# Patient Record
Sex: MALE | Race: WHITE | NOT HISPANIC OR LATINO | Employment: UNEMPLOYED | RURAL
[De-identification: names, ages, dates, MRNs, and addresses within clinical notes are randomized per-mention and may not be internally consistent; named-entity substitution may affect disease eponyms.]

---

## 2017-07-27 ENCOUNTER — TRANSCRIBE ORDERS (OUTPATIENT)
Dept: PHYSICAL THERAPY | Facility: HOSPITAL | Age: 37
End: 2017-07-27

## 2017-07-27 DIAGNOSIS — Z96.641 PRESENCE OF ARTIFICIAL HIP JOINT, RIGHT: Primary | ICD-10-CM

## 2017-08-07 ENCOUNTER — HOSPITAL ENCOUNTER (OUTPATIENT)
Dept: PHYSICAL THERAPY | Facility: HOSPITAL | Age: 37
Setting detail: THERAPIES SERIES
Discharge: HOME | End: 2017-08-07

## 2017-08-07 DIAGNOSIS — Z96.641 PRESENCE OF ARTIFICIAL HIP JOINT, RIGHT: Primary | ICD-10-CM

## 2017-08-07 PROCEDURE — 97161 PT EVAL LOW COMPLEX 20 MIN: CPT | Performed by: PHYSICAL THERAPIST

## 2017-08-07 PROCEDURE — 97110 THERAPEUTIC EXERCISES: CPT | Performed by: PHYSICAL THERAPIST
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
## 2018-01-29 ENCOUNTER — DOCUMENTATION (OUTPATIENT)
Dept: PHYSICAL THERAPY | Facility: HOSPITAL | Age: 38
End: 2018-01-29

## 2018-01-29 DIAGNOSIS — Z96.641 PRESENCE OF ARTIFICIAL HIP JOINT, RIGHT: Primary | ICD-10-CM

## 2018-01-29 NOTE — THERAPY DISCHARGE NOTE
Outpatient Physical Therapy Discharge Summary         Patient Name: Rikki Alejandro  : 1980  MRN: [id number]    Today's Date: 2018    Visit Dx:    ICD-10-CM ICD-9-CM   1. Presence of artificial hip joint, right Z96.641 V43.64             PT OP Goals       18 1300       PT Short Term Goals    STG Date to Achieve 17  -KG     STG 1 Indep in HEP  -KG     STG 1 Progress Not Met  -KG     STG 2 Tolerate 45 minute treatment session without increased pain  -KG     STG 2 Progress Not Met  -KG     STG 3 Ambulate independently with no AD, non antalgic gait  -KG     STG 3 Progress Not Met  -KG     STG 4 Demonstrate R hip flex AROM to 90 deg  -KG     STG 4 Progress Not Met  -KG     Long Term Goals    LTG Date to Achieve 17  -KG     LTG 1 Subjectively report 60% improvement or greater  -KG     LTG 1 Progress Not Met  -KG     LTG 2 LEFS score to 45/80 or greater  -KG     LTG 2 Progress Not Met  -KG     LTG 3 Demonstrate R hip flex/abd MMT to 5/5  -KG     LTG 3 Progress Not Met  -KG     LTG 4 Demonstrate R hip flex AROM to 100 deg  -KG     LTG 4 Progress Not Met  -KG     LTG 5 Demonstrate R hip abd AROM to 30 deg  -KG     LTG 5 Progress Not Met  -KG     LTG 6 Demonstrate ability to lift/carry 50# with no difficulty demonstrating good body mechanics  -KG     LTG 6 Progress Not Met  -KG       User Key  (r) = Recorded By, (t) = Taken By, (c) = Cosigned By    Initials Name Provider Type    KG Ciera Anne, PT Physical Therapist          OP PT Discharge Summary  Date of Discharge: 18  Reason for Discharge: Non-compliant (Pt did not return to therapy after initial eval on 17.)  Outcomes Achieved: Unable to make functional progress toward goals at this time, Refer to plan of care for updates on goals achieved  Discharge Destination: Home with home program      Time Calculation:                    Ciera Anne, PT  2018

## 2018-07-11 ENCOUNTER — OFFICE VISIT (OUTPATIENT)
Dept: NEUROSURGERY | Facility: CLINIC | Age: 38
End: 2018-07-11

## 2018-07-11 VITALS — WEIGHT: 257 LBS | BODY MASS INDEX: 36.79 KG/M2 | HEIGHT: 70 IN

## 2018-07-11 DIAGNOSIS — M54.12 CERVICAL RADICULOPATHY: Primary | ICD-10-CM

## 2018-07-11 DIAGNOSIS — M51.36 DEGENERATION OF LUMBAR INTERVERTEBRAL DISC: ICD-10-CM

## 2018-07-11 DIAGNOSIS — Z87.891 FORMER TOBACCO USE: ICD-10-CM

## 2018-07-11 PROCEDURE — 99204 OFFICE O/P NEW MOD 45 MIN: CPT | Performed by: NEUROLOGICAL SURGERY

## 2018-07-11 RX ORDER — RANITIDINE 150 MG/1
150 TABLET ORAL NIGHTLY
COMMUNITY
End: 2020-11-18

## 2018-07-11 RX ORDER — LOSARTAN POTASSIUM 100 MG/1
TABLET ORAL
COMMUNITY
End: 2019-04-24 | Stop reason: SDUPTHER

## 2018-07-11 RX ORDER — GABAPENTIN 300 MG
CAPSULE ORAL
COMMUNITY
End: 2018-10-29

## 2018-07-11 RX ORDER — BACLOFEN 10 MG/1
10 TABLET ORAL 3 TIMES DAILY
COMMUNITY
Start: 2018-06-30 | End: 2018-10-29
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
## 2018-07-11 NOTE — PATIENT INSTRUCTIONS

## 2018-07-23 ENCOUNTER — APPOINTMENT (OUTPATIENT)
Dept: PHYSICAL THERAPY | Facility: HOSPITAL | Age: 38
End: 2018-07-23

## 2018-07-23 ENCOUNTER — HOSPITAL ENCOUNTER (OUTPATIENT)
Dept: PHYSICAL THERAPY | Facility: HOSPITAL | Age: 38
Setting detail: THERAPIES SERIES
Discharge: HOME | End: 2018-07-23

## 2018-07-23 DIAGNOSIS — M54.12 CERVICAL RADICULOPATHY: Primary | ICD-10-CM

## 2018-07-23 DIAGNOSIS — M51.36 DEGENERATION OF LUMBAR INTERVERTEBRAL DISC: ICD-10-CM

## 2018-07-23 PROCEDURE — 97161 PT EVAL LOW COMPLEX 20 MIN: CPT | Performed by: PHYSICAL THERAPIST

## 2018-07-23 PROCEDURE — 97110 THERAPEUTIC EXERCISES: CPT | Performed by: PHYSICAL THERAPIST
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
## 2018-07-30 ENCOUNTER — HOSPITAL ENCOUNTER (OUTPATIENT)
Dept: PHYSICAL THERAPY | Facility: HOSPITAL | Age: 38
Setting detail: THERAPIES SERIES
Discharge: HOME | End: 2018-07-30

## 2018-07-30 DIAGNOSIS — M51.36 DEGENERATION OF LUMBAR INTERVERTEBRAL DISC: ICD-10-CM

## 2018-07-30 DIAGNOSIS — M54.12 CERVICAL RADICULOPATHY: Primary | ICD-10-CM

## 2018-07-30 PROCEDURE — 97110 THERAPEUTIC EXERCISES: CPT | Performed by: PHYSICAL THERAPY ASSISTANT

## 2018-07-30 PROCEDURE — 97140 MANUAL THERAPY 1/> REGIONS: CPT | Performed by: PHYSICAL THERAPY ASSISTANT

## 2018-07-30 NOTE — THERAPY TREATMENT NOTE
Outpatient Physical Therapy Ortho Treatment Note  [facility]     Patient Name: Rikki Alejandro  : 1980  MRN: [id number]  Today's Date: 2018      Visit Date: 2018  Subjective Improvement:  N/A     Attendance:   Approved:   20 visits        MD follow up:   After PT        RC date:    18   Visit Dx:    ICD-10-CM ICD-9-CM   1. Cervical radiculopathy M54.12 723.4   2. Degeneration of lumbar intervertebral disc M51.36 722.52       Patient Active Problem List   Diagnosis   • Cervical radiculopathy   • Degeneration of lumbar intervertebral disc   • BMI 36.0-36.9,adult   • Former tobacco use        Past Medical History:   Diagnosis Date   • Arthritis    • Hypertension         Past Surgical History:   Procedure Laterality Date   • TOTAL HIP ARTHROPLASTY Bilateral 2017    Performed by Dr. Mejia in [place], [place]             PT Ortho     Row Name 18 1300       Subjective Pain    Pre-Treatment Pain Level 7  -    Post-Treatment Pain Level 6  -       Posture/Observations    Posture/Observations Comments AMB with [facility]  -      User Key  (r) = Recorded By, (t) = Taken By, (c) = Cosigned By    Initials Name Provider Type    GAB Morris PTA Physical Therapy Assistant                            PT Assessment/Plan     Row Name 18 1400          PT Assessment    Assessment Comments Pt tolerated tx poorly, Pt defers any cervical stretching secondary to increased pain, Pt tolerated suboccipital release but not STM or distraction to cervical region. Treatment stopped and put on MHP to try to decrease pain. Pt reports going to MD on wednesday and PTA encouraged Pt to communicate issues with continuing headache.  -        PT Plan    PT Frequency 2x/week;3x/week  -     PT Plan Comments encourage stretching, progress as able  -       User Key  (r) = Recorded By, (t) = Taken By, (c) = Cosigned By    Initials Name Provider Type    GAB Morris PTA Physical Therapy  "Assistant                Modalities     Row Name 07/30/18 1300             Moist Heat    MH Applied Yes  -      Location cervical  -      Rx Minutes 15 mins  -      MH S/P Rx Yes  -        User Key  (r) = Recorded By, (t) = Taken By, (c) = Cosigned By    Initials Name Provider Type    GAB Morris PTA Physical Therapy Assistant                Exercises     Row Name 07/30/18 1300             Subjective Comments    Subjective Comments Pt reports he has fallen 2 times in the last week, and everything has gotten worse. Pt reports he has had a headach for 8 days with no relief  -         Subjective Pain    Able to rate subjective pain? yes  -      Pre-Treatment Pain Level 7  -JH      Post-Treatment Pain Level 6  -JH         Exercise 1    Exercise Name 1 PRO ll UE  -      Time 1 10  -JH      Additional Comments L 1, 5' Fwd/ 5'rev  -         Exercise 2    Exercise Name 2 defers UT/levator stretching  -         Exercise 3    Exercise Name 3 seated HS stretch  -      Reps 3 2  -JH      Time 3 30\"  -        User Key  (r) = Recorded By, (t) = Taken By, (c) = Cosigned By    Initials Name Provider Type    GAB Morris PTA Physical Therapy Assistant                        Manual Rx (last 36 hours)      Manual Treatments     Row Name 07/30/18 1300             Manual Rx 1    Manual Rx 1 Location Cervical spine  -      Manual Rx 1 Type Gentle manual distraction, L UT & sub-occipital STM, PROM  -      Manual Rx 1 Duration 6'  -        User Key  (r) = Recorded By, (t) = Taken By, (c) = Cosigned By    Initials Name Provider Type    GAB Morris PTA Physical Therapy Assistant                PT OP Goals     Row Name 07/30/18 1400          PT Short Term Goals    STG Date to Achieve 08/13/18  -     STG 1 Independent/compliant with HEP  -     STG 1 Progress New  -     STG 2 Tolerate 45 minute treatment session without increased pain  -     STG 2 Progress New  -     STG 3 Demonstrate " independence in isometric TrA activities throughout treatment session  -     STG 3 Progress New  -     STG 4 Demonstrate lumbar flex AROM to knee joint line  -     STG 4 Progress New  -     STG 5 Demonstrate bilateral shoulder abd AROM to 130 deg  -     STG 5 Progress New  [facility]        Long Term Goals    LTG Date to Achieve 08/27/18  -     LTG 1 Subjectively report 60% improvement or greater  -     LTG 1 Progress New  -     LTG 2 Improve NDI score to 45% or less  -     LTG 2 Progress New  -     LTG 3 Demonstrate Bilateral hip flex MMT to 4+/5  -JH     LTG 3 Progress New  -     LTG 4 Demonstrate bilateral hip abd MMT to 4+/5  -JH     LTG 4 Progress New  -     LTG 5 Demonstrate bilateral shoulder flex/abd MMT to 4+/5  -JH     LTG 5 Progress New  -     LTG 6 Demonstrate bilateral cervical rotation AROM to 50 deg or greater  -     LTG 6 Progress New  -     LTG 7 Demonstrate independence in proper posture/body mechanics throughout treatment session  -     LTG 7 Progress New  -        Time Calculation    PT Goal Re-Cert Due Date 08/13/18  -       User Key  (r) = Recorded By, (t) = Taken By, (c) = Cosigned By    Initials Name Provider Type     Eliceo Morris PTA Physical Therapy Assistant                         Time Calculation:   Start Time: 1355  Stop Time: 1438  Time Calculation (min): 43 min  PT Non-Billable Time (min): 15 min  Therapy Suggested Charges     Code   Minutes Charges    None           Therapy Charges for Today     Code Description Service Date Service Provider Modifiers Qty    30335559629 HC PT THER PROC EA 15 MIN 7/30/2018 Eliceo Morris PTA GP 1    77480280527 HC PT MANUAL THERAPY EA 15 MIN 7/30/2018 Eliceo Morris PTA GP 1    65895938615 HC PT THER SUPP EA 15 MIN 7/30/2018 Eliceo Morris PTA GP 1                    Eliceo Morris PTA  7/30/2018

## 2018-08-01 ENCOUNTER — HOSPITAL ENCOUNTER (OUTPATIENT)
Dept: PHYSICAL THERAPY | Facility: HOSPITAL | Age: 38
Setting detail: THERAPIES SERIES
Discharge: HOME | End: 2018-08-01

## 2018-08-01 DIAGNOSIS — M54.12 CERVICAL RADICULOPATHY: Primary | ICD-10-CM

## 2018-08-01 DIAGNOSIS — M51.36 DEGENERATION OF LUMBAR INTERVERTEBRAL DISC: ICD-10-CM

## 2018-08-01 DIAGNOSIS — Z96.641 PRESENCE OF ARTIFICIAL HIP JOINT, RIGHT: ICD-10-CM

## 2018-08-01 PROCEDURE — G0283 ELEC STIM OTHER THAN WOUND: HCPCS

## 2018-08-01 PROCEDURE — 97110 THERAPEUTIC EXERCISES: CPT

## 2018-08-01 NOTE — THERAPY TREATMENT NOTE
Outpatient Physical Therapy Ortho Treatment Note  [facility]  Emperatriz Mims PTA  18  2:40 PM       Patient Name: Rikki Alejandro  : 1980  MRN: [id number]  Today's Date: 2018      Visit Date: 2018    Subjective Improvement: 0%       Attendance: 3/3  Approved: 20 visits          MD follow up: After PT          RC date: 18         Visit Dx:    ICD-10-CM ICD-9-CM   1. Cervical radiculopathy M54.12 723.4   2. Degeneration of lumbar intervertebral disc M51.36 722.52   3. Presence of artificial hip joint, right Z96.641 V43.64       Patient Active Problem List   Diagnosis   • Cervical radiculopathy   • Degeneration of lumbar intervertebral disc   • BMI 36.0-36.9,adult   • Former tobacco use        Past Medical History:   Diagnosis Date   • Arthritis    • Hypertension         Past Surgical History:   Procedure Laterality Date   • TOTAL HIP ARTHROPLASTY Bilateral 2017    Performed by Dr. Mejia in [place], [place]             PT Ortho     Row Name 18 1300       Precautions and Contraindications    Precautions/Limitations no known precautions/limitations  -KM    Precautions Hx of bilateral ant JULIUS  -KM       Posture/Observations    Posture/Observations Comments AMB with SPC  -KM    Row Name 18 1300       Subjective Pain    Pre-Treatment Pain Level 7  -JH    Post-Treatment Pain Level 6  -JH       Posture/Observations    Posture/Observations Comments AMB with SPC  -JH      User Key  (r) = Recorded By, (t) = Taken By, (c) = Cosigned By    Initials Name Provider Type     Eliceo Morris PTA Physical Therapy Assistant     Emperatriz Mims PTA Physical Therapy Assistant                            PT Assessment/Plan     Row Name 18 1300          PT Assessment    Functional Limitations Limitation in home management;Limitations in community activities;Performance in leisure activities;Performance in self-care ADL;Limitations in functional capacity and  "performance  -KM     Impairments Impaired flexibility;Impaired muscle endurance;Joint mobility;Muscle strength;Pain;Posture;Range of motion  -KM     Assessment Comments pt continues to tolerate treatment fair. pt educated on gentle stretching and not over doing it. pt requested no manual done to his neck due to it being painful and not helping. pt did benefit from IFC on both neck and lumbar regions.   -KM     Rehab Potential Good  -KM     Patient/caregiver participated in establishment of treatment plan and goals Yes  -KM     Patient would benefit from skilled therapy intervention Yes  -KM        PT Plan    PT Frequency 2x/week;3x/week  -KM     Predicted Duration of Therapy Intervention (Therapy Eval) 3-4 weeks  -KM     PT Plan Comments Cont to progress as pt can tolerate. Educate HEP  -KM       User Key  (r) = Recorded By, (t) = Taken By, (c) = Cosigned By    Initials Name Provider Type    RIMA Mims PTA Physical Therapy Assistant                Modalities     Row Name 08/01/18 1300             Moist Heat    MH Applied Yes  -KM      Location cervical and low back  -KM      Rx Minutes 15 mins  -KM      MH S/P Rx Yes  -KM         ELECTRICAL STIMULATION    Attended/Unattended Unattended  -KM      Stimulation Type IFC  -KM      Location/Electrode Placement/Other Cervial/lumbar  -KM      35053 - PT Electrical Stimulation (Manual) Minutes 15  -KM        User Key  (r) = Recorded By, (t) = Taken By, (c) = Cosigned By    Initials Name Provider Type    RIMA Mims PTA Physical Therapy Assistant                Exercises     Row Name 08/01/18 1300             Precautions    Existing Precautions/Restrictions no known precautions/restrictions  -KM         Subjective Comments    Subjective Comments pt states that today is a good day compared to others. pt states that he cannot tolerate manual treatments to his neck- states that \"each time we've done it- it doesnt help\"  -KM         Subjective Pain    " Able to rate subjective pain? yes  -KM      Pre-Treatment Pain Level 6   6/10 neck; 5/10 back  -KM      Post-Treatment Pain Level 5   5/10 neck 4/10 back  -KM         Aquatics    Aquatics performed? No  -KM         Exercise 1    Exercise Name 1 Seated HS S  -KM      Reps 1 2  -KM      Time 1 30 sec hold  -KM         Exercise 2    Exercise Name 2 Gentle seated piriformis S  -KM      Reps 2 2  -KM      Time 2 30 sec hold  -KM         Exercise 3    Exercise Name 3 scap squeezes  -KM      Sets 3 2  -KM      Reps 3 10  -KM      Time 3 5 sec hold  -KM         Exercise 4    Exercise Name 4 UT S  -KM      Reps 4 2  -KM      Time 4 30 sec hold  -KM         Exercise 5    Exercise Name 5 Levator S  -KM      Reps 5 2  -KM      Time 5 30 sec hold  -KM         Exercise 6    Exercise Name 6 Corner pec S  -KM      Reps 6 2  -KM      Time 6 30 sec hold  -KM         Exercise 7    Exercise Name 7 LTR  -KM      Sets 7 1  -KM      Reps 7 5  -KM      Time 7 5 sec hold  -KM         Exercise 8    Exercise Name 8 Hip add squeezes  -KM      Sets 8 2  -KM      Reps 8 10  -KM      Time 8 5 sec hold  -KM         Exercise 9    Exercise Name 9 HL hip abd  -KM      Sets 9 2  -KM      Reps 9 10  -KM      Additional Comments yellow  -KM        User Key  (r) = Recorded By, (t) = Taken By, (c) = Cosigned By    Initials Name Provider Type    RIMA Mims PTA Physical Therapy Assistant                        Manual Rx (last 36 hours)      Manual Treatments     Row Name 08/01/18 1300             Manual Rx 1    Manual Rx 1 Location pt deferred  -KM        User Key  (r) = Recorded By, (t) = Taken By, (c) = Cosigned By    Initials Name Provider Type    RIMA Mims PTA Physical Therapy Assistant                PT OP Goals     Row Name 08/01/18 1300          PT Short Term Goals    STG Date to Achieve 08/13/18  -KM     STG 1 Independent/compliant with HEP  -KM     STG 1 Progress New  -KM     STG 2 Tolerate 45 minute treatment session  without increased pain  -KM     STG 2 Progress New  -KM     STG 3 Demonstrate independence in isometric TrA activities throughout treatment session  -KM     STG 3 Progress New  -KM     STG 4 Demonstrate lumbar flex AROM to knee joint line  -KM     STG 4 Progress New  -KM     STG 5 Demonstrate bilateral shoulder abd AROM to 130 deg  -KM     STG 5 Progress New  -KM        Long Term Goals    LTG Date to Achieve 08/27/18  -KM     LTG 1 Subjectively report 60% improvement or greater  -KM     LTG 1 Progress New  -KM     LTG 2 Improve NDI score to 45% or less  -KM     LTG 2 Progress New  -KM     LTG 3 Demonstrate Bilateral hip flex MMT to 4+/5  -KM     LTG 3 Progress New  -KM     LTG 4 Demonstrate bilateral hip abd MMT to 4+/5  -KM     LTG 4 Progress New  -KM     LTG 5 Demonstrate bilateral shoulder flex/abd MMT to 4+/5  -KM     LTG 5 Progress New  -KM     LTG 6 Demonstrate bilateral cervical rotation AROM to 50 deg or greater  -KM     LTG 6 Progress New  -KM     LTG 7 Demonstrate independence in proper posture/body mechanics throughout treatment session  -KM     LTG 7 Progress New  -        Time Calculation    PT Goal Re-Cert Due Date 08/13/18  -KM       User Key  (r) = Recorded By, (t) = Taken By, (c) = Cosigned By    Initials Name Provider Type     Emperatriz Mims, PTA Physical Therapy Assistant          Therapy Education  Given: HEP, Symptoms/condition management, Pain management, Posture/body mechanics  Program: New  How Provided: Verbal, Demonstration, Written  Provided to: Patient  Level of Understanding: Teach back education performed, Verbalized, Demonstrated              Time Calculation:   Start Time: 1347  Stop Time: 1441  Time Calculation (min): 54 min  PT Non-Billable Time (min): 15 min  Total Timed Code Minutes- PT: 39 minute(s)  Therapy Suggested Charges     Code   Minutes Charges    36054 (CPT®) Hc Pt Neuromusc Re Education Ea 15 Min      25755 (CPT®) Hc Pt Ther Proc Ea 15 Min      85866 (CPT®)  Hc Gait Training Ea 15 Min      45323 (CPT®) Hc Pt Therapeutic Act Ea 15 Min      16606 (CPT®) Hc Pt Manual Therapy Ea 15 Min      54763 (CPT®) Hc Pt Ther Massage- Per 15 Min      32353 (CPT®) Hc Pt Iontophoresis Ea 15 Min      77553 (CPT®) Hc Pt Elec Stim Ea-Per 15 Min 15 1    81367 (CPT®) Hc Pt Ultrasound Ea 15 Min      46451 (CPT®) Hc Pt Self Care/Mgmt/Train Ea 15 Min      Total  15 1        Therapy Charges for Today     Code Description Service Date Service Provider Modifiers Qty    11169486664 HC PT THER PROC EA 15 MIN 8/1/2018 Emperatriz Raymond, PTA GP 3    92567684979 HC PT ELECTRICAL STIM UNATTENDED 8/1/2018 Emperatriz Raymond, PTA  1    00717572861 HC PT THER SUPP EA 15 MIN 8/1/2018 Emperatriz Raymond, PTA GP 1                    Emperatriz Raymond, PTA  8/1/2018

## 2018-08-07 ENCOUNTER — HOSPITAL ENCOUNTER (OUTPATIENT)
Dept: PHYSICAL THERAPY | Facility: HOSPITAL | Age: 38
Setting detail: THERAPIES SERIES
Discharge: HOME | End: 2018-08-07

## 2018-08-07 DIAGNOSIS — M54.12 CERVICAL RADICULOPATHY: Primary | ICD-10-CM

## 2018-08-07 DIAGNOSIS — M51.36 DEGENERATION OF LUMBAR INTERVERTEBRAL DISC: ICD-10-CM

## 2018-08-07 DIAGNOSIS — Z96.641 PRESENCE OF ARTIFICIAL HIP JOINT, RIGHT: ICD-10-CM

## 2018-08-07 PROCEDURE — G0283 ELEC STIM OTHER THAN WOUND: HCPCS

## 2018-08-07 PROCEDURE — 97110 THERAPEUTIC EXERCISES: CPT

## 2018-08-07 NOTE — THERAPY TREATMENT NOTE
Outpatient Physical Therapy Ortho Treatment Note  [facility]  Emperatriz Raymond PTA  18  2:51 PM       Patient Name: Rikki Alejandro  : 1980  MRN: [id number]  Today's Date: 2018      Visit Date: 2018    Subjective Improvement: 0%       Attendance:   Approved: 20 visits           MD follow up: After PT           RC date: 18         Visit Dx:    ICD-10-CM ICD-9-CM   1. Cervical radiculopathy M54.12 723.4   2. Degeneration of lumbar intervertebral disc M51.36 722.52   3. Presence of artificial hip joint, right Z96.641 V43.64       Patient Active Problem List   Diagnosis   • Cervical radiculopathy   • Degeneration of lumbar intervertebral disc   • BMI 36.0-36.9,adult   • Former tobacco use        Past Medical History:   Diagnosis Date   • Arthritis    • Hypertension         Past Surgical History:   Procedure Laterality Date   • TOTAL HIP ARTHROPLASTY Bilateral 2017    Performed by Dr. Mejia in [place], [place]             PT Ortho     Row Name 18 1300       Precautions and Contraindications    Precautions/Limitations no known precautions/limitations  -KM    Precautions Hx of bilateral ant JULIUS  -KM       Subjective Pain    Post-Treatment Pain Level 7   7/10 NECK 6/10 BACK  -KM       Posture/Observations    Posture/Observations Comments AMB with SPC  -KM      User Key  (r) = Recorded By, (t) = Taken By, (c) = Cosigned By    Initials Name Provider Type    Emperatriz Valdez PTA Physical Therapy Assistant                            PT Assessment/Plan     Row Name 18 1400          PT Assessment    Functional Limitations Limitation in home management;Limitations in community activities;Performance in leisure activities;Performance in self-care ADL;Limitations in functional capacity and performance  -KM     Impairments Impaired flexibility;Impaired muscle endurance;Joint mobility;Muscle strength;Pain;Posture;Range of motion  -KM     Assessment Comments  pt had increase pain/ numb and increase complaints about L shoulder today. pt encouraged to call MD if symptoms persist. pt had good contraction with TA isometrics. Fair tolerance stretches  -KM     Rehab Potential Good  -KM     Patient/caregiver participated in establishment of treatment plan and goals Yes  -KM     Patient would benefit from skilled therapy intervention Yes  -KM        PT Plan    PT Frequency 2x/week;3x/week  -KM     Predicted Duration of Therapy Intervention (Therapy Eval) 3-4 weeks  -KM     PT Plan Comments Possible tband mid rows/shoulder ext  -KM       User Key  (r) = Recorded By, (t) = Taken By, (c) = Cosigned By    Initials Name Provider Type    Emperatriz Valdez PTA Physical Therapy Assistant                Modalities     Row Name 08/07/18 1300             Moist Heat    MH Applied --  -KM      Location --  -KM      Rx Minutes --  -KM      MH S/P Rx --  -KM         Ice    Ice Applied Yes  -KM      Location cervical and low back  -KM      Rx Minutes 15 mins  -KM      Ice S/P Rx Yes  -KM         ELECTRICAL STIMULATION    Attended/Unattended Unattended  -KM      Stimulation Type IFC  -KM      Location/Electrode Placement/Other Cervial/lumbar  -KM        User Key  (r) = Recorded By, (t) = Taken By, (c) = Cosigned By    Initials Name Provider Type    Emperatriz Valdez PTA Physical Therapy Assistant                Exercises     Row Name 08/07/18 1300             Precautions    Existing Precautions/Restrictions no known precautions/restrictions  -KM         Subjective Comments    Subjective Comments pt states that he started a new medicine for his HA and the next morning after taking it he states he woke up with his L shoulder hurting and numb.   -KM         Subjective Pain    Able to rate subjective pain? yes  -KM      Pre-Treatment Pain Level 8   neck 8/10; back 7/10  -KM      Post-Treatment Pain Level 7   7/10 NECK 6/10 BACK  -KM         Aquatics    Aquatics performed? No  -KM          Exercise 1    Exercise Name 1 Seated HS S  -KM      Reps 1 2  -KM      Time 1 30 sec hold  -KM         Exercise 2    Exercise Name 2 Gentle seated piriformis S  -KM      Reps 2 2  -KM      Time 2 30 sec hold  -KM         Exercise 3    Exercise Name 3 scap squeezes  -KM      Sets 3 2  -KM      Reps 3 10  -KM      Time 3 5 sec hold  -KM         Exercise 4    Exercise Name 4 UT S  -KM      Reps 4 2  -KM      Time 4 30 sec hold  -KM         Exercise 5    Exercise Name 5 Levator S  -KM      Reps 5 2  -KM      Time 5 30 sec hold  -KM         Exercise 6    Exercise Name 6 Corner pec S  -KM      Reps 6 2  -KM      Time 6 30 sec hold  -KM      Additional Comments deferred due to increase shoulder pain  -KM         Exercise 7    Exercise Name 7 No moneys  -KM      Sets 7 2  -KM      Reps 7 10  -KM      Time 7 5 sec hold  -KM         Exercise 8    Exercise Name 8 Hip add squeezes  -KM      Sets 8 2  -KM      Reps 8 10  -KM      Time 8 5 sec hold  -KM         Exercise 9    Exercise Name 9 HL hip abd  -KM      Sets 9 2  -KM      Reps 9 10  -KM      Additional Comments red  -KM         Exercise 10    Exercise Name 10 Isometric TA   -KM      Sets 10 2  -KM      Reps 10 10  -KM      Time 10 5 sec hold  -KM        User Key  (r) = Recorded By, (t) = Taken By, (c) = Cosigned By    Initials Name Provider Type    Emperatriz aVldez PTA Physical Therapy Assistant                        Manual Rx (last 36 hours)      Manual Treatments     Row Name 08/07/18 1300             Manual Rx 1    Manual Rx 1 Location pt deferred  -KM        User Key  (r) = Recorded By, (t) = Taken By, (c) = Cosigned By    Initials Name Provider Type    Emperatriz Valdez PTA Physical Therapy Assistant                PT OP Goals     Row Name 08/07/18 1400 08/07/18 1300       PT Short Term Goals    STG Date to Achieve  -- 08/13/18  -KM    STG 1  -- Independent/compliant with HEP  -KM    STG 1 Progress  -- Ongoing  -KM    STG 2  -- Tolerate 45  minute treatment session without increased pain  -[facility] 2 Progress  -- Ongoing  -KM    STG 3  -- Demonstrate independence in isometric TrA activities throughout treatment session  -[facility] 3 Progress  -- Ongoing  -KM    STG 4  -- Demonstrate lumbar flex AROM to knee joint line  -[facility] 4 Progress  -- Ongoing  -[facility] 5  -- Demonstrate bilateral shoulder abd AROM to 130 deg  -[facility] 5 Progress  -- Ongoing  -KM       Long Term Goals    LTG Date to Achieve  -- 08/27/18  -    LTG 1  -- Subjectively report 60% improvement or greater  -    LTG 1 Progress  -- Ongoing  -    LTG 2  -- Improve NDI score to 45% or less  -    LT 2 Progress  -- Ongoing  -    LTG 3  -- Demonstrate Bilateral hip flex MMT to 4+/5  -KM    LTG 3 Progress  -- Ongoing  -    LTG 4  -- Demonstrate bilateral hip abd MMT to 4+/5  -KM    LTG 4 Progress  -- Ongoing  -    LTG 5  -- Demonstrate bilateral shoulder flex/abd MMT to 4+/5  -KM    LTG 5 Progress  -- Ongoing  -    LTG 6  -- Demonstrate bilateral cervical rotation AROM to 50 deg or greater  -    LT 6 Progress  -- Ongoing  -    LTG 7  -- Demonstrate independence in proper posture/body mechanics throughout treatment session  -    LT 7 Progress  -- Ongoing  -       Time Calculation    PT Goal Re-Cert Due Date 08/13/18  -  --      User Key  (r) = Recorded By, (t) = Taken By, (c) = Cosigned By    Initials Name Provider Type     Emperatriz Mims, PTA Physical Therapy Assistant          Therapy Education  Given: HEP, Symptoms/condition management, Pain management, Posture/body mechanics  Program: New  How Provided: Verbal, Demonstration, Written  Provided to: Patient  Level of Understanding: Teach back education performed, Verbalized, Demonstrated              Time Calculation:   Start Time: 1350  Stop Time: 1445  Time Calculation (min): 55 min  PT Non-Billable Time (min): 15 min  Total Timed Code Minutes- PT: 40 minute(s)  Therapy Suggested Charges      Code   Minutes Charges    None           Therapy Charges for Today     Code Description Service Date Service Provider Modifiers Qty    27885715522 HC PT THER PROC EA 15 MIN 8/7/2018 Emperatriz Raymond, PTA GP 3    90718839876 HC PT ELECTRICAL STIM UNATTENDED 8/7/2018 Emperatriz Raymond, PTA  1    68736364581 HC PT THER SUPP EA 15 MIN 8/7/2018 Emperatriz Raymond, PTA GP 1                    Emperatriz Raymond, ANGE  8/7/2018

## 2018-08-09 ENCOUNTER — HOSPITAL ENCOUNTER (OUTPATIENT)
Dept: PHYSICAL THERAPY | Facility: HOSPITAL | Age: 38
Setting detail: THERAPIES SERIES
Discharge: HOME | End: 2018-08-09

## 2018-08-09 DIAGNOSIS — Z96.641 PRESENCE OF ARTIFICIAL HIP JOINT, RIGHT: ICD-10-CM

## 2018-08-09 DIAGNOSIS — M54.12 CERVICAL RADICULOPATHY: Primary | ICD-10-CM

## 2018-08-09 DIAGNOSIS — M51.36 DEGENERATION OF LUMBAR INTERVERTEBRAL DISC: ICD-10-CM

## 2018-08-09 PROCEDURE — 97113 AQUATIC THERAPY/EXERCISES: CPT

## 2018-08-09 NOTE — THERAPY TREATMENT NOTE
Outpatient Physical Therapy Ortho Treatment Note  [facility]  Emperatriz Raymond PTA  18  2:40 PM       Patient Name: Rikki Alejandro  : 1980  MRN: [id number]  Today's Date: 2018      Visit Date: 2018    Subjective Improvement: 0%      Attendance:   Approved: 20 visits           MD follow up: after PT          RC date: 18         Visit Dx:    ICD-10-CM ICD-9-CM   1. Cervical radiculopathy M54.12 723.4   2. Degeneration of lumbar intervertebral disc M51.36 722.52   3. Presence of artificial hip joint, right Z96.641 V43.64       Patient Active Problem List   Diagnosis   • Cervical radiculopathy   • Degeneration of lumbar intervertebral disc   • BMI 36.0-36.9,adult   • Former tobacco use        Past Medical History:   Diagnosis Date   • Arthritis    • Hypertension         Past Surgical History:   Procedure Laterality Date   • TOTAL HIP ARTHROPLASTY Bilateral 2017    Performed by Dr. Mejia in [place], [place]             PT Ortho     Row Name 18 1300       Precautions and Contraindications    Precautions/Limitations no known precautions/limitations  -KM    Precautions Hx of bilateral ant JULIUS  -KM       Subjective Pain    Able to rate subjective pain? yes  -KM    Pre-Treatment Pain Level 7  -KM    Post-Treatment Pain Level 6  -KM       Posture/Observations    Posture/Observations Comments AMB with SPC  -KM    Row Name 18 1300       Precautions and Contraindications    Precautions/Limitations no known precautions/limitations  -KM    Precautions Hx of bilateral ant JULIUS  -KM       Subjective Pain    Post-Treatment Pain Level 7   7/10 NECK 6/10 BACK  -KM       Posture/Observations    Posture/Observations Comments AMB with SPC  -KM      User Key  (r) = Recorded By, (t) = Taken By, (c) = Cosigned By    Initials Name Provider Type    Emperatriz Valdez PTA Physical Therapy Assistant                            PT Assessment/Plan     Row Name 18 1400           PT Assessment    Functional Limitations Limitation in home management;Limitations in community activities;Performance in leisure activities;Performance in self-care ADL;Limitations in functional capacity and performance  -KM     Impairments Impaired flexibility;Impaired muscle endurance;Joint mobility;Muscle strength;Pain;Posture;Range of motion  -KM     Assessment Comments pt had no complaints of increase pain with any aquatic therex. pt able to complete all therex with some cues for posture and TA contraction. Decrease in pain post aquatic treatment  -KM     Rehab Potential Good  -KM     Patient/caregiver participated in establishment of treatment plan and goals Yes  -KM     Patient would benefit from skilled therapy intervention Yes  -KM        PT Plan    PT Frequency 2x/week;3x/week  -KM     Predicted Duration of Therapy Intervention (Therapy Eval) 3-4 weeks  -KM     PT Plan Comments Float steps with YRF next aquatics  -KM       User Key  (r) = Recorded By, (t) = Taken By, (c) = Cosigned By    Initials Name Provider Type    Emperatriz Valdez, PTA Physical Therapy Assistant                    Exercises     Row Name 08/09/18 1300             Precautions    Existing Precautions/Restrictions no known precautions/restrictions  -KM         Subjective Comments    Subjective Comments pt states that he cannot get his neck to stop hurting  -KM         Subjective Pain    Able to rate subjective pain? yes  -KM      Pre-Treatment Pain Level 7  -KM      Post-Treatment Pain Level 6  -KM         Aquatics    Aquatics performed? Yes  -KM         Exercise 1    Exercise Name 1 AQ fwd/bkw walk  -KM      Time 1 4 min  -KM         Exercise 2    Exercise Name 2 AQ lat side stepping  -KM      Time 2 4 min  -KM         Exercise 3    Exercise Name 3 AQ truck rot w/ noodle  -KM      Sets 3 2  -KM      Reps 3 10  -KM         Exercise 4    Exercise Name 4 AQ TA + push/pull  -KM      Sets 4 2  -KM      Reps 4 10  -KM       Additional Comments M cookie  -KM         Exercise 5    Exercise Name 5 AQ TA + push down  -KM      Sets 5 2  -KM      Reps 5 10  -KM      Additional Comments M cookie  -KM         Exercise 6    Exercise Name 6 AQ shoulder flex/ext  -KM      Sets 6 2  -KM      Reps 6 10  -KM         Exercise 7    Exercise Name 7 AQ shoulder abd/add  -KM      Sets 7 2  -KM      Reps 7 10  -KM         Exercise 8    Exercise Name 8 AQ shoulder horizontal abd/  -KM      Sets 8 2  -KM      Reps 8 10  -KM         Exercise 9    Exercise Name 9 AQ mini squats  -KM      Sets 9 2  -KM      Reps 9 10  -KM         Exercise 10    Exercise Name 10 AQ SLR 3 way  -KM      Sets 10 2  -KM      Reps 10 10  -KM         Exercise 11    Exercise Name 11 Deep end:  bicycle  -KM      Time 11 3 min  -KM         Exercise 12    Exercise Name 12 Deep end scissor  -KM      Time 12 3 min  -KM         Exercise 13    Exercise Name 13 Deep end hang  -KM      Time 13 5 min  -KM        User Key  (r) = Recorded By, (t) = Taken By, (c) = Cosigned By    Initials Name Provider Type    Emperatriz Valdez, PTA Physical Therapy Assistant                               PT OP Goals     Row Name 08/09/18 1400          PT Short Term Goals    STG Date to Achieve 08/13/18  -KM     STG 1 Independent/compliant with HEP  -KM     STG 1 Progress Ongoing  -KM     STG 2 Tolerate 45 minute treatment session without increased pain  -KM     STG 2 Progress Ongoing  -KM     STG 3 Demonstrate independence in isometric TrA activities throughout treatment session  -KM     STG 3 Progress Ongoing  -KM     STG 4 Demonstrate lumbar flex AROM to knee joint line  -KM     STG 4 Progress Ongoing  -KM     STG 5 Demonstrate bilateral shoulder abd AROM to 130 deg  -KM     STG 5 Progress Ongoing  -KM        Long Term Goals    LTG Date to Achieve 08/27/18  -KM     LTG 1 Subjectively report 60% improvement or greater  -KM     LTG 1 Progress Ongoing  -KM     LTG 2 Improve NDI score to 45% or less  -KM      LTG 2 Progress Ongoing  -KM     LTG 3 Demonstrate Bilateral hip flex MMT to 4+/5  -KM     LTG 3 Progress Ongoing  -KM     LTG 4 Demonstrate bilateral hip abd MMT to 4+/5  -KM     LTG 4 Progress Ongoing  -KM     LTG 5 Demonstrate bilateral shoulder flex/abd MMT to 4+/5  -KM     LTG 5 Progress Ongoing  -KM     LTG 6 Demonstrate bilateral cervical rotation AROM to 50 deg or greater  -KM     LTG 6 Progress Ongoing  -KM     LTG 7 Demonstrate independence in proper posture/body mechanics throughout treatment session  -     LTG 7 Progress Ongoing  -KM        Time Calculation    PT Goal Re-Cert Due Date 08/13/18  -KM       User Key  (r) = Recorded By, (t) = Taken By, (c) = Cosigned By    Initials Name Provider Type     Emperatriz Raymond PTA Physical Therapy Assistant          Therapy Education  Given: HEP, Symptoms/condition management, Pain management, Posture/body mechanics  Program: New  How Provided: Verbal, Demonstration, Written  Provided to: Patient  Level of Understanding: Teach back education performed, Verbalized, Demonstrated              Time Calculation:   Start Time: 1348  Stop Time: 1430  Time Calculation (min): 42 min  Total Timed Code Minutes- PT: 42 minute(s)  Therapy Suggested Charges     Code   Minutes Charges    None           Therapy Charges for Today     Code Description Service Date Service Provider Modifiers Qty    84068635045 HC PT AQUATIC THERAPY EA 15 MIN 8/9/2018 Emperatriz Raymond PTA GP 3                    Emperatriz Raymond PTA  8/9/2018

## 2018-08-13 ENCOUNTER — HOSPITAL ENCOUNTER (OUTPATIENT)
Dept: PHYSICAL THERAPY | Facility: HOSPITAL | Age: 38
Setting detail: THERAPIES SERIES
Discharge: HOME | End: 2018-08-13

## 2018-08-13 DIAGNOSIS — M54.12 CERVICAL RADICULOPATHY: Primary | ICD-10-CM

## 2018-08-13 DIAGNOSIS — M51.36 DEGENERATION OF LUMBAR INTERVERTEBRAL DISC: ICD-10-CM

## 2018-08-13 DIAGNOSIS — Z96.641 PRESENCE OF ARTIFICIAL HIP JOINT, RIGHT: ICD-10-CM

## 2018-08-13 PROCEDURE — 97113 AQUATIC THERAPY/EXERCISES: CPT

## 2018-08-13 NOTE — THERAPY TREATMENT NOTE
Outpatient Physical Therapy Ortho Treatment Note  [facility]  Emperatriz Raymond PTA  18  3:27 PM       Patient Name: Rikki Alejandro  : 1980  MRN: [id number]  Today's Date: 2018      Visit Date: 2018    Subjective Improvement: 0%      Attendance:    Approved: 20 visits           MD follow up: after PT          RC date: 18        Visit Dx:    ICD-10-CM ICD-9-CM   1. Cervical radiculopathy M54.12 723.4   2. Degeneration of lumbar intervertebral disc M51.36 722.52   3. Presence of artificial hip joint, right Z96.641 V43.64       Patient Active Problem List   Diagnosis   • Cervical radiculopathy   • Degeneration of lumbar intervertebral disc   • BMI 36.0-36.9,adult   • Former tobacco use        Past Medical History:   Diagnosis Date   • Arthritis    • Hypertension         Past Surgical History:   Procedure Laterality Date   • TOTAL HIP ARTHROPLASTY Bilateral 2017    Performed by Dr. Mejia in [place], [place]             PT Ortho     Row Name 18 1400       Subjective Comments    Subjective Comments pt states that he really overdid it this weekend. States that he has done something to his lower leg that is hurting him but doesn't know how he did it.  -KM       Precautions and Contraindications    Precautions/Limitations no known precautions/limitations  -KM    Precautions Hx of bilateral ant JULIUS  -KM       Subjective Pain    Able to rate subjective pain? yes  -KM    Pre-Treatment Pain Level 9   neck 9/10 Back 8/10  -KM       Posture/Observations    Posture/Observations Comments AMB with SPC  -KM      User Key  (r) = Recorded By, (t) = Taken By, (c) = Cosigned By    Initials Name Provider Type    Emperatriz Valdez PTA Physical Therapy Assistant                            PT Assessment/Plan     Row Name 18 1400          PT Assessment    Functional Limitations Limitation in home management;Limitations in community activities;Performance in leisure  activities;Performance in self-care ADL;Limitations in functional capacity and performance  -KM     Impairments Impaired flexibility;Impaired muscle endurance;Joint mobility;Muscle strength;Pain;Posture;Range of motion  -KM     Assessment Comments pt complained of R leg pain today. pt not able to complete all 20 reps of several exercises. pt expresses his want to continue pool treatments due to helping him move better  -KM     Rehab Potential Good  -KM     Patient/caregiver participated in establishment of treatment plan and goals Yes  -KM     Patient would benefit from skilled therapy intervention Yes  -KM        PT Plan    PT Frequency 2x/week;3x/week  -KM     Predicted Duration of Therapy Intervention (Therapy Eval) 3-4 weeks  -KM     PT Plan Comments Possible YRF SLR 3 way  -KM       User Key  (r) = Recorded By, (t) = Taken By, (c) = Cosigned By    Initials Name Provider Type    Emperatriz Valdez, PTA Physical Therapy Assistant                    Exercises     Row Name 08/13/18 1400             Precautions    Existing Precautions/Restrictions no known precautions/restrictions  -KM         Subjective Comments    Subjective Comments pt states that he really overdid it this weekend. States that he has done something to his lower leg that is hurting him but doesn't know how he did it.  -KM         Subjective Pain    Able to rate subjective pain? yes  -KM      Pre-Treatment Pain Level 9   neck 9/10 Back 8/10  -KM         Aquatics    Aquatics performed? Yes  -KM         Exercise 1    Exercise Name 1 AQ fwd/bkw walk  -KM      Time 1 4 min  -KM         Exercise 2    Exercise Name 2 AQ lat side stepping  -KM      Time 2 4 min  -KM         Exercise 3    Exercise Name 3 AQ truck rot w/ noodle  -KM      Sets 3 2  -KM      Reps 3 10  -KM         Exercise 4    Exercise Name 4 AQ TA + push/pull  -KM      Sets 4 2  -KM      Reps 4 10  -KM      Additional Comments large cookie  -KM         Exercise 5    Exercise Name 5  AQ TA + push down  -KM      Sets 5 2  -KM      Reps 5 10  -KM      Additional Comments large cookie  -KM         Exercise 6    Exercise Name 6 AQ shoulder flex/ext  -KM      Sets 6 2  -KM      Reps 6 10  -KM         Exercise 7    Exercise Name 7 AQ shoulder abd/add  -KM      Sets 7 2  -KM      Reps 7 10  -KM         Exercise 8    Exercise Name 8 AQ shoulder horizontal abd/  -KM      Sets 8 2  -KM      Reps 8 10  -KM         Exercise 9    Exercise Name 9 AQ mini squats  -KM      Sets 9 2  -KM      Reps 9 10  -KM         Exercise 10    Exercise Name 10 AQ SLR 3 way  -KM      Sets 10 2  -KM      Reps 10 10  -KM         Exercise 11    Exercise Name 11 Deep end:  bicycle  -KM      Time 11 3 min  -KM         Exercise 12    Exercise Name 12 Deep end scissor  -KM      Time 12 3 min  -KM         Exercise 13    Exercise Name 13 Deep end hang  -KM      Time 13 5 min  -KM        User Key  (r) = Recorded By, (t) = Taken By, (c) = Cosigned By    Initials Name Provider Type    Emperatriz Valdez, PTA Physical Therapy Assistant                               PT OP Goals     Row Name 08/13/18 1400          PT Short Term Goals    STG Date to Achieve 08/13/18  -KM     STG 1 Independent/compliant with HEP  -KM     STG 1 Progress Ongoing  -KM     STG 2 Tolerate 45 minute treatment session without increased pain  -KM     STG 2 Progress Ongoing  -KM     STG 3 Demonstrate independence in isometric TrA activities throughout treatment session  -KM     STG 3 Progress Ongoing  -KM     STG 4 Demonstrate lumbar flex AROM to knee joint line  -KM     STG 4 Progress Ongoing  -KM     STG 5 Demonstrate bilateral shoulder abd AROM to 130 deg  -KM     STG 5 Progress Ongoing  -KM        Long Term Goals    LTG Date to Achieve 08/27/18  -KM     LTG 1 Subjectively report 60% improvement or greater  -KM     LTG 1 Progress Ongoing  -KM     LTG 2 Improve NDI score to 45% or less  -KM     LTG 2 Progress Ongoing  -KM     LTG 3 Demonstrate Bilateral hip  flex MMT to 4+/5  -KM     LTG 3 Progress Ongoing  -KM     LTG 4 Demonstrate bilateral hip abd MMT to 4+/5  -KM     LTG 4 Progress Ongoing  -KM     LTG 5 Demonstrate bilateral shoulder flex/abd MMT to 4+/5  -KM     LTG 5 Progress Ongoing  -KM     LTG 6 Demonstrate bilateral cervical rotation AROM to 50 deg or greater  -KM     LTG 6 Progress Ongoing  -KM     LTG 7 Demonstrate independence in proper posture/body mechanics throughout treatment session  -KM     LTG 7 Progress Ongoing  -KM        Time Calculation    PT Goal Re-Cert Due Date 08/13/18  -KM       User Key  (r) = Recorded By, (t) = Taken By, (c) = Cosigned By    Initials Name Provider Type     Emperatriz Raymond PTA Physical Therapy Assistant          Therapy Education  Given: HEP, Symptoms/condition management, Pain management, Posture/body mechanics  Program: New  How Provided: Verbal, Demonstration, Written  Provided to: Patient  Level of Understanding: Teach back education performed, Verbalized, Demonstrated              Time Calculation:   Start Time: 1430  Stop Time: 1515  Time Calculation (min): 45 min  Total Timed Code Minutes- PT: 45 minute(s)  Therapy Suggested Charges     Code   Minutes Charges    None           Therapy Charges for Today     Code Description Service Date Service Provider Modifiers Qty    26421499699 HC PT AQUATIC THERAPY EA 15 MIN 8/13/2018 Emperatriz Raymond PTA GP 3                    Emperatriz Raymond PTA  8/13/2018

## 2018-08-16 ENCOUNTER — HOSPITAL ENCOUNTER (OUTPATIENT)
Dept: PHYSICAL THERAPY | Facility: HOSPITAL | Age: 38
Setting detail: THERAPIES SERIES
Discharge: HOME | End: 2018-08-16

## 2018-08-16 DIAGNOSIS — M51.36 DEGENERATION OF LUMBAR INTERVERTEBRAL DISC: ICD-10-CM

## 2018-08-16 DIAGNOSIS — M54.12 CERVICAL RADICULOPATHY: Primary | ICD-10-CM

## 2018-08-16 PROCEDURE — 97110 THERAPEUTIC EXERCISES: CPT | Performed by: PHYSICAL THERAPIST

## 2018-08-16 PROCEDURE — G0283 ELEC STIM OTHER THAN WOUND: HCPCS | Performed by: PHYSICAL THERAPIST

## 2018-08-17 NOTE — THERAPY PROGRESS REPORT/RE-CERT
"    Outpatient Physical Therapy Ortho Progress Note  [facility]     Patient Name: Rikki Alejandro  : 1980  MRN: [id number]  Today's Date: 2018      Visit Date: 2018     Subjective Improvement: Back 40%; Neck \"very little\"      Attendance:   Approved:   20 visits        MD follow up:    PRN/after PT  RC date:     18      Patient Active Problem List   Diagnosis   • Cervical radiculopathy   • Degeneration of lumbar intervertebral disc   • BMI 36.0-36.9,adult   • Former tobacco use        Past Medical History:   Diagnosis Date   • Arthritis    • Hypertension         Past Surgical History:   Procedure Laterality Date   • TOTAL HIP ARTHROPLASTY Bilateral 2017    Performed by Dr. Mejia in [place], [place]       Visit Dx:     ICD-10-CM ICD-9-CM   1. Cervical radiculopathy M54.12 723.4   2. Degeneration of lumbar intervertebral disc M51.36 722.52                 PT Ortho     Row Name 18 1500       Precautions and Contraindications    Precautions/Limitations no known precautions/limitations  -KG    Precautions Hx of bilateral ant JULIUS  -KG       Subjective Pain    Pre-Treatment Pain Level 8  -KG    Subjective Pain Comment 8/10 neck; 6.5/10  back  -KG       Posture/Observations    Posture/Observations Comments No acute distress. Ambulates with SPC in L hand. Antalgic gait with decreased stance time LLE. Increased guarding/pain with transfers.  -KG       Sensory Screen for Light Touch- Upper Quarter Clearing    C4 (posterior shoulder) Bilateral:;Intact  -KG    C5 (lateral upper arm) Left:;Diminished  -KG    C6 (tip of thumb) Bilateral:;Intact  -KG    C7 (tip of 3rd finger) Bilateral:;Intact  -KG    C8 (tip of 5th finger) Bilateral:;Intact  -KG    T1 (medial lower arm) Bilateral:;Intact  -KG       Cervical/Shoulder ROM Screen    Cervical flexion Impaired  -KG    Cervical extension Impaired  -KG    Cervical lateral flexion Impaired  -KG    Cervical rotation Impaired  -KG    Cervical " "quadrant (Spurling's) Impaired  -KG       Neural Tension Signs- Lower Quarter Clearing    SLR Left:;Positive  -KG       Lumbar ROM Screen- Lower Quarter Clearing    Lumbar Flexion Impaired   Fingertips to superior patella  -KG    Lumbar Extension Impaired  -KG    Lumbar Lateral Flexion Impaired   2\" above knee jt line  -KG       Head/Neck/Trunk    Neck Extension AROM 15  -KG    Neck Flexion AROM 25  -KG    Neck Lt Lateral Flexion AROM 15; pain LUE  -KG    Neck Rt Lateral Flexion AROM 18  -KG    Neck Lt Rotation AROM 30  -KG    Neck Rt Rotation AROM 30  -KG       Right Upper Ext    Rt Shoulder Abduction AROM 106  -KG    Rt Shoulder Flexion AROM 120  -KG       Left Upper Ext    Lt Shoulder Abduction AROM 90  -KG    Lt Shoulder Flexion AROM 100  -KG       Left Shoulder (Manual Muscle Testing)    Comment, MMT: Left Shoulder Flex & abd 4-/5 with pain L neck  -KG       Right Shoulder (Manual Muscle Testing)    Comment, MMT: Right Shoulder Flex & abd 4/5 with pain R side of neck  -KG       Left Hip (Manual Muscle Testing)    MMT, Left Hip: Manual Muscle Testing (MMT) Flex 4/5, abd 4-/5, add 4/5  -KG       Right Hip (Manual Muscle Testing)    MMT, Right Hip: Manual Muscle Testing (MMT) Flex 4/5, abd 4-/5, add 4/5  -KG       Left Knee (Manual Muscle Testing)    Comment, Left Knee: Manual Muscle Testing (MMT) Grossly 4+/5 flex & ext  -KG       Right Knee (Manual Muscle Testing)    Comment, Right Knee: Manual Muscle Testing (MMT) Grossly 4/5 flex & ext  -KG        Strength Right    # Reps 2  -KG    Right Rung 2  -KG    Right  Test 1 80  -KG    Right  Test 2 85  -KG     Strength Average Right 82.5  -KG        Strength Left    # Reps 2  -KG    Left Rung 2  -KG    Left  Test 1 45  -KG    Left  Test 2 45  -KG     Strength Average Left 45  -KG      User Key  (r) = Recorded By, (t) = Taken By, (c) = Cosigned By    Initials Name Provider Type    Ciera Blanca, PT Physical Therapist    "                               PT OP Goals     Row Name 08/16/18 1500          PT Short Term Goals    STG Date to Achieve 08/13/18  -KG     STG 1 Independent/compliant with HEP  -KG     STG 1 Progress Ongoing  -KG     STG 2 Tolerate 45 minute treatment session without increased pain  -KG     STG 2 Progress Ongoing  -KG     STG 3 Demonstrate independence in isometric TrA activities throughout treatment session  -KG     STG 3 Progress Ongoing  -KG     STG 4 Demonstrate lumbar flex AROM to knee joint line  -KG     STG 4 Progress Partially Met  -KG     STG 5 Demonstrate bilateral shoulder abd AROM to 130 deg  -KG     STG 5 Progress Ongoing  -KG        Long Term Goals    LTG Date to Achieve 08/27/18  -KG     LTG 1 Subjectively report 60% improvement or greater  -KG     LTG 1 Progress Ongoing  -KG     LTG 2 Improve NDI score to 45% or less  -KG     LTG 2 Progress Ongoing  -KG     LTG 3 Demonstrate Bilateral hip flex MMT to 4+/5  -KG     LTG 3 Progress Ongoing  -KG     LTG 4 Demonstrate bilateral hip abd MMT to 4+/5  -KG     LTG 4 Progress Ongoing  -KG     LTG 5 Demonstrate bilateral shoulder flex/abd MMT to 4+/5  -KG     LTG 5 Progress Ongoing  -KG     LTG 6 Demonstrate bilateral cervical rotation AROM to 50 deg or greater  -KG     LTG 6 Progress Ongoing  -KG     LTG 7 Demonstrate independence in proper posture/body mechanics throughout treatment session  -KG     LTG 7 Progress Ongoing  -KG        Time Calculation    PT Goal Re-Cert Due Date 09/06/18  -KG       User Key  (r) = Recorded By, (t) = Taken By, (c) = Cosigned By    Initials Name Provider Type    KG Ciera Anne, PT Physical Therapist                PT Assessment/Plan     Row Name 08/16/18 1500          PT Assessment    Functional Limitations Limitation in home management;Limitations in community activities;Performance in leisure activities;Performance in self-care ADL;Limitations in functional capacity and performance  -KG     Impairments Impaired  flexibility;Impaired muscle endurance;Joint mobility;Muscle strength;Pain;Posture;Range of motion  -KG     Assessment Comments Pt progressing slowly with PT at this time. Pt has poor tolerance to land activities which includes manual therapy to cervical spine and associated soft tissue structures. Overall pt reports 40% improvement in his back pain which corresponds with his increased tolerance to core activities vs. cervical activities. Pt however is tolerated aquatic therapy well. Pt able to participate in more activity and reports improvement in pain post aquatics. Slight improvements noted in lumbar/trunk mobility and hip strength but pt still quite limited functionally. Cervical/BUE symptoms not improving and cervical ROM was less and more painful this visit vs. initial evaluation. Educated pt to call MD about lack of progress with neck symptoms. Will continue to see pt for aquatics only at this time until activity tolerance improves and will attempt to transition to more land therex.  -KG     Rehab Potential Good  -KG     Patient/caregiver participated in establishment of treatment plan and goals Yes  -KG     Patient would benefit from skilled therapy intervention Yes  -KG        PT Plan    PT Frequency 2x/week  -KG     Predicted Duration of Therapy Intervention (Therapy Eval) 2-3 more weeks  -KG     PT Plan Comments Will continue to see pt for aquatics 2x/week until activity tolerance improves. Pt has 20 visits for year and has used 7. Monitor progress and refer back to MD if needed.  -KG       User Key  (r) = Recorded By, (t) = Taken By, (c) = Cosigned By    Initials Name Provider Type    KG Ciera Anne, PT Physical Therapist                Modalities     Row Name 08/16/18 1500             Precautions    Existing Precautions/Restrictions no known precautions/restrictions  -KG         Subjective Comments    Subjective Comments Pt states overall his back is about 40% improved and has seen little  "improvement in his neck. States he still has pain/burning in LUE and the pain his neck is not getting better. Enjoys the pool and can actually do exercises without increased pain. States that it's helped make him feel a little better about his situation.  -KG        User Key  (r) = Recorded By, (t) = Taken By, (c) = Cosigned By    Initials Name Provider Type    KG Ciera Anne, PT Physical Therapist              Exercises     Row Name 08/16/18 1500             Precautions    Existing Precautions/Restrictions no known precautions/restrictions  -KG         Subjective Comments    Subjective Comments Pt states overall his back is about 40% improved and has seen little improvement in his neck. States he still has pain/burning in LUE and the pain his neck is not getting better. Enjoys the pool and can actually do exercises without increased pain. States that it's helped make him feel a little better about his situation.  -KG         Subjective Pain    Able to rate subjective pain? yes  -KG      Pre-Treatment Pain Level 8  -KG      Subjective Pain Comment 8/10 neck; 6.5/10  back  -KG         Aquatics    Aquatics performed? No  -KG         Exercise 1    Exercise Name 1 Seated hamstring stretch  -KG      Cueing 1 Verbal  -KG      Reps 1 2  -KG      Time 1 30\" hold  -KG         Exercise 2    Exercise Name 2 Gentle seated piriformis S  -KG      Reps 2 2  -KG      Time 2 30 sec hold  -KG         Exercise 3    Exercise Name 3 Scap retraction  -KG      Additional Comments Increased pain; did not tolerate  -KG         Exercise 4    Exercise Name 4 Isometric TA supine  -KG      Cueing 4 Verbal  -KG      Sets 4 1  -KG      Reps 4 10  -KG      Time 4 5\" hold  -KG         Exercise 5    Exercise Name 5 HL hip add squeeze + TA  -KG      Cueing 5 Verbal  -KG      Sets 5 1  -KG      Reps 5 10  -KG         Exercise 6    Exercise Name 6 Seated Hip abd Tband  -KG      Cueing 6 Verbal  -KG      Sets 6 1  -KG      Reps 6 10  -KG      " Additional Comments Yellow tband  -KG        User Key  (r) = Recorded By, (t) = Taken By, (c) = Cosigned By    Initials Name Provider Type    Ciera Blanca, PT Physical Therapist                        Outcome Measure Options: Modifed [place], Neck Disability Index (NDI)  Modified Oswestry  Modified Oswestry Score/Comments: 54%  Neck Disability Index  Section 1 - Pain Intensity: The pain is fairly severe at the moment.  Section 2 - Personal Care: I need some help but manage most of my personal care.  Section 3 - Lifting: Pain prevents me from lifting heavy weights, but I can manage light weights if they are conveniently positioned.  Section 4 - Work: I can't do any work at all.  Section 5 - Headaches: I have moderate headaches that come infrequently.  Section 6 - Concentration: I have a lot of difficulty concentrating.  Section 7 - Sleeping: My sleep is greatly disturbed for up to 3-5 hours.  Section 8 - Driving: I can't drive as long as I want because of moderate neck pain.  Section 9 - Reading: I can't read as much as I want because of moderate neck pain.  Section 10 - Recreation: I can hardly do recreational activities due to neck pain.  Neck Disability Index Score: 33  Neck Disability Index Comments: 66%      Time Calculation:     Therapy Suggested Charges     Code   Minutes Charges    None             Start Time: 1520  Stop Time: 1617  Time Calculation (min): 57 min  Total Timed Code Minutes- PT: 42 minute(s)     Therapy Charges for Today     Code Description Service Date Service Provider Modifiers Qty    55594184411 HC PT THER PROC EA 15 MIN 8/16/2018 Ciera Anne, PT GP 3    42865396632 HC PT ELECTRICAL STIM UNATTENDED 8/16/2018 Ciera Anne, PT  1    67942834929 HC PT THER SUPP EA 15 MIN 8/16/2018 Ciera Anne, PT GP 1          PT G-Codes  Outcome Measure Options: Judy J[place], Neck Disability Index (NDI)         Ciera Anne PT  8/17/2018

## 2018-08-20 ENCOUNTER — HOSPITAL ENCOUNTER (OUTPATIENT)
Dept: PHYSICAL THERAPY | Facility: HOSPITAL | Age: 38
Setting detail: THERAPIES SERIES
Discharge: HOME | End: 2018-08-20

## 2018-08-20 DIAGNOSIS — Z96.641 PRESENCE OF ARTIFICIAL HIP JOINT, RIGHT: ICD-10-CM

## 2018-08-20 DIAGNOSIS — M51.36 DEGENERATION OF LUMBAR INTERVERTEBRAL DISC: ICD-10-CM

## 2018-08-20 DIAGNOSIS — M54.12 CERVICAL RADICULOPATHY: Primary | ICD-10-CM

## 2018-08-20 PROCEDURE — 97113 AQUATIC THERAPY/EXERCISES: CPT

## 2018-08-20 NOTE — THERAPY TREATMENT NOTE
Outpatient Physical Therapy Ortho Treatment Note  [facility]  Emperatriz Raymond PTA  18  4:59 PM       Patient Name: Rikki Alejandro  : 1980  MRN: [id number]  Today's Date: 2018      Visit Date: 2018    Subjective Improvement: back 40%; neck very little      Attendance:   Approved: Chaim ayers MD follow up: after PT          RC date: 18        Visit Dx:    ICD-10-CM ICD-9-CM   1. Cervical radiculopathy M54.12 723.4   2. Degeneration of lumbar intervertebral disc M51.36 722.52   3. Presence of artificial hip joint, right Z96.641 V43.64       Patient Active Problem List   Diagnosis   • Cervical radiculopathy   • Degeneration of lumbar intervertebral disc   • BMI 36.0-36.9,adult   • Former tobacco use        Past Medical History:   Diagnosis Date   • Arthritis    • Hypertension         Past Surgical History:   Procedure Laterality Date   • TOTAL HIP ARTHROPLASTY Bilateral 2017    Performed by Dr. Mejia in [place], [place]             PT Ortho     Row Name 18 1600       Precautions and Contraindications    Precautions/Limitations no known precautions/limitations  -KM    Precautions Hx of bilateral ant JULIUS  -KM       Subjective Pain    Able to rate subjective pain? yes  -KM    Pre-Treatment Pain Level 8   neck and back  -KM    Post-Treatment Pain Level 7   7/10 back 8/10 neck  -KM       Posture/Observations    Posture/Observations Comments No acute distress. Ambulates with SPC in L hand. Antalgic gait with decreased stance time LLE. Increased guarding/pain with transfers.  -KM      User Key  (r) = Recorded By, (t) = Taken By, (c) = Cosigned By    Initials Name Provider Type    Emperatriz Valdez PTA Physical Therapy Assistant                            PT Assessment/Plan     Row Name 18 1600          PT Assessment    Functional Limitations Limitation in home management;Limitations in community activities;Performance in leisure  activities;Performance in self-care ADL;Limitations in functional capacity and performance  -KM     Impairments Impaired flexibility;Impaired muscle endurance;Joint mobility;Muscle strength;Pain;Posture;Range of motion  -KM     Assessment Comments pt becoming more independent in aquatic treatment with only minor cues for form  -KM     Rehab Potential Good  -KM     Patient/caregiver participated in establishment of treatment plan and goals Yes  -KM     Patient would benefit from skilled therapy intervention Yes  -KM        PT Plan    PT Frequency 2x/week  -KM     Predicted Duration of Therapy Intervention (Therapy Eval) 2-3 more weeks  -KM     PT Plan Comments Try step ups in pool next aquatic treatment  -KM       User Key  (r) = Recorded By, (t) = Taken By, (c) = Cosigned By    Initials Name Provider Type    Emperatriz Valdez PTA Physical Therapy Assistant                    Exercises     Row Name 08/20/18 1600             Precautions    Existing Precautions/Restrictions no known precautions/restrictions  -KM         Subjective Comments    Subjective Comments pt states neck is not getting any better but seeing some improvement in the back  -KM         Subjective Pain    Able to rate subjective pain? yes  -KM      Pre-Treatment Pain Level 8   neck and back  -KM      Post-Treatment Pain Level 7   7/10 back 8/10 neck  -KM         Aquatics    Aquatics performed? Yes  -KM         Exercise 1    Exercise Name 1 AQ fwd/bkw walk  -KM      Time 1 4 min  -KM         Exercise 2    Exercise Name 2 AQ lat side stepping  -KM      Time 2 4 min  -KM         Exercise 3    Exercise Name 3 AQ truck rot w/ noodle  -KM      Sets 3 2  -KM      Reps 3 10  -KM         Exercise 4    Exercise Name 4 AQ TA + push/pull  -KM      Sets 4 2  -KM      Reps 4 10  -KM         Exercise 5    Exercise Name 5 AQ TA + push down  -KM      Sets 5 2  -KM      Reps 5 10  -KM         Exercise 6    Exercise Name 6 AQ shoulder flex/ext  -KM      Sets 6  2  -KM      Reps 6 10  -KM         Exercise 7    Exercise Name 7 AQ shoulder abd/add  -KM      Sets 7 2  -KM      Reps 7 10  -KM         Exercise 8    Exercise Name 8 AQ shoulder horizontal abd/  -KM      Sets 8 2  -KM      Reps 8 10  -KM         Exercise 9    Exercise Name 9 AQ mini squats  -KM      Sets 9 2  -KM      Reps 9 10  -KM         Exercise 10    Exercise Name 10 AQ SLR 3 way  -KM      Sets 10 2  -KM      Reps 10 10  -KM         Exercise 11    Exercise Name 11 Deep end:  bicycle  -KM      Time 11 3 min  -KM         Exercise 12    Exercise Name 12 Deep end scissor  -KM      Time 12 3 min  -KM         Exercise 13    Exercise Name 13 Deep end hang  -KM      Time 13 5 min  -KM        User Key  (r) = Recorded By, (t) = Taken By, (c) = Cosigned By    Initials Name Provider Type    Emperatriz Valdez, PTA Physical Therapy Assistant                               PT OP Goals     Row Name 08/20/18 1600          PT Short Term Goals    STG Date to Achieve 08/13/18  -KM     STG 1 Independent/compliant with HEP  -KM     STG 1 Progress Ongoing  -KM     STG 2 Tolerate 45 minute treatment session without increased pain  -KM     STG 2 Progress Ongoing  -KM     STG 3 Demonstrate independence in isometric TrA activities throughout treatment session  -KM     STG 3 Progress Ongoing  -KM     STG 4 Demonstrate lumbar flex AROM to knee joint line  -KM     STG 4 Progress Partially Met  -KM     STG 5 Demonstrate bilateral shoulder abd AROM to 130 deg  -KM     STG 5 Progress Ongoing  -KM        Long Term Goals    LTG Date to Achieve 08/27/18  -KM     LTG 1 Subjectively report 60% improvement or greater  -KM     LTG 1 Progress Ongoing  -KM     LTG 2 Improve NDI score to 45% or less  -KM     LTG 2 Progress Ongoing  -KM     LTG 3 Demonstrate Bilateral hip flex MMT to 4+/5  -KM     LTG 3 Progress Ongoing  -KM     LTG 4 Demonstrate bilateral hip abd MMT to 4+/5  -KM     LTG 4 Progress Ongoing  -KM     LTG 5 Demonstrate bilateral  shoulder flex/abd MMT to 4+/5  -KM     LTG 5 Progress Ongoing  -KM     LTG 6 Demonstrate bilateral cervical rotation AROM to 50 deg or greater  -KM     LTG 6 Progress Ongoing  -KM     LTG 7 Demonstrate independence in proper posture/body mechanics throughout treatment session  -     LTG 7 Progress Ongoing  -KM        Time Calculation    PT Goal Re-Cert Due Date 09/06/18  -KM       User Key  (r) = Recorded By, (t) = Taken By, (c) = Cosigned By    Initials Name Provider Type     Emperatriz Raymond PTA Physical Therapy Assistant          Therapy Education  Given: HEP, Symptoms/condition management, Pain management, Posture/body mechanics  Program: New  How Provided: Verbal, Demonstration, Written  Provided to: Patient  Level of Understanding: Teach back education performed, Verbalized, Demonstrated              Time Calculation:   Start Time: 1602  Stop Time: 1645  Time Calculation (min): 43 min  Total Timed Code Minutes- PT: 43 minute(s)  Therapy Suggested Charges     Code   Minutes Charges    None           Therapy Charges for Today     Code Description Service Date Service Provider Modifiers Qty    46618351876 HC PT AQUATIC THERAPY EA 15 MIN 8/20/2018 Emperatriz Raymond PTA GP 3                    Emperatriz Raymond PTA  8/20/2018

## 2018-08-27 ENCOUNTER — HOSPITAL ENCOUNTER (OUTPATIENT)
Dept: PHYSICAL THERAPY | Facility: HOSPITAL | Age: 38
Setting detail: THERAPIES SERIES
Discharge: HOME | End: 2018-08-27

## 2018-08-27 DIAGNOSIS — Z96.641 PRESENCE OF ARTIFICIAL HIP JOINT, RIGHT: ICD-10-CM

## 2018-08-27 DIAGNOSIS — M51.36 DEGENERATION OF LUMBAR INTERVERTEBRAL DISC: ICD-10-CM

## 2018-08-27 DIAGNOSIS — M54.12 CERVICAL RADICULOPATHY: Primary | ICD-10-CM

## 2018-08-27 PROCEDURE — 97113 AQUATIC THERAPY/EXERCISES: CPT

## 2018-08-27 NOTE — THERAPY TREATMENT NOTE
Outpatient Physical Therapy Ortho Treatment Note  [facility]  Emperatriz Raymond PTA  18  2:00 PM       Patient Name: Rikki Alejandro  : 1980  MRN: [id number]  Today's Date: 2018      Visit Date: 2018    Subjective Improvement: back 40%; neck very little      Attendance:    Approved: 20 visits           MD follow up: after PT          RC date: 18        Visit Dx:    ICD-10-CM ICD-9-CM   1. Cervical radiculopathy M54.12 723.4   2. Degeneration of lumbar intervertebral disc M51.36 722.52   3. Presence of artificial hip joint, right Z96.641 V43.64       Patient Active Problem List   Diagnosis   • Cervical radiculopathy   • Degeneration of lumbar intervertebral disc   • BMI 36.0-36.9,adult   • Former tobacco use        Past Medical History:   Diagnosis Date   • Arthritis    • Hypertension         Past Surgical History:   Procedure Laterality Date   • TOTAL HIP ARTHROPLASTY Bilateral 2017    Performed by Dr. Mejia in [place], [place]             PT Ortho     Row Name 18 1300       Subjective Comments    Subjective Comments pt states that he went to see MD and said that he has increase his Gabapentin dosage.  -KM       Precautions and Contraindications    Precautions/Limitations no known precautions/limitations  -KM    Precautions Hx of bilateral ant JULIUS  -KM       Subjective Pain    Able to rate subjective pain? yes  -KM    Pre-Treatment Pain Level 7   7/10 neck 6/10 bakc  -KM    Post-Treatment Pain Level 7   7/10 neck; 6/10 back  -KM       Posture/Observations    Posture/Observations Comments No acute distress. Ambulates with SPC in L hand. Antalgic gait with decreased stance time LLE. Increased guarding/pain with transfers.  -KM      User Key  (r) = Recorded By, (t) = Taken By, (c) = Cosigned By    Initials Name Provider Type     Emperatriz Raymond PTA Physical Therapy Assistant                            PT Assessment/Plan     Row Name 18 1300           PT Assessment    Functional Limitations Limitation in home management;Limitations in community activities;Performance in leisure activities;Performance in self-care ADL;Limitations in functional capacity and performance  -KM     Impairments Impaired flexibility;Impaired muscle endurance;Joint mobility;Muscle strength;Pain;Posture;Range of motion  -KM     Assessment Comments pt did very well with aquatics however pts pain level did not change. Good effort and seems to enjoy pool.   -KM     Rehab Potential Good  -KM     Patient/caregiver participated in establishment of treatment plan and goals Yes  -KM     Patient would benefit from skilled therapy intervention Yes  -KM        PT Plan    PT Frequency 2x/week  -KM     Predicted Duration of Therapy Intervention (Therapy Eval) 2-3 more weeks  -KM     PT Plan Comments Step ups in pool next visit  -KM       User Key  (r) = Recorded By, (t) = Taken By, (c) = Cosigned By    Initials Name Provider Type    Emperatriz Valdez PTA Physical Therapy Assistant                    Exercises     Row Name 08/27/18 1300             Precautions    Existing Precautions/Restrictions no known precautions/restrictions  -KM         Subjective Comments    Subjective Comments pt states that he went to see MD and said that he has increase his Gabapentin dosage.  -KM         Subjective Pain    Able to rate subjective pain? yes  -KM      Pre-Treatment Pain Level 7   7/10 neck 6/10 bakc  -KM      Post-Treatment Pain Level 7   7/10 neck; 6/10 back  -KM         Aquatics    Aquatics performed? Yes  -KM         Exercise 1    Exercise Name 1 AQ fwd/bkw walk  -KM      Time 1 4 min  -KM         Exercise 2    Exercise Name 2 AQ lat side stepping  -KM      Time 2 4 min  -KM         Exercise 3    Exercise Name 3 AQ truck rot w/ noodle  -KM      Sets 3 2  -KM      Reps 3 10  -KM         Exercise 4    Exercise Name 4 AQ TA + push/pull  -KM      Sets 4 2  -KM      Reps 4 10  -KM      Additional  Comments L cookie  -KM         Exercise 5    Exercise Name 5 AQ TA + push down  -KM      Sets 5 2  -KM      Reps 5 10  -KM      Additional Comments L cookie  -KM         Exercise 6    Exercise Name 6 AQ shoulder flex/ext  -KM      Sets 6 2  -KM      Reps 6 10  -KM      Additional Comments yellow paddle  -KM         Exercise 7    Exercise Name 7 AQ shoulder abd/add  -KM      Sets 7 2  -KM      Reps 7 10  -KM      Additional Comments yellow paddle  -KM         Exercise 8    Exercise Name 8 AQ shoulder horizontal abd/  -KM      Sets 8 2  -KM      Reps 8 10  -KM      Additional Comments yellow paddle  -KM         Exercise 9    Exercise Name 9 AQ mini squats  -KM      Sets 9 2  -KM      Reps 9 10  -KM         Exercise 10    Exercise Name 10 AQ SLR 3 way  -KM      Sets 10 2  -KM      Reps 10 10  -KM      Additional Comments Blue round float  -KM         Exercise 11    Exercise Name 11 Deep end:  bicycle  -KM      Time 11 3 min  -KM         Exercise 12    Exercise Name 12 Deep end scissor  -KM      Time 12 3 min  -KM         Exercise 13    Exercise Name 13 Deep end hang  -KM      Time 13 5 min  -KM         Exercise 14    Exercise Name 14 AQ march  -KM      Sets 14 2  -KM      Reps 14 10  -KM      Additional Comments BRF  -KM        User Key  (r) = Recorded By, (t) = Taken By, (c) = Cosigned By    Initials Name Provider Type    Emperatriz Valdez, PTA Physical Therapy Assistant                               PT OP Goals     Row Name 08/27/18 1300          PT Short Term Goals    STG Date to Achieve 08/13/18  -KM     STG 1 Independent/compliant with HEP  -KM     STG 1 Progress Ongoing  -KM     STG 2 Tolerate 45 minute treatment session without increased pain  -KM     STG 2 Progress Ongoing  -KM     STG 3 Demonstrate independence in isometric TrA activities throughout treatment session  -KM     STG 3 Progress Ongoing  -KM     STG 4 Demonstrate lumbar flex AROM to knee joint line  -KM     STG 4 Progress Partially Met  -KM      STG 5 Demonstrate bilateral shoulder abd AROM to 130 deg  -KM     STG 5 Progress Ongoing  -KM        Long Term Goals    LTG Date to Achieve 08/27/18  -KM     LTG 1 Subjectively report 60% improvement or greater  -KM     LTG 1 Progress Ongoing  -KM     LTG 2 Improve NDI score to 45% or less  -KM     LTG 2 Progress Ongoing  -KM     LTG 3 Demonstrate Bilateral hip flex MMT to 4+/5  -KM     LTG 3 Progress Ongoing  -KM     LTG 4 Demonstrate bilateral hip abd MMT to 4+/5  -KM     LTG 4 Progress Ongoing  -KM     LTG 5 Demonstrate bilateral shoulder flex/abd MMT to 4+/5  -KM     LTG 5 Progress Ongoing  -KM     LTG 6 Demonstrate bilateral cervical rotation AROM to 50 deg or greater  -KM     LTG 6 Progress Ongoing  -KM     LTG 7 Demonstrate independence in proper posture/body mechanics throughout treatment session  -KM     LTG 7 Progress Ongoing  -KM        Time Calculation    PT Goal Re-Cert Due Date 09/06/18  -KM       User Key  (r) = Recorded By, (t) = Taken By, (c) = Cosigned By    Initials Name Provider Type     Emperatriz Raymond PTA Physical Therapy Assistant          Therapy Education  Given: HEP, Symptoms/condition management, Pain management, Posture/body mechanics  Program: New  How Provided: Verbal, Demonstration, Written  Provided to: Patient  Level of Understanding: Teach back education performed, Verbalized, Demonstrated              Time Calculation:   Start Time: 1300  Stop Time: 1355  Time Calculation (min): 55 min  Total Timed Code Minutes- PT: 55 minute(s)  Therapy Suggested Charges     Code   Minutes Charges    None           Therapy Charges for Today     Code Description Service Date Service Provider Modifiers Qty    39098382601 HC PT AQUATIC THERAPY EA 15 MIN 8/27/2018 Emperatriz Raymond PTA GP 3                    Emperatriz Raymond PTA  8/27/2018

## 2018-08-29 ENCOUNTER — HOSPITAL ENCOUNTER (OUTPATIENT)
Dept: PHYSICAL THERAPY | Facility: HOSPITAL | Age: 38
Setting detail: THERAPIES SERIES
Discharge: HOME | End: 2018-08-29

## 2018-08-29 DIAGNOSIS — M54.12 CERVICAL RADICULOPATHY: Primary | ICD-10-CM

## 2018-08-29 DIAGNOSIS — Z96.641 PRESENCE OF ARTIFICIAL HIP JOINT, RIGHT: ICD-10-CM

## 2018-08-29 DIAGNOSIS — M51.36 DEGENERATION OF LUMBAR INTERVERTEBRAL DISC: ICD-10-CM

## 2018-08-29 PROCEDURE — 97113 AQUATIC THERAPY/EXERCISES: CPT

## 2018-08-29 NOTE — THERAPY TREATMENT NOTE
Outpatient Physical Therapy Ortho Treatment Note  [facility]  Emperatriz Raymond, PTA  18  1:48 PM       Patient Name: Rikki Alejandro  : 1980  MRN: [id number]  Today's Date: 2018      Visit Date: 2018    Subjective Improvement: 40% back; neck very little      Attendance: 10/10   Approved: 20 visits           MD follow up: afterPT            date: 18        Visit Dx:    ICD-10-CM ICD-9-CM   1. Cervical radiculopathy M54.12 723.4   2. Degeneration of lumbar intervertebral disc M51.36 722.52   3. Presence of artificial hip joint, right Z96.641 V43.64       Patient Active Problem List   Diagnosis   • Cervical radiculopathy   • Degeneration of lumbar intervertebral disc   • BMI 36.0-36.9,adult   • Former tobacco use        Past Medical History:   Diagnosis Date   • Arthritis    • Hypertension         Past Surgical History:   Procedure Laterality Date   • TOTAL HIP ARTHROPLASTY Bilateral 2017    Performed by Dr. Mejia in [place], [place]             PT Ortho     Row Name 18 1300       Precautions and Contraindications    Precautions/Limitations no known precautions/limitations  -KM    Precautions Hx of bilateral ant JULIUS  -KM       Subjective Pain    Able to rate subjective pain? yes  -KM    Pre-Treatment Pain Level 7   7/10 neck; 6/10 back  -KM    Post-Treatment Pain Level 7   7/10 neck; 6/10 back  -KM       Posture/Observations    Posture/Observations Comments No acute distress. Ambulates with SPC in L hand. Antalgic gait with decreased stance time LLE. Increased guarding/pain with transfers.  -KM    Row Name 18 1300       Subjective Comments    Subjective Comments pt states that he went to see MD and said that he has increase his Gabapentin dosage.  -KM       Precautions and Contraindications    Precautions/Limitations no known precautions/limitations  -KM    Precautions Hx of bilateral ant JULIUS  -KM       Subjective Pain    Able to rate subjective pain?  yes  -KM    Pre-Treatment Pain Level 7   7/10 neck 6/10 bakc  -KM    Post-Treatment Pain Level 7   7/10 neck; 6/10 back  -KM       Posture/Observations    Posture/Observations Comments No acute distress. Ambulates with SPC in L hand. Antalgic gait with decreased stance time LLE. Increased guarding/pain with transfers.  -KM      User Key  (r) = Recorded By, (t) = Taken By, (c) = Cosigned By    Initials Name Provider Type    Emperatriz Valdez PTA Physical Therapy Assistant                            PT Assessment/Plan     Row Name 08/29/18 1300          PT Assessment    Functional Limitations Limitation in home management;Limitations in community activities;Performance in leisure activities;Performance in self-care ADL;Limitations in functional capacity and performance  -KM     Impairments Impaired flexibility;Impaired muscle endurance;Joint mobility;Muscle strength;Pain;Posture;Range of motion  -KM     Assessment Comments pt did well overall with aquatics. pt able to increase therex intensity with no issues in pool. Good form and knowledge of exercises  -KM     Rehab Potential Good  -KM     Patient/caregiver participated in establishment of treatment plan and goals Yes  -KM     Patient would benefit from skilled therapy intervention Yes  -KM        PT Plan    PT Frequency 2x/week  -KM     Predicted Duration of Therapy Intervention (Therapy Eval) 2-3 more weeks  -KM     PT Plan Comments Recheck next week  -KM       User Key  (r) = Recorded By, (t) = Taken By, (c) = Cosigned By    Initials Name Provider Type    Emperatriz Valdez PTA Physical Therapy Assistant                    Exercises     Row Name 08/29/18 1300             Precautions    Existing Precautions/Restrictions no known precautions/restrictions  -KM         Subjective Comments    Subjective Comments pt states that there has been minimal change in pain  -KM         Subjective Pain    Able to rate subjective pain? yes  -KM       Pre-Treatment Pain Level 7   7/10 neck; 6/10 back  -KM      Post-Treatment Pain Level 7   7/10 neck; 6/10 back  -KM         Aquatics    Aquatics performed? Yes  -KM         Exercise 1    Exercise Name 1 AQ fwd/bkw walk  -KM      Time 1 4 min  -KM      Additional Comments w/ L cookie  -KM         Exercise 2    Exercise Name 2 AQ lat side stepping  -KM      Time 2 4 min  -KM      Additional Comments w/ L cookie  -KM         Exercise 3    Exercise Name 3 AQ truck rot w/ noodle  -KM      Sets 3 2  -KM      Reps 3 10  -KM         Exercise 4    Exercise Name 4 AQ TA + push/pull  -KM      Sets 4 2  -KM      Reps 4 10  -KM      Additional Comments Kickboard  -KM         Exercise 5    Exercise Name 5 AQ TA + push down  -KM      Sets 5 2  -KM      Reps 5 10  -KM      Additional Comments Kickboard  -KM         Exercise 6    Exercise Name 6 AQ shoulder flex/ext  -KM      Sets 6 2  -KM      Reps 6 10  -KM      Additional Comments yellow paddle  -KM         Exercise 7    Exercise Name 7 AQ shoulder abd/add  -KM      Sets 7 2  -KM      Reps 7 10  -KM      Additional Comments yellow paddle  -KM         Exercise 8    Exercise Name 8 AQ shoulder horizontal abd/  -KM      Sets 8 2  -KM      Reps 8 10  -KM      Additional Comments yellow paddle  -KM         Exercise 9    Exercise Name 9 AQ mini squats  -KM      Sets 9 2  -KM      Reps 9 10  -KM         Exercise 10    Exercise Name 10 AQ SLR 3 way  -KM      Sets 10 2  -KM      Reps 10 10  -KM      Additional Comments BRF  -KM         Exercise 11    Exercise Name 11 Deep end:  bicycle  -KM      Time 11 3 min  -KM      Additional Comments 2 noodles  -KM         Exercise 12    Exercise Name 12 Deep end scissor  -KM      Time 12 3 min  -KM      Additional Comments 2 noodles  -KM         Exercise 13    Exercise Name 13 Deep end hang  -KM      Time 13 5 min  -KM      Additional Comments 2 noodles  -KM         Exercise 14    Exercise Name 14 AQ march  -KM      Sets 14 2  -KM      Reps 14 10   -KM      Additional Comments BRF  -KM        User Key  (r) = Recorded By, (t) = Taken By, (c) = Cosigned By    Initials Name Provider Type    Emperatriz Valdez PTA Physical Therapy Assistant                               PT OP Goals     Row Name 08/29/18 1300          PT Short Term Goals    STG Date to Achieve 08/13/18  -KM     STG 1 Independent/compliant with HEP  -KM     STG 1 Progress Ongoing  -KM     STG 2 Tolerate 45 minute treatment session without increased pain  -KM     STG 2 Progress Ongoing  -KM     STG 3 Demonstrate independence in isometric TrA activities throughout treatment session  -KM     STG 3 Progress Ongoing  -KM     STG 4 Demonstrate lumbar flex AROM to knee joint line  -KM     STG 4 Progress Partially Met  -KM     STG 5 Demonstrate bilateral shoulder abd AROM to 130 deg  -KM     STG 5 Progress Ongoing  -KM        Long Term Goals    LTG Date to Achieve 08/27/18  -KM     LTG 1 Subjectively report 60% improvement or greater  -KM     LTG 1 Progress Ongoing  -KM     LTG 2 Improve NDI score to 45% or less  -KM     LTG 2 Progress Ongoing  -KM     LTG 3 Demonstrate Bilateral hip flex MMT to 4+/5  -KM     LTG 3 Progress Ongoing  -KM     LTG 4 Demonstrate bilateral hip abd MMT to 4+/5  -KM     LTG 4 Progress Ongoing  -KM     LTG 5 Demonstrate bilateral shoulder flex/abd MMT to 4+/5  -KM     LTG 5 Progress Ongoing  -KM     LTG 6 Demonstrate bilateral cervical rotation AROM to 50 deg or greater  -KM     LTG 6 Progress Ongoing  -KM     LTG 7 Demonstrate independence in proper posture/body mechanics throughout treatment session  -KM     LTG 7 Progress Ongoing  -KM        Time Calculation    PT Goal Re-Cert Due Date 09/06/18  -KM       User Key  (r) = Recorded By, (t) = Taken By, (c) = Cosigned By    Initials Name Provider Type    Emperatriz Valdez PTA Physical Therapy Assistant          Therapy Education  Given: HEP, Symptoms/condition management, Pain management, Posture/body  mechanics  Program: New  How Provided: Verbal, Demonstration, Written  Provided to: Patient  Level of Understanding: Teach back education performed, Verbalized, Demonstrated              Time Calculation:   Start Time: 1304  Stop Time: 1350  Time Calculation (min): 46 min  Total Timed Code Minutes- PT: 46 minute(s)  Therapy Suggested Charges     Code   Minutes Charges    None           Therapy Charges for Today     Code Description Service Date Service Provider Modifiers Qty    61980016419 HC PT AQUATIC THERAPY EA 15 MIN 8/29/2018 Emperatriz Raymond, PTA GP 3                    Emperatriz Raymond, PTA  8/29/2018

## 2018-09-19 ENCOUNTER — OFFICE VISIT (OUTPATIENT)
Dept: NEUROSURGERY | Facility: CLINIC | Age: 38
End: 2018-09-19

## 2018-09-19 DIAGNOSIS — M51.36 DEGENERATION OF LUMBAR INTERVERTEBRAL DISC: ICD-10-CM

## 2018-09-19 DIAGNOSIS — Z87.891 FORMER TOBACCO USE: ICD-10-CM

## 2018-09-19 DIAGNOSIS — M54.12 CERVICAL RADICULOPATHY: Primary | ICD-10-CM

## 2018-09-19 PROCEDURE — 99213 OFFICE O/P EST LOW 20 MIN: CPT | Performed by: NEUROLOGICAL SURGERY

## 2018-09-19 RX ORDER — TIZANIDINE 4 MG/1
4 TABLET ORAL 2 TIMES DAILY
COMMUNITY
End: 2018-12-29 | Stop reason: SDUPTHER

## 2018-09-19 RX ORDER — GABAPENTIN 600 MG/1
600 TABLET ORAL 3 TIMES DAILY
COMMUNITY
End: 2018-11-19 | Stop reason: SDUPTHER

## 2018-09-19 NOTE — PROGRESS NOTES
Chief complaint:   Chief Complaint   Patient presents with   • Follow-up     Patient complains of numbness in the L arm, starts at shoulder and stops at elbow. Burning is associated with this. Patient complains of weakness, and dropping objects a lot. He states that physical therapy has caused him to have vertigo.        Subjective     HPI:   From previous note:   Rikki Alejandro is a 37 y.o. male with no significant comorbidity. He presents with a new problem of interscapular pain and low back pain in the paraspinal distribution. Physical exam findings of neurologically intact although subjective complaints of bilateral hand and bilateral anterior thigh numbness.  His imaging shows minor degenerative changes of the cervical spine.     Differential Diagnosis: Facet arthropathy, Myofascial pain, axial back pain, much less likely radicular pain from degenerative disc disease  1. Cervical radiculopathy    2. Degeneration of lumbar intervertebral disc    3. BMI 36.0-36.9,adult    4. Former tobacco use          Recommendations:  Rikki's complaints of grinding and popping in his neck as well as paraspinal pain is most consistent with facet arthropathy.  I do not see any evidence of neurological compromise.  His bilateral hand numbness is unexplained however he complains of symptoms worsen the right than the left.  In this is not consistent with his cervical disc herniation which is on the left.  It is unlikely to be peripheral neuropathy as it is not a stocking and glove distribution worsen in the distal extremities.  Nor does it fit a peripheral nerve entrapment syndrome.  The patient has no radiographic pathology to explain her pain syndrome.    No surgical intervention indicated.  PT/OT, prescription provided to patient.  She was in agreement to pursue this.  Massage and Chiropractic as tolerated  Nonsteroidal anti-inflammatories  Referral to pain management, for epidural/facet injection if persistent neck pain after  physical therapy.      Interval History: Rikki is still having persistent neck pain that radiates into his left shoulder to his elbow.  This is not progress past his elbow.  It is also associated with numbness.  He has difficulty with passive range of motion.  He states that his pain is a burning sensation in his arm.  It is not sharp and shooting.  It is a 5 out of 10 in severity.  Currently Dr. Henao as running for his pain medication.  He has not had a shoulder evaluation or x-rays of his shoulder done.    Review of Systems   Constitutional: Positive for activity change.   HENT: Negative.    Eyes: Negative.    Respiratory: Negative.    Cardiovascular: Negative.    Gastrointestinal: Negative.    Endocrine: Negative.    Genitourinary: Negative.    Musculoskeletal: Positive for back pain and gait problem.   Skin: Negative.    Allergic/Immunologic: Negative.    Hematological: Negative.    Psychiatric/Behavioral: Negative.        PFSH:  Past Medical History:   Diagnosis Date   • Arthritis    • Hypertension        Past Surgical History:   Procedure Laterality Date   • TOTAL HIP ARTHROPLASTY Bilateral 2017    Performed by Dr. Mejia in [place], [place]       Objective      Current Outpatient Prescriptions   Medication Sig Dispense Refill   • gabapentin (NEURONTIN) 300 MG capsule Neurontin 300 mg capsule   Take 1 capsule every day by oral route at bedtime.     • gabapentin (NEURONTIN) 600 MG tablet Take 600 mg by mouth 3 (Three) Times a Day.     • losartan (COZAAR) 100 MG tablet losartan 100 mg tablet   Take 1 tablet every day by oral route.     • raNITIdine (ZANTAC) 150 MG tablet Take 150 mg by mouth Every Night.     • tiZANidine (ZANAFLEX) 4 MG tablet Take 4 mg by mouth 2 (Two) Times a Day.     • baclofen (LIORESAL) 10 MG tablet Take 10 mg by mouth 3 (Three) Times a Day.       No current facility-administered medications for this visit.        Vital Signs  There were no vitals taken for this visit.  Physical Exam    Constitutional: He is oriented to person, place, and time. He appears well-developed and well-nourished.   HENT:   Head: Normocephalic and atraumatic.   Eyes: Pupils are equal, round, and reactive to light. Conjunctivae and EOM are normal.   Fundoscopic exam:       The right eye shows no exudate, no hemorrhage and no papilledema.        The left eye shows no exudate, no hemorrhage and no papilledema.   Neck: Normal range of motion. Neck supple.   Cardiovascular:   Pulses:       Dorsalis pedis pulses are 2+ on the right side, and 2+ on the left side.        Posterior tibial pulses are 2+ on the right side, and 2+ on the left side.   Pulmonary/Chest: Effort normal. No respiratory distress.     Vascular Status -  His right foot exhibits no edema. His left foot exhibits no edema.  Neurological: He is oriented to person, place, and time. He has normal strength. Gait normal.   Skin: Skin is warm. No rash noted. No erythema.   Psychiatric: His speech is normal.     Neurologic Exam     Mental Status   Oriented to person, place, and time.   Speech: speech is normal     Cranial Nerves     CN II   Visual fields full to confrontation.     CN III, IV, VI   Pupils are equal, round, and reactive to light.  Extraocular motions are normal.     CN V   Right facial sensation deficit: none  Left facial sensation deficit: none    CN VII   Facial expression full, symmetric.     CN VIII   Hearing: intact    CN IX, X   Palate: symmetric    CN XI   Right sternocleidomastoid strength: normal  Left sternocleidomastoid strength: normal    CN XII   Tongue deviation: none    Motor Exam     Strength   Strength 5/5 throughout. Giveaway weakness of left upper extremity.     Sensory Exam   Right arm light touch: normal  Left arm light touch: normal  Nondermatomal distribution sensory loss in the left lateral arm without extension past the elbow.     Gait, Coordination, and Reflexes     Gait  Gait: normal    Reflexes   Reflexes 2+ except as noted.      (12 bullet pts)    Results Review:   Noncontrast MRI of the cervical spine reveals a very small asymmetric to the left C6/7 disc bulge without effacement of the nerve root of the spinal cord.  Additionally there is a noncontrast MRI with a very small central disc bulge at L5/S1 that again shows no effacement of her nerve root.      Assessment/Plan: Rikki Alejandro is a 37 y.o. male with no significant comorbidity. He presents with a new problem of interscapular pain and low back pain in the paraspinal distribution. Physical exam findings of neurologically intact although subjective complaints of bilateral hand and bilateral anterior thigh numbness.  His imaging shows small left disc herniation without effacement of spinal cord of nerve root.        1. Cervical radiculopathy    2. Degeneration of lumbar intervertebral disc    3. BMI 36.0-36.9,adult    4. Former tobacco use        Recommendations:  Rikki presents with predominantly left shoulder pain and numbness in his left arm.  This does not coordinate with either his C6 or C7 dermatome.  Differential diagnosis includes left shoulder pathology versus myofascial pain versus much less likely brachial plexitis or other neuropathy.  Since the patient's pain includes pain with passive range of motion, I would recommend a shoulder evaluation with xrays and possibly referral to othropedist.  In reviewing his cervical MRI I find no radiographic explanation for the patient's pain syndrome.  Therefore I recommend an EMG and nerve conduction study to rule out brachial plexitis.  I will review the studies and call the patient if there is any abnormal findings.  The patient has also requested a referral to pain management.  He states he is not never been to pain management before and would appreciate a referral closer to [place].    Patient has a BMI 35.0-39.9        Classification: class II obesity.  Therefore above normal parameters. Recommendations include: educational  material, exercise counseling and no follow-up required.       Rikki was seen today for follow-up.    Diagnoses and all orders for this visit:    Cervical radiculopathy  -     EMG & Nerve Conduction Test; Future  -     Ambulatory Referral to Pain Management    Degeneration of lumbar intervertebral disc  -     EMG & Nerve Conduction Test; Future  -     Ambulatory Referral to Pain Management    BMI 36.0-36.9,adult    Former tobacco use        Return if symptoms worsen or fail to improve.    Level of Risk: Moderate due to: two stable chronic illnesses  MDM: Moderate Complexity  (Mod = 26990, High = 06905)    Thank you, Thomas Henao MD for allowing me to continue to participate in the care of this patient.    Sincerely,  Onel Barnett MD

## 2018-10-02 ENCOUNTER — HOSPITAL ENCOUNTER (OUTPATIENT)
Dept: NEUROLOGY | Facility: HOSPITAL | Age: 38
Discharge: HOME | End: 2018-10-02
Attending: NEUROLOGICAL SURGERY | Admitting: NEUROLOGICAL SURGERY

## 2018-10-02 DIAGNOSIS — M51.36 DEGENERATION OF LUMBAR INTERVERTEBRAL DISC: ICD-10-CM

## 2018-10-02 DIAGNOSIS — M54.12 CERVICAL RADICULOPATHY: ICD-10-CM

## 2018-10-02 PROCEDURE — 95909 NRV CNDJ TST 5-6 STUDIES: CPT

## 2018-10-02 PROCEDURE — 95886 MUSC TEST DONE W/N TEST COMP: CPT

## 2018-10-12 NOTE — PROGRESS NOTES
Please let him know that his EMG and his nerve conduction study of his left upper extremity were both normal.  This does not mean that he has not an pain.  It means that his pain is not caused from neurological compression.  I would encourage him and his primary care physician to look at bones, ligaments, and muscles.

## 2018-10-23 ENCOUNTER — TELEPHONE (OUTPATIENT)
Dept: NEUROSURGERY | Facility: CLINIC | Age: 38
End: 2018-10-23

## 2018-10-23 NOTE — TELEPHONE ENCOUNTER
Spoke with patient regarding test results, voiced understanding that he would follow up with PCP regarding such.

## 2018-10-23 NOTE — TELEPHONE ENCOUNTER
----- Message from Onel Barnett MD sent at 10/12/2018 12:55 PM CDT -----  Please let him know that his EMG and his nerve conduction study of his left upper extremity were both normal.  This does not mean that he has not an pain.  It means that his pain is not caused from neurological compression.  I would encourage him and   his primary care physician to look at bones, ligaments, and muscles.

## 2018-10-25 VITALS
SYSTOLIC BLOOD PRESSURE: 128 MMHG | HEART RATE: 80 BPM | HEIGHT: 70 IN | OXYGEN SATURATION: 97 % | BODY MASS INDEX: 36.56 KG/M2 | DIASTOLIC BLOOD PRESSURE: 72 MMHG | WEIGHT: 255.4 LBS

## 2018-10-25 RX ORDER — ALBUTEROL SULFATE 90 UG/1
2 AEROSOL, METERED RESPIRATORY (INHALATION) EVERY 4 HOURS PRN
COMMUNITY
End: 2019-04-23

## 2018-10-29 ENCOUNTER — OFFICE VISIT (OUTPATIENT)
Dept: FAMILY MEDICINE CLINIC | Facility: CLINIC | Age: 38
End: 2018-10-29

## 2018-10-29 VITALS
WEIGHT: 254.2 LBS | HEART RATE: 74 BPM | HEIGHT: 70 IN | BODY MASS INDEX: 36.39 KG/M2 | SYSTOLIC BLOOD PRESSURE: 130 MMHG | DIASTOLIC BLOOD PRESSURE: 70 MMHG | TEMPERATURE: 97.4 F | OXYGEN SATURATION: 98 %

## 2018-10-29 DIAGNOSIS — M54.2 NECK PAIN, CHRONIC: Primary | ICD-10-CM

## 2018-10-29 DIAGNOSIS — I10 ESSENTIAL HYPERTENSION: Chronic | ICD-10-CM

## 2018-10-29 DIAGNOSIS — Z87.891 FORMER TOBACCO USE: ICD-10-CM

## 2018-10-29 DIAGNOSIS — M25.512 LEFT SHOULDER PAIN, UNSPECIFIED CHRONICITY: ICD-10-CM

## 2018-10-29 DIAGNOSIS — M54.12 CERVICAL RADICULOPATHY: ICD-10-CM

## 2018-10-29 DIAGNOSIS — G89.29 NECK PAIN, CHRONIC: Primary | ICD-10-CM

## 2018-10-29 DIAGNOSIS — M51.36 DEGENERATION OF LUMBAR INTERVERTEBRAL DISC: ICD-10-CM

## 2018-10-29 PROCEDURE — 99214 OFFICE O/P EST MOD 30 MIN: CPT | Performed by: FAMILY MEDICINE

## 2018-10-29 RX ORDER — METHYLPREDNISOLONE 4 MG/1
TABLET ORAL
Qty: 1 EACH | Refills: 0 | Status: SHIPPED | OUTPATIENT
Start: 2018-10-29 | End: 2018-12-20

## 2018-10-29 RX ORDER — CITALOPRAM 20 MG/1
20 TABLET ORAL
COMMUNITY
End: 2018-12-20

## 2018-11-01 NOTE — PROGRESS NOTES
Subjective   Rikki Alejandro is a 38 y.o. male here today for Neck Pain (NCV f/u).     Neck Pain    This is a chronic problem. The current episode started more than 1 year ago. The problem occurs intermittently. The problem has been waxing and waning. The pain is associated with nothing. The pain is present in the left side, right side and midline. The quality of the pain is described as aching and stabbing. The pain is at a severity of 8/10. The symptoms are aggravated by bending, position and twisting. The pain is same all the time. Stiffness is present all day. Associated symptoms include headaches, numbness and weakness. Pertinent negatives include no chest pain, fever, syncope or trouble swallowing. He has tried acetaminophen, home exercises and NSAIDs for the symptoms. The treatment provided moderate relief.        The following portions of the patient's history were reviewed and updated as appropriate: history reviewed: Social history , Past Medical History, Allergies, Current Medications, Active Problem List and Health Maintenance.    Review of Systems   Constitutional: Negative for activity change and fever.   HENT: Negative for trouble swallowing.    Respiratory: Negative for cough and shortness of breath.    Cardiovascular: Negative for chest pain and syncope.   Musculoskeletal: Positive for arthralgias, neck pain and neck stiffness.   Neurological: Positive for weakness, numbness and headache. Negative for seizures and syncope.   Psychiatric/Behavioral: Positive for stress. Negative for agitation and suicidal ideas.       Objective   Physical Exam   Constitutional: He is oriented to person, place, and time. He appears well-developed and well-nourished.   HENT:   Head: Normocephalic and atraumatic.   Mouth/Throat: Oropharynx is clear and moist.   Eyes: Conjunctivae and EOM are normal.   Neck: Neck supple. Muscular tenderness present. Decreased range of motion present.       Cardiovascular: Normal rate,  regular rhythm, normal heart sounds and intact distal pulses.    Pulmonary/Chest: Effort normal and breath sounds normal. No respiratory distress. He has no wheezes.   Abdominal: Soft. Bowel sounds are normal.   Neurological: He is alert and oriented to person, place, and time. No sensory deficit. He exhibits normal muscle tone.   Skin: Skin is warm and dry.   Psychiatric: He has a normal mood and affect.   Nursing note and vitals reviewed.      Procedures    Assessment/Plan   Problem List Items Addressed This Visit     Cervical radiculopathy    Degeneration of lumbar intervertebral disc    BMI 36.0-36.9,adult    Former tobacco use    Hypertension (Chronic)    Left shoulder pain    Relevant Orders    XR Shoulder 2+ View Left    Neck pain, chronic - Primary        Continue current meds and treatments/therapy.

## 2018-11-05 PROBLEM — M25.512 LEFT SHOULDER PAIN: Status: ACTIVE | Noted: 2018-11-05

## 2018-11-05 PROBLEM — G89.29 NECK PAIN, CHRONIC: Status: ACTIVE | Noted: 2018-11-05

## 2018-11-05 PROBLEM — M54.2 NECK PAIN, CHRONIC: Status: ACTIVE | Noted: 2018-11-05

## 2018-11-15 ENCOUNTER — TELEPHONE (OUTPATIENT)
Dept: FAMILY MEDICINE CLINIC | Facility: CLINIC | Age: 38
End: 2018-11-15

## 2018-11-21 RX ORDER — MECLIZINE HYDROCHLORIDE 25 MG/1
TABLET ORAL
Qty: 30 TABLET | Refills: 1 | Status: SHIPPED | OUTPATIENT
Start: 2018-11-21 | End: 2020-02-19

## 2018-11-21 RX ORDER — GABAPENTIN 600 MG/1
TABLET ORAL
Qty: 90 TABLET | Refills: 0 | Status: SHIPPED | OUTPATIENT
Start: 2018-11-21 | End: 2019-02-15 | Stop reason: SDUPTHER

## 2018-12-20 ENCOUNTER — OFFICE VISIT (OUTPATIENT)
Dept: FAMILY MEDICINE CLINIC | Facility: CLINIC | Age: 38
End: 2018-12-20

## 2018-12-20 VITALS
OXYGEN SATURATION: 96 % | BODY MASS INDEX: 36.39 KG/M2 | HEIGHT: 70 IN | SYSTOLIC BLOOD PRESSURE: 130 MMHG | DIASTOLIC BLOOD PRESSURE: 90 MMHG | WEIGHT: 254.2 LBS | HEART RATE: 104 BPM | RESPIRATION RATE: 16 BRPM

## 2018-12-20 DIAGNOSIS — J45.20 MILD INTERMITTENT ASTHMATIC BRONCHITIS WITHOUT COMPLICATION: Primary | ICD-10-CM

## 2018-12-20 DIAGNOSIS — H61.23 BILATERAL HEARING LOSS DUE TO CERUMEN IMPACTION: ICD-10-CM

## 2018-12-20 DIAGNOSIS — F33.41 RECURRENT MAJOR DEPRESSIVE DISORDER, IN PARTIAL REMISSION (HCC): ICD-10-CM

## 2018-12-20 DIAGNOSIS — J06.9 UPPER RESPIRATORY TRACT INFECTION, UNSPECIFIED TYPE: ICD-10-CM

## 2018-12-20 PROCEDURE — 99213 OFFICE O/P EST LOW 20 MIN: CPT | Performed by: NURSE PRACTITIONER

## 2018-12-20 PROCEDURE — 69209 REMOVE IMPACTED EAR WAX UNI: CPT | Performed by: NURSE PRACTITIONER

## 2018-12-20 RX ORDER — CEFUROXIME AXETIL 500 MG/1
500 TABLET ORAL 2 TIMES DAILY
Qty: 14 TABLET | Refills: 0 | Status: SHIPPED | OUTPATIENT
Start: 2018-12-20 | End: 2018-12-27

## 2018-12-20 RX ORDER — BENZONATATE 100 MG/1
100 CAPSULE ORAL 3 TIMES DAILY PRN
Qty: 30 CAPSULE | Refills: 0 | Status: SHIPPED | OUTPATIENT
Start: 2018-12-20 | End: 2019-02-13

## 2018-12-20 RX ORDER — METHYLPREDNISOLONE 4 MG/1
TABLET ORAL
Qty: 1 EACH | Refills: 0 | Status: SHIPPED | OUTPATIENT
Start: 2018-12-20 | End: 2019-02-13

## 2018-12-20 RX ORDER — CITALOPRAM 40 MG/1
40 TABLET ORAL DAILY
Qty: 30 TABLET | Refills: 5 | Status: SHIPPED | OUTPATIENT
Start: 2018-12-20 | End: 2019-03-01 | Stop reason: SDUPTHER

## 2018-12-20 RX ORDER — VARENICLINE TARTRATE 1 MG/1
TABLET, FILM COATED ORAL
COMMUNITY
Start: 2018-10-31 | End: 2019-03-01

## 2018-12-20 NOTE — PROGRESS NOTES
Procedure   Ear lavage, bilaterally, for cerumen impaction    Please refer to office note for description of external auditory canals and complaints.      The right ear was draped with a towel and lavage of warm water and hydrogen peroxide was lavaged until a return on a large amount of hard cerumen.  The canal was clear of cerumen upon completion.    The procedure was repeated on the left with good return of large amount of hard cerumen as well.  The canal was clear of cerumen upon completion.    The patient tolerated the procedure well with relief of his symptoms.

## 2018-12-20 NOTE — PROGRESS NOTES
"Chief Complaint   Patient presents with   • Ears Stopped Up     Having trouble hearing   • URI     Coughing x3.5 weeks        Subjective   Rikki Alejandro is a 38 y.o.  male who presents today for URI symptoms that have waxed and waned over the past 3 weeks.  Cough is very productive (yellow-green sputum)  Ears have pressure and feel stopped up.  He also asks if his Celexa is at maximum dosage.    HPI:  The symptoms started as a virus or cold and seemed to get better about 1 week into the illness and then slowly came back, especially the cough.  Ears as above.  No sinus problems at present, just the cough.  The smoking cessation is going well.  He often goes a few days with no cigarette.  Some days he has 1 cigarette (only when anxious/upset), the most is 2 cigarettes per day.  We discussed increasing the Celexa.  He states it works well for about 8-10 hours then it is like \"my tank is empty\" and \"I fly off the handle.\"    Rikki Alejandro  has a past medical history of Anxiety, Arthritis, Depression, and Hypertension.    Allergies   Allergen Reactions   • Aspirin Anaphylaxis and Hives   • Lisinopril Cough       Current Outpatient Medications:   •  albuterol (PROVENTIL HFA;VENTOLIN HFA) 108 (90 Base) MCG/ACT inhaler, Inhale 2 puffs Every 4 (Four) Hours As Needed for Wheezing., Disp: , Rfl:   •  CHANTIX CONTINUING MONTH JOSE G 1 MG tablet, , Disp: , Rfl:   •  citalopram (CeleXA) 20 MG tablet, Take 20 mg by mouth., Disp: , Rfl:   •  gabapentin (NEURONTIN) 600 MG tablet, TAKE 1 TABLET BY MOUTH THREE TIMES DAILY, Disp: 90 tablet, Rfl: 0  •  losartan (COZAAR) 100 MG tablet, losartan 100 mg tablet  Take 1 tablet every day by oral route., Disp: , Rfl:   •  meclizine (ANTIVERT) 25 MG tablet, TAKE 1 TABLET BY MOUTH THREE TIMES DAILY AS NEEDED FOR  VERTIGO, Disp: 30 tablet, Rfl: 1  •  raNITIdine (ZANTAC) 150 MG tablet, Take 150 mg by mouth Every Night., Disp: , Rfl:   •  tiZANidine (ZANAFLEX) 4 MG tablet, Take 4 mg by mouth 2 " "(Two) Times a Day., Disp: , Rfl:   Past Medical History:   Diagnosis Date   • Anxiety    • Arthritis    • Depression    • Hypertension      Past Surgical History:   Procedure Laterality Date   • REPLACEMENT TOTAL HIP LATERAL POSITION Bilateral    • TOTAL HIP ARTHROPLASTY Bilateral 2017    Performed by Dr. Mejia in [place], [place]     Social History     Socioeconomic History   • Marital status:      Spouse name: Not on file   • Number of children: Not on file   • Years of education: Not on file   • Highest education level: Not on file   Tobacco Use   • Smoking status: Current Every Day Smoker   • Smokeless tobacco: Never Used   • Tobacco comment: 2-3 packs down to 1-2 cigarettes a day   Substance and Sexual Activity   • Alcohol use: Yes   • Drug use: No   • Sexual activity: Defer     Family History   Problem Relation Age of Onset   • COPD Mother    • Hyperlipidemia Mother    • Heart attack Father        Family history, surgical history, past medical history, Allergies and med's reviewed with patient today and updated in [facility] EMR.     ROS:  Review of Systems   Constitutional: Negative.    HENT: Positive for congestion, ear pain and hearing loss.    Eyes: Negative.    Respiratory: Positive for cough and wheezing.    Cardiovascular: Negative.    Gastrointestinal: Negative.    Endocrine: Negative.    Genitourinary: Negative.    Musculoskeletal: Negative.    Skin: Negative.    Allergic/Immunologic: Negative.    Neurological: Negative.    Hematological: Negative.    Psychiatric/Behavioral: Negative.        OBJECTIVE:  Vitals:    12/20/18 1538   BP: 130/90   Pulse: 104   Resp: 16   SpO2: 96%   Weight: 115 kg (254 lb 3.2 oz)   Height: 177.8 cm (70\")     Physical Exam   Constitutional: He is oriented to person, place, and time. He appears well-developed and well-nourished.   HENT:   Head: Normocephalic and atraumatic.   Mouth/Throat: Oropharynx is clear and moist.   Cerumen impaction bilaterally   Eyes: Conjunctivae " and EOM are normal. Pupils are equal, round, and reactive to light.   Neck: Normal range of motion. Neck supple.   Cardiovascular: Normal rate, regular rhythm and normal heart sounds.   Pulmonary/Chest: Effort normal. He has wheezes.   Expiratory wheezes bilaterally  Harsh cough noted   Abdominal: Soft.   Musculoskeletal: Normal range of motion.   Neurological: He is alert and oriented to person, place, and time.   Skin: Skin is warm and dry.   Psychiatric: He has a normal mood and affect.   Nursing note and vitals reviewed.      ASSESSMENT/ PLAN:    There are no diagnoses linked to this encounter.    Orders Placed Today:     No orders of the defined types were placed in this encounter.       Management Plan:     An After Visit Summary was printed and given to the patient at discharge.    Follow-up: No Follow-up on file.    Zhane Trevino, APRN 12/20/2018 3:57 PM  This note was electronically signed.

## 2018-12-31 RX ORDER — TIZANIDINE 4 MG/1
TABLET ORAL
Qty: 60 TABLET | Refills: 2 | Status: SHIPPED | OUTPATIENT
Start: 2018-12-31 | End: 2019-01-04 | Stop reason: SDUPTHER

## 2019-01-04 RX ORDER — TIZANIDINE 4 MG/1
4 TABLET ORAL 3 TIMES DAILY PRN
Qty: 60 TABLET | Refills: 2 | Status: SHIPPED | OUTPATIENT
Start: 2019-01-04 | End: 2019-03-25 | Stop reason: SDUPTHER

## 2019-01-04 NOTE — TELEPHONE ENCOUNTER
Pharmacy requesting refill on zanaflex 4mg taken one tablet three times daily as needed.      Last seen on 12/20/2018 by Zhane Trevino

## 2019-02-13 ENCOUNTER — OFFICE VISIT (OUTPATIENT)
Dept: FAMILY MEDICINE CLINIC | Facility: CLINIC | Age: 39
End: 2019-02-13

## 2019-02-13 VITALS
OXYGEN SATURATION: 98 % | HEART RATE: 95 BPM | SYSTOLIC BLOOD PRESSURE: 124 MMHG | DIASTOLIC BLOOD PRESSURE: 80 MMHG | TEMPERATURE: 98.7 F | BODY MASS INDEX: 35.58 KG/M2 | WEIGHT: 248 LBS

## 2019-02-13 DIAGNOSIS — M70.62 TROCHANTERIC BURSITIS OF LEFT HIP: ICD-10-CM

## 2019-02-13 DIAGNOSIS — I10 ESSENTIAL HYPERTENSION: ICD-10-CM

## 2019-02-13 DIAGNOSIS — R10.32 LLQ ABDOMINAL PAIN: Primary | ICD-10-CM

## 2019-02-13 PROCEDURE — 99213 OFFICE O/P EST LOW 20 MIN: CPT | Performed by: FAMILY MEDICINE

## 2019-02-13 RX ORDER — TRAMADOL HYDROCHLORIDE 50 MG/1
50 TABLET ORAL EVERY 6 HOURS PRN
Qty: 40 TABLET | Refills: 0 | Status: SHIPPED | OUTPATIENT
Start: 2019-02-13 | End: 2019-02-20 | Stop reason: SDUPTHER

## 2019-02-13 NOTE — PROGRESS NOTES
OFFICE VISIT NOTE:    Rikki Alejandro is a 38 y.o. male who presents today for Abdominal Pain and Hip Pain (burning sensation & popping sound heard , Left hip replacement 2018).     No trauma or injury - no excess straining. Swelling and redness on left hip about 2 wks ago, but now that's gone and now pain in left abdomen, LLQ.       Abdominal Pain   This is a chronic problem. The current episode started more than 1 year ago. The onset quality is gradual. The problem occurs daily. The problem has been unchanged. The pain is located in the generalized abdominal region. The pain is moderate. The quality of the pain is aching, cramping and dull. The abdominal pain does not radiate. Associated symptoms include belching. Pertinent negatives include no constipation, diarrhea, fever, flatus, hematochezia, melena or weight loss. Nothing aggravates the pain. The pain is relieved by bowel movements and certain positions. He has tried antacids for the symptoms. The treatment provided mild relief.   Hip Pain    The incident occurred more than 1 week ago. The incident occurred at home. There was no injury mechanism. The pain is present in the left hip. The quality of the pain is described as aching and cramping. The pain is moderate. The pain has been fluctuating since onset. Pertinent negatives include no inability to bear weight, loss of motion, loss of sensation or muscle weakness. He reports no foreign bodies present. The symptoms are aggravated by movement and palpation. He has tried acetaminophen and rest for the symptoms. The treatment provided mild relief.        Past medical/surgical history, Family history, Social history, Allergies and Medications have been reviewed with the patient today and are updated in [facility] EMR. See below.    Past Medical History:   Diagnosis Date   • Anxiety    • Arthritis    • Depression    • Hypertension      Past Surgical History:   Procedure Laterality Date   • REPLACEMENT TOTAL HIP LATERAL  POSITION Bilateral    • TOTAL HIP ARTHROPLASTY Bilateral 2017    Performed by Dr. Mejia in [place], [place]     Family History   Problem Relation Age of Onset   • COPD Mother    • Hyperlipidemia Mother    • Heart attack Father      Social History     Tobacco Use   • Smoking status: Current Every Day Smoker   • Smokeless tobacco: Never Used   • Tobacco comment: 2-3 packs down to 1-2 cigarettes a day   Substance Use Topics   • Alcohol use: Yes     Alcohol/week: 2.4 oz     Types: 4 Cans of beer per week     Frequency: 4 or more times a week     Drinks per session: 3 or 4     Binge frequency: Never   • Drug use: No       Allergies:  Aspirin and Lisinopril      Review of Systems:    Review of Systems   Constitutional: Negative for activity change, appetite change, fatigue, fever, unexpected weight gain and unexpected weight loss.   Respiratory: Negative for shortness of breath.    Cardiovascular: Negative for chest pain.   Gastrointestinal: Positive for abdominal pain. Negative for constipation, diarrhea, flatus, hematochezia and melena.   Genitourinary: Negative for difficulty urinating.   Skin: Negative for rash.   Neurological: Negative for syncope and headache.         Physical Examination:  Vital Signs:  /80 (BP Location: Left arm, Patient Position: Sitting, Cuff Size: Adult)   Pulse 95   Temp 98.7 °F (37.1 °C) (Tympanic)   Wt 112 kg (248 lb)   SpO2 98%   BMI 35.58 kg/m²   Physical Exam   Constitutional: He is oriented to person, place, and time. He appears well-developed and well-nourished. No distress.   HENT:   Head: Normocephalic and atraumatic.   Mouth/Throat: Oropharynx is clear and moist.   Neck: Normal range of motion. Neck supple. No JVD present.   Cardiovascular: Normal rate, regular rhythm, normal heart sounds and intact distal pulses.   Pulmonary/Chest: Effort normal and breath sounds normal. No respiratory distress.   Abdominal: Soft. He exhibits no distension. There is no tenderness.    Musculoskeletal: He exhibits no edema.        Left hip: He exhibits decreased range of motion, tenderness (trochanteric bursal tenderness) and bony tenderness. He exhibits no swelling and no crepitus.   Neurological: He is alert and oriented to person, place, and time. No cranial nerve deficit.   Skin: Skin is warm and dry. Capillary refill takes less than 2 seconds. No rash noted.   Psychiatric: He has a normal mood and affect. His behavior is normal.   Nursing note and vitals reviewed.      Procedures      ASSESSMENT/ PLAN:    Rikki was seen today for abdominal pain and hip pain.    Diagnoses and all orders for this visit:    LLQ abdominal pain  -     Cancel: CT Abdomen Pelvis Without Contrast; Future  -     CT Abdomen Pelvis Without Contrast; Future  -     CT Abdomen Pelvis Without Contrast    Trochanteric bursitis of left hip  -     traMADol (ULTRAM) 50 MG tablet; Take 1 tablet by mouth Every 6 (Six) Hours As Needed for Moderate Pain  or Severe Pain .    Essential hypertension        Specific Patient Instructions:  Regular exercise as tolerated. Aerobic activity 30 minutes per day, 3 times a week recommended for heart health. Risk, benefits, and merits of treatment, alternatives reviewed with the patient and questions answered.      Current Outpatient Medications:   •  albuterol (PROVENTIL HFA;VENTOLIN HFA) 108 (90 Base) MCG/ACT inhaler, Inhale 2 puffs Every 4 (Four) Hours As Needed for Wheezing., Disp: , Rfl:   •  CHANTIX CONTINUING MONTH JOSE G 1 MG tablet, , Disp: , Rfl:   •  citalopram (CeleXA) 40 MG tablet, Take 1 tablet by mouth Daily., Disp: 30 tablet, Rfl: 5  •  losartan (COZAAR) 100 MG tablet, losartan 100 mg tablet  Take 1 tablet every day by oral route., Disp: , Rfl:   •  meclizine (ANTIVERT) 25 MG tablet, TAKE 1 TABLET BY MOUTH THREE TIMES DAILY AS NEEDED FOR  VERTIGO, Disp: 30 tablet, Rfl: 1  •  raNITIdine (ZANTAC) 150 MG tablet, Take 150 mg by mouth Every Night., Disp: , Rfl:   •  tiZANidine  (ZANAFLEX) 4 MG tablet, Take 1 tablet by mouth 3 (Three) Times a Day As Needed for Muscle Spasms., Disp: 60 tablet, Rfl: 2  •  gabapentin (NEURONTIN) 600 MG tablet, TAKE 1 TABLET BY MOUTH THREE TIMES DAILY, Disp: 90 tablet, Rfl: 2  •  traMADol (ULTRAM) 50 MG tablet, Take 1 tablet by mouth Every 6 (Six) Hours As Needed for Moderate Pain  or Severe Pain ., Disp: 40 tablet, Rfl: 0    FOLLOW-UP:    Return in about 2 weeks (around 2/27/2019) for Recheck.    I discussed the patients findings and my recommendations with patient.  An After Visit Summary (AVS) was printed and given to the patient at discharge.    Thomas Henao MD, FAAFP  2/16/2019

## 2019-02-13 NOTE — PATIENT INSTRUCTIONS

## 2019-02-15 RX ORDER — GABAPENTIN 600 MG/1
TABLET ORAL
Qty: 90 TABLET | Refills: 2 | Status: SHIPPED | OUTPATIENT
Start: 2019-02-15 | End: 2019-03-25 | Stop reason: SDUPTHER

## 2019-02-20 DIAGNOSIS — M70.62 TROCHANTERIC BURSITIS OF LEFT HIP: ICD-10-CM

## 2019-02-20 RX ORDER — TRAMADOL HYDROCHLORIDE 50 MG/1
50 TABLET ORAL EVERY 6 HOURS PRN
Qty: 28 TABLET | Refills: 2 | Status: SHIPPED | OUTPATIENT
Start: 2019-02-20 | End: 2019-03-25 | Stop reason: SDUPTHER

## 2019-03-01 ENCOUNTER — OFFICE VISIT (OUTPATIENT)
Dept: FAMILY MEDICINE CLINIC | Facility: CLINIC | Age: 39
End: 2019-03-01

## 2019-03-01 VITALS
BODY MASS INDEX: 35.15 KG/M2 | DIASTOLIC BLOOD PRESSURE: 74 MMHG | TEMPERATURE: 98.7 F | WEIGHT: 245.5 LBS | HEART RATE: 87 BPM | HEIGHT: 70 IN | SYSTOLIC BLOOD PRESSURE: 138 MMHG | OXYGEN SATURATION: 98 %

## 2019-03-01 DIAGNOSIS — H53.2 DIPLOPIA: Primary | ICD-10-CM

## 2019-03-01 DIAGNOSIS — R10.84 GENERALIZED ABDOMINAL PAIN: ICD-10-CM
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
## 2019-03-01 DIAGNOSIS — S06.0X0A CONCUSSION WITHOUT LOSS OF CONSCIOUSNESS, INITIAL ENCOUNTER: ICD-10-CM

## 2019-03-01 DIAGNOSIS — F33.41 RECURRENT MAJOR DEPRESSIVE DISORDER, IN PARTIAL REMISSION (HCC): ICD-10-CM

## 2019-03-01 DIAGNOSIS — S09.90XA TRAUMATIC INJURY OF HEAD, INITIAL ENCOUNTER: Primary | ICD-10-CM

## 2019-03-01 PROBLEM — Z86.79 HISTORY OF HYPERTENSION: Status: ACTIVE | Noted: 2017-04-12

## 2019-03-01 PROBLEM — Z96.649 HISTORY OF TOTAL HIP ARTHROPLASTY: Status: ACTIVE | Noted: 2017-08-23

## 2019-03-01 PROBLEM — G47.00 INSOMNIA: Status: ACTIVE | Noted: 2017-05-01

## 2019-03-01 PROCEDURE — 99214 OFFICE O/P EST MOD 30 MIN: CPT | Performed by: FAMILY MEDICINE

## 2019-03-01 RX ORDER — CITALOPRAM 40 MG/1
40 TABLET ORAL DAILY
Qty: 30 TABLET | Refills: 5 | Status: SHIPPED | OUTPATIENT
Start: 2019-03-01 | End: 2019-03-25 | Stop reason: SDUPTHER

## 2019-03-01 NOTE — PATIENT INSTRUCTIONS
Suspect Essential HTN.Good BP control is encouraged with Goal BP based on JNC 8 guidelines 2014 <140/90 for patients with known cardiac disease and diabetes. (CARMELO. 2014:322 (5):507-520. doi:10.1001/carmelo.2013.23089): general population <60 yr old goal BP <140/90 and for those >60 <150/90.  For patients of all ages with Diabetes, CKD, Known CAD <140/90. Recommended to the patient to obtain electronic home BP machine with upper arm blood pressure cuff and to check regularly as instructed.  Keep BP log and bring to subsequent visits. Stable, at goal.  a. LABS: routine for hypertension recommended and ordered if necessary.  b. Recommend if you do not have a home BP machine to obtain an electronic machine with arm blood pressure cuff.      c. Monitor BP over the next week and keep log to bring back to office. Discussed medication therapy however pt wants to try to control with diet exercise. .  Your provider  has recommended self-monitoring of your blood pressure.  If you do not have a blood pressure cuff you may purchase one from the local pharmacy.  You may ask the pharmacist which brand and model they recommend.  Obtain your blood pressure measurement at least 2x per week.  You should also check your blood pressure if you experience any symptoms of blurred visit, dizziness or headache.  Please record all blood pressure measurements and bring them to next office visit.  If you have any questions about the accuracy of your blood pressure machine please bring it in to the office and our staff will be happy to check accuracy.   d. Encouraged to eat a low sodium heart healthy diet  e. Offered handout on HTN educational topics.  These were provided if patient requested these today.  f. MEDS: as listed in today's visit.  g. Risks/benefits of current and new medications discussed with the patient and or family today.  The patient/family are aware and accept that if there any side effects they should call or return to clinic  as soon as possible.  Appropriate F/U discussed for topics addressed today. All questions were answered to the  satisfactory state of patient/family.  Should symptoms fail to improve or worsen they agree to call or return to clinic or to go to the ER. Education handouts were offered on any new Rx if requested.  Discussed the importance of following up with any needed screening tests/labs/specialist appointments and any requested follow-up recommended by me today.  Importance of maintaining follow-up discussed and patient accepts that missed appointments can delay diagnosis and potentially lead to worsening of conditions.      Concussion, Adult  A concussion is a brain injury from a direct hit (blow) to the head or body. This blow causes the brain to shake quickly back and forth inside the skull. This can damage brain cells and cause chemical changes in the brain. A concussion may also be known as a mild traumatic brain injury (TBI).  Concussions are usually not life-threatening, but the effects of a concussion can be serious. If you have a concussion, you are more likely to experience concussion-like symptoms after a direct blow to the head in the future.  What are the causes?  This condition is caused by:  · A direct blow to the head, such as from running into another player during a game, being hit in a fight, or hitting your head on a hard surface.  · A jolt of the head or neck that causes the brain to move back and forth inside the skull, such as in a car crash.    What are the signs or symptoms?  The signs of a concussion can be hard to notice. Early on, they may be missed by you, family members, and health care providers. You may look fine but act or feel differently.  Symptoms are usually temporary, but they may last for days, weeks, or even longer. Some symptoms may appear right away but other symptoms may not show up for hours or days. Every head injury is different. Symptoms may include:  · Headaches. This  can include a feeling of pressure in the head.  · Memory problems.  · Trouble concentrating, organizing, or making decisions.  · Slowness in thinking, acting or reacting, speaking, or reading.  · Confusion.  · Fatigue.  · Changes in eating or sleeping patterns.  · Problems with coordination or balance.  · Nausea or vomiting.  · Numbness or tingling.  · Sensitivity to light or noise.  · Vision or hearing problems.  · Reduced sense of smell.  · Irritability or mood changes.  · Dizziness.  · Lack of motivation.  · Seeing or hearing things that other people do not see or hear (hallucinations).    How is this diagnosed?  This condition is diagnosed based on:  · Your symptoms.  · A description of your injury.    You may also have tests, including:  · Imaging tests, such as a CT scan or MRI. These are done to look for signs of brain injury.  · Neuropsychological tests. These measure your thinking, understanding, learning, and remembering abilities.    How is this treated?  This condition is treated with physical and mental rest and careful observation, usually at home. If the concussion is severe, you may need to stay home from work for a while. You may be referred to a concussion clinic or to other health care providers for management. It is important that you tell your health care provider if:  · You are taking any medicines, including prescription medicines, over-the-counter medicines, and natural remedies. Some medicines, such as blood thinners (anticoagulants) and aspirin, may increase the chance of complications, such as bleeding.  · You are taking or have taken alcohol or illegal drugs. Alcohol and certain other drugs may slow your recovery and can put you at risk of further injury.    How fast you will recover from a concussion depends on many factors, such as how severe your concussion is, what part of your brain was injured, how old you are, and how healthy you were before the concussion. Recovery can take time.  It is important to wait to return to activity until a health care provider says it is safe to do that and your symptoms are completely gone.  Follow these instructions at home:  Activity  · Limit activities that require a lot of thought or concentration. These may include:  ? Doing homework or job-related work.  ? Watching TV.  ? Working on the computer.  ? Playing memory games and puzzles.  · Rest. Rest helps the brain to heal. Make sure you:  ? Get plenty of sleep at night. Avoid staying up late at night.  ? Keep the same bedtime hours on weekends and weekdays.  ? Rest during the day. Take naps or rest breaks when you feel tired.  · Having another concussion before the first one has healed can be dangerous. Do not do high-risk activities that could cause a second concussion, such as riding a bicycle or playing sports.  · Ask your health care provider when you can return to your normal activities, such as school, work, athletics, driving, riding a bicycle, or using heavy machinery. Your ability to react may be slower after a brain injury. Never do these activities if you are dizzy. Your health care provider will likely give you a plan for gradually returning to activities.  General instructions  · Take over-the-counter and prescription medicines only as told by your health care provider.  · Do not drink alcohol until your health care provider says you can.  · If it is harder than usual to remember things, write them down.  · If you are easily distracted, try to do one thing at a time. For example, do not try to watch TV while fixing dinner.  · Talk with family members or close friends when making important decisions.  · Watch your symptoms and tell others to do the same. Complications sometimes occur after a concussion. Older adults with a brain injury may have a higher risk of serious complications, such as a blood clot in the brain.  · Tell your teachers, school nurse, school counselor, , ,  or  about your injury, symptoms, and restrictions. Tell them about what you can or cannot do. They should watch for:  ? Increased problems with attention or concentration.  ? Increased difficulty remembering or learning new information.  ? Increased time needed to complete tasks or assignments.  ? Increased irritability or decreased ability to cope with stress.  ? Increased symptoms.  · Keep all follow-up visits as told by your health care provider. This is important.  How is this prevented?  It is very important to avoid another brain injury, especially as you recover. In rare cases, another injury can lead to permanent brain damage, brain swelling, or death. The risk of this is greatest during the first 7-10 days after a head injury. Avoid injuries by:  · Wearing a seat belt when riding in a car.  · Wearing a helmet when biking, skiing, skateboarding, skating, or doing similar activities.  · Avoiding activities that could lead to a second concussion, such as contact or recreational sports, until your health care provider says it is okay.  · Taking safety measures in your home, such as:  ? Removing clutter and tripping hazards from floors and stairways.  ? Using grab bars in bathrooms and handrails by stairs.  ? Placing non-slip mats on floors and in bathtubs.  ? Improving lighting in dim areas.    Contact a health care provider if:  · Your symptoms get worse.  · You have new symptoms.  · You continue to have symptoms for more than 2 weeks.  Get help right away if:  · You have severe or worsening headaches.  · You have weakness or numbness in any part of your body.  · Your coordination gets worse.  · You vomit repeatedly.  · You are sleepier.  · The pupil of one eye is larger than the other.  · You have convulsions or a seizure.  · Your speech is slurred.  · Your fatigue, confusion, or irritability gets worse.  · You cannot recognize people or places.  · You have neck pain.  · It is difficult to wake  you up.  · You have unusual behavior changes.  · You lose consciousness.  Summary  · A concussion is a brain injury from a direct hit (blow) to the head or body.  · A concussion may also be called a mild traumatic brain injury (TBI).  · You may have imaging tests and neuropsychological tests to diagnose a concussion.  · This condition is treated with physical and mental rest and careful observation.  · Ask your health care provider when you can return to your normal activities, such as school, work, athletics, driving, riding a bicycle, or using heavy machinery. Follow safety instructions as told by your health care provider.  This information is not intended to replace advice given to you by your health care provider. Make sure you discuss any questions you have with your health care provider.  Document Released: 03/09/2005 Document Revised: 11/28/2017 Document Reviewed: 11/28/2017  [employer] Patient Education © 2018 [employer]
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
## 2019-03-06 ENCOUNTER — HOSPITAL ENCOUNTER (OUTPATIENT)
Dept: CT IMAGING | Facility: HOSPITAL | Age: 39
Discharge: HOME | End: 2019-03-06
Admitting: FAMILY MEDICINE

## 2019-03-06 DIAGNOSIS — H53.2 DIPLOPIA: ICD-10-CM

## 2019-03-06 PROCEDURE — 70450 CT HEAD/BRAIN W/O DYE: CPT

## 2019-03-08 RX ORDER — FEXOFENADINE HCL AND PSEUDOEPHEDRINE HCI 180; 240 MG/1; MG/1
1 TABLET, EXTENDED RELEASE ORAL DAILY PRN
Qty: 30 TABLET | Refills: 3 | Status: CANCELLED | OUTPATIENT
Start: 2019-03-08

## 2019-03-18 RX ORDER — FEXOFENADINE HCL AND PSEUDOEPHEDRINE HCI 180; 240 MG/1; MG/1
1 TABLET, EXTENDED RELEASE ORAL DAILY
Qty: 30 TABLET | Refills: 3 | Status: SHIPPED | OUTPATIENT
Start: 2019-03-18 | End: 2019-03-25 | Stop reason: SDUPTHER

## 2019-03-25 ENCOUNTER — OFFICE VISIT (OUTPATIENT)
Dept: FAMILY MEDICINE CLINIC | Facility: CLINIC | Age: 39
End: 2019-03-25

## 2019-03-25 VITALS
DIASTOLIC BLOOD PRESSURE: 66 MMHG | HEART RATE: 111 BPM | HEIGHT: 70 IN | WEIGHT: 238.6 LBS | OXYGEN SATURATION: 98 % | TEMPERATURE: 98.7 F | SYSTOLIC BLOOD PRESSURE: 122 MMHG | BODY MASS INDEX: 34.16 KG/M2

## 2019-03-25 DIAGNOSIS — M70.62 TROCHANTERIC BURSITIS OF LEFT HIP: Primary | ICD-10-CM

## 2019-03-25 DIAGNOSIS — J01.20 SUBACUTE ETHMOIDAL SINUSITIS: ICD-10-CM

## 2019-03-25 DIAGNOSIS — F33.41 RECURRENT MAJOR DEPRESSIVE DISORDER, IN PARTIAL REMISSION (HCC): ICD-10-CM

## 2019-03-25 DIAGNOSIS — I10 ESSENTIAL HYPERTENSION: ICD-10-CM

## 2019-03-25 DIAGNOSIS — R51.9 CHRONIC DAILY HEADACHE: ICD-10-CM

## 2019-03-25 DIAGNOSIS — M62.838 NECK MUSCLE SPASM: ICD-10-CM

## 2019-03-25 DIAGNOSIS — G89.29 CHRONIC PAIN OF LEFT KNEE: ICD-10-CM

## 2019-03-25 DIAGNOSIS — M25.562 CHRONIC PAIN OF LEFT KNEE: ICD-10-CM

## 2019-03-25 PROCEDURE — 99214 OFFICE O/P EST MOD 30 MIN: CPT | Performed by: FAMILY MEDICINE

## 2019-03-25 RX ORDER — CITALOPRAM 40 MG/1
40 TABLET ORAL DAILY
Qty: 30 TABLET | Refills: 5 | Status: SHIPPED | OUTPATIENT
Start: 2019-03-25 | End: 2020-11-18

## 2019-03-25 RX ORDER — TRAMADOL HYDROCHLORIDE 50 MG/1
50 TABLET ORAL EVERY 6 HOURS PRN
Qty: 60 TABLET | Refills: 1 | Status: SHIPPED | OUTPATIENT
Start: 2019-03-25 | End: 2019-05-21 | Stop reason: SDUPTHER

## 2019-03-25 RX ORDER — CALCIPOTRIENE 50 UG/G
CREAM TOPICAL
Refills: 5 | COMMUNITY
Start: 2019-03-19 | End: 2020-02-19

## 2019-03-25 RX ORDER — GABAPENTIN 600 MG/1
600 TABLET ORAL 3 TIMES DAILY
Qty: 90 TABLET | Refills: 2 | Status: SHIPPED | OUTPATIENT
Start: 2019-03-25 | End: 2019-09-23 | Stop reason: SDUPTHER

## 2019-03-25 RX ORDER — FEXOFENADINE HCL AND PSEUDOEPHEDRINE HCI 180; 240 MG/1; MG/1
1 TABLET, EXTENDED RELEASE ORAL DAILY
Qty: 30 TABLET | Refills: 3 | Status: SHIPPED | OUTPATIENT
Start: 2019-03-25 | End: 2019-04-23

## 2019-03-25 RX ORDER — TIZANIDINE 4 MG/1
4 TABLET ORAL 3 TIMES DAILY PRN
Qty: 60 TABLET | Refills: 2 | Status: SHIPPED | OUTPATIENT
Start: 2019-03-25 | End: 2020-11-18

## 2019-03-25 NOTE — PROGRESS NOTES
OFFICE VISIT NOTE:    Rikki Alejandro is a 38 y.o. male who presents today for Head Injury (follow up); Headache (changing locations, intermittent, dull ache); and LLE pain.     Weight is reducing each visit!  Left knee is painful - especially when standing.   A lot of stressors lately - living with mom - in ill-health.        Head Injury    The incident occurred more than 1 week ago. The injury mechanism was a fall. He lost consciousness for a period of less than one minute (prior concussion evaluated by CT scan head). There was no blood loss. The quality of the pain is described as dull and squeezing. The pain is at a severity of 3/10. The pain has been improving since the injury. Associated symptoms include headaches. Pertinent negatives include no blurred vision, disorientation, memory loss or tinnitus. He has tried acetaminophen and NSAIDs for the symptoms. The treatment provided moderate relief.   Headache    This is a chronic problem. The current episode started more than 1 month ago. The problem occurs daily. The problem has been waxing and waning. The pain is located in the bilateral, parietal and temporal region. The pain does not radiate. The pain quality is similar to prior headaches. The quality of the pain is described as dull and squeezing. The pain is at a severity of 3/10. Associated symptoms include sinus pressure. Pertinent negatives include no abdominal pain, abnormal behavior, blurred vision, dizziness, fever, loss of balance, phonophobia, photophobia, scalp tenderness, tinnitus or weight loss. The symptoms are aggravated by emotional stress. He has tried NSAIDs and acetaminophen for the symptoms. The treatment provided mild relief. His past medical history is significant for migraines in the family.        Past medical/surgical history, Family history, Social history, Allergies and Medications have been reviewed with the patient today and are updated in [facility] EMR. See below.    Past Medical  "History:   Diagnosis Date   • Anxiety    • Arthritis    • Depression      Past Surgical History:   Procedure Laterality Date   • REPLACEMENT TOTAL HIP LATERAL POSITION Bilateral    • TOTAL HIP ARTHROPLASTY Bilateral 2017    Performed by Dr. Mejia in [place], [place]     Family History   Problem Relation Age of Onset   • COPD Mother    • Hyperlipidemia Mother    • Heart attack Father      Social History     Tobacco Use   • Smoking status: Current Every Day Smoker     Packs/day: 1.50     Types: Cigarettes   • Smokeless tobacco: Never Used   Substance Use Topics   • Alcohol use: Yes     Alcohol/week: 2.4 oz     Types: 4 Cans of beer per week     Frequency: 4 or more times a week     Drinks per session: 3 or 4     Binge frequency: Never   • Drug use: No       Allergies:  Aspirin; Lisinopril; and Naproxen sodium      Review of Systems:    Review of Systems   Constitutional: Negative for activity change, appetite change, fatigue, fever, unexpected weight gain and unexpected weight loss.   HENT: Positive for sinus pressure. Negative for tinnitus.    Eyes: Negative for blurred vision and photophobia.   Respiratory: Negative for shortness of breath.    Cardiovascular: Negative for chest pain.   Gastrointestinal: Negative for abdominal pain.   Genitourinary: Negative for difficulty urinating.   Skin: Negative for rash.   Neurological: Positive for headache. Negative for dizziness, syncope and loss of balance.   Psychiatric/Behavioral: Negative for memory loss.         Physical Examination:  Vital Signs:  /66 (BP Location: Left arm, Patient Position: Sitting, Cuff Size: Adult)   Pulse 111   Temp 98.7 °F (37.1 °C) (Tympanic)   Ht 177.8 cm (70\")   Wt 108 kg (238 lb 9.6 oz)   SpO2 98%   BMI 34.24 kg/m²   Physical Exam   Constitutional: He is oriented to person, place, and time. He appears well-developed and well-nourished. No distress.   HENT:   Head: Normocephalic and atraumatic.   Mouth/Throat: Oropharynx is clear " and moist.   Eyes: Conjunctivae and EOM are normal.   Neck: Normal range of motion. Neck supple. No JVD present.   Cardiovascular: Normal rate, regular rhythm, normal heart sounds and intact distal pulses.   Pulmonary/Chest: Effort normal and breath sounds normal. No respiratory distress.   Abdominal: Soft. Bowel sounds are normal. He exhibits no distension. There is no tenderness.   Musculoskeletal: Normal range of motion. He exhibits tenderness (left hip more near the buttocks and lower SI area, and toward the left knee without ligament laxity). He exhibits no edema.   Neurological: He is alert and oriented to person, place, and time. No cranial nerve deficit.   Skin: Skin is warm and dry. Capillary refill takes less than 2 seconds. No rash noted.   Psychiatric: He has a normal mood and affect. His behavior is normal.   Nursing note and vitals reviewed.      Procedures      ASSESSMENT/ PLAN:    Rikki was seen today for head injury, headache and lle pain.    Diagnoses and all orders for this visit:    Trochanteric bursitis of left hip  -     Ambulatory Referral to Orthopedic Surgery  -     traMADol (ULTRAM) 50 MG tablet; Take 1 tablet by mouth Every 6 (Six) Hours As Needed for Moderate Pain  or Severe Pain .    Chronic pain of left knee  -     Ambulatory Referral to Orthopedic Surgery  -     traMADol (ULTRAM) 50 MG tablet; Take 1 tablet by mouth Every 6 (Six) Hours As Needed for Moderate Pain  or Severe Pain .    Essential hypertension    Chronic daily headache  -     gabapentin (NEURONTIN) 600 MG tablet; Take 1 tablet by mouth 3 (Three) Times a Day.  -     fexofenadine-pseudoephedrine (ALLEGRA-D ALLERGY & CONGESTION) 180-240 MG per 24 hr tablet; Take 1 tablet by mouth Daily.    Recurrent major depressive disorder, in partial remission (CMS/HCC)  -     citalopram (CeleXA) 40 MG tablet; Take 1 tablet by mouth Daily.    Neck muscle spasm  -     tiZANidine (ZANAFLEX) 4 MG tablet; Take 1 tablet by mouth 3 (Three) Times  a Day As Needed for Muscle Spasms.    Subacute ethmoidal sinusitis  -     fexofenadine-pseudoephedrine (ALLEGRA-D ALLERGY & CONGESTION) 180-240 MG per 24 hr tablet; Take 1 tablet by mouth Daily.        Specific Patient Instructions:    MEDICATION Instructions: Encouraged patient to continue routine medicines as prescribed and maintain compliance. Patient instructed to report any adverse side effects or reactions to medicines promptly to the office. Patient instructed to make us aware of any OTC or herbal meds or supplement use.  DIET Recommendations: No new recommendations regarding diet/restrictions.  EXERCISE Instructions: No new recommendations.    SMOKING Recommendations: N/A  HEALTH MAINTENANCE:  N/A  MISCELLANEOUS Instructions: N/A      Medications at completion of visit:      Current Outpatient Medications:   •  albuterol (PROVENTIL HFA;VENTOLIN HFA) 108 (90 Base) MCG/ACT inhaler, Inhale 2 puffs Every 4 (Four) Hours As Needed for Wheezing., Disp: , Rfl:   •  citalopram (CeleXA) 40 MG tablet, Take 1 tablet by mouth Daily., Disp: 30 tablet, Rfl: 5  •  fexofenadine-pseudoephedrine (ALLEGRA-D ALLERGY & CONGESTION) 180-240 MG per 24 hr tablet, Take 1 tablet by mouth Daily., Disp: 30 tablet, Rfl: 3  •  gabapentin (NEURONTIN) 600 MG tablet, Take 1 tablet by mouth 3 (Three) Times a Day., Disp: 90 tablet, Rfl: 2  •  losartan (COZAAR) 100 MG tablet, losartan 100 mg tablet  Take 1 tablet every day by oral route., Disp: , Rfl:   •  meclizine (ANTIVERT) 25 MG tablet, TAKE 1 TABLET BY MOUTH THREE TIMES DAILY AS NEEDED FOR  VERTIGO, Disp: 30 tablet, Rfl: 1  •  raNITIdine (ZANTAC) 150 MG tablet, Take 150 mg by mouth Every Night., Disp: , Rfl:   •  tiZANidine (ZANAFLEX) 4 MG tablet, Take 1 tablet by mouth 3 (Three) Times a Day As Needed for Muscle Spasms., Disp: 60 tablet, Rfl: 2  •  traMADol (ULTRAM) 50 MG tablet, Take 1 tablet by mouth Every 6 (Six) Hours As Needed for Moderate Pain  or Severe Pain ., Disp: 60 tablet, Rfl:  1  •  calcipotriene (DOVONEX) 0.005 % cream, , Disp: , Rfl: 5    FOLLOW-UP:    Return in about 3 months (around 6/25/2019) for Recheck.    I discussed the patients findings and my recommendations with patient.  An After Visit Summary (AVS) was printed and given to the patient at discharge.    Thomas Henao MD, FAAFP  3/25/2019

## 2019-03-25 NOTE — PATIENT INSTRUCTIONS
Tension Headache, Adult  A tension headache is a feeling of pain, pressure, or aching in the head that is often felt over the front and sides of the head. The pain can be dull, or it can feel tight (constricting). There are two types of tension headache:  · Episodic tension headache. This is when the headaches happen fewer than 15 days a month.  · Chronic tension headache. This is when the headaches happen more than 15 days a month during a 3-month period.    A tension headache can last from 30 minutes to several days. It is the most common kind of headache. Tension headaches are not normally associated with nausea or vomiting, and they do not get worse with physical activity.  What are the causes?  The exact cause of this condition is not known. Tension headaches are often triggered by stress, anxiety, or depression. Other triggers include:  · Alcohol.  · Too much caffeine or caffeine withdrawal.  · Respiratory infections, such as colds, flu, or sinus infections.  · Dental problems or teeth clenching.  · Tiredness (fatigue).  · Holding your head and neck in the same position for a long period of time, such as while using a computer.  · Smoking.  · Arthritis of the neck.    What are the signs or symptoms?  Symptoms of this condition include:  · A feeling of pressure or tightness around the head.  · Dull, aching head pain.  · Pain over the front and sides of the head.  · Tenderness in the muscles of the head, neck, and shoulders.    How is this diagnosed?  This condition may be diagnosed based on your symptoms, your medical history, and a physical exam. If your symptoms are severe or unusual, you may have imaging tests, such as a CT scan or an MRI of your head. Your vision may also be checked.  How is this treated?  This condition may be treated with lifestyle changes and with medicines that help relieve symptoms.  Follow these instructions at home:  Managing pain  · Take over-the-counter and prescription medicines  only as told by your health care provider.  · When you have a headache, lie down in a dark, quiet room.  · If directed, apply ice to the head and neck:  ? Put ice in a plastic bag.  ? Place a towel between your skin and the bag.  ? Leave the ice on for 20 minutes, 2-3 times a day.  · If directed, apply heat to the back of your neck as often as told by your health care provider. Use the heat source that your health care provider recommends, such as a moist heat pack or a heating pad.  ? Place a towel between your skin and the heat source.  ? Leave the heat on for 20-30 minutes.  ? Remove the heat if your skin turns bright red. This is especially important if you are unable to feel pain, heat, or cold. You may have a greater risk of getting burned.  Eating and drinking  · Eat meals on a regular schedule.  · Limit alcohol intake to no more than 1 drink a day for nonpregnant women and 2 drinks a day for men. One drink equals 12 oz of beer, 5 oz of wine, or 1½ oz of hard liquor.  · Drink enough fluid to keep your urine pale yellow.  · Decrease your caffeine intake, or stop using caffeine.  Lifestyle  · Get 7-9 hours of sleep each night, or get the amount of sleep recommended by your health care provider.  · At bedtime, remove all electronic devices from your room. Electronic devices include computers, phones, and tablets.  · Find ways to manage your stress. Some things that can help relieve stress include:  ? Exercise.  ? Deep breathing exercises.  ? Yoga.  ? Listening to music.  ? Positive mental imagery.  · Try to sit up straight and avoid tensing your muscles.  · Do not use any products that contain nicotine or tobacco, such as cigarettes and e-cigarettes. If you need help quitting, ask your health care provider.  General instructions  · Keep all follow-up visits as told by your health care provider. This is important.  · Avoid any headache triggers. Keep a headache journal to help find out what may trigger your  headaches. For example, write down:  ? What you eat and drink.  ? How much sleep you get.  ? Any change to your diet or medicines.  Contact a health care provider if:  · Your headache does not get better.  · Your headache comes back.  · You are sensitive to sounds, light, or smells because of a headache.  · You have nausea or you vomit.  · Your stomach hurts.  Get help right away if:  · You suddenly develop a very severe headache along with any of the following:  ? A stiff neck.  ? Nausea and vomiting.  ? Confusion.  ? Weakness.  ? Double vision or loss of vision.  ? Shortness of breath.  ? Rash.  ? Unusual sleepiness.  ? Fever.  ? Trouble speaking.  ? Pain in your eyes or ears.  ? Trouble walking or balancing.  ? Feeling faint or passing out.  Summary  · A tension headache is a feeling of pain, pressure, or aching in the head that is often felt over the front and sides of the head.  · A tension headache can last from 30 minutes to several days. It is the most common kind of headache.  · This condition may be diagnosed based on your symptoms, your medical history, and a physical exam.  · This condition may be treated with lifestyle changes and with medicines that help relieve symptoms.  This information is not intended to replace advice given to you by your health care provider. Make sure you discuss any questions you have with your health care provider.  Document Released: 12/18/2006 Document Revised: 03/30/2018 Document Reviewed: 03/30/2018  [employer] Interactive Patient Education © 2019 [employer]  Knee Pain, Adult  Knee pain in adults is common. It can be caused by many things, including:  · Arthritis.  · A fluid-filled sac (cyst) or growth in your knee.  · An infection in your knee.  · An injury that will not heal.  · Damage, swelling, or irritation of the tissues that support your knee.    Knee pain is usually not a sign of a serious problem. The pain may go away on its own with time and rest. If it does  not, a health care provider may order tests to find the cause of the pain. These may include:  · Imaging tests, such as an X-ray, MRI, or ultrasound.  · Joint aspiration. In this test, fluid is removed from the knee.  · Arthroscopy. In this test, a lighted tube is inserted into knee and an image is projected onto a TV screen.  · A biopsy. In this test, a sample of tissue is removed from the body and studied under a microscope.    Follow these instructions at home:  Pay attention to any changes in your symptoms. Take these actions to relieve your pain.  Activity  · Rest your knee.  · Do not do things that cause pain or make pain worse.  · Avoid high-impact activities or exercises, such as running, jumping rope, or doing jumping jacks.  General instructions  · Take over-the-counter and prescription medicines only as told by your health care provider.  · Raise (elevate) your knee above the level of your heart when you are sitting or lying down.  · Sleep with a pillow under your knee.  · If directed, apply ice to the knee:  ? Put ice in a plastic bag.  ? Place a towel between your skin and the bag.  ? Leave the ice on for 20 minutes, 2-3 times a day.  · Ask your health care provider if you should wear an elastic knee support.  · Lose weight if you are overweight. Extra weight can put pressure on your knee.  · Do not use any products that contain nicotine or tobacco, such as cigarettes and e-cigarettes. Smoking may slow the healing of any bone and joint problems that you may have. If you need help quitting, ask your health care provider.  Contact a health care provider if:  · Your knee pain continues, changes, or gets worse.  · You have a fever along with knee pain.  · Your knee maribell or locks up.  · Your knee swells, and the swelling becomes worse.  Get help right away if:  · Your knee feels warm to the touch.  · You cannot move your knee.  · You have severe pain in your knee.  · You have chest pain.  · You have  trouble breathing.  Summary  · Knee pain in adults is common. It can be caused by many things, including, arthritis, infection, cysts, or injury.  · Knee pain is usually not a sign of a serious problem, but if it does not go away, a health care provider may perform tests to know the cause of the pain.  · Pay attention to any changes in your symptoms. Relieve your pain with rest, medicines, light activity, and use of ice.  · Get help if your pain continues or becomes very severe, or if your knee maribell or locks up, or if you have chest pain or trouble breathing.  This information is not intended to replace advice given to you by your health care provider. Make sure you discuss any questions you have with your health care provider.  Document Released: 10/14/2008 Document Revised: 12/08/2017 Document Reviewed: 12/08/2017  [employer] Interactive Patient Education © 2019 [employer]

## 2019-04-08 DIAGNOSIS — M70.62 TROCHANTERIC BURSITIS OF LEFT HIP: Primary | ICD-10-CM

## 2019-04-11 ENCOUNTER — HOSPITAL ENCOUNTER (EMERGENCY)
Age: 39
Discharge: HOME | End: 2019-04-11
Payer: MEDICAID

## 2019-04-11 ENCOUNTER — APPOINTMENT (OUTPATIENT)
Dept: GENERAL RADIOLOGY | Age: 39
End: 2019-04-11
Payer: MEDICAID

## 2019-04-11 VITALS
RESPIRATION RATE: 18 BRPM | HEIGHT: 70 IN | BODY MASS INDEX: 33.64 KG/M2 | TEMPERATURE: 97.8 F | WEIGHT: 235 LBS | HEART RATE: 87 BPM | OXYGEN SATURATION: 96 % | SYSTOLIC BLOOD PRESSURE: 142 MMHG | DIASTOLIC BLOOD PRESSURE: 95 MMHG

## 2019-04-11 DIAGNOSIS — S80.01XA CONTUSION OF RIGHT KNEE, INITIAL ENCOUNTER: Primary | ICD-10-CM

## 2019-04-11 PROCEDURE — 73560 X-RAY EXAM OF KNEE 1 OR 2: CPT

## 2019-04-11 PROCEDURE — 99283 EMERGENCY DEPT VISIT LOW MDM: CPT | Performed by: PHYSICIAN ASSISTANT

## 2019-04-11 PROCEDURE — 99283 EMERGENCY DEPT VISIT LOW MDM: CPT

## 2019-04-11 PROCEDURE — 6360000002 HC RX W HCPCS: Performed by: PHYSICIAN ASSISTANT

## 2019-04-11 PROCEDURE — 96372 THER/PROPH/DIAG INJ SC/IM: CPT

## 2019-04-11 RX ORDER — METHYLPREDNISOLONE 4 MG/1
TABLET ORAL
Qty: 1 KIT | Refills: 0 | Status: SHIPPED | OUTPATIENT
Start: 2019-04-11 | End: 2019-04-17

## 2019-04-11 RX ORDER — KETOROLAC TROMETHAMINE 30 MG/ML
30 INJECTION, SOLUTION INTRAMUSCULAR; INTRAVENOUS ONCE
Status: COMPLETED | OUTPATIENT
Start: 2019-04-11 | End: 2019-04-11

## 2019-04-11 RX ORDER — DEXAMETHASONE SODIUM PHOSPHATE 4 MG/ML
4 INJECTION, SOLUTION INTRA-ARTICULAR; INTRALESIONAL; INTRAMUSCULAR; INTRAVENOUS; SOFT TISSUE ONCE
Status: COMPLETED | OUTPATIENT
Start: 2019-04-11 | End: 2019-04-11

## 2019-04-11 RX ORDER — MELOXICAM 15 MG/1
15 TABLET ORAL DAILY
Qty: 30 TABLET | Refills: 0 | Status: SHIPPED | OUTPATIENT
Start: 2019-04-11 | End: 2022-09-21

## 2019-04-11 RX ADMIN — DEXAMETHASONE SODIUM PHOSPHATE 4 MG: 4 INJECTION, SOLUTION INTRAMUSCULAR; INTRAVENOUS at 15:12

## 2019-04-11 RX ADMIN — KETOROLAC TROMETHAMINE 30 MG: 30 INJECTION, SOLUTION INTRAMUSCULAR at 15:12

## 2019-04-11 ASSESSMENT — ENCOUNTER SYMPTOMS
EYE ITCHING: 0
SHORTNESS OF BREATH: 0
BACK PAIN: 0
COLOR CHANGE: 0
PHOTOPHOBIA: 0
COUGH: 0
EYE DISCHARGE: 0
APNEA: 0

## 2019-04-11 ASSESSMENT — PAIN SCALES - GENERAL
PAINLEVEL_OUTOF10: 9
PAINLEVEL_OUTOF10: 9

## 2019-04-11 ASSESSMENT — PAIN DESCRIPTION - LOCATION: LOCATION: KNEE

## 2019-04-11 ASSESSMENT — PAIN DESCRIPTION - PAIN TYPE: TYPE: ACUTE PAIN

## 2019-04-11 NOTE — ED PROVIDER NOTES
[facility] EMERGENCY DEPT  eMERGENCYdEPARTMENT eNCOUnter      Pt Name: Nathan Umana  MRN: [id number]  [place] 1980  Date of evaluation: 4/11/2019  Provider:JOSE Alas    CHIEF COMPLAINT       Chief Complaint   Patient presents with    Fall     yesterday    Knee Injury     right         HISTORY OF PRESENT ILLNESS  (Location/Symptom, Timing/Onset, Context/Setting, Quality, Duration, Modifying Factors, Severity.)   Nathan Umana is a 34 y.o. male who presents to the emergency department with complaints of right knee pain he twisted last night while sitting in a rolling chair rolled out from under him. He is unsure of if he hyper flexed or just twisted. He has been walking on this since the event. He denies any numbness tingling admits to some swelling. No significant medical treatment prior to coming in. Pain is a 10 out of 10 when weightbearing. Denies hip pain or hitting his head. Denies loss of consciousness denies any other arthralgias. HPI    Nursing Notes were reviewed and I agree. REVIEW OF SYSTEMS    (2-9 systems for level 4, 10 or more for level 5)     Review of Systems   Constitutional: Negative for activity change, appetite change, chills and fever. HENT: Negative for congestion and dental problem. Eyes: Negative for photophobia, discharge and itching. Respiratory: Negative for apnea, cough and shortness of breath. Cardiovascular: Negative for chest pain, palpitations and leg swelling. Musculoskeletal: Positive for arthralgias, gait problem and joint swelling. Negative for back pain, myalgias, neck pain and neck stiffness. Skin: Negative for color change, pallor, rash and wound. Neurological: Negative for dizziness, seizures and syncope. Psychiatric/Behavioral: Negative for agitation. The patient is not nervous/anxious. Except as noted above the remainder of the review of systems was reviewed and negative.        PAST MEDICAL HISTORY     Past Medical History: Diagnosis Date    Chronic pain     Hypertension     Osteoarthritis          SURGICAL HISTORY       Past Surgical History:   Procedure Laterality Date    TOTAL HIP ARTHROPLASTY      bilateral         CURRENT MEDICATIONS       Previous Medications    No medications on file       ALLERGIES     Aleve [naproxen sodium]    FAMILY HISTORY     History reviewed. No pertinent family history. SOCIAL HISTORY       Social History     Socioeconomic History    Marital status:      Spouse name: None    Number of children: None    Years of education: None    Highest education level: None   Occupational History    None   Social Needs    Financial resource strain: None    Food insecurity:     Worry: None     Inability: None    Transportation needs:     Medical: None     Non-medical: None   Tobacco Use    Smoking status: Current Every Day Smoker     Packs/day: 0.50     Types: Cigarettes    Smokeless tobacco: Never Used   Substance and Sexual Activity    Alcohol use: Not Currently    Drug use: Never    Sexual activity: None   Lifestyle    Physical activity:     Days per week: None     Minutes per session: None    Stress: None   Relationships    Social connections:     Talks on phone: None     Gets together: None     Attends Yazidism service: None     Active member of club or organization: None     Attends meetings of clubs or organizations: None     Relationship status: None    Intimate partner violence:     Fear of current or ex partner: None     Emotionally abused: None     Physically abused: None     Forced sexual activity: None   Other Topics Concern    None   Social History Narrative    None       SCREENINGS           PHYSICAL EXAM    (up to 7 forlevel 4, 8 or more for level 5)     ED Triage Vitals [04/11/19 1400]   BP Temp Temp src Pulse Resp SpO2 Height Weight   (!) 142/95 97.8 °F (36.6 °C) -- 87 18 96 % -- --       Physical Exam   Constitutional: He is oriented to person, place, and time. He appears well-developed and well-nourished. No distress. HENT:   Head: Normocephalic and atraumatic. Right Ear: External ear normal.   Left Ear: External ear normal.   Mouth/Throat: Oropharynx is clear and moist.   Eyes: Pupils are equal, round, and reactive to light. EOM are normal.   Neck: Normal range of motion. Neck supple. No tracheal deviation present. Cardiovascular: Normal rate, regular rhythm and normal heart sounds. No murmur heard. Pulmonary/Chest: Effort normal and breath sounds normal. No stridor. He has no wheezes. He exhibits no tenderness. Abdominal: Soft. Bowel sounds are normal. He exhibits no distension. There is no tenderness. Musculoskeletal: Normal range of motion. He exhibits edema and tenderness. He exhibits no deformity. Neurological: He is alert and oriented to person, place, and time. He displays normal reflexes. No cranial nerve deficit or sensory deficit. He exhibits normal muscle tone. Coordination normal.   Skin: Skin is warm and dry. Capillary refill takes less than 2 seconds. He is not diaphoretic. Psychiatric: He has a normal mood and affect. His behavior is normal. Judgment and thought content normal.   Nursing note and vitals reviewed. DIAGNOSTIC RESULTS     RADIOLOGY:   Non-plain film images such as CT, Ultrasound and MRI are read by the radiologist. Plain radiographic images are visualized and preliminarilyinterpreted by No att. providers found with the below findings:      Interpretation per the Radiologist below, if available at the time of this note:    XR KNEE RIGHT (1-2 VIEWS)   Final Result   Impression:   Moderate suprapatellar effusion, without acute displaced fracture or   dislocation. .   Signed by Dr Ruma No on 4/11/2019 3:06 PM          LABS:  Labs Reviewed - No data to display    All other labs were within normal range or notreturned as of this dictation.     RE-ASSESSMENT        EMERGENCY DEPARTMENT COURSE and DIFFERENTIAL

## 2019-04-23 ENCOUNTER — OFFICE VISIT (OUTPATIENT)
Dept: ORTHOPEDIC SURGERY | Facility: CLINIC | Age: 39
End: 2019-04-23

## 2019-04-23 VITALS — WEIGHT: 238.1 LBS | BODY MASS INDEX: 34.09 KG/M2 | HEIGHT: 70 IN

## 2019-04-23 DIAGNOSIS — I10 ESSENTIAL HYPERTENSION: ICD-10-CM

## 2019-04-23 DIAGNOSIS — M70.62 TROCHANTERIC BURSITIS OF LEFT HIP: Primary | ICD-10-CM

## 2019-04-23 DIAGNOSIS — Z96.641 PRESENCE OF RIGHT HIP IMPLANT: ICD-10-CM

## 2019-04-23 DIAGNOSIS — Z96.642 PRESENCE OF LEFT HIP IMPLANT: ICD-10-CM

## 2019-04-23 DIAGNOSIS — M25.552 LEFT HIP PAIN: ICD-10-CM

## 2019-04-23 DIAGNOSIS — F17.210 HEAVY CIGARETTE SMOKER (20-39 PER DAY): ICD-10-CM

## 2019-04-23 PROCEDURE — 99203 OFFICE O/P NEW LOW 30 MIN: CPT | Performed by: ORTHOPAEDIC SURGERY

## 2019-04-23 PROCEDURE — 99406 BEHAV CHNG SMOKING 3-10 MIN: CPT | Performed by: ORTHOPAEDIC SURGERY

## 2019-04-23 NOTE — PROGRESS NOTES
Rikki Alejandro is a 38 y.o. male   Primary provider:  Thomas Henao MD       Chief Complaint   Patient presents with   • Left Hip - Hip Pain       HISTORY OF PRESENT ILLNESS: left hip pain started in 2000, left JULIUS 2017 Dr. Glaser in [place]. Patient states that hip does not feel right. xrays done today.   Pain started about 6 weeks after surgery.  No specific injury  Pain is in mid-thigh.  Pain in left knee.  States he had knee pain prior to surgery.    Has numbness on lateral aspect of hip.    Also had right hip replacement (in July 2017) - no problems  Pain has been getting worse in left hip    Hip Pain    The incident occurred more than 1 week ago. There was no injury mechanism. The pain is present in the left hip. The quality of the pain is described as stabbing and burning. The pain is severe. The pain has been constant since onset. He reports no foreign bodies present. Exacerbated by: standing, driving, walking.  He has tried ice and heat for the symptoms.        CONCURRENT MEDICAL HISTORY:    Past Medical History:   Diagnosis Date   • Anxiety    • Arthritis    • Depression        Allergies   Allergen Reactions   • Aspirin Anaphylaxis and Hives   • Lisinopril Cough   • Naproxen Sodium Other (See Comments)         Current Outpatient Medications:   •  calcipotriene (DOVONEX) 0.005 % cream, , Disp: , Rfl: 5  •  citalopram (CeleXA) 40 MG tablet, Take 1 tablet by mouth Daily., Disp: 30 tablet, Rfl: 5  •  gabapentin (NEURONTIN) 600 MG tablet, Take 1 tablet by mouth 3 (Three) Times a Day., Disp: 90 tablet, Rfl: 2  •  losartan (COZAAR) 100 MG tablet, losartan 100 mg tablet  Take 1 tablet every day by oral route., Disp: , Rfl:   •  meclizine (ANTIVERT) 25 MG tablet, TAKE 1 TABLET BY MOUTH THREE TIMES DAILY AS NEEDED FOR  VERTIGO, Disp: 30 tablet, Rfl: 1  •  raNITIdine (ZANTAC) 150 MG tablet, Take 150 mg by mouth Every Night., Disp: , Rfl:   •  tiZANidine (ZANAFLEX) 4 MG tablet, Take 1 tablet by mouth 3 (Three)  "Times a Day As Needed for Muscle Spasms., Disp: 60 tablet, Rfl: 2  •  traMADol (ULTRAM) 50 MG tablet, Take 1 tablet by mouth Every 6 (Six) Hours As Needed for Moderate Pain  or Severe Pain ., Disp: 60 tablet, Rfl: 1    Past Surgical History:   Procedure Laterality Date   • REPLACEMENT TOTAL HIP LATERAL POSITION Bilateral    • TOTAL HIP ARTHROPLASTY Bilateral 2017    Performed by Dr. Mejia in [place], [place]       Family History   Problem Relation Age of Onset   • COPD Mother    • Hyperlipidemia Mother    • Heart attack Father        Social History     Socioeconomic History   • Marital status:      Spouse name: Not on file   • Number of children: Not on file   • Years of education: Not on file   • Highest education level: Not on file   Tobacco Use   • Smoking status: Current Every Day Smoker     Packs/day: 1.50     Types: Cigarettes   • Smokeless tobacco: Never Used   Substance and Sexual Activity   • Alcohol use: Yes     Alcohol/week: 2.4 oz     Types: 4 Cans of beer per week     Frequency: 4 or more times a week     Drinks per session: 3 or 4     Binge frequency: Never   • Drug use: No   • Sexual activity: Defer        Review of Systems   Constitutional: Positive for unexpected weight change.   Respiratory: Positive for cough and wheezing.    Gastrointestinal: Positive for abdominal pain and diarrhea.   Endocrine: Positive for heat intolerance.   Musculoskeletal: Positive for joint swelling.   Neurological: Positive for headaches.   Psychiatric/Behavioral: Positive for dysphoric mood and sleep disturbance.   All other systems reviewed and are negative.      PHYSICAL EXAMINATION:       Ht 177.8 cm (70\")   Wt 108 kg (238 lb 1.6 oz)   BMI 34.16 kg/m²     Physical Exam   Constitutional: He is oriented to person, place, and time. He appears well-developed and well-nourished.   Neurological: He is alert and oriented to person, place, and time.   Psychiatric: He has a normal mood and affect. His behavior is " normal. Judgment and thought content normal.       GAIT:     []  Normal  [x]  Antalgic    Assistive device: [x]  None  []  Walker     []  Crutches  []  Cane     []  Wheelchair  []  Stretcher    Right Hip Exam     Tenderness   The patient is experiencing no tenderness.     Range of Motion   External rotation: 40   Internal rotation: 20     Muscle Strength   Abduction: 4/5   Adduction: 4/5   Flexion: 4/5     Tests   JONATHAN: negative    Other   Erythema: absent  Sensation: normal  Pulse: present      Left Hip Exam     Tenderness   The patient is experiencing no tenderness.     Range of Motion   External rotation: 40   Internal rotation: 20     Muscle Strength   Abduction: 4/5   Adduction: 4/5   Flexion: 4/5     Tests   JONATHAN: negative  Fadir:  Negative FADIR test    Other   Erythema: absent  Sensation: normal  Pulse: present                  Xr Hip With Or Without Pelvis 2 - 3 View Left    Result Date: 4/23/2019  Narrative: Ordering Provider:  Eleazar Orourke MD Ordering Diagnosis/Indication:  Trochanteric bursitis of left hip Procedure:  XR HIP W OR WO PELVIS 2-3 VIEW LEFT Exam Date:  4/23/19 COMPARISON:  Not applicable, no relevant images available.     Impression:  AP standing of the pelvis with AP and lateral of the left hip show acceptable position and alignment of bilateral total hip arthroplasties.  There are 2 fixation screws in the left acetabulum and the appearance of one in the right acetabulum.  No sign of implant loosening or failure is noted on either side.  No acute bony abnormalities are noted. Eleazar Orourke MD 4/23/19           ASSESSMENT:    Diagnoses and all orders for this visit:    Trochanteric bursitis of left hip    Left hip pain    Presence of left hip implant    Presence of right hip implant    Essential hypertension    Heavy cigarette smoker (20-39 per day)          PLAN    Patient underwent total hip arthroplasty on the left approximately 1-1/2 years ago.  He has had pain in  the left leg.  The x-ray appears to indicate that he has some limb length shortening on the left side.  He seemed to be more comfortable with a 1/8 inch block under his foot.  We discussed use of a heel wedge or a shoe insert to elevate the left leg slightly.  We also discussed physical therapy with range of motion and strengthening exercises of both legs gait training and low back exercises.   The patient has seen spine surgery for herniated disks in the cervical spine and lumbar spine.  He is also an pain management.  He has multiple different issues that are intertwined.  The x-rays do not show an abnormality in the left hip, loosening of the prosthesis, or malalignment of the implants.  No surgical indication noted at this time.    I advised Rikki of the risks of continuing to use tobacco.    During this visit, I spent 5 minutes counseling the patient regarding tobacco cessation.      Return in about 6 weeks (around 6/4/2019) for recheck.    Eleazar Orourke MD

## 2019-04-24 ENCOUNTER — TELEPHONE (OUTPATIENT)
Dept: ORTHOPEDIC SURGERY | Facility: CLINIC | Age: 39
End: 2019-04-24

## 2019-04-24 DIAGNOSIS — Z96.642 PRESENCE OF LEFT HIP IMPLANT: ICD-10-CM

## 2019-04-24 DIAGNOSIS — M25.552 LEFT HIP PAIN: ICD-10-CM

## 2019-04-24 DIAGNOSIS — M70.62 TROCHANTERIC BURSITIS OF LEFT HIP: Primary | ICD-10-CM

## 2019-04-24 RX ORDER — LOSARTAN POTASSIUM 100 MG/1
TABLET ORAL
Qty: 30 TABLET | Refills: 5 | Status: SHIPPED | OUTPATIENT
Start: 2019-04-24 | End: 2020-02-19 | Stop reason: SDUPTHER

## 2019-05-01 ENCOUNTER — HOSPITAL ENCOUNTER (OUTPATIENT)
Dept: PHYSICAL THERAPY | Facility: HOSPITAL | Age: 39
Setting detail: THERAPIES SERIES
Discharge: HOME | End: 2019-05-01

## 2019-05-01 DIAGNOSIS — M70.62 TROCHANTERIC BURSITIS OF LEFT HIP: ICD-10-CM

## 2019-05-01 DIAGNOSIS — M70.62 GREATER TROCHANTERIC BURSITIS OF LEFT HIP: Primary | ICD-10-CM

## 2019-05-01 DIAGNOSIS — M25.552 LEFT HIP PAIN: ICD-10-CM

## 2019-05-01 PROCEDURE — 97110 THERAPEUTIC EXERCISES: CPT | Performed by: PHYSICAL THERAPIST

## 2019-05-01 PROCEDURE — 97162 PT EVAL MOD COMPLEX 30 MIN: CPT | Performed by: PHYSICAL THERAPIST

## 2019-05-01 NOTE — THERAPY EVALUATION
Outpatient Physical Therapy Ortho Initial Evaluation  [facility]     Patient Name: Rikki Alejandro  : 1980  MRN: [id number]  Today's Date: 2019      Visit Date: 2019  Visit   Return to MD: SIDNEY  Re-cert date: 19  Patient Active Problem List   Diagnosis   • Cervical radiculopathy   • Degeneration of lumbar intervertebral disc   • BMI 36.0-36.9,adult   • Former tobacco use   • Essential hypertension   • Left shoulder pain   • Neck pain, chronic   • Trochanteric bursitis of left hip   • History of hypertension   • History of total hip arthroplasty   • Insomnia   • Left hip pain   • Presence of left hip implant        Past Medical History:   Diagnosis Date   • Anxiety    • Arthritis    • Bulging of cervical intervertebral disc    • Bulging of lumbar intervertebral disc    • Depression    • GERD (gastroesophageal reflux disease)    • Hypertension         Past Surgical History:   Procedure Laterality Date   • REPLACEMENT TOTAL HIP LATERAL POSITION Bilateral    • TOTAL HIP ARTHROPLASTY Bilateral 2017    Performed by Dr. Mejia in [place], [place]       Visit Dx:     ICD-10-CM ICD-9-CM   1. Greater trochanteric bursitis of left hip M70.62 726.5     Medications (Admitted on 2019)     calcipotriene (DOVONEX) 0.005 % cream     citalopram (CeleXA) 40 MG tablet     gabapentin (NEURONTIN) 600 MG tablet     losartan (COZAAR) 100 MG tablet     meclizine (ANTIVERT) 25 MG tablet     raNITIdine (ZANTAC) 150 MG tablet     tiZANidine (ZANAFLEX) 4 MG tablet     traMADol (ULTRAM) 50 MG tablet      Allergies: Aspirin, Lisinopril, Naproxen Sodium        PT Ortho     Row Name 19 1345       Precautions and Contraindications    Precautions/Limitations  no known precautions/limitations  -BS       Subjective Pain    Able to rate subjective pain?  yes  -BS    Pre-Treatment Pain Level  7  -BS       Posture/Observations    Posture/Observations Comments  level SIJ-level ASIS; level lateral malleoli  (35 inches leg length bilaterally)  -BS       Special Tests/Palpation    Special Tests/Palpation  Hip  -BS       Leg Length Test    True  Equal Bilat LE's 35 inches-ASIS to lat malleolus  -BS       General ROM    GENERAL ROM COMMENTS  AROM: L hip flex 48° add 17° abd 13° R hip AROM-flex 60° abd 27° add 15°  -BS       MMT (Manual Muscle Testing)    General MMT Comments  MMT: R hip-flex 5/5 hip abd 5/5 knee flex 5/5 knee ext 5/5 ankle DF 5/5; L LE-hip flex 5/5 hip abd 5/5 hip ext 4/5 knee ext 4+/5 knee flex 4/5 ankle DF 5/5  -BS       Sensation    Light Touch  Partial deficits in the LLE  -BS       Gait/Stairs Assessment/Training    Comment (Gait/Stairs)  ambulates without AD with minimally antalgic gait pattern with L LE  -BS      User Key  (r) = Recorded By, (t) = Taken By, (c) = Cosigned By    Initials Name Provider Type    Je Diaz, PT Physical Therapist                      Therapy Education  Education Details: HEP: prone HS curls, B SLR, SKC S w/ towel  Given: HEP  Program: New  How Provided: Verbal, Demonstration, Written  Provided to: Patient  Level of Understanding: Teach back education performed     PT OP Goals     Row Name 05/01/19 1400          PT Short Term Goals    STG Date to Achieve  05/22/19  -BS     STG 1  Pt independent with HEP for LE/low back strengthening and ROM.   -BS     STG 1 Progress  New  -BS     STG 2  Improve L hip flex AROM to 90°  -BS     STG 2 Progress  New  -BS     STG 3  Improve L hip extension/L knee flex MMT to 5/5  -BS     STG 3 Progress  New  -BS     STG 4  Reduce L hip pain by 25-50% with prolonged sitting and standing  -BS     STG 4 Progress  New  -BS     STG 5  Improve L hip abduction AROM to 20°  -BS     STG 5 Progress  New  -BS        Time Calculation    PT Goal Re-Cert Due Date  05/22/19  -BS       User Key  (r) = Recorded By, (t) = Taken By, (c) = Cosigned By    Initials Name Provider Type    Je Diaz, PT Physical Therapist          PT Assessment/Plan      Row Name 05/01/19 1345          PT Assessment    Functional Limitations  Impaired gait;Performance in work activities;Performance in sport activities;Performance in leisure activities  -BS     Impairments  Endurance;Gait;Impaired flexibility;Range of motion;Pain;Muscle strength  -BS     Assessment Comments  Chronic L hip pain w/ trochanteric bursitis and h/o old L THR.  -BS     Please refer to paper survey for additional self-reported information  Yes  -BS     Rehab Potential  Fair  -BS     Patient/caregiver participated in establishment of treatment plan and goals  Yes  -BS     Patient would benefit from skilled therapy intervention  Yes  -BS        PT Plan    PT Frequency  2x/week  -BS     Predicted Duration of Therapy Intervention (Therapy Eval)  3 weeks  -BS     Planned CPT's?  PT EVAL MOD COMPLELITY: 53959;PT RE-EVAL: 34822;PT THER PROC EA 15 MIN: 32115;PT MANUAL THERAPY EA 15 MIN: 57334;PT THER ACT EA 15 MIN: 46847;PT NEUROMUSC RE-EDUCATION EA 15 MIN: 36449;PT GAIT TRAINING EA 15 MIN: 36862;PT AQUATIC THERAPY EA 15 MIN: 59120;PT ELECTRICAL STIM UNATTEND: ;PT HOT/COLD PACK WC NONMCARE: 47120;PT THER SUPP EA 15 MIN  -BS     Physical Therapy Interventions (Optional Details)  aquatics exercise;balance training;gait training;home exercise program;lumbar stabilization;manual therapy techniques;modalities;neuromuscular re-education;patient/family education;ROM (Range of Motion);stair training;strengthening;stretching;transfer training  -BS     PT Plan Comments  Address core stability and L hip strengthening and lumbar paraspinal stretching. Consider aquatic therapy if land based PT proves ineffective with pain management.  -BS       User Key  (r) = Recorded By, (t) = Taken By, (c) = Cosigned By    Initials Name Provider Type    BS Je Bautista, PT Physical Therapist            Exercises     Row Name 05/01/19 1345             Subjective Comments    Subjective Comments  39 yo male with chronic L hip pain, h/o L  "THR in 2017 and the pain never abated since. Has a slight length length discrepancy, R>L leg length. Aggravating factors: prolonged standing/sitting, sleeping, driving, walking short distances; Easing factors: none  -BS         Subjective Pain    Able to rate subjective pain?  yes  -BS      Pre-Treatment Pain Level  7  -BS      Post-Treatment Pain Level  8  -BS         Exercise 1    Exercise Name 1  SKC S w/ towel  -BS      Sets 1  1  -BS      Reps 1  10  -BS      Time 1  3\" hold  -BS         Exercise 2    Exercise Name 2  B SLR  -BS      Sets 2  1  -BS      Reps 2  10  -BS         Exercise 3    Exercise Name 3  prone knee bends  -BS      Sets 3  1  -BS      Reps 3  10  -BS        User Key  (r) = Recorded By, (t) = Taken By, (c) = Cosigned By    Initials Name Provider Type    Je Diaz, PT Physical Therapist                        Outcome Measure Options: Lower Extremity Functional Scale (LEFS)  Lower Extremity Functional Index  Any of your usual work, housework or school activities: Quite a bit of difficulty  Your usual hobbies, recreational or sporting activities: Extreme difficulty or unable to perform activity  Getting into or out of the bath: Quite a bit of difficulty  Walking between rooms: Quite a bit of difficulty  Putting on your shoes or socks: Extreme difficulty or unable to perform activity  Squatting: Quite a bit of difficulty  Lifting an object, like a bag of groceries from the floor: Quite a bit of difficulty  Performing light activities around your home: Moderate difficulty  Performing heavy activities around your home: Quite a bit of difficulty  Getting into or out of a car: Quite a bit of difficulty  Walking 2 blocks: Extreme difficulty or unable to perform activity  Walking a mile: Extreme difficulty or unable to perform activity  Going up or down 10 stairs (about 1 flight of stairs): Quite a bit of difficulty  Standing for 1 hour: Extreme difficulty or unable to perform activity  Sitting " for 1 hour: Quite a bit of difficulty  Running on even ground: Extreme difficulty or unable to perform activity  Running on uneven ground: Extreme difficulty or unable to perform activity  Making sharp turns while running fast: Extreme difficulty or unable to perform activity  Hopping: Extreme difficulty or unable to perform activity  Rolling over in bed: Moderate difficulty  Total: 13      Time Calculation:     Start Time: 1345  Stop Time: 1440  Time Calculation (min): 55 min  Total Timed Code Minutes- PT: 55 minute(s)     Therapy Charges for Today     Code Description Service Date Service Provider Modifiers Qty    20090882733 HC PT EVAL MOD COMPLEXITY 3 5/1/2019 Je Bautista, PT GP 1    25917667980 HC PT THER PROC EA 15 MIN 5/1/2019 Je Bautista, PT GP 1          PT G-Codes  Outcome Measure Options: Lower Extremity Functional Scale (LEFS)  Total: 13         Je Bautista, PT  5/1/2019

## 2019-05-06 ENCOUNTER — HOSPITAL ENCOUNTER (OUTPATIENT)
Dept: PHYSICAL THERAPY | Facility: HOSPITAL | Age: 39
Setting detail: THERAPIES SERIES
Discharge: HOME | End: 2019-05-06

## 2019-05-06 DIAGNOSIS — M25.552 LEFT HIP PAIN: ICD-10-CM

## 2019-05-06 DIAGNOSIS — M70.62 TROCHANTERIC BURSITIS OF LEFT HIP: ICD-10-CM

## 2019-05-06 DIAGNOSIS — M70.62 GREATER TROCHANTERIC BURSITIS OF LEFT HIP: Primary | ICD-10-CM

## 2019-05-06 PROCEDURE — 97110 THERAPEUTIC EXERCISES: CPT

## 2019-05-06 NOTE — THERAPY TREATMENT NOTE
"    Outpatient Physical Therapy Ortho Treatment Note  [facility]     Patient Name: Rikki Alejandro  : 1980  MRN: [id number]  Today's Date: 2019      Visit Date: 2019  Subjective Improvement:   \"none\"    Attendance:   Approved: 20 visits per yr          MD follow up: ?5 wks            date:   2019      Visit Dx:    ICD-10-CM ICD-9-CM   1. Greater trochanteric bursitis of left hip M70.62 726.5   2. Left hip pain M25.552 719.45   3. Trochanteric bursitis of left hip M70.62 726.5       Patient Active Problem List   Diagnosis   • Cervical radiculopathy   • Degeneration of lumbar intervertebral disc   • BMI 36.0-36.9,adult   • Former tobacco use   • Essential hypertension   • Left shoulder pain   • Neck pain, chronic   • Trochanteric bursitis of left hip   • History of hypertension   • History of total hip arthroplasty   • Insomnia   • Left hip pain   • Presence of left hip implant        Past Medical History:   Diagnosis Date   • Anxiety    • Arthritis    • Bulging of cervical intervertebral disc    • Bulging of lumbar intervertebral disc    • Depression    • GERD (gastroesophageal reflux disease)    • Hypertension         Past Surgical History:   Procedure Laterality Date   • REPLACEMENT TOTAL HIP LATERAL POSITION Bilateral    • TOTAL HIP ARTHROPLASTY Bilateral 2017    Performed by Dr. Mejia in [place], [place]       PT Ortho     Row Name 19 1400       Precautions and Contraindications    Precautions/Limitations  no known precautions/limitations  -PM       Subjective Pain    Able to rate subjective pain?  yes  -PM    Pre-Treatment Pain Level  7  -PM    Post-Treatment Pain Level  7  -PM       Posture/Observations    Posture/Observations Comments  enters with SPC L hand, antalgic gait supine L short, min change MET; impr with prone ex  -PM       Special Tests/Palpation    Special Tests/Palpation  Hip left is quite anterior with attempted ant hip flex supp S  -PM      User " "Key  (r) = Recorded By, (t) = Taken By, (c) = Cosigned By    Initials Name Provider Type    PM Kisha Fu PTA Physical Therapy Assistant                      PT Assessment/Plan     Row Name 05/06/19 1400          PT Assessment    Assessment Comments  Pt was counseled and practice gait with cane in other hand; states just can't; toes out significantly B and advised to try to keep straight. Most c/o's anterolateral L hip; LLD improved after prone ex. Weak and poor bridge/iso TA. Has only 20 visits per yr per pt and concerned re: saving visits in event he has to have something done to hip.  -PM        PT Plan    PT Frequency  2x/week  -PM     Predicted Duration of Therapy Intervention (Therapy Eval)  3 wks  -PM     PT Plan Comments  AQ next visit as in POC and prev helped pt; monitor improvement or lack thereof being good stewards of his visits  -PM       User Key  (r) = Recorded By, (t) = Taken By, (c) = Cosigned By    Initials Name Provider Type    PM Kisha Fu PTA Physical Therapy Assistant            Exercises     Row Name 05/06/19 1400             Subjective Comments    Subjective Comments  Pt states SLR really hurt/burn.  -PM         Subjective Pain    Able to rate subjective pain?  yes  -PM      Pre-Treatment Pain Level  7  -PM      Post-Treatment Pain Level  7  -PM         Exercise 1    Exercise Name 1  seated HS S util stool  -PM      Cueing 1  Verbal  -PM      Reps 1  2  -PM      Time 1  30\"  -PM         Exercise 2    Exercise Name 2  bridge w/add and iso TA very low level  -PM      Sets 2  2  -PM      Reps 2  10  -PM      Time 2  3\"  -PM         Exercise 3    Exercise Name 3  HL clam w/TB  -PM      Cueing 3  Verbal  -PM      Sets 3  2  -PM      Reps 3  10  -PM      Additional Comments  red  -PM         Exercise 4    Exercise Name 4  SL clam   -PM      Cueing 4  Verbal  -PM      Sets 4  2  -PM      Reps 4  10  -PM         Exercise 5    Exercise Name 5  attempted supported hip flexor S  " "-PM      Cueing 5  Verbal  -PM      Reps 5  1  -PM      Time 5  30\"  -PM         Exercise 6    Exercise Name 6  ball rolls SKTC as vera B  -PM      Cueing 6  Verbal  -PM      Reps 6  15  -PM         Exercise 7    Exercise Name 7  R piriformis S w/towel  -PM      Cueing 7  Verbal  -PM      Reps 7  2  -PM      Time 7  30\"  -PM         Exercise 8    Exercise Name 8  L piriformis S w/towel  -PM      Cueing 8  Verbal  -PM      Reps 8  1  -PM      Time 8  15\"  -PM      Additional Comments  pn ant hip vs S post rept  -PM         Exercise 9    Exercise Name 9  prone ham curls  -PM      Cueing 9  Verbal  -PM      Sets 9  2  -PM      Reps 9  10  -PM         Exercise 10    Exercise Name 10  prone TKE/GS  -PM      Cueing 10  Verbal  -PM      Sets 10  2  -PM      Reps 10  10  -PM      Time 10  5\"  -PM        User Key  (r) = Recorded By, (t) = Taken By, (c) = Cosigned By    Initials Name Provider Type    PM Kisha Fu PTA Physical Therapy Assistant                      Manual Rx (last 36 hours)      Manual Treatments     Row Name 05/06/19 1500             Manual Rx 1    Manual Rx 1 Location  IS  -PM      Manual Rx 1 Type  MET  -PM      Manual Rx 1 Duration  3x10\"  -PM        User Key  (r) = Recorded By, (t) = Taken By, (c) = Cosigned By    Initials Name Provider Type    PM Kisha Fu PTA Physical Therapy Assistant          PT OP Goals     Row Name 05/06/19 1400          PT Short Term Goals    STG Date to Achieve  05/22/19  -PM     STG 1  Pt independent with HEP for LE/low back strengthening and ROM.   -PM     STG 1 Progress  Ongoing  -PM     STG 2  Improve L hip flex AROM to 90°  -PM     STG 2 Progress  Ongoing  -PM     STG 3  Improve L hip extension/L knee flex MMT to 5/5  -PM     STG 3 Progress  Ongoing  -PM     STG 4  Reduce L hip pain by 25-50% with prolonged sitting and standing  -PM     STG 4 Progress  Ongoing  -PM     STG 5  Improve L hip abduction AROM to 20°  -PM     STG 5 Progress  Ongoing  -PM        " Time Calculation    PT Goal Re-Cert Due Date  05/22/19  -PM       User Key  (r) = Recorded By, (t) = Taken By, (c) = Cosigned By    Initials Name Provider Type    PM Kisha Fu PTA Physical Therapy Assistant          Therapy Education  Education Details: SL clam; prone TKE/GS  Given: HEP  Program: Reinforced, Progressed  How Provided: Verbal, Demonstration, Written  Provided to: Patient  Level of Understanding: Teach back education performed              Time Calculation:   Start Time: 1432  Stop Time: 1518  Time Calculation (min): 46 min  Total Timed Code Minutes- PT: 46 minute(s)  Therapy Charges for Today     Code Description Service Date Service Provider Modifiers Qty    51796639668 HC PT THER PROC EA 15 MIN 5/6/2019 Kisha Fu PTA GP 3                    Kisha Fu PTA  5/6/2019

## 2019-05-08 ENCOUNTER — HOSPITAL ENCOUNTER (OUTPATIENT)
Dept: PHYSICAL THERAPY | Facility: HOSPITAL | Age: 39
Setting detail: THERAPIES SERIES
Discharge: HOME | End: 2019-05-08

## 2019-05-08 DIAGNOSIS — M70.62 GREATER TROCHANTERIC BURSITIS OF LEFT HIP: Primary | ICD-10-CM

## 2019-05-08 DIAGNOSIS — M25.552 LEFT HIP PAIN: ICD-10-CM

## 2019-05-08 DIAGNOSIS — M70.62 TROCHANTERIC BURSITIS OF LEFT HIP: ICD-10-CM

## 2019-05-08 PROCEDURE — 97113 AQUATIC THERAPY/EXERCISES: CPT

## 2019-05-08 NOTE — THERAPY TREATMENT NOTE
"    Outpatient Physical Therapy Ortho Treatment Note  [facility]     Patient Name: Rikki Alejandro  : 1980  MRN: [id number]  Today's Date: 2019      Visit Date: 2019  Subjective Improvement:   \"none\"    Attendance: 3/3  Approved: 20 visits per yr          MD follow up: ?5 wks            date:   2019        Visit Dx:    ICD-10-CM ICD-9-CM   1. Greater trochanteric bursitis of left hip M70.62 726.5   2. Left hip pain M25.552 719.45   3. Trochanteric bursitis of left hip M70.62 726.5       Patient Active Problem List   Diagnosis   • Cervical radiculopathy   • Degeneration of lumbar intervertebral disc   • BMI 36.0-36.9,adult   • Former tobacco use   • Essential hypertension   • Left shoulder pain   • Neck pain, chronic   • Trochanteric bursitis of left hip   • History of hypertension   • History of total hip arthroplasty   • Insomnia   • Left hip pain   • Presence of left hip implant        Past Medical History:   Diagnosis Date   • Anxiety    • Arthritis    • Bulging of cervical intervertebral disc    • Bulging of lumbar intervertebral disc    • Depression    • GERD (gastroesophageal reflux disease)    • Hypertension         Past Surgical History:   Procedure Laterality Date   • REPLACEMENT TOTAL HIP LATERAL POSITION Bilateral    • TOTAL HIP ARTHROPLASTY Bilateral 2017    Performed by Dr. Mejia in [place], [place]       PT Ortho     Row Name 19 1400       Subjective Comments    Subjective Comments  Pt states doing fairly well today.  -PM       Precautions and Contraindications    Precautions/Limitations  no known precautions/limitations  -PM       Subjective Pain    Able to rate subjective pain?  yes  -PM    Pre-Treatment Pain Level  7  -PM    Post-Treatment Pain Level  6  -PM       Posture/Observations    Posture/Observations Comments  moves freely in H20 with HHA to wall frequently  -PM    Row Name 19 1400       Precautions and Contraindications    " "Precautions/Limitations  no known precautions/limitations  -PM       Subjective Pain    Able to rate subjective pain?  yes  -PM    Pre-Treatment Pain Level  7  -PM    Post-Treatment Pain Level  7  -PM       Posture/Observations    Posture/Observations Comments  enters with SPC L hand, antalgic gait supine L short, min change MET; impr with prone ex  -PM       Special Tests/Palpation    Special Tests/Palpation  Hip left is quite anterior with attempted ant hip flex supp S  -PM      User Key  (r) = Recorded By, (t) = Taken By, (c) = Cosigned By    Initials Name Provider Type    PM Kisha Fu PTA Physical Therapy Assistant                      PT Assessment/Plan     Row Name 05/08/19 1400          PT Assessment    Assessment Comments  Pt able to do more in aquatic environment with no pain incr. Will monitor post Rx symptoms.  -PM        PT Plan    PT Frequency  2x/week  -PM     Predicted Duration of Therapy Intervention (Therapy Eval)  3 wks  -PM     PT Plan Comments  AQ ham curls; AQ float steps; monitor post AQ symptoms/status  -PM       User Key  (r) = Recorded By, (t) = Taken By, (c) = Cosigned By    Initials Name Provider Type    PM Kisha Fu PTA Physical Therapy Assistant            Exercises     Row Name 05/08/19 1400             Subjective Comments    Subjective Comments  Pt states doing fairly well today.  -PM         Subjective Pain    Able to rate subjective pain?  yes  -PM      Pre-Treatment Pain Level  7  -PM      Post-Treatment Pain Level  6  -PM         Aquatics    Aquatics performed?  Yes  -PM         Aquatics LE    Water Walk  forward;side;backward F/R 4'; LAT 3'  -PM      Stretch 1  HS S at step 2x20\"  -PM      Stretch 2  hip flexor S at step 2x20\"  -PM      Hip Abd/Add  B 2x10 alt  -PM      Hip Flex/Ext  B 2x10 alt  -PM      March in Place  B 2x10 alt  -PM      Mini Squat  B 2x10  -PM      Toe/Heel Raises  20  -PM      Step Ups  0  -PM      Bicycle  deep w/2N 3'  -PM      " Flutter/Scissor  LAT; A/P deep w/2N 1'x2  -PM        User Key  (r) = Recorded By, (t) = Taken By, (c) = Cosigned By    Initials Name Provider Type    Kisha Madison PTA Physical Therapy Assistant                       PT OP Goals     Row Name 05/08/19 1400          PT Short Term Goals    STG Date to Achieve  05/22/19  -PM     STG 1  Pt independent with HEP for LE/low back strengthening and ROM.   -PM     STG 1 Progress  Ongoing  -PM     STG 2  Improve L hip flex AROM to 90°  -PM     STG 2 Progress  Ongoing  -PM     STG 3  Improve L hip extension/L knee flex MMT to 5/5  -PM     STG 3 Progress  Ongoing  -PM     STG 4  Reduce L hip pain by 25-50% with prolonged sitting and standing  -PM     STG 4 Progress  Ongoing  -PM     STG 5  Improve L hip abduction AROM to 20°  -PM     STG 5 Progress  Ongoing  -PM        Time Calculation    PT Goal Re-Cert Due Date  05/22/19  -PM       User Key  (r) = Recorded By, (t) = Taken By, (c) = Cosigned By    Initials Name Provider Type    PM Kisha Fu PTA Physical Therapy Assistant          Therapy Education  Given: HEP  Program: Reinforced  How Provided: Verbal, Demonstration, Written  Provided to: Patient  Level of Understanding: Teach back education performed              Time Calculation:   Start Time: 1435  Stop Time: 1517  Time Calculation (min): 42 min  Total Timed Code Minutes- PT: 42 minute(s)  Therapy Charges for Today     Code Description Service Date Service Provider Modifiers Qty    66637285198 HC PT AQUATIC THERAPY EA 15 MIN 5/8/2019 Kisha Fu PTA GP 3                    Kisha Fu PTA  5/8/2019

## 2019-05-14 ENCOUNTER — TELEPHONE (OUTPATIENT)
Dept: FAMILY MEDICINE CLINIC | Facility: CLINIC | Age: 39
End: 2019-05-14

## 2019-05-14 ENCOUNTER — HOSPITAL ENCOUNTER (OUTPATIENT)
Dept: PHYSICAL THERAPY | Facility: HOSPITAL | Age: 39
Setting detail: THERAPIES SERIES
Discharge: HOME | End: 2019-05-14

## 2019-05-14 DIAGNOSIS — M70.62 GREATER TROCHANTERIC BURSITIS OF LEFT HIP: Primary | ICD-10-CM

## 2019-05-14 DIAGNOSIS — M70.62 TROCHANTERIC BURSITIS OF LEFT HIP: ICD-10-CM

## 2019-05-14 DIAGNOSIS — M25.552 LEFT HIP PAIN: ICD-10-CM

## 2019-05-14 PROCEDURE — 97110 THERAPEUTIC EXERCISES: CPT

## 2019-05-14 NOTE — TELEPHONE ENCOUNTER
Pt called requesting refill of pain medication. He would like this to be sent to [facility]. Tablet requested is 50 mg.

## 2019-05-14 NOTE — TELEPHONE ENCOUNTER
Tried to contact Pt, mom answered, stated he was in [place] at PT, states she would have him call me back when he got to a phone. Pt needs UDS and Contract before any refills on pain medication.

## 2019-05-14 NOTE — THERAPY TREATMENT NOTE
"    Outpatient Physical Therapy Ortho Treatment Note  [facility]     Patient Name: Rikki Alejandro  : 1980  MRN: [id number]  Today's Date: 2019      Visit Date: 2019  Subjective Improvement:   \"not really\"    Attendance:   Approved: 20 visits per yr          MD follow up: ?5 wks            date:   2019        Visit Dx:    ICD-10-CM ICD-9-CM   1. Greater trochanteric bursitis of left hip M70.62 726.5   2. Left hip pain M25.552 719.45   3. Trochanteric bursitis of left hip M70.62 726.5       Patient Active Problem List   Diagnosis   • Cervical radiculopathy   • Degeneration of lumbar intervertebral disc   • BMI 36.0-36.9,adult   • Former tobacco use   • Essential hypertension   • Left shoulder pain   • Neck pain, chronic   • Trochanteric bursitis of left hip   • History of hypertension   • History of total hip arthroplasty   • Insomnia   • Left hip pain   • Presence of left hip implant        Past Medical History:   Diagnosis Date   • Anxiety    • Arthritis    • Bulging of cervical intervertebral disc    • Bulging of lumbar intervertebral disc    • Depression    • GERD (gastroesophageal reflux disease)    • Hypertension         Past Surgical History:   Procedure Laterality Date   • REPLACEMENT TOTAL HIP LATERAL POSITION Bilateral    • TOTAL HIP ARTHROPLASTY Bilateral 2017    Performed by Dr. Mejia in [place], [place]       PT Ortho     Row Name 19 1300       Subjective Comments    Subjective Comments  Pt states he felt pretty good for an hour after pool, then pn returned sitting in chair fixing lawnmower. Saw a different MD for R knee and he said I needed to strengthen quad and hip flexor to improve my L hip pn.  -PM       Precautions and Contraindications    Precautions/Limitations  no known precautions/limitations  -PM       Subjective Pain    Able to rate subjective pain?  yes  -PM    Post-Treatment Pain Level  8  -PM       Posture/Observations    " Posture/Observations Comments  uses cane ipsilateral; altered gait; antalgic gait; LL=, ASIS and sacrum = today after ther ex  -PM      User Key  (r) = Recorded By, (t) = Taken By, (c) = Cosigned By    Initials Name Provider Type    Kisha Madiosn PTA Physical Therapy Assistant                      PT Assessment/Plan     Row Name 05/14/19 1400          PT Assessment    Assessment Comments  Pt vera ther ex well today although reports cont to have high levels of pain. Pt laughing and talking throughout therapy; was noted to be tender post hip; alignment symmetrical today.  -PM     Rehab Potential  Fair  -PM     Patient/caregiver participated in establishment of treatment plan and goals  Yes  -PM     Patient would benefit from skilled therapy intervention  Yes  -PM        PT Plan    PT Frequency  2x/week  -PM     Predicted Duration of Therapy Intervention (Therapy Eval)  3 wks  -PM     PT Plan Comments  possible Pro II; cont AQ; flor next wk  -PM       User Key  (r) = Recorded By, (t) = Taken By, (c) = Cosigned By    Initials Name Provider Type    Kisha Madison PTA Physical Therapy Assistant          Modalities     Row Name 05/14/19 1300             Ice    Ice Applied  Yes  -PM      Location  L hip anterolateral, SL position  -PM      Rx Minutes  15 mins  -PM      Ice S/P Rx  Yes  -PM        User Key  (r) = Recorded By, (t) = Taken By, (c) = Cosigned By    Initials Name Provider Type    Kisha Madison PTA Physical Therapy Assistant        Exercises     Row Name 05/14/19 1300             Subjective Comments    Subjective Comments  Pt states he felt pretty good for an hour after pool, then pn returned sitting in chair fixing lawnmower. Saw a different MD for R knee and he said I needed to strengthen quad and hip flexor to improve my L hip pn.  -PM         Subjective Pain    Able to rate subjective pain?  yes  -PM      Pre-Treatment Pain Level  8  -PM      Post-Treatment Pain Level  8  -PM       "   Exercise 1    Exercise Name 1  seated HS S util stool  -PM      Cueing 1  Verbal  -PM      Reps 1  2  -PM      Time 1  30\"  -PM         Exercise 2    Exercise Name 2  bridge w/add and iso TA very low level  -PM      Sets 2  2  -PM      Reps 2  10  -PM      Time 2  3\"  -PM         Exercise 3    Exercise Name 3  HL clam w/TB  -PM      Cueing 3  Verbal  -PM      Sets 3  2  -PM      Reps 3  10  -PM      Additional Comments  GTB  -PM         Exercise 4    Exercise Name 4  SL clam   -PM      Cueing 4  Verbal  -PM      Sets 4  2  -PM      Reps 4  10  -PM         Exercise 5    Exercise Name 5  std mod quad/hip flexor S with support and util chair  -PM      Cueing 5  Verbal  -PM      Reps 5  2  -PM      Time 5  30\"  -PM      Additional Comments  Bilateral  -PM         Exercise 6    Exercise Name 6  ball rolls SKTC as vera B  -PM      Cueing 6  Verbal  -PM      Reps 6  20  -PM         Exercise 7    Exercise Name 7  R piriformis S w/strap  -PM      Cueing 7  Verbal  -PM      Reps 7  2  -PM      Time 7  30\"  -PM         Exercise 8    Exercise Name 8  L piriformis S w/strap  -PM      Cueing 8  Verbal  -PM      Reps 8  2  -PM      Time 8  30\"  -PM         Exercise 9    Exercise Name 9  prone ham curls  -PM      Cueing 9  Verbal  -PM      Sets 9  2  -PM      Reps 9  10  -PM         Exercise 10    Exercise Name 10  prone TKE/GS  -PM      Cueing 10  Verbal  -PM      Sets 10  2  -PM      Reps 10  10  -PM      Time 10  5\"  -PM         Exercise 11    Exercise Name 11  prone IR B  -PM      Cueing 11  Verbal  -PM      Sets 11  2  -PM      Reps 11  10  -PM        User Key  (r) = Recorded By, (t) = Taken By, (c) = Cosigned By    Initials Name Provider Type    PM Kisha Fu, ANGE Physical Therapy Assistant                      Manual Rx (last 36 hours)      Manual Treatments     Row Name 05/14/19 1300             Manual Rx 1    Manual Rx 1 Location  L piriformis  -PM      Manual Rx 1 Type  MFR release  -PM      Manual Rx 1 " Duration  2'  -PM        User Key  (r) = Recorded By, (t) = Taken By, (c) = Cosigned By    Initials Name Provider Type    PM Kisha Fu PTA Physical Therapy Assistant          PT OP Goals     Row Name 05/14/19 1400          PT Short Term Goals    STG Date to Achieve  05/22/19  -PM     STG 1  Pt independent with HEP for LE/low back strengthening and ROM.   -PM     STG 1 Progress  Ongoing  -PM     STG 2  Improve L hip flex AROM to 90°  -PM     STG 2 Progress  Ongoing  -PM     STG 3  Improve L hip extension/L knee flex MMT to 5/5  -PM     STG 3 Progress  Ongoing  -PM     STG 4  Reduce L hip pain by 25-50% with prolonged sitting and standing  -PM     STG 4 Progress  Ongoing  -PM     STG 5  Improve L hip abduction AROM to 20°  -PM     STG 5 Progress  Ongoing  -PM        Time Calculation    PT Goal Re-Cert Due Date  05/22/19  -PM       User Key  (r) = Recorded By, (t) = Taken By, (c) = Cosigned By    Initials Name Provider Type    PM Kisha Fu PTA Physical Therapy Assistant          Therapy Education  Given: HEP  Program: Reinforced  How Provided: Verbal, Demonstration, Written  Provided to: Patient  Level of Understanding: Teach back education performed              Time Calculation:   Start Time: 1350  Stop Time: 1450  Time Calculation (min): 60 min  Total Timed Code Minutes- PT: 45 minute(s)  Therapy Charges for Today     Code Description Service Date Service Provider Modifiers Qty    93087586952 HC PT THER PROC EA 15 MIN 5/14/2019 Kisha Fu PTA GP 3    19656512078 HC PT THER SUPP EA 15 MIN 5/14/2019 Kisha Fu PTA GP 1                    Kisha Fu PTA  5/14/2019

## 2019-05-16 ENCOUNTER — HOSPITAL ENCOUNTER (OUTPATIENT)
Dept: PHYSICAL THERAPY | Facility: HOSPITAL | Age: 39
Setting detail: THERAPIES SERIES
Discharge: HOME | End: 2019-05-16

## 2019-05-16 DIAGNOSIS — M25.552 LEFT HIP PAIN: ICD-10-CM

## 2019-05-16 DIAGNOSIS — M70.62 GREATER TROCHANTERIC BURSITIS OF LEFT HIP: Primary | ICD-10-CM

## 2019-05-16 DIAGNOSIS — M70.62 TROCHANTERIC BURSITIS OF LEFT HIP: ICD-10-CM

## 2019-05-16 PROCEDURE — 97110 THERAPEUTIC EXERCISES: CPT | Performed by: PHYSICAL THERAPY ASSISTANT

## 2019-05-16 NOTE — THERAPY TREATMENT NOTE
"    Outpatient Physical Therapy Ortho Treatment Note  [facility]     Patient Name: Rikki Alejandro  : 1980  MRN: [id number]  Today's Date: 2019      Visit Date: 2019  Subjective Improvement:   \"not really\"    Attendance:   Approved: 20 visits per yr          MD follow up: ?5 wks            date:   2019           Visit Dx:    ICD-10-CM ICD-9-CM   1. Greater trochanteric bursitis of left hip M70.62 726.5   2. Left hip pain M25.552 719.45   3. Trochanteric bursitis of left hip M70.62 726.5       Patient Active Problem List   Diagnosis   • Cervical radiculopathy   • Degeneration of lumbar intervertebral disc   • BMI 36.0-36.9,adult   • Former tobacco use   • Essential hypertension   • Left shoulder pain   • Neck pain, chronic   • Trochanteric bursitis of left hip   • History of hypertension   • History of total hip arthroplasty   • Insomnia   • Left hip pain   • Presence of left hip implant        Past Medical History:   Diagnosis Date   • Anxiety    • Arthritis    • Bulging of cervical intervertebral disc    • Bulging of lumbar intervertebral disc    • Depression    • GERD (gastroesophageal reflux disease)    • Hypertension         Past Surgical History:   Procedure Laterality Date   • REPLACEMENT TOTAL HIP LATERAL POSITION Bilateral    • TOTAL HIP ARTHROPLASTY Bilateral 2017    Performed by Dr. Mejia in [place], [place]       PT Ortho     Row Name 19 1500       Precautions and Contraindications    Precautions/Limitations  no known precautions/limitations  -       Posture/Observations    Posture/Observations Comments  no AD this date  -    Row Name 19 1300       Subjective Comments    Subjective Comments  Pt states he felt pretty good for an hour after pool, then pn returned sitting in chair fixing lawnmower. Saw a different MD for R knee and he said I needed to strengthen quad and hip flexor to improve my L hip pn.  -PM       Precautions and Contraindications " "   Precautions/Limitations  no known precautions/limitations  -PM       Subjective Pain    Able to rate subjective pain?  yes  -PM    Post-Treatment Pain Level  8  -PM       Posture/Observations    Posture/Observations Comments  uses cane ipsilateral; altered gait; antalgic gait; LL=, ASIS and sacrum = today after ther ex  -PM      User Key  (r) = Recorded By, (t) = Taken By, (c) = Cosigned By    Initials Name Provider Type    PM Kisha Fu, ANGE Physical Therapy Assistant     Eliceo Morris PTA Physical Therapy Assistant                      PT Assessment/Plan     Row Name 05/16/19 1500          PT Assessment    Assessment Comments  good tolerance with all ther ex this date patient had slight decrease in pain. no new goals met  -        PT Plan    PT Frequency  2x/week  -     Predicted Duration of Therapy Intervention (Therapy Eval)  3 weeks  -     PT Plan Comments  recheck NW  -       User Key  (r) = Recorded By, (t) = Taken By, (c) = Cosigned By    Initials Name Provider Type     Eliceo Morris, ANGE Physical Therapy Assistant            Exercises     Row Name 05/16/19 1500             Subjective Comments    Subjective Comments  Patient reports he is hurtingtoday in the neck and the hip.  -         Subjective Pain    Able to rate subjective pain?  yes  -      Pre-Treatment Pain Level  7  -      Post-Treatment Pain Level  6  -         Aquatics    Aquatics performed?  Yes  -         Aquatics LE    Water Walk  forward;side;backward F/R 4'; LAT 3'  -      Stretch 1  HS S at step 2x20\"  -      Stretch 2  hip flexor S at step 2x20\"  -      Hip Abd/Add  B 2x10 alt  -JH      Hip Flex/Ext  B 2x10 alt  -JH      March in Place  B 2x10 alt  -JH      Mini Squat  B 2x10  -JH      Toe/Heel Raises  20  -JH      Step Ups  0  -JH      Bicycle  deep w/2N 5'  -JH      Flutter/Scissor  LAT; A/P deep w/2N 1'x2  -        User Key  (r) = Recorded By, (t) = Taken By, (c) = Cosigned By    Initials " Name Provider Type     Eliceo Morris PTA Physical Therapy Assistant                       PT OP Goals     Row Name 05/16/19 1500          PT Short Term Goals    STG Date to Achieve  05/22/19  -     STG 1  Pt independent with [facility] for LE/low back strengthening and ROM.   -     STG 1 Progress  Ongoing  -     STG 2  Improve L hip flex AROM to 90°  -     STG 2 Progress  Ongoing  -     STG 3  Improve L hip extension/L knee flex MMT to 5/5  -     STG 3 Progress  Ongoing  -     STG 4  Reduce L hip pain by 25-50% with prolonged sitting and standing  -     STG 4 Progress  Ongoing  -     STG 5  Improve L hip abduction AROM to 20°  -     STG 5 Progress  [facility]        Time Calculation    PT Goal Re-Cert Due Date  05/22/19  -       User Key  (r) = Recorded By, (t) = Taken By, (c) = Cosigned By    Initials Name Provider Type     Eliceo Morris PTA Physical Therapy Assistant                         Time Calculation:   Start Time: 0230  Stop Time: 0315  Time Calculation (min): 45 min  Therapy Charges for Today     Code Description Service Date Service Provider Modifiers Qty    11953101438 HC PT THER PROC EA 15 MIN 5/16/2019 Eliceo Morris PTA GP 3                    Eliceo Morris PTA  5/16/2019

## 2019-05-20 ENCOUNTER — HOSPITAL ENCOUNTER (OUTPATIENT)
Dept: PHYSICAL THERAPY | Facility: HOSPITAL | Age: 39
Setting detail: THERAPIES SERIES
Discharge: HOME | End: 2019-05-20

## 2019-05-20 DIAGNOSIS — M70.62 GREATER TROCHANTERIC BURSITIS OF LEFT HIP: Primary | ICD-10-CM

## 2019-05-20 DIAGNOSIS — M25.552 LEFT HIP PAIN: ICD-10-CM

## 2019-05-20 PROCEDURE — 97110 THERAPEUTIC EXERCISES: CPT | Performed by: PHYSICAL THERAPIST

## 2019-05-20 NOTE — THERAPY PROGRESS REPORT/RE-CERT
"    Outpatient Physical Therapy Ortho Progress Note  [facility]     Patient Name: Rikki Alejandro  : 1980  MRN: [id number]  Today's Date: 2019      Visit Date: 2019     Subjective Improvement:  \"little to none\"     Attendance:   Approved:    20 visits       MD follow up:     19       date:     6/10/19      Patient Active Problem List   Diagnosis   • Cervical radiculopathy   • Degeneration of lumbar intervertebral disc   • BMI 36.0-36.9,adult   • Former tobacco use   • Essential hypertension   • Left shoulder pain   • Neck pain, chronic   • Trochanteric bursitis of left hip   • History of hypertension   • History of total hip arthroplasty   • Insomnia   • Left hip pain   • Presence of left hip implant        Past Medical History:   Diagnosis Date   • Anxiety    • Arthritis    • Bulging of cervical intervertebral disc    • Bulging of lumbar intervertebral disc    • Depression    • GERD (gastroesophageal reflux disease)    • Hypertension         Past Surgical History:   Procedure Laterality Date   • REPLACEMENT TOTAL HIP LATERAL POSITION Bilateral    • TOTAL HIP ARTHROPLASTY Bilateral     Performed by Dr. Mejia in [place], [place]       Visit Dx:     ICD-10-CM ICD-9-CM   1. Greater trochanteric bursitis of left hip M70.62 726.5   2. Left hip pain M25.552 719.45             PT Ortho     Row Name 19 1500       Precautions and Contraindications    Precautions/Limitations  no known precautions/limitations  -KG       Posture/Observations    Posture/Observations Comments  Ambulates with SPC, antalgic gait LLE with cane on ipsilateral side. Gait did improve slightly after given heel lift for L shoe. LLE ~1/4\" short vs RLE.  -KG       General ROM    LT Lower Ext  Lt Hip Flexion;Lt Hip ABduction;Lt Hip External Rotation;Lt Hip Internal Rotation  -KG    GENERAL ROM COMMENTS  95 deg flex R  -KG       Left Lower Ext    Lt Hip ABduction AROM  21 deg  -KG    Lt Hip Flexion AROM  58 " deg supine  -KG       MMT (Manual Muscle Testing)    Lt Lower Ext  Lt Hip Flexion;Lt Hip ABduction;Lt Hip ADduction;Lt Knee Extension;Lt Knee Flexion  -KG       MMT Left Lower Ext    Lt Hip Flexion MMT, Gross Movement  (4+/5) good plus  -KG    Lt Hip ABduction MMT, Gross Movement  (4/5) good  -KG    Lt Hip ADduction MMT, Gross Movement  (4/5) good  -KG    Lt Knee Extension MMT, Gross Movement  (4+/5) good plus  -KG    Lt Knee Flexion MMT, Gross Movement  (4/5) good  -KG       Sensation    Light Touch  Partial deficits in the LLE  -KG      User Key  (r) = Recorded By, (t) = Taken By, (c) = Cosigned By    Initials Name Provider Type    Ciera Blanca, PT Physical Therapist                      Therapy Education  Education Details: Given green tband for HL hip abd, added seated posture/PN/TA to HEP  Given: HEP, Symptoms/condition management, Pain management, Posture/body mechanics, Mobility training  Program: Reinforced, Progressed  How Provided: Verbal, Demonstration, Written  Provided to: Patient  Level of Understanding: Teach back education performed, Verbalized, Demonstrated     PT OP Goals     Row Name 05/20/19 1500          PT Short Term Goals    STG Date to Achieve  06/10/19  -KG     STG 1  Pt independent with HEP for LE/low back strengthening and ROM.   -KG     STG 1 Progress  Progressing;Partially Met  -KG     STG 2  Improve L hip flex AROM to 90°  -KG     STG 2 Progress  Ongoing  -KG     STG 3  Improve L hip extension/L knee flex MMT to 5/5  -KG     STG 3 Progress  Ongoing  -KG     STG 4  Reduce L hip pain by 25-50% with prolonged sitting and standing  -KG     STG 4 Progress  Ongoing  -KG     STG 4 Progress Comments  10%  -KG     STG 5  Improve L hip abduction AROM to 20°  -KG     STG 5 Progress  Met  -KG        Time Calculation    PT Goal Re-Cert Due Date  06/10/19  -KG       User Key  (r) = Recorded By, (t) = Taken By, (c) = Cosigned By    Initials Name Provider Type    Ciera Blanca, PT  "Physical Therapist          PT Assessment/Plan     Row Name 05/20/19 1500          PT Assessment    Functional Limitations  Impaired gait;Performance in work activities;Performance in sport activities;Performance in leisure activities;Limitation in home management  -KG     Impairments  Endurance;Gait;Impaired flexibility;Range of motion;Pain;Muscle strength;Impaired muscle endurance  -KG     Assessment Comments  Pt progressing fair with PT at this time. Does demonstrate mild improvements in overall hip strength & ROM but still quite limited funcitonally at this time. Pt conitnues to report increased pain with functional activities. Appears to have true leg length discrepency with LLE being ~1/4\" short vs RLE. Pt given a heel lift for his shoe this date, which he reports assisted with decreasing his pain. Pt will continue to benefit from skilled PT services to futher improve L hip/knee strength & stability, core stability, and tolerance to daily functional activities.  -KG     Rehab Potential  Fair  -KG     Patient/caregiver participated in establishment of treatment plan and goals  Yes  -KG     Patient would benefit from skilled therapy intervention  Yes  -KG        PT Plan    PT Frequency  2x/week  -KG     Predicted Duration of Therapy Intervention (Therapy Eval)  2-3 more weeks  -KG     PT Plan Comments  Will continue 1x pool, 1x land per week. Follow up on heel lift. Continue overall hip and core strengthening, stretching.  -KG       User Key  (r) = Recorded By, (t) = Taken By, (c) = Cosigned By    Initials Name Provider Type    KG Ciera Anne, PT Physical Therapist          Modalities     Row Name 05/20/19 1500             Subjective Pain    Pre-Treatment Pain Level  8  -KG      Post-Treatment Pain Level  6  -KG         Ice    Patient denies application of Ice  Yes  -KG        User Key  (r) = Recorded By, (t) = Taken By, (c) = Cosigned By    Initials Name Provider Type    Ciera Blanca, PT Physical " "Therapist        Exercises     Row Name 05/20/19 1500             Precautions    Existing Precautions/Restrictions  no known precautions/restrictions  -KG         Subjective Comments    Subjective Comments  Lifting LLE/flexing hip and going up/down stairs.  -KG         Subjective Pain    Able to rate subjective pain?  yes  -KG      Pre-Treatment Pain Level  8  -KG      Post-Treatment Pain Level  6  -KG         Aquatics    Aquatics performed?  No  -KG         Exercise 1    Exercise Name 1  Pro II for ROM/endurance  -KG      Cueing 1  Verbal  -KG      Time 1  5 min  -KG      Additional Comments  L 1.5  -KG         Exercise 2    Exercise Name 2  seated HS S util stool  -KG      Cueing 2  Verbal  -KG      Reps 2  2  -KG      Time 2  30\" hold  -KG         Exercise 3    Exercise Name 3  Supine piriformis stretch with strap  -KG      Cueing 3  Verbal  -KG      Reps 3  2  -KG      Time 3  30\" hold  -KG      Additional Comments  Bilateral  -KG         Exercise 4    Exercise Name 4  HL clam w/TB  -KG      Cueing 4  Verbal  -KG      Sets 4  2  -KG      Reps 4  10  -KG      Additional Comments  Green tband  -KG         Exercise 5    Exercise Name 5  ball rolls SKTC as vera B  -KG      Cueing 5  Verbal  -KG      Sets 5  1  -KG      Reps 5  15  -KG         Exercise 6    Exercise Name 6  Prone ham curls  -KG      Cueing 6  Verbal  -KG      Sets 6  2  -KG      Reps 6  10  -KG         Exercise 7    Exercise Name 7  SL clamshells  -KG      Cueing 7  Verbal  -KG      Sets 7  2  -KG      Reps 7  10  -KG         Exercise 8    Exercise Name 8  Seated PN/TrA education  -KG      Cueing 8  Verbal;Tactile  -KG      Time 8  3 min  -KG         Exercise 9    Exercise Name 9  Seated PN/TrA alt LAQ  -KG      Cueing 9  Verbal  -KG      Sets 9  1  -KG      Reps 9  10  -KG         Exercise 10    Exercise Name 10  Seated PN/TrA alt marching  -KG      Cueing 10  Verbal  -KG      Sets 10  1  -KG      Reps 10  10  -KG        User Key  (r) = Recorded By, " (t) = Taken By, (c) = Cosigned By    Initials Name Provider Type    Ciera Blanca, PT Physical Therapist                        Outcome Measure Options: Lower Extremity Functional Scale (LEFS)  Lower Extremity Functional Index  Any of your usual work, housework or school activities: Quite a bit of difficulty  Your usual hobbies, recreational or sporting activities: Extreme difficulty or unable to perform activity  Getting into or out of the bath: Quite a bit of difficulty  Walking between rooms: Quite a bit of difficulty  Putting on your shoes or socks: Extreme difficulty or unable to perform activity  Squatting: Extreme difficulty or unable to perform activity  Lifting an object, like a bag of groceries from the floor: Extreme difficulty or unable to perform activity  Performing light activities around your home: Quite a bit of difficulty  Performing heavy activities around your home: Extreme difficulty or unable to perform activity  Getting into or out of a car: Quite a bit of difficulty  Walking 2 blocks: Extreme difficulty or unable to perform activity  Walking a mile: Extreme difficulty or unable to perform activity  Going up or down 10 stairs (about 1 flight of stairs): Quite a bit of difficulty  Standing for 1 hour: Quite a bit of difficulty  Sitting for 1 hour: Quite a bit of difficulty  Running on even ground: Extreme difficulty or unable to perform activity  Running on uneven ground: Extreme difficulty or unable to perform activity  Making sharp turns while running fast: Extreme difficulty or unable to perform activity  Hopping: Extreme difficulty or unable to perform activity  Rolling over in bed: Moderate difficulty  Total: 10      Time Calculation:     Start Time: 1517  Stop Time: 1605  Time Calculation (min): 48 min  Total Timed Code Minutes- PT: 48 minute(s)     Therapy Charges for Today     Code Description Service Date Service Provider Modifiers Qty    60063292936  PT THER PROC EA 15 MIN  5/20/2019 Ciera Anne, PT GP 3          PT G-Codes  Outcome Measure Options: Lower Extremity Functional Scale (LEFS)  Total: 10         Ciera Anne, PT  5/21/2019

## 2019-05-21 ENCOUNTER — RESULTS ENCOUNTER (OUTPATIENT)
Dept: FAMILY MEDICINE CLINIC | Facility: CLINIC | Age: 39
End: 2019-05-21

## 2019-05-21 ENCOUNTER — CLINICAL SUPPORT (OUTPATIENT)
Dept: FAMILY MEDICINE CLINIC | Facility: CLINIC | Age: 39
End: 2019-05-21

## 2019-05-21 DIAGNOSIS — Z79.899 LONG-TERM USE OF HIGH-RISK MEDICATION: Primary | ICD-10-CM

## 2019-05-21 DIAGNOSIS — G89.29 CHRONIC PAIN OF LEFT KNEE: ICD-10-CM

## 2019-05-21 DIAGNOSIS — M25.562 CHRONIC PAIN OF LEFT KNEE: ICD-10-CM

## 2019-05-21 DIAGNOSIS — R51.9 CHRONIC DAILY HEADACHE: ICD-10-CM

## 2019-05-21 DIAGNOSIS — M70.62 TROCHANTERIC BURSITIS OF LEFT HIP: ICD-10-CM

## 2019-05-21 DIAGNOSIS — Z79.899 LONG-TERM USE OF HIGH-RISK MEDICATION: ICD-10-CM

## 2019-05-21 NOTE — TELEPHONE ENCOUNTER
Pt came in office and completed UDS and Contract. Pt last seen 3/25/19 and has follow up appt 5/31/19. Pt asking for refill on Tramadol 50mg every 6 hours PRN

## 2019-05-21 NOTE — PROGRESS NOTES
Patient presented to office to complete UDS and controlled substance agreement. Contract was discussed and signed by Pt, MA, and MD. Urine sample was obtained and sent to lab for UDS. Anirudh was also pulled for MD to review.

## 2019-05-23 ENCOUNTER — HOSPITAL ENCOUNTER (OUTPATIENT)
Dept: PHYSICAL THERAPY | Facility: HOSPITAL | Age: 39
Setting detail: THERAPIES SERIES
Discharge: HOME | End: 2019-05-23

## 2019-05-23 DIAGNOSIS — M70.62 GREATER TROCHANTERIC BURSITIS OF LEFT HIP: Primary | ICD-10-CM

## 2019-05-23 DIAGNOSIS — M25.552 LEFT HIP PAIN: ICD-10-CM

## 2019-05-23 PROCEDURE — 97110 THERAPEUTIC EXERCISES: CPT

## 2019-05-23 RX ORDER — TRAMADOL HYDROCHLORIDE 50 MG/1
50 TABLET ORAL EVERY 6 HOURS PRN
Qty: 60 TABLET | Refills: 0 | Status: SHIPPED | OUTPATIENT
Start: 2019-05-23 | End: 2019-05-31 | Stop reason: SDUPTHER

## 2019-05-23 NOTE — THERAPY TREATMENT NOTE
"    Outpatient Physical Therapy Ortho Treatment Note  [facility]     Patient Name: Rikki Alejandro  : 1980  MRN: [id number]  Today's Date: 2019      Visit Date: 2019  Subjective Improvement:  \"little to none\"     Attendance:   Approved:    20 visits       MD follow up:     19       date:     6/10/19      Visit Dx:    ICD-10-CM ICD-9-CM   1. Greater trochanteric bursitis of left hip M70.62 726.5   2. Left hip pain M25.552 719.45       Patient Active Problem List   Diagnosis   • Cervical radiculopathy   • Degeneration of lumbar intervertebral disc   • BMI 36.0-36.9,adult   • Former tobacco use   • Essential hypertension   • Left shoulder pain   • Neck pain, chronic   • Trochanteric bursitis of left hip   • History of hypertension   • History of total hip arthroplasty   • Insomnia   • Left hip pain   • Presence of left hip implant        Past Medical History:   Diagnosis Date   • Anxiety    • Arthritis    • Bulging of cervical intervertebral disc    • Bulging of lumbar intervertebral disc    • Depression    • GERD (gastroesophageal reflux disease)    • Hypertension         Past Surgical History:   Procedure Laterality Date   • REPLACEMENT TOTAL HIP LATERAL POSITION Bilateral    • TOTAL HIP ARTHROPLASTY Bilateral 2017    Performed by Dr. Mejia in [place], [place]                       PT Assessment/Plan     Row Name 19 1600          PT Assessment    Assessment Comments  Good vera of today's visit. Good reaction to added heel lift, but pt having agg when he has to amb without it. Pt cont to benefit from further PT.  -        PT Plan    PT Frequency  2x/week  -     Predicted Duration of Therapy Intervention (Therapy Eval)  2-3 weeks  -     PT Plan Comments  Cont on land next visit.  -       User Key  (r) = Recorded By, (t) = Taken By, (c) = Cosigned By    Initials Name Provider Type    Maksim Vogt PTA Physical Therapy Assistant            Exercises     Row " Name 05/23/19 1600             Precautions    Existing Precautions/Restrictions  no known precautions/restrictions  -JW         Subjective Comments    Subjective Comments  Pt cont to c/o L upper thigh pain. He reports heel lift has helped him. He can tell when he amb bare foot in house.   -JW         Subjective Pain    Able to rate subjective pain?  yes  -JW      Pre-Treatment Pain Level  7  -JW      Post-Treatment Pain Level  7  -JW         Aquatics    Aquatics performed?  Yes  -JW         Exercise 1    Exercise Name 1  AQ: amb fwd  -JW      Time 1  4'  -JW      Additional Comments  cookie  -JW         Exercise 2    Exercise Name 2  AQ: rev amb  -JW      Time 2  2.5'  -JW      Additional Comments  cookie. c/o increasing pain  -JW         Exercise 3    Exercise Name 3  AQ: hip 3 way  -JW      Reps 3  15x ea  -JW      Additional Comments  small float  -JW         Exercise 4    Exercise Name 4  AQ: HS curls  -JW      Reps 4  20  -JW      Additional Comments  small float  -JW         Exercise 5    Exercise Name 5  AQ: float step  -JW      Reps 5  20  -JW      Additional Comments  blue float  -JW         Exercise 6    Exercise Name 6  AQ: trunk twist  -JW      Sets 6  2  -JW      Reps 6  10  -JW      Additional Comments  pipe  -JW         Exercise 7    Exercise Name 7  AQ: MS  -JW      Reps 7  20  -JW         Exercise 8    Exercise Name 8  AQ: deep hang bike  -JW      Time 8  3'  -JW         Exercise 9    Exercise Name 9  AQ: deep hang  -JW      Time 9  5'  -JW        User Key  (r) = Recorded By, (t) = Taken By, (c) = Cosigned By    Initials Name Provider Type    Maksim Vogt PTA Physical Therapy Assistant                       PT OP Goals     Row Name 05/23/19 1609 05/23/19 1600       PT Short Term Goals    STG Date to Achieve  --  06/10/19  -JW    STG 1  --  Pt independent with [facility] for LE/low back strengthening and ROM.   -JW    STG 1 Progress  --  Progressing;Partially Met  -JW    STG 2  --  Improve L hip  flex AROM to 90°  -    STG 2 Progress  --  Ongoing  -    STG 3  --  Improve L hip extension/L knee flex MMT to 5/5  -    STG 3 Progress  --  Ongoing  -    STG 4  --  Reduce L hip pain by 25-50% with prolonged sitting and standing  -    STG 4 Progress  --  Ongoing  -    STG 5  --  Improve L hip abduction AROM to 20°  -[facility] 5 Progress  --  Met  -       Time Calculation    PT Goal Re-Cert Due Date  06/10/19  -  --      User Key  (r) = Recorded By, (t) = Taken By, (c) = Cosigned By    Initials Name Provider Type    Maksim Vogt PTA Physical Therapy Assistant                         Time Calculation:   Start Time: 1515  Stop Time: 1600  Time Calculation (min): 45 min  Total Timed Code Minutes- PT: 45 minute(s)  Therapy Charges for Today     Code Description Service Date Service Provider Modifiers Qty    96936301894 HC PT THER PROC EA 15 MIN 5/23/2019 Maksim Fields PTA GP 3                    Maksim Fields PTA  5/23/2019

## 2019-05-28 ENCOUNTER — HOSPITAL ENCOUNTER (OUTPATIENT)
Dept: PHYSICAL THERAPY | Facility: HOSPITAL | Age: 39
Setting detail: THERAPIES SERIES
Discharge: HOME | End: 2019-05-28

## 2019-05-28 DIAGNOSIS — M70.62 GREATER TROCHANTERIC BURSITIS OF LEFT HIP: Primary | ICD-10-CM

## 2019-05-28 DIAGNOSIS — M70.62 TROCHANTERIC BURSITIS OF LEFT HIP: ICD-10-CM

## 2019-05-28 DIAGNOSIS — M25.552 LEFT HIP PAIN: ICD-10-CM

## 2019-05-28 PROCEDURE — 97140 MANUAL THERAPY 1/> REGIONS: CPT

## 2019-05-28 PROCEDURE — 97110 THERAPEUTIC EXERCISES: CPT

## 2019-05-28 NOTE — THERAPY TREATMENT NOTE
"    Outpatient Physical Therapy Ortho Treatment Note  [facility]     Patient Name: Rikki Alejandro  : 1980  MRN: [id number]  Today's Date: 2019      Visit Date: 2019  Subjective Improvement:  \"little to none/heel lift helps some\"     Attendance:   Approved:    20 visits       MD follow up:     19       date:     6/10/19      Visit Dx:    ICD-10-CM ICD-9-CM   1. Greater trochanteric bursitis of left hip M70.62 726.5   2. Left hip pain M25.552 719.45   3. Trochanteric bursitis of left hip M70.62 726.5       Patient Active Problem List   Diagnosis   • Cervical radiculopathy   • Degeneration of lumbar intervertebral disc   • BMI 36.0-36.9,adult   • Former tobacco use   • Essential hypertension   • Left shoulder pain   • Neck pain, chronic   • Trochanteric bursitis of left hip   • History of hypertension   • History of total hip arthroplasty   • Insomnia   • Left hip pain   • Presence of left hip implant        Past Medical History:   Diagnosis Date   • Anxiety    • Arthritis    • Bulging of cervical intervertebral disc    • Bulging of lumbar intervertebral disc    • Depression    • GERD (gastroesophageal reflux disease)    • Hypertension         Past Surgical History:   Procedure Laterality Date   • REPLACEMENT TOTAL HIP LATERAL POSITION Bilateral    • TOTAL HIP ARTHROPLASTY Bilateral 2017    Performed by Dr. 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  -PM       Precautions and Contraindications    Precautions/Limitations  no known precautions/limitations  -PM       Subjective Pain    Able to rate subjective pain?  yes  -PM    Pre-Treatment Pain Level  9 hips and low back  -PM    Post-Treatment Pain Level  8  -PM       Posture/Observations    Posture/Observations Comments  carries cane at times; toes out; some decr antalgic pattern  -PM    "   User Key  (r) = Recorded By, (t) = Taken By, (c) = Cosigned By    Initials Name Provider Type    iKsha Madison PTA Physical Therapy Assistant                      PT Assessment/Plan     Row Name 05/28/19 1400          PT Assessment    Assessment Comments  Pt vera Rx better; some less cues for posture, seated; ins restriction to 20 visits per yr and per pt conversation may need therapy for other problems. Therefore, we may want to conserve his ins visits for therapy, pt agreed and notified PT who also agreed.  -PM        PT Plan    PT Frequency  2x/week  -PM     Predicted Duration of Therapy Intervention (Therapy Eval)  2-3 weeks  -PM     PT Plan Comments  work toward DC to [place] mgt nxt with fitness for AQ  -PM       User Key  (r) = Recorded By, (t) = Taken By, (c) = Cosigned By    Initials Name Provider Type    Kisha Madison PTA Physical Therapy Assistant          Modalities     Row Name 05/28/19 1400             Moist Heat    MH Applied  Yes  -PM      Location  LS  -PM      Rx Minutes  10 mins  -PM      MH S/P Rx  Yes  -PM        User Key  (r) = Recorded By, (t) = Taken By, (c) = Cosigned By    Initials Name Provider Type    Kisha Madison PTA Physical Therapy Assistant        Exercises     Row Name 05/28/19 1400             Precautions    Existing Precautions/Restrictions  no known precautions/restrictions  -PM         Subjective Comments    Subjective Comments  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  -PM         Subjective Pain    Able to rate subjective pain?  yes  -PM      Pre-Treatment Pain Level  9 hips and low back  -PM      Post-Treatment Pain Level  8  -PM         Aquatics    Aquatics performed?  No  -PM         Exercise 1    Exercise Name 1  Pro II for ROM/endurance  -PM      Cueing 1  Verbal  -PM      Time 1  8'  -PM      Additional Comments  L3  -PM         Exercise 2    Exercise Name 2  seated HS S util stool  -PM      Cueing 2  Verbal   "-PM      Reps 2  2  -PM      Time 2  30\" hold  -PM         Exercise 3    Exercise Name 3  Supine piriformis stretch with strap  -PM      Cueing 3  Verbal  -PM      Reps 3  2  -PM      Time 3  30\" hold  -PM         Exercise 4    Exercise Name 4  HL clam w/TB w/iso TA  -PM      Cueing 4  Verbal  -PM      Sets 4  2  -PM      Reps 4  10  -PM      Additional Comments  GTB  -PM         Exercise 5    Exercise Name 5  ball rolls DKTC as vera B  -PM      Cueing 5  Verbal  -PM      Sets 5  2  -PM      Reps 5  10  -PM         Exercise 6    Exercise Name 6  Prone ham curls  -PM      Cueing 6  Verbal  -PM      Sets 6  2  -PM      Reps 6  10  -PM         Exercise 7    Exercise Name 7  SL clamshells  -PM      Cueing 7  Verbal  -PM      Sets 7  2  -PM      Reps 7  10  -PM         Exercise 8    Exercise Name 8  Seated PN/TrA education  -PM      Cueing 8  Verbal;Tactile  -PM      Time 8  1'  -PM         Exercise 9    Exercise Name 9  Seated PN/TrA alt LAQ  -PM      Cueing 9  Verbal;Demo  -PM      Sets 9  2  -PM      Reps 9  10  -PM         Exercise 10    Exercise Name 10  Seated PN/TrA alt marching  -PM      Cueing 10  Verbal;Demo  -PM      Sets 10  2  -PM      Reps 10  10  -PM         Exercise 11    Exercise Name 11  prone hip IR  -PM      Cueing 11  Verbal  -PM      Sets 11  2  -PM      Reps 11  10  -PM      Additional Comments  peach loop  -PM         Exercise 12    Exercise Name 12  NANI  -PM      Reps 12  3  -PM      Time 12  15\"  -PM        User Key  (r) = Recorded By, (t) = Taken By, (c) = Cosigned By    Initials Name Provider Type    PM Kisha Fu, ANGE Physical Therapy Assistant                      Manual Rx (last 36 hours)      Manual Treatments     Row Name 05/28/19 1400             Manual Rx 1    Manual Rx 1 Location  LS; L piriformis  -PM      Manual Rx 1 Type  MFR/STM  -PM      Manual Rx 1 Duration  8'  -PM        User Key  (r) = Recorded By, (t) = Taken By, (c) = Cosigned By    Initials Name Provider Type    PM " Kisha Fu PTA Physical Therapy Assistant          PT OP Goals     Row Name 05/28/19 1400          PT Short Term Goals    STG Date to Achieve  06/10/19  -PM     STG 1  Pt independent with [facility] for LE/low back strengthening and ROM.   -PM     STG 1 Progress  Progressing;Partially Met  -PM     STG 2  Improve L hip flex AROM to 90°  -PM     STG 2 Progress  Ongoing  -PM     STG 3  Improve L hip extension/L knee flex MMT to 5/5  -PM     STG 3 Progress  Ongoing  -PM     STG 4  Reduce L hip pain by 25-50% with prolonged sitting and standing  -PM     STG 4 Progress  Ongoing  -PM     STG 5  Improve L hip abduction AROM to 20°  -PM     STG 5 Progress  Met  -PM        Time Calculation    PT Goal Re-Cert Due Date  06/10/19  -PM       User Key  (r) = Recorded By, (t) = Taken By, (c) = Cosigned By    Initials Name Provider Type    PM Kisha Fu PTA Physical Therapy Assistant          Therapy Education  Given: HEP, Symptoms/condition management, Pain management, Posture/body mechanics, Mobility training  Program: Reinforced  How Provided: Verbal, Demonstration, Written  Provided to: Patient  Level of Understanding: Teach back education performed, Verbalized, Demonstrated              Time Calculation:   Start Time: 1433  Stop Time: 1535  Time Calculation (min): 62 min  Total Timed Code Minutes- PT: 52 minute(s)  Therapy Charges for Today     Code Description Service Date Service Provider Modifiers Qty    12818139246 HC PT THER PROC EA 15 MIN 5/28/2019 Kisha Fu PTA GP 2    43096623781 HC PT MANUAL THERAPY EA 15 MIN 5/28/2019 Kisha Fu, ANGE GP 1    57446381780 HC PT THER SUPP EA 15 MIN 5/28/2019 Kisha Fu PTA GP 1                    Kisha Fu PTA  5/28/2019

## 2019-05-30 ENCOUNTER — HOSPITAL ENCOUNTER (OUTPATIENT)
Dept: PHYSICAL THERAPY | Facility: HOSPITAL | Age: 39
Setting detail: THERAPIES SERIES
Discharge: HOME | End: 2019-05-30

## 2019-05-30 DIAGNOSIS — M70.62 GREATER TROCHANTERIC BURSITIS OF LEFT HIP: Primary | ICD-10-CM

## 2019-05-30 DIAGNOSIS — M70.62 TROCHANTERIC BURSITIS OF LEFT HIP: ICD-10-CM

## 2019-05-30 DIAGNOSIS — M25.552 LEFT HIP PAIN: ICD-10-CM

## 2019-05-30 PROCEDURE — 97110 THERAPEUTIC EXERCISES: CPT | Performed by: PHYSICAL THERAPY ASSISTANT

## 2019-05-30 NOTE — THERAPY TREATMENT NOTE
"    Outpatient Physical Therapy Ortho Treatment Note  [facility], [place]     Patient Name: Rikki Alejandro  : 1980  MRN: [id number]  Today's Date: 2019      Visit Date: 2019  Subjective Improvement:  \"little to none/heel lift helps some\"     Attendance:   Approved:    20 visits       MD follow up:     19       date:     6/10/19         Visit Dx:    ICD-10-CM ICD-9-CM   1. Greater trochanteric bursitis of left hip M70.62 726.5   2. Left hip pain M25.552 719.45   3. Trochanteric bursitis of left hip M70.62 726.5       Patient Active Problem List   Diagnosis   • Cervical radiculopathy   • Degeneration of lumbar intervertebral disc   • BMI 36.0-36.9,adult   • Former tobacco use   • Essential hypertension   • Left shoulder pain   • Neck pain, chronic   • Trochanteric bursitis of left hip   • History of hypertension   • History of total hip arthroplasty   • Insomnia   • Left hip pain   • Presence of left hip implant        Past Medical History:   Diagnosis Date   • Anxiety    • Arthritis    • Bulging of cervical intervertebral disc    • Bulging of lumbar intervertebral disc    • Depression    • GERD (gastroesophageal reflux disease)    • Hypertension         Past Surgical History:   Procedure Laterality Date   • REPLACEMENT TOTAL HIP LATERAL POSITION Bilateral    • TOTAL HIP ARTHROPLASTY Bilateral 2017    Performed by Dr. 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  -PM       Precautions and Contraindications    Precautions/Limitations  no known precautions/limitations  -PM       Subjective Pain    Able to rate subjective pain?  yes  -PM    Pre-Treatment Pain Level  9 hips and low back  -PM    Post-Treatment Pain Level  8  -PM       Posture/Observations    Posture/Observations Comments  carries cane at times; toes out; some decr antalgic " pattern  -PM      User Key  (r) = Recorded By, (t) = Taken By, (c) = Cosigned By    Initials Name Provider Type    PM Kisha Fu, ANGE Physical Therapy Assistant                      PT Assessment/Plan     Row Name 05/30/19 1500          PT Assessment    Assessment Comments  Patient tolerates all aquatics except rev walk with cookie Pt c/o pain.  -        PT Plan    PT Frequency  2x/week  -     Predicted Duration of Therapy Intervention (Therapy Eval)  2-3 weeks  -     PT Plan Comments  educate on I management with   -       User Key  (r) = Recorded By, (t) = Taken By, (c) = Cosigned By    Initials Name Provider Type     Eliceo Morris PTA Physical Therapy Assistant            Exercises     Row Name 05/30/19 1500             Precautions    Existing Precautions/Restrictions  no known precautions/restrictions  -         Subjective Comments    Subjective Comments  Patient reports he was working and is hurting know  -         Subjective Pain    Able to rate subjective pain?  yes  -JH      Pre-Treatment Pain Level  8  -JH      Post-Treatment Pain Level  7  -JH         Aquatics    Aquatics performed?  Yes  -         Exercise 1    Exercise Name 1  AQ: amb fwd  -JH      Time 1  5'  -JH         Exercise 2    Exercise Name 2  AQ: rev amb  -JH      Time 2  3'  -JH      Additional Comments  Pt c/o pain  -JH         Exercise 3    Exercise Name 3  AQ: hip 3 way  -JH      Reps 3  15x ea  -JH         Exercise 4    Exercise Name 4  AQ: HS curls  -JH      Reps 4  20  -JH         Exercise 5    Exercise Name 5  AQ: float step  -JH      Reps 5  20  -JH         Exercise 6    Exercise Name 6  AQ: trunk twist  -JH      Sets 6  2  -JH      Reps 6  10  -JH         Exercise 7    Exercise Name 7  AQ: MS  -JH      Reps 7  20  -JH         Exercise 8    Exercise Name 8  AQ CR TR  -JH      Time 8  3'  -JH         Exercise 9    Exercise Name 9  AQ deep bike  -JH      Time 9  5'  -JH         Exercise 10    Exercise Name 10   AQ deep flutter  -      Time 10  5'  -         Exercise 11    Exercise Name 11  AQ deep hang  -      Time 11  5'  -        User Key  (r) = Recorded By, (t) = Taken By, (c) = Cosigned By    Initials Name Provider Type    Eliceo Vega PTA Physical Therapy Assistant                       PT OP Goals     Row Name 05/30/19 1500          PT Short Term Goals    STG Date to Achieve  06/10/19  -     STG 1  Pt independent with [facility] for LE/low back strengthening and ROM.   -     STG 1 Progress  Progressing;Partially Met  -     STG 2  Improve L hip flex AROM to 90°  -     STG 2 Progress  Ongoing  -     STG 3  Improve L hip extension/L knee flex MMT to 5/5  -     STG 3 Progress  Ongoing  -     STG 4  Reduce L hip pain by 25-50% with prolonged sitting and standing  -     STG 4 Progress  Ongoing  -     STG 5  Improve L hip abduction AROM to 20°  -[facility] 5 Progress  Met  [facility]        Time Calculation    PT Goal Re-Cert Due Date  06/10/19  -       User Key  (r) = Recorded By, (t) = Taken By, (c) = Cosigned By    Initials Name Provider Type    Eliceo Vega PTA Physical Therapy Assistant                         Time Calculation:   Start Time: 1430  Stop Time: 1515  Time Calculation (min): 45 min  Therapy Charges for Today     Code Description Service Date Service Provider Modifiers Qty    39227326111 HC PT THER PROC EA 15 MIN 5/30/2019 Eliceo Morris PTA GP 3                    Eliceo Morris PTA  5/30/2019

## 2019-05-31 ENCOUNTER — OFFICE VISIT (OUTPATIENT)
Dept: FAMILY MEDICINE CLINIC | Facility: CLINIC | Age: 39
End: 2019-05-31

## 2019-05-31 VITALS
HEART RATE: 114 BPM | TEMPERATURE: 97.8 F | OXYGEN SATURATION: 97 % | HEIGHT: 70 IN | DIASTOLIC BLOOD PRESSURE: 68 MMHG | BODY MASS INDEX: 34.65 KG/M2 | WEIGHT: 242 LBS | SYSTOLIC BLOOD PRESSURE: 134 MMHG

## 2019-05-31 DIAGNOSIS — G89.29 CHRONIC PAIN OF LEFT KNEE: ICD-10-CM

## 2019-05-31 DIAGNOSIS — M54.12 CERVICAL RADICULOPATHY: ICD-10-CM

## 2019-05-31 DIAGNOSIS — M51.36 DEGENERATION OF LUMBAR INTERVERTEBRAL DISC: Primary | ICD-10-CM

## 2019-05-31 DIAGNOSIS — M25.562 CHRONIC PAIN OF LEFT KNEE: ICD-10-CM

## 2019-05-31 DIAGNOSIS — M70.62 TROCHANTERIC BURSITIS OF LEFT HIP: ICD-10-CM

## 2019-05-31 PROCEDURE — 99213 OFFICE O/P EST LOW 20 MIN: CPT | Performed by: FAMILY MEDICINE

## 2019-05-31 RX ORDER — TRAMADOL HYDROCHLORIDE 50 MG/1
50 TABLET ORAL EVERY 6 HOURS PRN
Qty: 90 TABLET | Refills: 0 | Status: SHIPPED | OUTPATIENT
Start: 2019-05-31 | End: 2019-09-23 | Stop reason: SDUPTHER

## 2019-05-31 NOTE — PROGRESS NOTES
OFFICE VISIT NOTE:    Rikki Alejandro is a 38 y.o. male who presents today for Pain (f/u , pain med refill ).     Due to see neurosurgeon next week over his neck. Saw LYNDSEY at [facility] recently, and was to adjust his meds, but never happened with new Rx's or refills yet. He is due to follow up with them again toward the end of the month. They Rx'd a heel wedge and it has helped well without a cane now!       Back Pain   This is a chronic problem. The current episode started more than 1 year ago. The problem occurs constantly. The problem is unchanged. The pain is present in the lumbar spine and sacro-iliac. The quality of the pain is described as aching and cramping. The pain does not radiate. The pain is severe. The pain is the same all the time. The symptoms are aggravated by bending, position, standing and twisting. Stiffness is present all day. Pertinent negatives include no abdominal pain, bladder incontinence, bowel incontinence, chest pain, fever or weight loss. He has tried analgesics, bed rest and home exercises for the symptoms. The treatment provided moderate relief.   Neck Pain    This is a chronic problem. The current episode started more than 1 year ago. The problem occurs constantly. The problem has been unchanged. The pain is associated with nothing. The pain is present in the right side, left side and midline. The quality of the pain is described as aching and cramping. The pain is severe. The symptoms are aggravated by bending, position and twisting. The pain is same all the time. Stiffness is present all day. Pertinent negatives include no chest pain, fever or weight loss. He has tried acetaminophen, bed rest and home exercises for the symptoms. The treatment provided moderate relief.        Past medical/surgical history, Family history, Social history, Allergies and Medications have been reviewed with the patient today and are updated in [facility] EMR. See below.    Past Medical History:   Diagnosis Date   •  Anxiety    • Arthritis    • Bulging of cervical intervertebral disc    • Bulging of lumbar intervertebral disc    • Depression    • GERD (gastroesophageal reflux disease)    • Hypertension      Past Surgical History:   Procedure Laterality Date   • REPLACEMENT TOTAL HIP LATERAL POSITION Bilateral    • TOTAL HIP ARTHROPLASTY Bilateral 2017    Performed by Dr. Mejia in [place], [place]     Family History   Problem Relation Age of Onset   • COPD Mother    • Hyperlipidemia Mother    • Heart attack Father      Social History     Tobacco Use   • Smoking status: Current Every Day Smoker     Packs/day: 0.50     Types: Cigarettes   • Smokeless tobacco: Never Used   Substance Use Topics   • Alcohol use: Yes     Alcohol/week: 2.4 oz     Types: 4 Cans of beer per week     Frequency: 4 or more times a week     Drinks per session: 3 or 4     Binge frequency: Never     Comment: 12 beers/week   • Drug use: No       Allergies:  Aspirin; Mobic [meloxicam]; Lisinopril; and Naproxen sodium    Current Meds:    Current Outpatient Medications:   •  calcipotriene (DOVONEX) 0.005 % cream, , Disp: , Rfl: 5  •  citalopram (CeleXA) 40 MG tablet, Take 1 tablet by mouth Daily., Disp: 30 tablet, Rfl: 5  •  gabapentin (NEURONTIN) 600 MG tablet, Take 1 tablet by mouth 3 (Three) Times a Day., Disp: 90 tablet, Rfl: 2  •  losartan (COZAAR) 100 MG tablet, TAKE 1 TABLET BY MOUTH ONCE DAILY, Disp: 30 tablet, Rfl: 5  •  meclizine (ANTIVERT) 25 MG tablet, TAKE 1 TABLET BY MOUTH THREE TIMES DAILY AS NEEDED FOR  VERTIGO, Disp: 30 tablet, Rfl: 1  •  raNITIdine (ZANTAC) 150 MG tablet, Take 150 mg by mouth Every Night., Disp: , Rfl:   •  tiZANidine (ZANAFLEX) 4 MG tablet, Take 1 tablet by mouth 3 (Three) Times a Day As Needed for Muscle Spasms., Disp: 60 tablet, Rfl: 2  •  traMADol (ULTRAM) 50 MG tablet, Take 1 tablet by mouth Every 6 (Six) Hours As Needed for Moderate Pain  or Severe Pain ., Disp: 90 tablet, Rfl: 0    Review of Systems:  Review of Systems  "  Constitutional: Negative for activity change, appetite change, fatigue, fever, unexpected weight gain and unexpected weight loss.   Respiratory: Negative for shortness of breath.    Cardiovascular: Negative for chest pain.   Gastrointestinal: Negative for abdominal pain and bowel incontinence.   Genitourinary: Negative for urinary incontinence and difficulty urinating.   Musculoskeletal: Positive for back pain and neck pain.   Skin: Negative for rash.   Neurological: Negative for syncope and headache.       Physical Examination:  Vital Signs:  /68 (BP Location: Left arm, Patient Position: Sitting, Cuff Size: Adult)   Pulse 114   Temp 97.8 °F (36.6 °C) (Tympanic)   Ht 177.8 cm (70\")   Wt 110 kg (242 lb)   SpO2 97%   BMI 34.72 kg/m²   Physical Exam   Constitutional: He is oriented to person, place, and time. He appears well-developed and well-nourished. No distress.   HENT:   Head: Normocephalic and atraumatic.   Mouth/Throat: Oropharynx is clear and moist.   Neck: Normal range of motion. Neck supple. No JVD present.   Cardiovascular: Normal rate, regular rhythm, normal heart sounds and intact distal pulses.   Pulmonary/Chest: Effort normal and breath sounds normal. No respiratory distress.   Abdominal: Soft. He exhibits no distension. There is no tenderness.   Musculoskeletal: Normal range of motion. He exhibits no edema.   Neurological: He is alert and oriented to person, place, and time. No cranial nerve deficit.   Skin: Skin is warm and dry. Capillary refill takes less than 2 seconds. No rash noted.   Psychiatric: He has a normal mood and affect. His behavior is normal.   Nursing note and vitals reviewed.      Procedures    ASSESSMENT/ PLAN:        Problem List Items Addressed This Visit        Nervous and Auditory    Cervical radiculopathy    Trochanteric bursitis of left hip    Relevant Medications    traMADol (ULTRAM) 50 MG tablet       Musculoskeletal and Integument    Degeneration of lumbar " intervertebral disc - Primary      Other Visit Diagnoses     Chronic pain of left knee        Relevant Medications    traMADol (ULTRAM) 50 MG tablet        Will provide a Rx to tie him over until the follow up appt with [facility] in mid to late June. Hopefully, their office will assume Rx's and adjust as necessary then.            Specific Patient Instructions:  MEDICATION Instructions: Encouraged patient to continue routine medicines as prescribed and maintain compliance. Patient instructed to report any adverse side effects or reactions to medicines promptly to the office. Patient instructed to make us aware of any OTC or herbal meds or supplement use.  DIET Recommendations: No new recommendations regarding diet/restrictions.  EXERCISE Instructions: No new recommendations.    SMOKING Recommendations: Counseled patient and encouraged them on smoking cessation.  HEALTH MAINTENANCE:  N/A  MISCELLANEOUS Instructions: N/A      Medications ordered or changed this visit:  New Medications Ordered This Visit   Medications   • traMADol (ULTRAM) 50 MG tablet     Sig: Take 1 tablet by mouth Every 6 (Six) Hours As Needed for Moderate Pain  or Severe Pain .     Dispense:  90 tablet     Refill:  0        FOLLOW-UP:  Return in about 3 months (around 8/31/2019) for Recheck.    I discussed the patients findings and my recommendations with patient.  An After Visit Summary (AVS) was printed and given to the patient at discharge.      Thomas Henao MD, FAAFP  5/31/2019

## 2019-05-31 NOTE — PATIENT INSTRUCTIONS
Chronic Back Pain  When back pain lasts longer than 3 months, it is called chronic back pain. The cause of your back pain may not be known. Some common causes include:  · Wear and tear (degenerative disease) of the bones, ligaments, or disks in your back.  · Inflammation and stiffness in your back (arthritis).    People who have chronic back pain often go through certain periods in which the pain is more intense (flare-ups). Many people can learn to manage the pain with home care.  Follow these instructions at home:  Pay attention to any changes in your symptoms. Take these actions to help with your pain:  Activity  · Avoid bending and other activities that make the problem worse.  · Maintain a proper position when standing or sitting:  ? When standing, keep your upper back and neck straight, with your shoulders pulled back. Avoid slouching.  ? When sitting, keep your back straight and relax your shoulders. Do not round your shoulders or pull them backward.  · Do not sit or  one place for long periods of time.  · Take brief periods of rest throughout the day. This will reduce your pain. Resting in a lying or standing position is usually better than sitting to rest.  · When you are resting for longer periods, mix in some mild activity or stretching between periods of rest. This will help to prevent stiffness and pain.  · Get regular exercise. Ask your health care provider what activities are safe for you.  · Do not lift anything that is heavier than 10 lb (4.5 kg). Always use proper lifting technique, which includes:  ? Bending your knees.  ? Keeping the load close to your body.  ? Avoiding twisting.  · Sleep on a firm mattress in a comfortable position. Try lying on your side with your knees slightly bent. If you lie on your back, put a pillow under your knees.  Managing pain  · If directed, apply ice to the painful area. Your health care provider may recommend applying ice during the first 24-48 hours after  a flare-up begins.  ? Put ice in a plastic bag.  ? Place a towel between your skin and the bag.  ? Leave the ice on for 20 minutes, 2-3 times per day.  · If directed, apply heat to the affected area as often as told by your health care provider. Use the heat source that your health care provider recommends, such as a moist heat pack or a heating pad.  ? Place a towel between your skin and the heat source.  ? Leave the heat on for 20-30 minutes.  ? Remove the heat if your skin turns bright red. This is especially important if you are unable to feel pain, heat, or cold. You may have a greater risk of getting burned.  · Try soaking in a warm tub.  · Take over-the-counter and prescription medicines only as told by your health care provider.  · Keep all follow-up visits as told by your health care provider. This is important.  Contact a health care provider if:  · You have pain that is not relieved with rest or medicine.  Get help right away if:  · You have weakness or numbness in one or both of your legs or feet.  · You have trouble controlling your bladder or your bowels.  · You have nausea or vomiting.  · You have pain in your abdomen.  · You have shortness of breath or you faint.  This information is not intended to replace advice given to you by your health care provider. Make sure you discuss any questions you have with your health care provider.  Document Released: 01/25/2006 Document Revised: 06/27/2018 Document Reviewed: 06/27/2018  [employer] Interactive Patient Education © 2019 [employer]

## 2019-06-03 ENCOUNTER — HOSPITAL ENCOUNTER (OUTPATIENT)
Dept: PHYSICAL THERAPY | Facility: HOSPITAL | Age: 39
Setting detail: THERAPIES SERIES
Discharge: HOME | End: 2019-06-03

## 2019-06-03 DIAGNOSIS — M70.62 GREATER TROCHANTERIC BURSITIS OF LEFT HIP: Primary | ICD-10-CM

## 2019-06-03 DIAGNOSIS — M25.552 LEFT HIP PAIN: ICD-10-CM

## 2019-06-03 DIAGNOSIS — M70.62 TROCHANTERIC BURSITIS OF LEFT HIP: ICD-10-CM

## 2019-06-03 PROCEDURE — 97140 MANUAL THERAPY 1/> REGIONS: CPT

## 2019-06-03 PROCEDURE — 97110 THERAPEUTIC EXERCISES: CPT

## 2019-06-03 NOTE — THERAPY TREATMENT NOTE
"    Outpatient Physical Therapy Ortho Treatment Note  [facility]     Patient Name: Rikki Alejandro  : 1980  MRN: [id number]  Today's Date: 6/3/2019      Visit Date: 2019  Subjective Improvement:  Mobility appr 25-30%; Pain no improvement   Attendance: 10/10  Approved:    20 visits       MD follow up:     19       date:     6/10/19      Visit Dx:    ICD-10-CM ICD-9-CM   1. Greater trochanteric bursitis of left hip M70.62 726.5   2. Left hip pain M25.552 719.45   3. Trochanteric bursitis of left hip M70.62 726.5       Patient Active Problem List   Diagnosis   • Cervical radiculopathy   • Degeneration of lumbar intervertebral disc   • BMI 36.0-36.9,adult   • Former tobacco use   • Essential hypertension   • Left shoulder pain   • Neck pain, chronic   • Trochanteric bursitis of left hip   • History of hypertension   • History of total hip arthroplasty   • Insomnia   • Left hip pain   • Presence of left hip implant        Past Medical History:   Diagnosis Date   • Anxiety    • Arthritis    • Bulging of cervical intervertebral disc    • Bulging of lumbar intervertebral disc    • Depression    • GERD (gastroesophageal reflux disease)    • Hypertension         Past Surgical History:   Procedure Laterality Date   • REPLACEMENT TOTAL HIP LATERAL POSITION Bilateral    • TOTAL HIP ARTHROPLASTY Bilateral 2017    Performed by Dr. Mejia in [place], [place]       PT Ortho     Row Name 19 1500       Subjective Comments    Subjective Comments  \"Work sucks b/c it hurts.\"  -PM       Precautions and Contraindications    Precautions/Limitations  no known precautions/limitations  -PM       Subjective Pain    Able to rate subjective pain?  yes  -PM    Pre-Treatment Pain Level  7  -PM    Post-Treatment Pain Level  7  -PM       Posture/Observations    Posture/Observations Comments  R ASIS down and L Up; improved after MET's; poor posture, cues given to correct  -PM       Left Lower Ext    Lt Hip " "Flexion AROM  seated hip flex approx 100 deg  -PM       MMT Left Lower Ext    Lt Knee Flexion MMT, Gross Movement  (4+/5) good plus HIP EXT prone 4/5  -PM      User Key  (r) = Recorded By, (t) = Taken By, (c) = Cosigned By    Initials Name Provider Type    Kisha Madison PTA Physical Therapy Assistant                      PT Assessment/Plan     Row Name 06/03/19 1500          PT Assessment    Assessment Comments  Pt did well with ther ex today except bridges tend to cause him more pain so limited; ham strength improved, needs to cont to strengthen as well as hip extension.   -PM        PT Plan    PT Frequency  2x/week  -PM     Predicted Duration of Therapy Intervention (Therapy Eval)  2-3 weeks  -PM     PT Plan Comments  progress to I mgt per patient and PT conf to conserve PT visits for the year.  -PM       User Key  (r) = Recorded By, (t) = Taken By, (c) = Cosigned By    Initials Name Provider Type    Kisha Madison PTA Physical Therapy Assistant            Exercises     Row Name 06/03/19 1500             Precautions    Existing Precautions/Restrictions  no known precautions/restrictions  -PM         Subjective Comments    Subjective Comments  \"Work sucks b/c it hurts.\"  -PM         Subjective Pain    Able to rate subjective pain?  yes  -PM      Pre-Treatment Pain Level  7  -PM      Post-Treatment Pain Level  7  -PM         Aquatics    Aquatics performed?  No  -PM         Exercise 1    Exercise Name 1  Pro II for ROM/endurance  -PM      Cueing 1  Verbal  -PM      Time 1  --  -PM      Additional Comments  defer, unavailable  -PM         Exercise 2    Exercise Name 2  seated HS S util stool  -PM      Cueing 2  Verbal  -PM      Reps 2  2  -PM      Time 2  30\" hold  -PM         Exercise 3    Exercise Name 3  Seated piriformis stretch with strap  -PM      Cueing 3  Verbal  -PM      Reps 3  2  -PM      Time 3  30\" hold  -PM      Additional Comments  review supine as well 1x30\"  -PM         Exercise 4 " "   Exercise Name 4  HL clam w/TB w/iso TA  -PM      Cueing 4  Verbal  -PM      Sets 4  2  -PM      Reps 4  10  -PM      Additional Comments  GTB  -PM         Exercise 5    Exercise Name 5  bridge w/TA  -PM      Cueing 5  Verbal  -PM      Sets 5  1  -PM      Reps 5  15  -PM         Exercise 6    Exercise Name 6  Prone TKE/GS  -PM      Cueing 6  Verbal  -PM      Sets 6  2  -PM      Reps 6  10  -PM         Exercise 7    Exercise Name 7  SL clamshells  -PM      Cueing 7  Verbal  -PM      Sets 7  2  -PM      Reps 7  10  -PM         Exercise 8    Exercise Name 8  Seated PN/TrA education DYNADISC Row  -PM      Cueing 8  Verbal;Tactile  -PM      Sets 8  1  -PM      Reps 8  15  -PM      Time 8  --  -PM         Exercise 9    Exercise Name 9  DynaDisc Seated PN/TrA alt LAQ  -PM      Cueing 9  Verbal;Demo  -PM      Sets 9  2  -PM      Reps 9  10  -PM         Exercise 10    Exercise Name 10  Stefania Disc Seated PN/TrA alt marching  -PM      Cueing 10  Verbal;Demo  -PM      Sets 10  2  -PM      Reps 10  10  -PM         Exercise 11    Exercise Name 11  prone hip IR  -PM      Cueing 11  Verbal  -PM      Sets 11  2  -PM      Reps 11  10  -PM      Additional Comments  red TB  -PM         Exercise 12    Exercise Name 12  NANI  -PM      Reps 12  3  -PM      Time 12  15\"  -PM         Exercise 13    Exercise Name 13  prone hip ext  -PM      Cueing 13  Verbal  -PM      Sets 13  2  -PM      Reps 13  10  -PM        User Key  (r) = Recorded By, (t) = Taken By, (c) = Cosigned By    Initials Name Provider Type    PM Kisha Fu PTA Physical Therapy Assistant                      Manual Rx (last 36 hours)      Manual Treatments     Row Name 06/03/19 1500             Manual Rx 1    Manual Rx 1 Location  LS; L piriformis; IS  -PM      Manual Rx 1 Type  MFR/STM; MET   -PM      Manual Rx 1 Duration  8'  -PM        User Key  (r) = Recorded By, (t) = Taken By, (c) = Cosigned By    Initials Name Provider Type    PM Kisha Fu PTA " Physical Therapy Assistant          PT OP Goals     Row Name 06/03/19 1500          PT Short Term Goals    STG Date to Achieve  06/10/19  -PM     STG 1  Pt independent with HEP for LE/low back strengthening and ROM.   -PM     STG 1 Progress  Progressing;Partially Met  -PM     STG 2  Improve L hip flex AROM to 90°  -PM     STG 2 Progress  Met  -PM     STG 3  Improve L hip extension/L knee flex MMT to 5/5  -PM     STG 3 Progress  Progressing;Not Met  -PM     STG 4  Reduce L hip pain by 25-50% with prolonged sitting and standing  -PM     STG 4 Progress  Not Met  -PM     STG 5  Improve L hip abduction AROM to 20°  -PM     STG 5 Progress  Met  -PM        Time Calculation    PT Goal Re-Cert Due Date  06/10/19  -PM       User Key  (r) = Recorded By, (t) = Taken By, (c) = Cosigned By    Initials Name Provider Type    PM Kisha Fu PTA Physical Therapy Assistant          Therapy Education  Education Details: prone IR w/Red TB; prone hip ext; practice posture  Given: HEP, Symptoms/condition management, Pain management, Posture/body mechanics, Mobility training  Program: Reinforced, Progressed  How Provided: Verbal, Demonstration, Written  Provided to: Patient  Level of Understanding: Teach back education performed, Verbalized, Demonstrated              Time Calculation:   Start Time: 1520  Stop Time: 1615  Time Calculation (min): 55 min  Total Timed Code Minutes- PT: 55 minute(s)  Therapy Charges for Today     Code Description Service Date Service Provider Modifiers Qty    78102744110 HC PT THER PROC EA 15 MIN 6/3/2019 Kisha Fu PTA GP 3    57219978088 HC PT MANUAL THERAPY EA 15 MIN 6/3/2019 Kisha Fu PTA GP 1                    Kisha Fu PTA  6/3/2019

## 2019-06-04 ENCOUNTER — LAB (OUTPATIENT)
Dept: LAB | Facility: HOSPITAL | Age: 39
End: 2019-06-04

## 2019-06-04 ENCOUNTER — OFFICE VISIT (OUTPATIENT)
Dept: ORTHOPEDIC SURGERY | Facility: CLINIC | Age: 39
End: 2019-06-04

## 2019-06-04 VITALS — BODY MASS INDEX: 34.57 KG/M2 | WEIGHT: 241.5 LBS | HEIGHT: 70 IN

## 2019-06-04 DIAGNOSIS — M25.552 LEFT HIP PAIN: ICD-10-CM

## 2019-06-04 DIAGNOSIS — M70.62 TROCHANTERIC BURSITIS OF LEFT HIP: Primary | ICD-10-CM

## 2019-06-04 DIAGNOSIS — Z96.642 PRESENCE OF LEFT HIP IMPLANT: ICD-10-CM

## 2019-06-04 DIAGNOSIS — M70.62 TROCHANTERIC BURSITIS OF LEFT HIP: ICD-10-CM

## 2019-06-04 PROCEDURE — 85652 RBC SED RATE AUTOMATED: CPT

## 2019-06-04 PROCEDURE — 36415 COLL VENOUS BLD VENIPUNCTURE: CPT

## 2019-06-04 PROCEDURE — 86140 C-REACTIVE PROTEIN: CPT

## 2019-06-04 PROCEDURE — 99213 OFFICE O/P EST LOW 20 MIN: CPT | Performed by: ORTHOPAEDIC SURGERY

## 2019-06-04 PROCEDURE — 85025 COMPLETE CBC W/AUTO DIFF WBC: CPT

## 2019-06-04 NOTE — PROGRESS NOTES
"Rikki Alejandro is a 38 y.o. male returns for     Chief Complaint   Patient presents with   • Left Hip - Follow-up, Pain     6 week        HISTORY OF PRESENT ILLNESS: Patient presents today for a 6 week follow up Left Hip pain. He states his pain is 7/10.  Using heel wedge does seem to help.  Walking without the heel wedge (going to bathroom, walking around at home) is still painful.   Seeing spine surgery for neck and low back.  He does not feel that PT is helping.       CONCURRENT MEDICAL HISTORY:    The following portions of the patient's history were reviewed and updated as appropriate: allergies, current medications, past family history, past medical history, past social history, past surgical history and problem list.     ROS  No fevers or chills.  No chest pain or shortness of air.  No GI or  disturbances.    PHYSICAL EXAMINATION:       Ht 177.8 cm (70\")   Wt 110 kg (241 lb 8 oz)   BMI 34.65 kg/m²     Physical Exam   Constitutional: He is oriented to person, place, and time. He appears well-developed and well-nourished.   Neurological: He is alert and oriented to person, place, and time.   Psychiatric: He has a normal mood and affect. His behavior is normal. Judgment and thought content normal.       GAIT:     [x]  Normal  []  Antalgic    Assistive device: [x]  None  []  Walker     []  Crutches  []  Cane     []  Wheelchair  []  Stretcher    Left Hip Exam     Tenderness   The patient is experiencing no tenderness.     Range of Motion   External rotation: 40   Internal rotation: 20     Muscle Strength   Abduction: 4/5   Adduction: 4/5   Flexion: 4/5     Tests   JONATHAN: negative  Fadir:  Negative FADIR test    Other   Erythema: absent  Sensation: normal  Pulse: present                        ASSESSMENT:    Diagnoses and all orders for this visit:    Trochanteric bursitis of left hip  -     C-reactive Protein; Future  -     Sedimentation Rate; Future  -     CBC & Differential; Future    Left hip pain  -     " C-reactive Protein; Future  -     Sedimentation Rate; Future  -     CBC & Differential; Future    Presence of left hip implant  -     C-reactive Protein; Future  -     Sedimentation Rate; Future  -     CBC & Differential; Future          PLAN    CRP, ESR, CBC to assess for low grade infection.    Continue home exercises.    xrays show good position and alignment  Continue pain management.    No surgical indication at this time.    Patient's Body mass index is 34.65 kg/m². BMI is above normal parameters. Recommendations include: exercise counseling and nutrition counseling.  Return if symptoms worsen or fail to improve, for recheck.    Eleazar Orourke MD

## 2019-06-05 ENCOUNTER — HOSPITAL ENCOUNTER (OUTPATIENT)
Dept: PHYSICAL THERAPY | Facility: HOSPITAL | Age: 39
Setting detail: THERAPIES SERIES
Discharge: HOME | End: 2019-06-05

## 2019-06-05 ENCOUNTER — TELEPHONE (OUTPATIENT)
Dept: ORTHOPEDIC SURGERY | Facility: CLINIC | Age: 39
End: 2019-06-05

## 2019-06-05 DIAGNOSIS — M70.62 GREATER TROCHANTERIC BURSITIS OF LEFT HIP: Primary | ICD-10-CM

## 2019-06-05 DIAGNOSIS — M25.552 LEFT HIP PAIN: ICD-10-CM

## 2019-06-05 DIAGNOSIS — M70.62 TROCHANTERIC BURSITIS OF LEFT HIP: ICD-10-CM

## 2019-06-05 LAB
BASOPHILS # BLD AUTO: 0.06 10*3/MM3 (ref 0–0.2)
BASOPHILS NFR BLD AUTO: 0.7 % (ref 0–1.5)
CRP SERPL-MCNC: 0.16 MG/DL (ref 0–0.5)
DEPRECATED RDW RBC AUTO: 48.6 FL (ref 37–54)
EOSINOPHIL # BLD AUTO: 0.18 10*3/MM3 (ref 0–0.4)
EOSINOPHIL NFR BLD AUTO: 2.2 % (ref 0.3–6.2)
ERYTHROCYTE [DISTWIDTH] IN BLOOD BY AUTOMATED COUNT: 13.1 % (ref 12.3–15.4)
ERYTHROCYTE [SEDIMENTATION RATE] IN BLOOD: 6 MM/HR (ref 0–15)
HCT VFR BLD AUTO: 43.6 % (ref 37.5–51)
HGB BLD-MCNC: 14 G/DL (ref 13–17.7)
IMM GRANULOCYTES # BLD AUTO: 0.04 10*3/MM3 (ref 0–0.05)
IMM GRANULOCYTES NFR BLD AUTO: 0.5 % (ref 0–0.5)
LYMPHOCYTES # BLD AUTO: 1.84 10*3/MM3 (ref 0.7–3.1)
LYMPHOCYTES NFR BLD AUTO: 22.2 % (ref 19.6–45.3)
MCH RBC QN AUTO: 32.6 PG (ref 26.6–33)
MCHC RBC AUTO-ENTMCNC: 32.1 G/DL (ref 31.5–35.7)
MCV RBC AUTO: 101.6 FL (ref 79–97)
MONOCYTES # BLD AUTO: 0.51 10*3/MM3 (ref 0.1–0.9)
MONOCYTES NFR BLD AUTO: 6.2 % (ref 5–12)
NEUTROPHILS # BLD AUTO: 5.65 10*3/MM3 (ref 1.7–7)
NEUTROPHILS NFR BLD AUTO: 68.2 % (ref 42.7–76)
NRBC BLD AUTO-RTO: 0 /100 WBC (ref 0–0.2)
PLATELET # BLD AUTO: 222 10*3/MM3 (ref 140–450)
PMV BLD AUTO: 11.7 FL (ref 6–12)
RBC # BLD AUTO: 4.29 10*6/MM3 (ref 4.14–5.8)
WBC NRBC COR # BLD: 8.28 10*3/MM3 (ref 3.4–10.8)

## 2019-06-05 PROCEDURE — 97113 AQUATIC THERAPY/EXERCISES: CPT

## 2019-06-05 NOTE — THERAPY DISCHARGE NOTE
Outpatient Physical Therapy Ortho Treatment Note/Discharge Summary  [facility]     Patient Name: Rikki Alejandro  : 1980  MRN: [id number]  Today's Date: 2019      Visit Date: 2019    Subjective Improvement:  Mobility appr 25-30%; Pain no improvement   Attendance:   Approved:    20 visits       MD follow up:     19       date:     6/10/19      Visit Dx:    ICD-10-CM ICD-9-CM   1. Greater trochanteric bursitis of left hip M70.62 726.5   2. Left hip pain M25.552 719.45   3. Trochanteric bursitis of left hip M70.62 726.5       Patient Active Problem List   Diagnosis   • Cervical radiculopathy   • Degeneration of lumbar intervertebral disc   • BMI 36.0-36.9,adult   • Former tobacco use   • Essential hypertension   • Left shoulder pain   • Neck pain, chronic   • Trochanteric bursitis of left hip   • History of hypertension   • History of total hip arthroplasty   • Insomnia   • Left hip pain   • Presence of left hip implant        Past Medical History:   Diagnosis Date   • Anxiety    • Arthritis    • Bulging of cervical intervertebral disc    • Bulging of lumbar intervertebral disc    • Depression    • GERD (gastroesophageal reflux disease)    • Hypertension         Past Surgical History:   Procedure Laterality Date   • REPLACEMENT TOTAL HIP LATERAL POSITION Bilateral    • TOTAL HIP ARTHROPLASTY Bilateral 2017    Performed by Dr. Mejia in [place], [place]       PT Ortho     Row Name 19 1500       Subjective Comments    Subjective Comments  Pt states about the same. States it always hurts; has a high pain vera, anyone else it would be a 9 or 10.  -PM       Precautions and Contraindications    Precautions/Limitations  no known precautions/limitations  -PM       Subjective Pain    Able to rate subjective pain?  yes  -PM    Pre-Treatment Pain Level  7  -PM    Post-Treatment Pain Level  6  -PM       Posture/Observations    Posture/Observations Comments  minimal cues for  "postural control with water ther ex/activities  -PM    Row Name 06/03/19 1500       Subjective Comments    Subjective Comments  \"Work sucks b/c it hurts.\"  -PM       Precautions and Contraindications    Precautions/Limitations  no known precautions/limitations  -PM       Subjective Pain    Able to rate subjective pain?  yes  -PM    Pre-Treatment Pain Level  7  -PM    Post-Treatment Pain Level  7  -PM       Posture/Observations    Posture/Observations Comments  R ASIS down and L Up; improved after MET's; poor posture, cues given to correct  -PM       Left Lower Ext    Lt Hip Flexion AROM  seated hip flex approx 100 deg  -PM       MMT Left Lower Ext    Lt Knee Flexion MMT, Gross Movement  (4+/5) good plus HIP EXT prone 4/5  -PM      User Key  (r) = Recorded By, (t) = Taken By, (c) = Cosigned By    Initials Name Provider Type    Kisha Madison PTA Physical Therapy Assistant                      PT Assessment/Plan     Row Name 06/05/19 1500          PT Assessment    Assessment Comments  Pt required minimal cues for postural control with AQ therapy; he does seem to move freely in the water and notes a slight improvement in pn. Pt I enough to cont on his own with AQ. We are DC at this time due to no improvement noted subjectively and pt capable of cont ex as well as reserve PT annual visits for other problems he has. Pt is given a free month of fitness post rehab to cont exc/wellness.  -PM        PT Plan    PT Plan Comments  DC to I HEP and fitness  -PM       User Key  (r) = Recorded By, (t) = Taken By, (c) = Cosigned By    Initials Name Provider Type    Kisha Madison PTA Physical Therapy Assistant              Exercises     Row Name 06/05/19 1500             Precautions    Existing Precautions/Restrictions  no known precautions/restrictions  -PM         Subjective Comments    Subjective Comments  Pt states about the same. States it always hurts; has a high pain vera, anyone else it would be a 9 or 10.  " -PM         Subjective Pain    Able to rate subjective pain?  yes  -PM      Pre-Treatment Pain Level  7  -PM      Post-Treatment Pain Level  6  -PM         Aquatics    Aquatics performed?  Yes  -PM         Aquatics UE    Water Walk  forward;side;backward 4' F/B; 4' Lat; 3' march walk  -PM      Scap Retraction/Row  wall sit TA LC 20x  -PM      Hip Abd/Add  2x10 med RF  -PM      Hip Flex/Ext  2x10 med RF  -PM      March in Place  dynamic walk 3  -PM      Mini Squat  25 cues for correct squat form  -PM      Toe/Heel Raises  25  -PM         Exercise 1    Exercise Name 1  AQ: amb fwd  -PM      Time 1  --  -PM      Additional Comments  see above   -PM         Exercise 2    Exercise Name 2  AQ:  -PM      Time 2  --  -PM         Exercise 3    Exercise Name 3  AQ: hip 3 way  -PM      Reps 3  20 ea  -PM      Additional Comments  bilateral; med RF  -PM         Exercise 4    Exercise Name 4  AQ: HS curls  -PM      Reps 4  20  -PM      Additional Comments  B med RF  -PM         Exercise 5    Exercise Name 5  AQ: float step B  -PM      Reps 5  20  -PM      Additional Comments  med RF  -PM         Exercise 6    Exercise Name 6  AQ: wall sit iso TA Row  -PM      Cueing 6  Verbal  -PM      Sets 6  2  -PM      Reps 6  10  -PM      Additional Comments  see above LC  -PM         Exercise 7    Exercise Name 7  AQ:wall sit iso TA press down, ab retrain  -PM      Cueing 7  Verbal  -PM      Reps 7  20  -PM      Additional Comments  LC  -PM         Exercise 8    Exercise Name 8  AQ: deep hang bike  -PM      Time 8  3'  -PM         Exercise 9    Exercise Name 9  AQ: deep hang  -PM      Time 9  4'  -PM      Additional Comments  relax  -PM         Exercise 10    Exercise Name 10  AQ deep lat scissor hip abd/add  -PM      Time 10  1' x 2  -PM         Exercise 11    Exercise Name 11  AQ deep Ant/Post scissor  -PM      Cueing 11  Verbal  -PM      Time 11  1'x2  -PM        User Key  (r) = Recorded By, (t) = Taken By, (c) = Cosigned By    Initials  Name Provider Type    Kisha Madison PTA Physical Therapy Assistant                         PT OP Goals     Row Name 06/05/19 1500          PT Short Term Goals    STG Date to Achieve  06/10/19  -PM     STG 1  Pt independent with HEP for LE/low back strengthening and ROM.   -PM     STG 1 Progress  Progressing;Partially Met  -PM     STG 2  Improve L hip flex AROM to 90°  -PM     STG 2 Progress  Met  -PM     STG 3  Improve L hip extension/L knee flex MMT to 5/5  -PM     STG 3 Progress  Progressing;Not Met  -PM     STG 4  Reduce L hip pain by 25-50% with prolonged sitting and standing  -PM     STG 4 Progress  Not Met  -PM     STG 5  Improve L hip abduction AROM to 20°  -PM     STG 5 Progress  Met  -PM        Time Calculation    PT Goal Re-Cert Due Date  06/10/19  -PM       User Key  (r) = Recorded By, (t) = Taken By, (c) = Cosigned By    Initials Name Provider Type    Kisha Madison PTA Physical Therapy Assistant          Therapy Education  Education Details: fitness given  Given: HEP, Symptoms/condition management, Pain management, Posture/body mechanics, Mobility training  Program: Reinforced  How Provided: Verbal, Demonstration, Written  Provided to: Patient  Level of Understanding: Teach back education performed, Verbalized, Demonstrated              Time Calculation:   Start Time: 1523  Stop Time: 1611  Time Calculation (min): 48 min  Total Timed Code Minutes- PT: 48 minute(s)  Therapy Charges for Today     Code Description Service Date Service Provider Modifiers Qty    29348925005 HC PT AQUATIC THERAPY EA 15 MIN 6/5/2019 Kisha Fu PTA GP 3                OP PT Discharge Summary  Date of Discharge: 06/05/19  Reason for Discharge: Independent, Lack of progress, Patient/Caregiver request  Discharge Destination: Home with home program      Kisha Fu PTA  6/5/2019

## 2019-06-25 ENCOUNTER — OFFICE VISIT (OUTPATIENT)
Dept: FAMILY MEDICINE CLINIC | Facility: CLINIC | Age: 39
End: 2019-06-25

## 2019-06-25 VITALS
BODY MASS INDEX: 35.22 KG/M2 | HEIGHT: 70 IN | SYSTOLIC BLOOD PRESSURE: 134 MMHG | HEART RATE: 106 BPM | WEIGHT: 246 LBS | TEMPERATURE: 98.7 F | OXYGEN SATURATION: 96 % | DIASTOLIC BLOOD PRESSURE: 72 MMHG

## 2019-06-25 DIAGNOSIS — M47.26 OSTEOARTHRITIS OF SPINE WITH RADICULOPATHY, LUMBAR REGION: ICD-10-CM

## 2019-06-25 DIAGNOSIS — I10 ESSENTIAL HYPERTENSION: Chronic | ICD-10-CM

## 2019-06-25 DIAGNOSIS — Z96.642 PRESENCE OF LEFT HIP IMPLANT: ICD-10-CM

## 2019-06-25 DIAGNOSIS — M70.62 TROCHANTERIC BURSITIS OF LEFT HIP: Primary | ICD-10-CM

## 2019-06-25 PROCEDURE — 99214 OFFICE O/P EST MOD 30 MIN: CPT | Performed by: FAMILY MEDICINE

## 2019-06-25 PROCEDURE — 96372 THER/PROPH/DIAG INJ SC/IM: CPT | Performed by: FAMILY MEDICINE

## 2019-06-25 RX ORDER — METHYLPREDNISOLONE ACETATE 80 MG/ML
80 INJECTION, SUSPENSION INTRA-ARTICULAR; INTRALESIONAL; INTRAMUSCULAR; SOFT TISSUE ONCE
Status: COMPLETED | OUTPATIENT
Start: 2019-06-25 | End: 2019-06-25

## 2019-06-25 RX ORDER — METHYLPREDNISOLONE 4 MG/1
TABLET ORAL
Qty: 21 EACH | Refills: 0 | Status: SHIPPED | OUTPATIENT
Start: 2019-06-25 | End: 2019-09-23

## 2019-06-25 RX ADMIN — METHYLPREDNISOLONE ACETATE 80 MG: 80 INJECTION, SUSPENSION INTRA-ARTICULAR; INTRALESIONAL; INTRAMUSCULAR; SOFT TISSUE at 15:34

## 2019-06-25 NOTE — PATIENT INSTRUCTIONS
Hip Pain  The hip is the joint between the upper legs and the lower pelvis. The bones, cartilage, tendons, and muscles of your hip joint support your body and allow you to move around.  Hip pain can range from a minor ache to severe pain in one or both of your hips. The pain may be felt on the inside of the hip joint near the groin, or the outside near the buttocks and upper thigh. You may also have swelling or stiffness.  Follow these instructions at home:  Managing pain, stiffness, and swelling  · If directed, apply ice to the injured area.  ? Put ice in a plastic bag.  ? Place a towel between your skin and the bag.  ? Leave the ice on for 20 minutes, 2-3 times a day  · Sleep with a pillow between your legs on your most comfortable side.  · Avoid any activities that cause pain.  General instructions  · Take over-the-counter and prescription medicines only as told by your health care provider.  · Do any exercises as told by your health care provider.  · Record the following:  ? How often you have hip pain.  ? The location of your pain.  ? What the pain feels like.  ? What makes the pain worse.  · Keep all follow-up visits as told by your health care provider. This is important.  Contact a health care provider if:  · You cannot put weight on your leg.  · Your pain or swelling continues or gets worse after one week.  · It gets harder to walk.  · You have a fever.  Get help right away if:  · You fall.  · You have a sudden increase in pain and swelling in your hip.  · Your hip is red or swollen or very tender to touch.  Summary  · Hip pain can range from a minor ache to severe pain in one or both of your hips.  · The pain may be felt on the inside of the hip joint near the groin, or the outside near the buttocks and upper thigh.  · Avoid any activities that cause pain.  · Record how often you have hip pain, the location of the pain, what makes it worse and what it feels like.  This information is not intended to  replace advice given to you by your health care provider. Make sure you discuss any questions you have with your health care provider.  Document Released: 06/07/2011 Document Revised: 11/20/2017 Document Reviewed: 11/20/2017  [employer] Patient Education © 2019 [employer]

## 2019-06-25 NOTE — PROGRESS NOTES
OFFICE VISIT NOTE:    Rikki Alejandro is a 38 y.o. male who presents today for Hip Pain (3 mth f/u).     Orthopedist won't do anything with the hip until the back and neck are fixed, and cannot go to PM until the specialist is finished, according to pain management.       Hip Pain    The incident occurred more than 1 week ago. The incident occurred at home. There was no injury mechanism. The pain is present in the left hip. The quality of the pain is described as aching, cramping and stabbing. The pain is severe. The pain has been fluctuating since onset. Associated symptoms include an inability to bear weight, a loss of motion and muscle weakness. He reports no foreign bodies present. The symptoms are aggravated by movement, palpation and weight bearing. He has tried rest and non-weight bearing for the symptoms. The treatment provided moderate relief.        Past medical/surgical history, Family history, Social history, Allergies and Medications have been reviewed with the patient today and are updated in [facility] EMR. See below.    Past Medical History:   Diagnosis Date   • Anxiety    • Arthritis    • Bulging of cervical intervertebral disc    • Bulging of lumbar intervertebral disc    • Depression    • GERD (gastroesophageal reflux disease)    • Hypertension      Past Surgical History:   Procedure Laterality Date   • REPLACEMENT TOTAL HIP LATERAL POSITION Bilateral    • TOTAL HIP ARTHROPLASTY Bilateral 2017    Performed by Dr. Mejia in [place], [place]     Family History   Problem Relation Age of Onset   • COPD Mother    • Hyperlipidemia Mother    • Heart attack Father      Social History     Tobacco Use   • Smoking status: Current Every Day Smoker     Packs/day: 0.50     Types: Cigarettes   • Smokeless tobacco: Never Used   Substance Use Topics   • Alcohol use: Yes     Alcohol/week: 2.4 oz     Types: 4 Cans of beer per week     Frequency: 4 or more times a week     Drinks per session: 3 or 4     Binge frequency: Never  "    Comment: 12 beers/week   • Drug use: No       Allergies:  Aspirin; Mobic [meloxicam]; Lisinopril; and Naproxen sodium    Current Meds:    Current Outpatient Medications:   •  calcipotriene (DOVONEX) 0.005 % cream, , Disp: , Rfl: 5  •  citalopram (CeleXA) 40 MG tablet, Take 1 tablet by mouth Daily., Disp: 30 tablet, Rfl: 5  •  gabapentin (NEURONTIN) 600 MG tablet, Take 1 tablet by mouth 3 (Three) Times a Day., Disp: 90 tablet, Rfl: 2  •  losartan (COZAAR) 100 MG tablet, TAKE 1 TABLET BY MOUTH ONCE DAILY, Disp: 30 tablet, Rfl: 5  •  meclizine (ANTIVERT) 25 MG tablet, TAKE 1 TABLET BY MOUTH THREE TIMES DAILY AS NEEDED FOR  VERTIGO, Disp: 30 tablet, Rfl: 1  •  raNITIdine (ZANTAC) 150 MG tablet, Take 150 mg by mouth Every Night., Disp: , Rfl:   •  tiZANidine (ZANAFLEX) 4 MG tablet, Take 1 tablet by mouth 3 (Three) Times a Day As Needed for Muscle Spasms., Disp: 60 tablet, Rfl: 2  •  traMADol (ULTRAM) 50 MG tablet, Take 1 tablet by mouth Every 6 (Six) Hours As Needed for Moderate Pain  or Severe Pain ., Disp: 90 tablet, Rfl: 0  •  methylPREDNISolone (MEDROL, JOSE G,) 4 MG tablet, Take as directed on package instructions., Disp: 21 each, Rfl: 0    Review of Systems:  Review of Systems   Constitutional: Negative for activity change, appetite change, fatigue, fever, unexpected weight gain and unexpected weight loss.   Respiratory: Negative for shortness of breath.    Cardiovascular: Negative for chest pain.   Gastrointestinal: Negative for abdominal pain.   Genitourinary: Negative for difficulty urinating.   Skin: Negative for rash.   Neurological: Negative for syncope and headache.       Physical Examination:  Vital Signs:  /72 (BP Location: Left arm, Patient Position: Sitting, Cuff Size: Adult)   Pulse 106   Temp 98.7 °F (37.1 °C) (Tympanic)   Ht 177.8 cm (70\")   Wt 112 kg (246 lb)   SpO2 96%   BMI 35.30 kg/m²   Physical Exam   Constitutional: He is oriented to person, place, and time. He appears " well-developed and well-nourished. No distress.   HENT:   Head: Normocephalic and atraumatic.   Mouth/Throat: Oropharynx is clear and moist.   Neck: Normal range of motion. Neck supple. No JVD present.   Cardiovascular: Normal rate, regular rhythm, normal heart sounds and intact distal pulses.   Pulmonary/Chest: Effort normal and breath sounds normal. No respiratory distress.   Abdominal: Soft. He exhibits no distension. There is no tenderness.   Musculoskeletal: Normal range of motion. He exhibits tenderness (left hip and lumbar area). He exhibits no edema.   Neurological: He is alert and oriented to person, place, and time. No cranial nerve deficit.   Skin: Skin is warm and dry. Capillary refill takes less than 2 seconds. No rash noted.   Psychiatric: He has a normal mood and affect. His behavior is normal.   Nursing note and vitals reviewed.      Procedures    ASSESSMENT/ PLAN:        Problem List Items Addressed This Visit        Cardiovascular and Mediastinum    Essential hypertension (Chronic)       Nervous and Auditory    Trochanteric bursitis of left hip - Primary    Relevant Medications    methylPREDNISolone (MEDROL, JOSE G,) 4 MG tablet    methylPREDNISolone acetate (DEPO-medrol) injection 80 mg (Completed)    Osteoarthritis of spine with radiculopathy, lumbar region       Other    Presence of left hip implant    Relevant Medications    methylPREDNISolone (MEDROL, JOSE G,) 4 MG tablet    methylPREDNISolone acetate (DEPO-medrol) injection 80 mg (Completed)                   Specific Patient Instructions:  MEDICATION Instructions: Encouraged patient to continue routine medicines as prescribed and maintain compliance. Patient instructed to report any adverse side effects or reactions to medicines promptly to the office. Patient instructed to make us aware of any OTC or herbal meds or supplement use.  DIET Recommendations: No new recommendations regarding diet/restrictions.  EXERCISE Instructions: No new  recommendations.    SMOKING Recommendations: Counseled patient and encouraged them on smoking cessation.  HEALTH MAINTENANCE:  N/A  MISCELLANEOUS Instructions: N/A      Medications ordered or changed this visit:  New Medications Ordered This Visit   Medications   • methylPREDNISolone (MEDROL, JOSE G,) 4 MG tablet     Sig: Take as directed on package instructions.     Dispense:  21 each     Refill:  0   • methylPREDNISolone acetate (DEPO-medrol) injection 80 mg        FOLLOW-UP:  Return in about 3 months (around 9/25/2019) for Recheck.    I discussed the patients findings and my recommendations with patient.  An After Visit Summary (AVS) was printed and given to the patient at discharge.      Thomas Henao MD, FAAFP  6/27/2019

## 2019-09-23 ENCOUNTER — OFFICE VISIT (OUTPATIENT)
Dept: FAMILY MEDICINE CLINIC | Facility: CLINIC | Age: 39
End: 2019-09-23

## 2019-09-23 VITALS
OXYGEN SATURATION: 98 % | HEIGHT: 70 IN | DIASTOLIC BLOOD PRESSURE: 72 MMHG | SYSTOLIC BLOOD PRESSURE: 130 MMHG | WEIGHT: 258 LBS | BODY MASS INDEX: 36.94 KG/M2 | HEART RATE: 72 BPM

## 2019-09-23 DIAGNOSIS — I10 ESSENTIAL HYPERTENSION: Chronic | ICD-10-CM

## 2019-09-23 DIAGNOSIS — H53.9 VISUAL CHANGES: ICD-10-CM

## 2019-09-23 DIAGNOSIS — R53.82 CHRONIC FATIGUE: ICD-10-CM

## 2019-09-23 DIAGNOSIS — M54.12 CERVICAL RADICULOPATHY: ICD-10-CM

## 2019-09-23 DIAGNOSIS — M47.26 OSTEOARTHRITIS OF SPINE WITH RADICULOPATHY, LUMBAR REGION: Primary | ICD-10-CM

## 2019-09-23 DIAGNOSIS — M70.62 TROCHANTERIC BURSITIS OF LEFT HIP: ICD-10-CM

## 2019-09-23 DIAGNOSIS — H53.8 BLURRED VISION, LEFT EYE: ICD-10-CM

## 2019-09-23 DIAGNOSIS — R51.9 CHRONIC DAILY HEADACHE: ICD-10-CM

## 2019-09-23 DIAGNOSIS — G89.29 CHRONIC PAIN OF LEFT KNEE: ICD-10-CM

## 2019-09-23 DIAGNOSIS — M25.562 CHRONIC PAIN OF LEFT KNEE: ICD-10-CM

## 2019-09-23 PROCEDURE — 99214 OFFICE O/P EST MOD 30 MIN: CPT | Performed by: FAMILY MEDICINE

## 2019-09-23 RX ORDER — GABAPENTIN 600 MG/1
600 TABLET ORAL 3 TIMES DAILY
Qty: 90 TABLET | Refills: 2 | Status: SHIPPED | OUTPATIENT
Start: 2019-09-23 | End: 2020-08-31 | Stop reason: SDDI

## 2019-09-23 RX ORDER — TRAMADOL HYDROCHLORIDE 50 MG/1
50 TABLET ORAL EVERY 6 HOURS PRN
Qty: 90 TABLET | Refills: 0 | Status: CANCELLED | OUTPATIENT
Start: 2019-09-23

## 2019-09-23 RX ORDER — TRAMADOL HYDROCHLORIDE 50 MG/1
50 TABLET ORAL EVERY 6 HOURS PRN
Qty: 90 TABLET | Refills: 0 | Status: SHIPPED | OUTPATIENT
Start: 2019-09-23 | End: 2020-02-19 | Stop reason: ALTCHOICE

## 2019-09-23 NOTE — PATIENT INSTRUCTIONS
Chronic Back Pain  When back pain lasts longer than 3 months, it is called chronic back pain. The cause of your back pain may not be known. Some common causes include:  · Wear and tear (degenerative disease) of the bones, ligaments, or disks in your back.  · Inflammation and stiffness in your back (arthritis).  People who have chronic back pain often go through certain periods in which the pain is more intense (flare-ups). Many people can learn to manage the pain with home care.  Follow these instructions at home:  Pay attention to any changes in your symptoms. Take these actions to help with your pain:  Activity    · Avoid bending and other activities that make the problem worse.  · Maintain a proper position when standing or sitting:  ? When standing, keep your upper back and neck straight, with your shoulders pulled back. Avoid slouching.  ? When sitting, keep your back straight and relax your shoulders. Do not round your shoulders or pull them backward.  · Do not sit or  one place for long periods of time.  · Take brief periods of rest throughout the day. This will reduce your pain. Resting in a lying or standing position is usually better than sitting to rest.  · When you are resting for longer periods, mix in some mild activity or stretching between periods of rest. This will help to prevent stiffness and pain.  · Get regular exercise. Ask your health care provider what activities are safe for you.  · Do not lift anything that is heavier than 10 lb (4.5 kg). Always use proper lifting technique, which includes:  ? Bending your knees.  ? Keeping the load close to your body.  ? Avoiding twisting.  · Sleep on a firm mattress in a comfortable position. Try lying on your side with your knees slightly bent. If you lie on your back, put a pillow under your knees.  Managing pain  · If directed, apply ice to the painful area. Your health care provider may recommend applying ice during the first 24-48 hours after  a flare-up begins.  ? Put ice in a plastic bag.  ? Place a towel between your skin and the bag.  ? Leave the ice on for 20 minutes, 2-3 times per day.  · If directed, apply heat to the affected area as often as told by your health care provider. Use the heat source that your health care provider recommends, such as a moist heat pack or a heating pad.  ? Place a towel between your skin and the heat source.  ? Leave the heat on for 20-30 minutes.  ? Remove the heat if your skin turns bright red. This is especially important if you are unable to feel pain, heat, or cold. You may have a greater risk of getting burned.  · Try soaking in a warm tub.  · Take over-the-counter and prescription medicines only as told by your health care provider.  · Keep all follow-up visits as told by your health care provider. This is important.  Contact a health care provider if:  · You have pain that is not relieved with rest or medicine.  Get help right away if:  · You have weakness or numbness in one or both of your legs or feet.  · You have trouble controlling your bladder or your bowels.  · You have nausea or vomiting.  · You have pain in your abdomen.  · You have shortness of breath or you faint.  This information is not intended to replace advice given to you by your health care provider. Make sure you discuss any questions you have with your health care provider.  Document Released: 01/25/2006 Document Revised: 06/27/2018 Document Reviewed: 06/27/2018  [employer] Interactive Patient Education © 2019 [employer]      Peripheral Neuropathy  Peripheral neuropathy is a type of nerve damage. It affects nerves that carry signals between the spinal cord and the arms, legs, and the rest of the body (peripheral nerves). It does not affect nerves in the spinal cord or brain. In peripheral neuropathy, one nerve or a group of nerves may be damaged. Peripheral neuropathy is a broad category that includes many specific nerve disorders, like  diabetic neuropathy, hereditary neuropathy, and carpal tunnel syndrome.  What are the causes?  This condition may be caused by:  · Diabetes. This is the most common cause of peripheral neuropathy.  · Nerve injury.  · Pressure or stress on a nerve that lasts a long time.  · Lack (deficiency) of B vitamins. This can result from alcoholism, poor diet, or a restricted diet.  · Infections.  · Autoimmune diseases, such as rheumatoid arthritis and systemic lupus erythematosus.  · Nerve diseases that are passed from parent to child (inherited).  · Some medicines, such as cancer medicines (chemotherapy).  · Poisonous (toxic) substances, such as lead and mercury.  · Too little blood flowing to the legs.  · Kidney disease.  · Thyroid disease.  In some cases, the cause of this condition is not known.  What are the signs or symptoms?  Symptoms of this condition depend on which of your nerves is damaged. Common symptoms include:  · Loss of feeling (numbness) in the feet, hands, or both.  · Tingling in the feet, hands, or both.  · Burning pain.  · Very sensitive skin.  · Weakness.  · Not being able to move a part of the body (paralysis).  · Muscle twitching.  · Clumsiness or poor coordination.  · Loss of balance.  · Not being able to control your bladder.  · Feeling dizzy.  · Sexual problems.  How is this diagnosed?  Diagnosing and finding the cause of peripheral neuropathy can be difficult. Your health care provider will take your medical history and do a physical exam. A neurological exam will also be done. This involves checking things that are affected by your brain, spinal cord, and nerves (nervous system). For example, your health care provider will check your reflexes, how you move, and what you can feel.  You may have other tests, such as:  · Blood tests.  · Electromyogram (EMG) and nerve conduction tests. These tests check nerve function and how well the nerves are controlling the muscles.  · Imaging tests, such as CT  scans or MRI to rule out other causes of your symptoms.  · Removing a small piece of nerve to be examined in a lab (nerve biopsy). This is rare.  · Removing and examining a small amount of the fluid that surrounds the brain and spinal cord (lumbar puncture). This is rare.  How is this treated?  Treatment for this condition may involve:  · Treating the underlying cause of the neuropathy, such as diabetes, kidney disease, or vitamin deficiencies.  · Stopping medicines that can cause neuropathy, such as chemotherapy.  · Medicine to relieve pain. Medicines may include:  ? Prescription or over-the-counter pain medicine.  ? Antiseizure medicine.  ? Antidepressants.  ? Pain-relieving patches that are applied to painful areas of skin.  · Surgery to relieve pressure on a nerve or to destroy a nerve that is causing pain.  · Physical therapy to help improve movement and balance.  · Devices to help you move around (assistive devices).  Follow these instructions at home:  Medicines  · Take over-the-counter and prescription medicines only as told by your health care provider. Do not take any other medicines without first asking your health care provider.  · Do not drive or use heavy machinery while taking prescription pain medicine.  Lifestyle    · Do not use any products that contain nicotine or tobacco, such as cigarettes and e-cigarettes. Smoking keeps blood from reaching damaged nerves. If you need help quitting, ask your health care provider.  · Avoid or limit alcohol. Too much alcohol can cause a vitamin B deficiency, and vitamin B is needed for healthy nerves.  · Eat a healthy diet. This includes:  ? Eating foods that are high in fiber, such as fresh fruits and vegetables, whole grains, and beans.  ? Limiting foods that are high in fat and processed sugars, such as fried or sweet foods.  General instructions    · If you have diabetes, work closely with your health care provider to keep your blood sugar under control.  · If  you have numbness in your feet:  ? Check every day for signs of injury or infection. Watch for redness, warmth, and swelling.  ? Wear padded socks and comfortable shoes. These help protect your feet.  · Develop a good support system. Living with peripheral neuropathy can be stressful. Consider talking with a mental health specialist or joining a support group.  · Use assistive devices and attend physical therapy as told by your health care provider. This may include using a walker or a cane.  · Keep all follow-up visits as told by your health care provider. This is important.  Contact a health care provider if:  · You have new signs or symptoms of peripheral neuropathy.  · You are struggling emotionally from dealing with peripheral neuropathy.  · Your pain is not well-controlled.  Get help right away if:  · You have an injury or infection that is not healing normally.  · You develop new weakness in an arm or leg.  · You fall frequently.  Summary  · Peripheral neuropathy is when the nerves in the arms, or legs are damaged, resulting in numbness, weakness, or pain.  · There are many causes of peripheral neuropathy, including diabetes, pinched nerves, vitamin deficiencies, autoimmune disease, and hereditary conditions.  · Diagnosing and finding the cause of peripheral neuropathy can be difficult. Your health care provider will take your medical history, do a physical exam, and do tests, including blood tests and nerve function tests.  · Treatment involves treating the underlying cause of the neuropathy and taking medicines to help control pain. Physical therapy and assistive devices may also help.  This information is not intended to replace advice given to you by your health care provider. Make sure you discuss any questions you have with your health care provider.  Document Released: 12/08/2003 Document Revised: 02/26/2018 Document Reviewed: 02/26/2018  [employer] Interactive Patient Education © 2019 [employer]      Chronic Back Pain  When back pain lasts longer than 3 months, it is called chronic back pain. The cause of your back pain may not be known. Some common causes include:  · Wear and tear (degenerative disease) of the bones, ligaments, or disks in your back.  · Inflammation and stiffness in your back (arthritis).  People who have chronic back pain often go through certain periods in which the pain is more intense (flare-ups). Many people can learn to manage the pain with home care.  Follow these instructions at home:  Pay attention to any changes in your symptoms. Take these actions to help with your pain:  Activity    · Avoid bending and other activities that make the problem worse.  · Maintain a proper position when standing or sitting:  ? When standing, keep your upper back and neck straight, with your shoulders pulled back. Avoid slouching.  ? When sitting, keep your back straight and relax your shoulders. Do not round your shoulders or pull them backward.  · Do not sit or  one place for long periods of time.  · Take brief periods of rest throughout the day. This will reduce your pain. Resting in a lying or standing position is usually better than sitting to rest.  · When you are resting for longer periods, mix in some mild activity or stretching between periods of rest. This will help to prevent stiffness and pain.  · Get regular exercise. Ask your health care provider what activities are safe for you.  · Do not lift anything that is heavier than 10 lb (4.5 kg). Always use proper lifting technique, which includes:  ? Bending your knees.  ? Keeping the load close to your body.  ? Avoiding twisting.  · Sleep on a firm mattress in a comfortable position. Try lying on your side with your knees slightly bent. If you lie on your back, put a pillow under your knees.  Managing pain  · If directed, apply ice to the painful area. Your health care provider may recommend applying ice during the first 24-48  hours after a flare-up begins.  ? Put ice in a plastic bag.  ? Place a towel between your skin and the bag.  ? Leave the ice on for 20 minutes, 2-3 times per day.  · If directed, apply heat to the affected area as often as told by your health care provider. Use the heat source that your health care provider recommends, such as a moist heat pack or a heating pad.  ? Place a towel between your skin and the heat source.  ? Leave the heat on for 20-30 minutes.  ? Remove the heat if your skin turns bright red. This is especially important if you are unable to feel pain, heat, or cold. You may have a greater risk of getting burned.  · Try soaking in a warm tub.  · Take over-the-counter and prescription medicines only as told by your health care provider.  · Keep all follow-up visits as told by your health care provider. This is important.  Contact a health care provider if:  · You have pain that is not relieved with rest or medicine.  Get help right away if:  · You have weakness or numbness in one or both of your legs or feet.  · You have trouble controlling your bladder or your bowels.  · You have nausea or vomiting.  · You have pain in your abdomen.  · You have shortness of breath or you faint.  This information is not intended to replace advice given to you by your health care provider. Make sure you discuss any questions you have with your health care provider.  Document Released: 01/25/2006 Document Revised: 06/27/2018 Document Reviewed: 06/27/2018  [employer] Interactive Patient Education © 2019 [employer]

## 2019-09-23 NOTE — PROGRESS NOTES
OFFICE VISIT NOTE:    Rikki Alejandro is a 38 y.o. male who presents today for Back Pain (f/u ) and Peripheral Neuropathy.     [facility] reviewed the CT of lumbar spine - in June 2019 - but still haven't heard anything from them - actually showed up there about 3 weeks ago, and they couldn't get his results to him then - had to make another appt for FU.   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 Ran out of Neurontin about a month ago - so now the left eye is a bit better, but not resolved. Last eye exam is quite a few years ago.       Back Pain   This is a chronic problem. The current episode started more than 1 year ago. The problem occurs constantly. The problem is unchanged. The pain is present in the lumbar spine and sacro-iliac. The quality of the pain is described as cramping and aching. The pain does not radiate. The pain is severe. The pain is the same all the time. The symptoms are aggravated by bending, standing and twisting. Stiffness is present all day. Pertinent negatives include no abdominal pain, bladder incontinence, bowel incontinence, chest pain, fever or weight loss. Risk factors include obesity. He has tried analgesics, bed rest and chiropractic manipulation for the symptoms. The treatment provided moderate relief.   Peripheral Neuropathy   This is a chronic problem. The current episode started more than 1 year ago. The problem occurs constantly. The problem has been unchanged. Pertinent negatives include no abdominal pain, chest pain, fatigue, fever or rash. The symptoms are aggravated by standing, walking and twisting. He has tried position changes, relaxation, rest and oral narcotics for the symptoms. The treatment provided moderate relief.        Past medical/surgical history, Family history, Social history, Allergies and Medications have been reviewed with the patient today and are  updated in [facility] EMR. See below.    Past Medical History:   Diagnosis Date   • Anxiety    • Arthritis    • Bulging of cervical intervertebral disc    • Bulging of lumbar intervertebral disc    • Depression    • GERD (gastroesophageal reflux disease)    • Hypertension      Past Surgical History:   Procedure Laterality Date   • REPLACEMENT TOTAL HIP LATERAL POSITION Bilateral    • TOTAL HIP ARTHROPLASTY Bilateral 2017    Performed by Dr. Mejia in [place], [place]     Family History   Problem Relation Age of Onset   • COPD Mother    • Hyperlipidemia Mother    • Heart attack Father      Social History     Tobacco Use   • Smoking status: Current Every Day Smoker     Packs/day: 0.50     Types: Cigarettes   • Smokeless tobacco: Never Used   Substance Use Topics   • Alcohol use: Yes     Alcohol/week: 2.4 oz     Types: 4 Cans of beer per week     Frequency: 4 or more times a week     Drinks per session: 3 or 4     Binge frequency: Never     Comment: 12 beers/week   • Drug use: No       Allergies:  Aspirin; Mobic [meloxicam]; Lisinopril; and Naproxen sodium    Current Meds:    Current Outpatient Medications:   •  calcipotriene (DOVONEX) 0.005 % cream, , Disp: , Rfl: 5  •  citalopram (CeleXA) 40 MG tablet, Take 1 tablet by mouth Daily., Disp: 30 tablet, Rfl: 5  •  losartan (COZAAR) 100 MG tablet, TAKE 1 TABLET BY MOUTH ONCE DAILY, Disp: 30 tablet, Rfl: 5  •  meclizine (ANTIVERT) 25 MG tablet, TAKE 1 TABLET BY MOUTH THREE TIMES DAILY AS NEEDED FOR  VERTIGO, Disp: 30 tablet, Rfl: 1  •  raNITIdine (ZANTAC) 150 MG tablet, Take 150 mg by mouth Every Night., Disp: , Rfl:   •  tiZANidine (ZANAFLEX) 4 MG tablet, Take 1 tablet by mouth 3 (Three) Times a Day As Needed for Muscle Spasms. (Patient taking differently: Take 1 tablet by mouth 3 (Three) Times a Day As Needed for Muscle Spasms.), Disp: 60 tablet, Rfl: 2  •  traMADol (ULTRAM) 50 MG tablet, Take 1 tablet by mouth Every 6 (Six) Hours As Needed for Moderate Pain  or Severe Pain .,  "Disp: 90 tablet, Rfl: 0  •  gabapentin (NEURONTIN) 600 MG tablet, Take 1 tablet by mouth 3 (Three) Times a Day., Disp: 90 tablet, Rfl: 2    Review of Systems:  Review of Systems   Constitutional: Negative for activity change, appetite change, fatigue, fever, unexpected weight gain and unexpected weight loss.   Respiratory: Negative for shortness of breath.    Cardiovascular: Negative for chest pain.   Gastrointestinal: Negative for abdominal pain and bowel incontinence.   Genitourinary: Negative for difficulty urinating and urinary incontinence.   Musculoskeletal: Positive for back pain.   Skin: Negative for rash.   Neurological: Negative for syncope and headache.       Physical Examination:  Vital Signs:  /72 (BP Location: Left arm, Patient Position: Sitting, Cuff Size: Adult)   Pulse 72   Ht 177.8 cm (70\")   Wt 117 kg (258 lb)   SpO2 98%   BMI 37.02 kg/m²   Physical Exam   Constitutional: He is oriented to person, place, and time. He appears well-developed and well-nourished. No distress.   HENT:   Head: Normocephalic and atraumatic.   Mouth/Throat: Oropharynx is clear and moist.   Neck: Normal range of motion. Neck supple. No JVD present.   Cardiovascular: Normal rate, regular rhythm, normal heart sounds and intact distal pulses.   Pulmonary/Chest: Effort normal and breath sounds normal. No respiratory distress.   Abdominal: Soft. He exhibits no distension. There is no tenderness.   Musculoskeletal: Normal range of motion. He exhibits tenderness (lumbar spine with spasms). He exhibits no edema.   Neurological: He is alert and oriented to person, place, and time. No cranial nerve deficit.   Skin: Skin is warm and dry. Capillary refill takes less than 2 seconds. No rash noted.   Psychiatric: He has a normal mood and affect. His behavior is normal.   Nursing note and vitals reviewed.      Procedures    ASSESSMENT/ PLAN:        Problem List Items Addressed This Visit        Cardiovascular and Mediastinum "    Essential hypertension (Chronic)    Relevant Orders    Comprehensive Metabolic Panel    CBC (No Diff)    T4    TSH    Vitamin D 25 Hydroxy       Nervous and Auditory    Cervical radiculopathy    Relevant Orders    Vitamin D 25 Hydroxy    Trochanteric bursitis of left hip    Relevant Medications    traMADol (ULTRAM) 50 MG tablet    Osteoarthritis of spine with radiculopathy, lumbar region - Primary    Relevant Orders    Vitamin D 25 Hydroxy      Other Visit Diagnoses     Chronic daily headache        Relevant Medications    gabapentin (NEURONTIN) 600 MG tablet    traMADol (ULTRAM) 50 MG tablet    Chronic pain of left knee        Relevant Medications    traMADol (ULTRAM) 50 MG tablet    Other Relevant Orders    Vitamin D 25 Hydroxy    Visual changes        Relevant Orders    Ambulatory Referral to Ophthalmology (Completed)    Blurred vision, left eye        Chronic fatigue        Relevant Orders    Comprehensive Metabolic Panel    CBC (No Diff)    T4    TSH    Vitamin D 25 Hydroxy                   Specific Patient Instructions:  MEDICATION Instructions: Encouraged patient to continue routine medicines as prescribed and maintain compliance. Patient instructed to report any adverse side effects or reactions to medicines promptly to the office. Patient instructed to make us aware of any OTC or herbal meds or supplement use.  DIET Recommendations: No new recommendations regarding diet/restrictions.  EXERCISE Instructions: No new recommendations.    Patient's Body mass index is 37.02 kg/m². BMI is above normal parameters. Recommendations include: exercise counseling and nutrition counseling.      SMOKING Recommendations: N/A  HEALTH MAINTENANCE:  N/A  MISCELLANEOUS Instructions: N/A      Medications ordered or changed this visit:  New Medications Ordered This Visit   Medications   • gabapentin (NEURONTIN) 600 MG tablet     Sig: Take 1 tablet by mouth 3 (Three) Times a Day.     Dispense:  90 tablet     Refill:  2   •  traMADol (ULTRAM) 50 MG tablet     Sig: Take 1 tablet by mouth Every 6 (Six) Hours As Needed for Moderate Pain  or Severe Pain .     Dispense:  90 tablet     Refill:  0        FOLLOW-UP:  Return in about 3 months (around 12/23/2019) for Recheck.    I discussed the patients findings and my recommendations with patient.  An After Visit Summary (AVS) was printed and given to the patient at discharge.      Thomas Henao MD, FAAFP  9/25/2019

## 2019-09-30 ENCOUNTER — RESULTS ENCOUNTER (OUTPATIENT)
Dept: FAMILY MEDICINE CLINIC | Facility: CLINIC | Age: 39
End: 2019-09-30

## 2019-09-30 DIAGNOSIS — M47.26 OSTEOARTHRITIS OF SPINE WITH RADICULOPATHY, LUMBAR REGION: ICD-10-CM

## 2019-09-30 DIAGNOSIS — R53.82 CHRONIC FATIGUE: ICD-10-CM

## 2019-09-30 DIAGNOSIS — M54.12 CERVICAL RADICULOPATHY: ICD-10-CM

## 2019-09-30 DIAGNOSIS — I10 ESSENTIAL HYPERTENSION: Chronic | ICD-10-CM

## 2019-09-30 DIAGNOSIS — M25.562 CHRONIC PAIN OF LEFT KNEE: ICD-10-CM

## 2019-09-30 DIAGNOSIS — G89.29 CHRONIC PAIN OF LEFT KNEE: ICD-10-CM

## 2019-12-20 ENCOUNTER — OFFICE VISIT (OUTPATIENT)
Dept: FAMILY MEDICINE CLINIC | Facility: CLINIC | Age: 39
End: 2019-12-20

## 2019-12-20 VITALS
SYSTOLIC BLOOD PRESSURE: 130 MMHG | HEIGHT: 70 IN | WEIGHT: 264.2 LBS | HEART RATE: 100 BPM | OXYGEN SATURATION: 96 % | BODY MASS INDEX: 37.82 KG/M2 | DIASTOLIC BLOOD PRESSURE: 68 MMHG

## 2019-12-20 DIAGNOSIS — R53.82 CHRONIC FATIGUE: ICD-10-CM

## 2019-12-20 DIAGNOSIS — M47.26 OSTEOARTHRITIS OF SPINE WITH RADICULOPATHY, LUMBAR REGION: Primary | ICD-10-CM

## 2019-12-20 DIAGNOSIS — J01.00 ACUTE NON-RECURRENT MAXILLARY SINUSITIS: ICD-10-CM

## 2019-12-20 DIAGNOSIS — G47.10 EXCESSIVE SOMNOLENCE DISORDER: ICD-10-CM

## 2019-12-20 PROCEDURE — 99214 OFFICE O/P EST MOD 30 MIN: CPT | Performed by: FAMILY MEDICINE

## 2019-12-20 PROCEDURE — 96372 THER/PROPH/DIAG INJ SC/IM: CPT | Performed by: FAMILY MEDICINE

## 2019-12-20 RX ORDER — AMOXICILLIN 500 MG/1
1000 CAPSULE ORAL 3 TIMES DAILY
Qty: 30 CAPSULE | Refills: 0 | Status: SHIPPED | OUTPATIENT
Start: 2019-12-20 | End: 2019-12-26

## 2019-12-20 RX ORDER — FEXOFENADINE HCL AND PSEUDOEPHEDRINE HCI 60; 120 MG/1; MG/1
1 TABLET, EXTENDED RELEASE ORAL 2 TIMES DAILY PRN
Qty: 30 TABLET | Refills: 2 | Status: SHIPPED | OUTPATIENT
Start: 2019-12-20 | End: 2020-11-18

## 2019-12-20 RX ORDER — METHYLPREDNISOLONE ACETATE 80 MG/ML
80 INJECTION, SUSPENSION INTRA-ARTICULAR; INTRALESIONAL; INTRAMUSCULAR; SOFT TISSUE ONCE
Status: COMPLETED | OUTPATIENT
Start: 2019-12-20 | End: 2019-12-20

## 2019-12-20 RX ADMIN — METHYLPREDNISOLONE ACETATE 80 MG: 80 INJECTION, SUSPENSION INTRA-ARTICULAR; INTRALESIONAL; INTRAMUSCULAR; SOFT TISSUE at 15:25

## 2019-12-20 NOTE — PROGRESS NOTES
OFFICE VISIT NOTE:    Rikki Alejandro is a 39 y.o. male who presents today for Osteoarthritis (3 mth f/u); med refills; and daytime somnolence.     Saw the neurosurgeon Tuesday and they took over the Gabapentin, Tizanidine and Tramadol.   Does have some insomnia at night, or not the best restful sleep, and significant daytime sleepiness.                                                     [place] Sleepiness Scale  Situation Chance of Dozing or Sleeping  Sitting and reading 3 - high chance of dosing or sleeping  Watching TV 3 - high chance of dosing or sleeping  Sitting inactive in a public place 3 - high chance of dosing or sleeping  Being a passenger in a motor vehicle for an hour or more 3 - high chance of dosing or sleeping  Lying down in the afternoon 3 - high chance of dosing or sleeping  Sitting and talking to someone 1 - slight chance of dosing or sleeping  Sitting quietly after lunch (no alcohol) 3 - high chance of dosing or sleeping  Stopped for a few minutes in traffic while driving 0 - would never dose or sleep  Total score (add the scores up) 19    Had experience with CPAP in hospital after surgery - and wasn't tolerable.       Osteoarthritis   Presents for follow-up visit. He complains of pain, stiffness and joint swelling. The symptoms have been worsening. Affected location: multiple joints. His pain is at a severity of 8/10. Pertinent negatives include no fatigue, fever, rash or weight loss. His past medical history is significant for osteoarthritis. Compliance with total regimen is %. Compliance with medications is %.        Past medical/surgical history, Family history, Social history, Allergies and Medications have been reviewed with the patient today and are updated in [facility] EMR. See below.    Past Medical History:   Diagnosis Date   • Anxiety    • Arthritis    • Bulging of cervical intervertebral disc    • Bulging of lumbar intervertebral disc    • Depression    • GERD (gastroesophageal  reflux disease)    • Hypertension      Past Surgical History:   Procedure Laterality Date   • REPLACEMENT TOTAL HIP LATERAL POSITION Bilateral    • TOTAL HIP ARTHROPLASTY Bilateral 2017    Performed by Dr. Mejia in [place], [place]     Family History   Problem Relation Age of Onset   • COPD Mother    • Hyperlipidemia Mother    • Heart attack Father      Social History     Tobacco Use   • Smoking status: Current Every Day Smoker     Packs/day: 0.50     Types: Cigarettes   • Smokeless tobacco: Never Used   Substance Use Topics   • Alcohol use: Yes     Alcohol/week: 4.0 standard drinks     Types: 4 Cans of beer per week     Frequency: 4 or more times a week     Drinks per session: 3 or 4     Binge frequency: Never     Comment: 12 beers/week   • Drug use: No       ALLERGIES:  Aspirin; Mobic [meloxicam]; Lisinopril; and Naproxen sodium    CURRENT MEDS:    Current Outpatient Medications:   •  calcipotriene (DOVONEX) 0.005 % cream, , Disp: , Rfl: 5  •  citalopram (CeleXA) 40 MG tablet, Take 1 tablet by mouth Daily., Disp: 30 tablet, Rfl: 5  •  gabapentin (NEURONTIN) 600 MG tablet, Take 1 tablet by mouth 3 (Three) Times a Day., Disp: 90 tablet, Rfl: 2  •  losartan (COZAAR) 100 MG tablet, TAKE 1 TABLET BY MOUTH ONCE DAILY, Disp: 30 tablet, Rfl: 5  •  meclizine (ANTIVERT) 25 MG tablet, TAKE 1 TABLET BY MOUTH THREE TIMES DAILY AS NEEDED FOR  VERTIGO, Disp: 30 tablet, Rfl: 1  •  raNITIdine (ZANTAC) 150 MG tablet, Take 150 mg by mouth Every Night., Disp: , Rfl:   •  tiZANidine (ZANAFLEX) 4 MG tablet, Take 1 tablet by mouth 3 (Three) Times a Day As Needed for Muscle Spasms. (Patient taking differently: Take 1 tablet by mouth 3 (Three) Times a Day As Needed for Muscle Spasms.), Disp: 60 tablet, Rfl: 2  •  traMADol (ULTRAM) 50 MG tablet, Take 1 tablet by mouth Every 6 (Six) Hours As Needed for Moderate Pain  or Severe Pain ., Disp: 90 tablet, Rfl: 0  •  azithromycin (ZITHROMAX Z-JOSE G) 250 MG tablet, Take 2 tablets the first day,  "then 1 tablet daily for 4 days., Disp: 6 tablet, Rfl: 0  •  fexofenadine-pseudoephedrine (ALLEGRA-D)  MG per 12 hr tablet, Take 1 tablet by mouth 2 (Two) Times a Day As Needed for Allergies (sinus congestion)., Disp: 30 tablet, Rfl: 2    REVIEW OF SYSTEMS:  Review of Systems   Constitutional: Negative for activity change, appetite change, fatigue, fever, unexpected weight gain and unexpected weight loss.   Respiratory: Negative for shortness of breath.    Cardiovascular: Negative for chest pain.   Gastrointestinal: Negative for abdominal pain.   Genitourinary: Negative for difficulty urinating.   Musculoskeletal: Positive for joint swelling and stiffness.   Skin: Negative for rash.   Neurological: Negative for syncope and headache.       PHYSICAL EXAMINATION:  Vital Signs:  /68 (BP Location: Left arm, Patient Position: Sitting, Cuff Size: Adult)   Pulse 100   Ht 177.8 cm (70\")   Wt 120 kg (264 lb 3.2 oz)   SpO2 96%   BMI 37.91 kg/m²   Physical Exam   Constitutional: He is oriented to person, place, and time. He appears well-developed and well-nourished. No distress.   HENT:   Head: Normocephalic and atraumatic.   Mouth/Throat: Oropharynx is clear and moist.   Neck: Normal range of motion. Neck supple. No JVD present.   Cardiovascular: Normal rate, regular rhythm, normal heart sounds and intact distal pulses.   Pulmonary/Chest: Effort normal and breath sounds normal. No respiratory distress.   Abdominal: Soft. He exhibits no distension. There is no tenderness.   Musculoskeletal: Normal range of motion. He exhibits no edema.   Neurological: He is alert and oriented to person, place, and time. No cranial nerve deficit.   Skin: Skin is warm and dry. Capillary refill takes less than 2 seconds. No rash noted.   Psychiatric: He has a normal mood and affect. His behavior is normal.   Nursing note and vitals reviewed.      Procedures    ASSESSMENT/ PLAN:  Problem List Items Addressed This Visit        " Nervous and Auditory    Osteoarthritis of spine with radiculopathy, lumbar region - Primary    Relevant Medications    methylPREDNISolone acetate (DEPO-medrol) injection 80 mg (Completed)      Other Visit Diagnoses     Chronic fatigue        Relevant Orders    Overnight Sleep Oximetry Study    Excessive somnolence disorder        Relevant Orders    Overnight Sleep Oximetry Study    Acute non-recurrent maxillary sinusitis        Relevant Medications    fexofenadine-pseudoephedrine (ALLEGRA-D)  MG per 12 hr tablet            Specific Patient Instructions:  MEDICATION Instructions: Encouraged patient to continue routine medicines as prescribed and maintain compliance. Patient instructed to report any adverse side effects or reactions to medicines promptly to the office. Patient instructed to make us aware of any OTC or herbal meds or supplement use.  DIET Recommendations: Patient instructed and provided information on the following nutrition and DIET(s): Calorie restriction for weight reduction and maintenance. Necessity for adequate daily intake of fluids/water.  EXERCISE Instructions: Discussed with patient the need for routine aerobic activity for cardiovascular fitness, 3 times a week for about 30 minutes. Daily exercise for increased fitness and weight reduction goals.    Patient's Body mass index is 37.91 kg/m². BMI is above normal parameters. Recommendations include: exercise counseling and nutrition counseling.      SMOKING Recommendations: Counseled patient and encouraged them on smoking cessation. Discussed the benefits to all body systems with smoking cessation, including decreased cardiac/lung/stroke/cancer risk.  HEALTH MAINTENANCE:  Counseling provided to patient/family about routine health maintenance and ANNUAL physicals/labs. Counseling on recommended Vaccinations appropriate for age needed.  MISCELLANEOUS Instructions: N/A      Medications or Orders placed this visit:  Orders Placed This  Encounter   Procedures   • Overnight Sleep Oximetry Study     Standing Status:   Future     Number of Occurrences:   1     Standing Expiration Date:   12/20/2020     Scheduling Instructions:      [facility] preferred       Medications DISCONTINUED this visit:  There are no discontinued medications.    FOLLOW-UP:  Return in about 3 months (around 3/20/2020) for Recheck.    I discussed the patients findings and my recommendations with patient.  An After Visit Summary (AVS) was printed and given to the patient at discharge.      Thomas Henao MD, FAAFP  12/26/2019

## 2019-12-20 NOTE — PATIENT INSTRUCTIONS
Arthritis  Arthritis is a term that is commonly used to refer to joint pain or joint disease. There are more than 100 types of arthritis.  What are the causes?  The most common cause of this condition is wear and tear of a joint. Other causes include:  · Gout.  · Inflammation of a joint.  · An infection of a joint.  · Sprains and other injuries near the joint.  · A drug reaction or allergic reaction.  In some cases, the cause may not be known.  What are the signs or symptoms?  The main symptom of this condition is pain in the joint with movement. Other symptoms include:  · Redness, swelling, or stiffness at a joint.  · Warmth coming from the joint.  · Fever.  · Overall feeling of illness.  How is this diagnosed?  This condition may be diagnosed with a physical exam and tests, including:  · Blood tests.  · Urine tests.  · Imaging tests, such as MRI, X-rays, or a CT scan.  Sometimes, fluid is removed from a joint for testing.  How is this treated?  Treatment for this condition may involve:  · Treatment of the cause, if it is known.  · Rest.  · Raising (elevating) the joint.  · Applying cold or hot packs to the joint.  · Medicines to improve symptoms and reduce inflammation.  · Injections of a steroid such as cortisone into the joint to help reduce pain and inflammation.  Depending on the cause of your arthritis, you may need to make lifestyle changes to reduce stress on your joint. These changes may include exercising more and losing weight.  Follow these instructions at home:  Medicines  · Take over-the-counter and prescription medicines only as told by your health care provider.  · Do not take aspirin to relieve pain if gout is suspected.  Activity  · Rest your joint if told by your health care provider. Rest is important when your disease is active and your joint feels painful, swollen, or stiff.  · Avoid activities that make the pain worse. It is important to balance activity with rest.  · Exercise your joint  regularly with range-of-motion exercises as told by your health care provider. Try doing low-impact exercise, such as:  ? Swimming.  ? Water aerobics.  ? Biking.  ? Walking.  Joint Care    · If your joint is swollen, keep it elevated if told by your health care provider.  · If your joint feels stiff in the morning, try taking a warm shower.  · If directed, apply heat to the joint. If you have diabetes, do not apply heat without permission from your health care provider.  ? Put a towel between the joint and the hot pack or heating pad.  ? Leave the heat on the area for 20-30 minutes.  · If directed, apply ice to the joint:  ? Put ice in a plastic bag.  ? Place a towel between your skin and the bag.  ? Leave the ice on for 20 minutes, 2-3 times per day.  · Keep all follow-up visits as told by your health care provider. This is important.  Contact a health care provider if:  · The pain gets worse.  · You have a fever.  Get help right away if:  · You develop severe joint pain, swelling, or redness.  · Many joints become painful and swollen.  · You develop severe back pain.  · You develop severe weakness in your leg.  · You cannot control your bladder or bowels.  This information is not intended to replace advice given to you by your health care provider. Make sure you discuss any questions you have with your health care provider.  Document Released: 01/25/2006 Document Revised: 05/25/2017 Document Reviewed: 03/14/2016  [employer] Interactive Patient Education © 2019 [employer]      Calorie Counting for Weight Loss  Calories are units of energy. Your body needs a certain amount of calories from food to keep you going throughout the day. When you eat more calories than your body needs, your body stores the extra calories as fat. When you eat fewer calories than your body needs, your body burns fat to get the energy it needs.  Calorie counting means keeping track of how many calories you eat and drink each day. Calorie  counting can be helpful if you need to lose weight. If you make sure to eat fewer calories than your body needs, you should lose weight. Ask your health care provider what a healthy weight is for you.  For calorie counting to work, you will need to eat the right number of calories in a day in order to lose a healthy amount of weight per week. A dietitian can help you determine how many calories you need in a day and will give you suggestions on how to reach your calorie goal.  · A healthy amount of weight to lose per week is usually 1-2 lb (0.5-0.9 kg). This usually means that your daily calorie intake should be reduced by 500-750 calories.  · Eating 1,200 - 1,500 calories per day can help most women lose weight.  · Eating 1,500 - 1,800 calories per day can help most men lose weight.  What is my plan?  My goal is to have __________ calories per day.  If I have this many calories per day, I should lose around __________ pounds per week.  What do I need to know about calorie counting?  In order to meet your daily calorie goal, you will need to:  · Find out how many calories are in each food you would like to eat. Try to do this before you eat.  · Decide how much of the food you plan to eat.  · Write down what you ate and how many calories it had. Doing this is called keeping a food log.  To successfully lose weight, it is important to balance calorie counting with a healthy lifestyle that includes regular activity. Aim for 150 minutes of moderate exercise (such as walking) or 75 minutes of vigorous exercise (such as running) each week.  Where do I find calorie information?    The number of calories in a food can be found on a Nutrition Facts label. If a food does not have a Nutrition Facts label, try to look up the calories online or ask your dietitian for help.  Remember that calories are listed per serving. If you choose to have more than one serving of a food, you will have to multiply the calories per serving by  "the amount of servings you plan to eat. For example, the label on a package of bread might say that a serving size is 1 slice and that there are 90 calories in a serving. If you eat 1 slice, you will have eaten 90 calories. If you eat 2 slices, you will have eaten 180 calories.  How do I keep a food log?  Immediately after each meal, record the following information in your food log:  · What you ate. Don't forget to include toppings, sauces, and other extras on the food.  · How much you ate. This can be measured in cups, ounces, or number of items.  · How many calories each food and drink had.  · The total number of calories in the meal.  Keep your food log near you, such as in a small notebook in your pocket, or use a mobile frank or website. Some programs will calculate calories for you and show you how many calories you have left for the day to meet your goal.  What are some calorie counting tips?    · Use your calories on foods and drinks that will fill you up and not leave you hungry:  ? Some examples of foods that fill you up are nuts and nut butters, vegetables, lean proteins, and high-fiber foods like whole grains. High-fiber foods are foods with more than 5 g fiber per serving.  ? Drinks such as sodas, specialty coffee drinks, alcohol, and juices have a lot of calories, yet do not fill you up.  · Eat nutritious foods and avoid empty calories. Empty calories are calories you get from foods or beverages that do not have many vitamins or protein, such as candy, sweets, and soda. It is better to have a nutritious high-calorie food (such as an avocado) than a food with few nutrients (such as a bag of chips).  · Know how many calories are in the foods you eat most often. This will help you calculate calorie counts faster.  · Pay attention to calories in drinks. Low-calorie drinks include water and unsweetened drinks.  · Pay attention to nutrition labels for \"low fat\" or \"fat free\" foods. These foods sometimes have " the same amount of calories or more calories than the full fat versions. They also often have added sugar, starch, or salt, to make up for flavor that was removed with the fat.  · Find a way of tracking calories that works for you. Get creative. Try different apps or programs if writing down calories does not work for you.  What are some portion control tips?  · Know how many calories are in a serving. This will help you know how many servings of a certain food you can have.  · Use a measuring cup to measure serving sizes. You could also try weighing out portions on a kitchen scale. With time, you will be able to estimate serving sizes for some foods.  · Take some time to put servings of different foods on your favorite plates, bowls, and cups so you know what a serving looks like.  · Try not to eat straight from a bag or box. Doing this can lead to overeating. Put the amount you would like to eat in a cup or on a plate to make sure you are eating the right portion.  · Use smaller plates, glasses, and bowls to prevent overeating.  · Try not to multitask (for example, watch TV or use your computer) while eating. If it is time to eat, sit down at a table and enjoy your food. This will help you to know when you are full. It will also help you to be aware of what you are eating and how much you are eating.  What are tips for following this plan?  Reading food labels  · Check the calorie count compared to the serving size. The serving size may be smaller than what you are used to eating.  · Check the source of the calories. Make sure the food you are eating is high in vitamins and protein and low in saturated and trans fats.  Shopping  · Read nutrition labels while you shop. This will help you make healthy decisions before you decide to purchase your food.  · Make a grocery list and stick to it.  Cooking  · Try to cook your favorite foods in a healthier way. For example, try baking instead of frying.  · Use low-fat dairy  "products.  Meal planning  · Use more fruits and vegetables. Half of your plate should be fruits and vegetables.  · Include lean proteins like poultry and fish.  How do I count calories when eating out?  · Ask for smaller portion sizes.  · Consider sharing an entree and sides instead of getting your own entree.  · If you get your own entree, eat only half. Ask for a box at the beginning of your meal and put the rest of your entree in it so you are not tempted to eat it.  · If calories are listed on the menu, choose the lower calorie options.  · Choose dishes that include vegetables, fruits, whole grains, low-fat dairy products, and lean protein.  · Choose items that are boiled, broiled, grilled, or steamed. Stay away from items that are buttered, battered, fried, or served with cream sauce. Items labeled \"crispy\" are usually fried, unless stated otherwise.  · Choose water, low-fat milk, unsweetened iced tea, or other drinks without added sugar. If you want an alcoholic beverage, choose a lower calorie option such as a glass of wine or light beer.  · Ask for dressings, sauces, and syrups on the side. These are usually high in calories, so you should limit the amount you eat.  · If you want a salad, choose a garden salad and ask for grilled meats. Avoid extra toppings like syed, cheese, or fried items. Ask for the dressing on the side, or ask for olive oil and vinegar or lemon to use as dressing.  · Estimate how many servings of a food you are given. For example, a serving of cooked rice is ½ cup or about the size of half a baseball. Knowing serving sizes will help you be aware of how much food you are eating at restaurants. The list below tells you how big or small some common portion sizes are based on everyday objects:  ? 1 oz--4 stacked dice.  ? 3 oz--1 deck of cards.  ? 1 tsp--1 die.  ? 1 Tbsp--½ a ping-pong ball.  ? 2 Tbsp--1 ping-pong ball.  ? ½ cup--½ baseball.  ? 1 cup--1 baseball.  Summary  · Calorie " counting means keeping track of how many calories you eat and drink each day. If you eat fewer calories than your body needs, you should lose weight.  · A healthy amount of weight to lose per week is usually 1-2 lb (0.5-0.9 kg). This usually means reducing your daily calorie intake by 500-750 calories.  · The number of calories in a food can be found on a Nutrition Facts label. If a food does not have a Nutrition Facts label, try to look up the calories online or ask your dietitian for help.  · Use your calories on foods and drinks that will fill you up, and not on foods and drinks that will leave you hungry.  · Use smaller plates, glasses, and bowls to prevent overeating.  This information is not intended to replace advice given to you by your health care provider. Make sure you discuss any questions you have with your health care provider.  Document Released: 12/18/2006 Document Revised: 09/06/2019 Document Reviewed: 11/17/2017  [email or web address] Interactive Patient Education © 2019 [employer]

## 2019-12-26 DIAGNOSIS — J01.00 ACUTE NON-RECURRENT MAXILLARY SINUSITIS: Primary | ICD-10-CM

## 2019-12-26 RX ORDER — AZITHROMYCIN 250 MG/1
TABLET, FILM COATED ORAL
Qty: 6 TABLET | Refills: 0 | Status: SHIPPED | OUTPATIENT
Start: 2019-12-26 | End: 2020-02-19

## 2020-01-17 RX ORDER — ALBUTEROL SULFATE 90 UG/1
2 AEROSOL, METERED RESPIRATORY (INHALATION) EVERY 4 HOURS PRN
Qty: 18 G | Refills: 5 | Status: SHIPPED | OUTPATIENT
Start: 2020-01-17 | End: 2020-11-18 | Stop reason: SDUPTHER

## 2020-01-17 RX ORDER — VARENICLINE TARTRATE 1 MG/1
1 TABLET, FILM COATED ORAL 2 TIMES DAILY
Qty: 60 TABLET | Refills: 4 | Status: SHIPPED | OUTPATIENT
Start: 2020-01-17 | End: 2020-11-18

## 2020-02-19 ENCOUNTER — OFFICE VISIT (OUTPATIENT)
Dept: FAMILY MEDICINE CLINIC | Facility: CLINIC | Age: 40
End: 2020-02-19

## 2020-02-19 VITALS
HEART RATE: 88 BPM | WEIGHT: 262 LBS | TEMPERATURE: 98.2 F | OXYGEN SATURATION: 98 % | HEIGHT: 70 IN | DIASTOLIC BLOOD PRESSURE: 110 MMHG | BODY MASS INDEX: 37.51 KG/M2 | SYSTOLIC BLOOD PRESSURE: 174 MMHG

## 2020-02-19 DIAGNOSIS — R45.1 AGITATION: ICD-10-CM

## 2020-02-19 DIAGNOSIS — F41.9 ANXIETY: ICD-10-CM

## 2020-02-19 DIAGNOSIS — I10 ESSENTIAL HYPERTENSION: Primary | ICD-10-CM

## 2020-02-19 PROCEDURE — 99214 OFFICE O/P EST MOD 30 MIN: CPT | Performed by: FAMILY MEDICINE

## 2020-02-19 RX ORDER — LOSARTAN POTASSIUM 100 MG/1
100 TABLET ORAL DAILY
Qty: 90 TABLET | Refills: 3 | Status: SHIPPED | OUTPATIENT
Start: 2020-02-19 | End: 2020-12-23 | Stop reason: SDUPTHER

## 2020-02-19 RX ORDER — ACETAMINOPHEN AND CODEINE PHOSPHATE 300; 30 MG/1; MG/1
TABLET ORAL
COMMUNITY
Start: 2020-01-27 | End: 2020-08-31 | Stop reason: SDDI

## 2020-02-19 RX ORDER — OLANZAPINE 5 MG/1
5 TABLET ORAL NIGHTLY
Qty: 30 TABLET | Refills: 5 | Status: SHIPPED | OUTPATIENT
Start: 2020-02-19 | End: 2020-11-18

## 2020-02-19 NOTE — PROGRESS NOTES
OFFICE VISIT NOTE:    Rikki Alejnadro is a 39 y.o. male who presents today for Anxiety (recheck); Osteoarthritis (3 mth f/u); and irritability.     Very stressed - mostly out of anger. Realized he's been out of BP meds for about 2 months.   Asks for a counselor/therapist to discuss things with.     Anxiety   Presents for follow-up visit. Symptoms include excessive worry and nervous/anxious behavior. Patient reports no chest pain, obsessions, palpitations, panic, restlessness, shortness of breath or suicidal ideas. Symptoms occur constantly. The severity of symptoms is interfering with daily activities. The quality of sleep is fair. Nighttime awakenings: occasional.     Compliance with medications is %.   Osteoarthritis   Presents for follow-up visit. He complains of pain, stiffness and joint swelling. The symptoms have been worsening. Affected location: multiple joints. His pain is at a severity of 9/10. Pertinent negatives include no fatigue, fever, rash or weight loss. His past medical history is significant for osteoarthritis. Compliance with total regimen is %. Compliance with medications is %.        Past medical/surgical history, Family history, Social history, Allergies and Medications have been reviewed with the patient today and are updated in [facility] EMR. See below.    Past Medical History:   Diagnosis Date   • Anxiety    • Arthritis    • Bulging of cervical intervertebral disc    • Bulging of lumbar intervertebral disc    • Depression    • GERD (gastroesophageal reflux disease)    • Hypertension      Past Surgical History:   Procedure Laterality Date   • REPLACEMENT TOTAL HIP LATERAL POSITION Bilateral    • TOTAL HIP ARTHROPLASTY Bilateral 2017    Performed by Dr. Mejia in [place], [place]     Family History   Problem Relation Age of Onset   • COPD Mother    • Hyperlipidemia Mother    • Heart attack Father      Social History     Tobacco Use   • Smoking status: Current Every Day Smoker      Packs/day: 0.50     Types: Cigarettes   • Smokeless tobacco: Never Used   Substance Use Topics   • Alcohol use: Yes     Alcohol/week: 4.0 standard drinks     Types: 4 Cans of beer per week     Frequency: 4 or more times a week     Drinks per session: 3 or 4     Binge frequency: Never     Comment: 12 beers/week   • Drug use: No       ALLERGIES:  Aspirin; Mobic [meloxicam]; Lisinopril; and Naproxen sodium    CURRENT MEDS:    Current Outpatient Medications:   •  acetaminophen-codeine (TYLENOL #3) 300-30 MG per tablet, TAKE 1 TABLET BY MOUTH THREE TIMES DAILY AS NEEDED PAIN, Disp: , Rfl:   •  albuterol sulfate  (90 Base) MCG/ACT inhaler, Inhale 2 puffs Every 4 (Four) Hours As Needed for Wheezing or Shortness of Air., Disp: 18 g, Rfl: 5  •  citalopram (CeleXA) 40 MG tablet, Take 1 tablet by mouth Daily., Disp: 30 tablet, Rfl: 5  •  gabapentin (NEURONTIN) 600 MG tablet, Take 1 tablet by mouth 3 (Three) Times a Day., Disp: 90 tablet, Rfl: 2  •  raNITIdine (ZANTAC) 150 MG tablet, Take 150 mg by mouth Every Night., Disp: , Rfl:   •  tiZANidine (ZANAFLEX) 4 MG tablet, Take 1 tablet by mouth 3 (Three) Times a Day As Needed for Muscle Spasms. (Patient taking differently: Take 1 tablet by mouth 3 (Three) Times a Day As Needed for Muscle Spasms.), Disp: 60 tablet, Rfl: 2  •  varenicline (CHANTIX) 1 MG tablet, Take 1 tablet by mouth 2 (Two) Times a Day., Disp: 60 tablet, Rfl: 4  •  fexofenadine-pseudoephedrine (ALLEGRA-D)  MG per 12 hr tablet, Take 1 tablet by mouth 2 (Two) Times a Day As Needed for Allergies (sinus congestion)., Disp: 30 tablet, Rfl: 2  •  losartan (COZAAR) 100 MG tablet, Take 1 tablet by mouth Daily., Disp: 90 tablet, Rfl: 3  •  OLANZapine (ZYPREXA) 5 MG tablet, Take 1 tablet by mouth Every Night., Disp: 30 tablet, Rfl: 5    REVIEW OF SYSTEMS:  Review of Systems   Constitutional: Negative for activity change, appetite change, fatigue, fever, unexpected weight gain and unexpected weight loss.  "  Respiratory: Negative for shortness of breath.    Cardiovascular: Negative for chest pain and palpitations.   Gastrointestinal: Negative for abdominal pain.   Genitourinary: Negative for difficulty urinating.   Musculoskeletal: Positive for joint swelling and stiffness.   Skin: Negative for rash.   Neurological: Negative for syncope and headache.   Psychiatric/Behavioral: Negative for suicidal ideas. The patient is nervous/anxious.        PHYSICAL EXAMINATION:  Vital Signs:  BP (!) 174/110 (BP Location: Left arm, Patient Position: Sitting, Cuff Size: Adult)   Pulse 88   Temp 98.2 °F (36.8 °C) (Temporal)   Ht 177.8 cm (70\")   Wt 119 kg (262 lb)   SpO2 98%   BMI 37.59 kg/m²   Vitals:    02/19/20 1420 02/19/20 1427   Patient Position: Sitting Sitting       Physical Exam   Constitutional: He is oriented to person, place, and time. He appears well-developed and well-nourished. No distress.   HENT:   Head: Normocephalic and atraumatic.   Mouth/Throat: Oropharynx is clear and moist.   Neck: Normal range of motion. Neck supple. No JVD present.   Cardiovascular: Normal rate, regular rhythm, normal heart sounds and intact distal pulses.   Pulmonary/Chest: Effort normal. No respiratory distress. He has wheezes.   Abdominal: Soft. He exhibits no distension. There is no tenderness.   Musculoskeletal: Normal range of motion. He exhibits no edema.   Neurological: He is alert and oriented to person, place, and time. No cranial nerve deficit.   Skin: Skin is warm and dry. Capillary refill takes less than 2 seconds. No rash noted.   Psychiatric: He has a normal mood and affect. His behavior is normal.   Nursing note and vitals reviewed.      Procedures    ASSESSMENT/ PLAN:  Problem List Items Addressed This Visit        Cardiovascular and Mediastinum    Essential hypertension - Primary (Chronic)    Relevant Medications    losartan (COZAAR) 100 MG tablet      Other Visit Diagnoses     Agitation        Relevant Medications    " OLANZapine (ZYPREXA) 5 MG tablet    Anxiety        Relevant Medications    OLANZapine (ZYPREXA) 5 MG tablet            Specific Patient Instructions:  MEDICATION Instructions: Encouraged patient to continue routine medicines as prescribed and maintain compliance. Patient instructed to report any adverse side effects or reactions to medicines promptly to the office. Patient instructed to make us aware of any OTC or herbal meds or supplement use.    DIET Recommendations: Patient instructed and provided information on the following nutrition and diet recommendations: Calorie restriction for weight reduction and maintenance. Necessity for adequate daily intake of fluids/water. Also, diet information provided in AVS for specifics.    EXERCISE Instructions: Discussed with patient the need for routine aerobic activity for cardiovascular fitness, 3 times a week for about 30 minutes. Daily exercise for increased fitness and weight reduction goals.    SMOKING Recommendations: Counseled patient and encouraged them on smoking and tobacco cessation if applicable. Discussed the benefits to all body systems with smoking/tobacco cessation, including decreased cardiac/lung/stroke/cancer risk. Encouraged no vaping use.    HEALTH MAINTENANCE:  Counseling provided to patient/family about routine health maintenance and ANNUAL physicals/labs. Counseling on recommended Vaccinations appropriate for age needed.        Patient's Body mass index is 37.59 kg/m². BMI is above normal parameters. Recommendations include: exercise counseling and nutrition counseling.      Medications or Orders placed this visit:  No orders of the defined types were placed in this encounter.      Medications DISCONTINUED this visit:  Medications Discontinued During This Encounter   Medication Reason   • azithromycin (ZITHROMAX Z-JOSE G) 250 MG tablet *Therapy completed   • calcipotriene (DOVONEX) 0.005 % cream *Therapy completed   • meclizine (ANTIVERT) 25 MG tablet  *Therapy completed   • traMADol (ULTRAM) 50 MG tablet Discontinued by another clinician   • varenicline (CHANTIX JOSE G) 0.5 MG X 11 & 1 MG X 42 tablet *Therapy completed   • losartan (COZAAR) 100 MG tablet Reorder       FOLLOW-UP:  Return in about 4 weeks (around 3/18/2020) for Recheck.    I discussed the patients findings and my recommendations with patient.  An After Visit Summary (AVS) was printed and given to the patient at discharge.      Thomas Henao MD, FAAFP  2/23/2020

## 2020-02-19 NOTE — PATIENT INSTRUCTIONS
Living With Depression  Everyone experiences occasional disappointment, sadness, and loss in their lives. When you are feeling down, blue, or sad for at least 2 weeks in a row, it may mean that you have depression. Depression can affect your thoughts and feelings, relationships, daily activities, and physical health. It is caused by changes in the way your brain functions. If you receive a diagnosis of depression, your health care provider will tell you which type of depression you have and what treatment options are available to you.  If you are living with depression, there are ways to help you recover from it and also ways to prevent it from coming back.  How to cope with lifestyle changes  Coping with stress         Stress is your body’s reaction to life changes and events, both good and bad. Stressful situations may include:  · Getting .  · The death of a spouse.  · Losing a job.  · Retiring.  · Having a baby.  Stress can last just a few hours or it can be ongoing. Stress can play a major role in depression, so it is important to learn both how to cope with stress and how to think about it differently.  Talk with your health care provider or a counselor if you would like to learn more about stress reduction. He or she may suggest some stress reduction techniques, such as:  · Music therapy. This can include creating music or listening to music. Choose music that you enjoy and that inspires you.  · Mindfulness-based meditation. This kind of meditation can be done while sitting or walking. It involves being aware of your normal breaths, rather than trying to control your breathing.  · Centering prayer. This is a kind of meditation that involves focusing on a spiritual word or phrase. Choose a word, phrase, or sacred image that is meaningful to you and that brings you peace.  · Deep breathing. To do this, expand your stomach and inhale slowly through your nose. Hold your breath for 3-5 seconds, then exhale  slowly, allowing your stomach muscles to relax.  · Muscle relaxation. This involves intentionally tensing muscles then relaxing them.  Choose a stress reduction technique that fits your lifestyle and personality. Stress reduction techniques take time and practice to develop. Set aside 5-15 minutes a day to do them. Therapists can offer training in these techniques. The training may be covered by some insurance plans. Other things you can do to manage stress include:  · Keeping a stress diary. This can help you learn what triggers your stress and ways to control your response.  · Understanding what your limits are and saying no to requests or events that lead to a schedule that is too full.  · Thinking about how you respond to certain situations. You may not be able to control everything, but you can control how you react.  · Adding humor to your life by watching funny films or TV shows.  · Making time for activities that help you relax and not feeling guilty about spending your time this way.    Medicines  Your health care provider may suggest certain medicines if he or she feels that they will help improve your condition. Avoid using alcohol and other substances that may prevent your medicines from working properly (may interact). It is also important to:  · Talk with your pharmacist or health care provider about all the medicines that you take, their possible side effects, and what medicines are safe to take together.  · Make it your goal to take part in all treatment decisions (shared decision-making). This includes giving input on the side effects of medicines. It is best if shared decision-making with your health care provider is part of your total treatment plan.  If your health care provider prescribes a medicine, you may not notice the full benefits of it for 4-8 weeks. Most people who are treated for depression need to be on medicine for at least 6-12 months after they feel better. If you are taking  medicines as part of your treatment, do not stop taking medicines without first talking to your health care provider. You may need to have the medicine slowly decreased (tapered) over time to decrease the risk of harmful side effects.  Relationships  Your health care provider may suggest family therapy along with individual therapy and drug therapy. While there may not be family problems that are causing you to feel depressed, it is still important to make sure your family learns as much as they can about your mental health. Having your family’s support can help make your treatment successful.  How to recognize changes in your condition  Everyone has a different response to treatment for depression. Recovery from major depression happens when you have not had signs of major depression for two months. This may mean that you will start to:  · Have more interest in doing activities.  · Feel less hopeless than you did 2 months ago.  · Have more energy.  · Overeat less often, or have better or improving appetite.  · Have better concentration.  Your health care provider will work with you to decide the next steps in your recovery. It is also important to recognize when your condition is getting worse. Watch for these signs:  · Having fatigue or low energy.  · Eating too much or too little.  · Sleeping too much or too little.  · Feeling restless, agitated, or hopeless.  · Having trouble concentrating or making decisions.  · Having unexplained physical complaints.  · Feeling irritable, angry, or aggressive.  Get help as soon as you or your family members notice these symptoms coming back.  How to get support and help from others  How to talk with friends and family members about your condition    Talking to friends and family members about your condition can provide you with one way to get support and guidance. Reach out to trusted friends or family members, explain your symptoms to them, and let them know that you are  working with a health care provider to treat your depression.  Financial resources  Not all insurance plans cover mental health care, so it is important to check with your insurance carrier. If paying for co-pays or counseling services is a problem, search for a local or [facility] mental health care center. They may be able to offer public mental health care services at low or no cost when you are not able to see a private health care provider.  If you are taking medicine for depression, you may be able to get the generic form, which may be less expensive. Some makers of prescription medicines also offer help to patients who cannot afford the medicines they need.  Follow these instructions at home:    · Get the right amount and quality of sleep.  · Cut down on using caffeine, tobacco, alcohol, and other potentially harmful substances.  · Try to exercise, such as walking or lifting small weights.  · Take over-the-counter and prescription medicines only as told by your health care provider.  · Eat a healthy diet that includes plenty of vegetables, fruits, whole grains, low-fat dairy products, and lean protein. Do not eat a lot of foods that are high in solid fats, added sugars, or salt.  · Keep all follow-up visits as told by your health care provider. This is important.  Contact a health care provider if:  · You stop taking your antidepressant medicines, and you have any of these symptoms:  ? Nausea.  ? Headache.  ? Feeling lightheaded.  ? Chills and body aches.  ? Not being able to sleep (insomnia).  · You or your friends and family think your depression is getting worse.  Get help right away if:  · You have thoughts of hurting yourself or others.  If you ever feel like you may hurt yourself or others, or have thoughts about taking your own life, get help right away. You can go to your nearest emergency department or call:  · Your local emergency services (911 in the [place]  · A suicide crisis helpline, such as the  National Suicide Prevention Lifeline at 1-970.439.2418. This is open 24-hours a day.  Summary  · If you are living with depression, there are ways to help you recover from it and also ways to prevent it from coming back.  · Work with your health care team to create a management plan that includes counseling, stress management techniques, and healthy lifestyle habits.  This information is not intended to replace advice given to you by your health care provider. Make sure you discuss any questions you have with your health care provider.  Document Released: 11/20/2017 Document Revised: 11/20/2017 Document Reviewed: 11/20/2017  [email or web address] Interactive Patient Education © 2020 [employer]      Generalized Anxiety Disorder, Adult  Generalized anxiety disorder (NANCY) is a mental health disorder. People with this condition constantly worry about everyday events. Unlike normal anxiety, worry related to NANCY is not triggered by a specific event. These worries also do not fade or get better with time. NANCY interferes with life functions, including relationships, work, and school.  NANCY can vary from mild to severe. People with severe NANCY can have intense waves of anxiety with physical symptoms (panic attacks).  What are the causes?  The exact cause of NANCY is not known.  What increases the risk?  This condition is more likely to develop in:  · Women.  · People who have a family history of anxiety disorders.  · People who are very shy.  · People who experience very stressful life events, such as the death of a loved one.  · People who have a very stressful family environment.  What are the signs or symptoms?  People with NANCY often worry excessively about many things in their lives, such as their health and family. They may also be overly concerned about:  · Doing well at work.  · Being on time.  · Natural disasters.  · Friendships.  Physical symptoms of NANCY include:  · Fatigue.  · Muscle tension or having muscle  twitches.  · Trembling or feeling shaky.  · Being easily startled.  · Feeling like your heart is pounding or racing.  · Feeling out of breath or like you cannot take a deep breath.  · Having trouble falling asleep or staying asleep.  · Sweating.  · Nausea, diarrhea, or irritable bowel syndrome (IBS).  · Headaches.  · Trouble concentrating or remembering facts.  · Restlessness.  · Irritability.  How is this diagnosed?  Your health care provider can diagnose NANCY based on your symptoms and medical history. You will also have a physical exam. The health care provider will ask specific questions about your symptoms, including how severe they are, when they started, and if they come and go. Your health care provider may ask you about your use of alcohol or drugs, including prescription medicines. Your health care provider may refer you to a mental health specialist for further evaluation.  Your health care provider will do a thorough examination and may perform additional tests to rule out other possible causes of your symptoms.  To be diagnosed with NANCY, a person must have anxiety that:  · Is out of his or her control.  · Affects several different aspects of his or her life, such as work and relationships.  · Causes distress that makes him or her unable to take part in normal activities.  · Includes at least three physical symptoms of NANCY, such as restlessness, fatigue, trouble concentrating, irritability, muscle tension, or sleep problems.  Before your health care provider can confirm a diagnosis of NANCY, these symptoms must be present more days than they are not, and they must last for six months or longer.  How is this treated?  The following therapies are usually used to treat NANCY:  · Medicine. Antidepressant medicine is usually prescribed for long-term daily control. Antianxiety medicines may be added in severe cases, especially when panic attacks occur.  · Talk therapy (psychotherapy). Certain types of talk therapy  can be helpful in treating NANCY by providing support, education, and guidance. Options include:  ? Cognitive behavioral therapy (CBT). People learn coping skills and techniques to ease their anxiety. They learn to identify unrealistic or negative thoughts and behaviors and to replace them with positive ones.  ? Acceptance and commitment therapy (ACT). This treatment teaches people how to be mindful as a way to cope with unwanted thoughts and feelings.  ? Biofeedback. This process trains you to manage your body's response (physiological response) through breathing techniques and relaxation methods. You will work with a therapist while machines are used to monitor your physical symptoms.  · Stress management techniques. These include yoga, meditation, and exercise.  A mental health specialist can help determine which treatment is best for you. Some people see improvement with one type of therapy. However, other people require a combination of therapies.  Follow these instructions at home:  · Take over-the-counter and prescription medicines only as told by your health care provider.  · Try to maintain a normal routine.  · Try to anticipate stressful situations and allow extra time to manage them.  · Practice any stress management or self-calming techniques as taught by your health care provider.  · Do not punish yourself for setbacks or for not making progress.  · Try to recognize your accomplishments, even if they are small.  · Keep all follow-up visits as told by your health care provider. This is important.  Contact a health care provider if:  · Your symptoms do not get better.  · Your symptoms get worse.  · You have signs of depression, such as:  ? A persistently sad, cranky, or irritable mood.  ? Loss of enjoyment in activities that used to bring you carolyn.  ? Change in weight or eating.  ? Changes in sleeping habits.  ? Avoiding friends or family members.  ? Loss of energy for normal tasks.  ? Feelings of guilt or  worthlessness.  Get help right away if:  · You have serious thoughts about hurting yourself or others.  If you ever feel like you may hurt yourself or others, or have thoughts about taking your own life, get help right away. You can go to your nearest emergency department or call:  · Your local emergency services (911 in the [place]  · A suicide crisis helpline, such as the National Suicide Prevention Lifeline at 1-994.262.9765. This is open 24 hours a day.  Summary  · Generalized anxiety disorder (NANCY) is a mental health disorder that involves worry that is not triggered by a specific event.  · People with NANCY often worry excessively about many things in their lives, such as their health and family.  · NANCY may cause physical symptoms such as restlessness, trouble concentrating, sleep problems, frequent sweating, nausea, diarrhea, headaches, and trembling or muscle twitching.  · A mental health specialist can help determine which treatment is best for you. Some people see improvement with one type of therapy. However, other people require a combination of therapies.  This information is not intended to replace advice given to you by your health care provider. Make sure you discuss any questions you have with your health care provider.  Document Released: 04/14/2014 Document Revised: 01/07/2020 Document Reviewed: 11/07/2017  [employer] Interactive Patient Education © 2020 [employer]    Suspect Essential HTN.Good BP control is encouraged with Goal BP based on JNC 8 guidelines 2014 <140/90 for patients with known cardiac disease and diabetes. (CARMELO. 2014:322 (5):507-520. doi:10.1001/carmelo.2013.30980): general population <60 yr old goal BP <140/90 and for those >60 <150/90.  For patients of all ages with Diabetes, CKD, Known CAD <140/90. Recommended to the patient to obtain electronic home BP machine with upper arm blood pressure cuff and to check regularly as instructed.  Keep BP log and bring to subsequent visits.  Stable, at goal.  a. LABS: routine for hypertension recommended and ordered if necessary.  b. Recommend if you do not have a home BP machine to obtain an electronic machine with arm blood pressure cuff.      c. Monitor BP over the next week and keep log to bring back to office. Discussed medication therapy however pt wants to try to control with diet exercise. .  Your provider  has recommended self-monitoring of your blood pressure.  If you do not have a blood pressure cuff you may purchase one from the local pharmacy.  You may ask the pharmacist which brand and model they recommend.  Obtain your blood pressure measurement at least 2x per week.  You should also check your blood pressure if you experience any symptoms of blurred visit, dizziness or headache.  Please record all blood pressure measurements and bring them to next office visit.  If you have any questions about the accuracy of your blood pressure machine please bring it in to the office and our staff will be happy to check accuracy.   d. Encouraged to eat a low sodium heart healthy diet  e. Offered handout on HTN educational topics.  These were provided if patient requested these today.  f. MEDS: as listed in today's visit.  g. Risks/benefits of current and new medications discussed with the patient and or family today.  The patient/family are aware and accept that if there any side effects they should call or return to clinic as soon as possible.  Appropriate F/U discussed for topics addressed today. All questions were answered to the  satisfactory state of patient/family.  Should symptoms fail to improve or worsen they agree to call or return to clinic or to go to the ER. Education handouts were offered on any new Rx if requested.  Discussed the importance of following up with any needed screening tests/labs/specialist appointments and any requested follow-up recommended by me today.  Importance of maintaining follow-up discussed and patient accepts  that missed appointments can delay diagnosis and potentially lead to worsening of conditions.

## 2020-04-01 ENCOUNTER — TELEPHONE (OUTPATIENT)
Dept: FAMILY MEDICINE CLINIC | Facility: CLINIC | Age: 40
End: 2020-04-01

## 2020-08-31 ENCOUNTER — OFFICE VISIT (OUTPATIENT)
Dept: FAMILY MEDICINE CLINIC | Facility: CLINIC | Age: 40
End: 2020-08-31

## 2020-08-31 VITALS
SYSTOLIC BLOOD PRESSURE: 158 MMHG | BODY MASS INDEX: 40.09 KG/M2 | OXYGEN SATURATION: 95 % | HEART RATE: 102 BPM | TEMPERATURE: 96 F | DIASTOLIC BLOOD PRESSURE: 84 MMHG | HEIGHT: 70 IN | WEIGHT: 280 LBS

## 2020-08-31 DIAGNOSIS — G89.11 ACUTE SHOULDER PAIN DUE TO TRAUMA, RIGHT: Primary | ICD-10-CM

## 2020-08-31 DIAGNOSIS — M54.2 NECK PAIN, CHRONIC: ICD-10-CM

## 2020-08-31 DIAGNOSIS — I10 ESSENTIAL HYPERTENSION: ICD-10-CM

## 2020-08-31 DIAGNOSIS — M51.36 DEGENERATION OF LUMBAR INTERVERTEBRAL DISC: ICD-10-CM

## 2020-08-31 DIAGNOSIS — G89.29 NECK PAIN, CHRONIC: ICD-10-CM

## 2020-08-31 DIAGNOSIS — M25.511 ACUTE SHOULDER PAIN DUE TO TRAUMA, RIGHT: Primary | ICD-10-CM

## 2020-08-31 DIAGNOSIS — M25.562 CHRONIC PAIN OF LEFT KNEE: ICD-10-CM

## 2020-08-31 DIAGNOSIS — G89.29 CHRONIC PAIN OF LEFT KNEE: ICD-10-CM

## 2020-08-31 PROCEDURE — 99214 OFFICE O/P EST MOD 30 MIN: CPT | Performed by: FAMILY MEDICINE

## 2020-08-31 PROCEDURE — 96372 THER/PROPH/DIAG INJ SC/IM: CPT | Performed by: FAMILY MEDICINE

## 2020-08-31 RX ORDER — METHYLPREDNISOLONE ACETATE 80 MG/ML
80 INJECTION, SUSPENSION INTRA-ARTICULAR; INTRALESIONAL; INTRAMUSCULAR; SOFT TISSUE ONCE
Status: COMPLETED | OUTPATIENT
Start: 2020-08-31 | End: 2020-08-31

## 2020-08-31 RX ADMIN — METHYLPREDNISOLONE ACETATE 80 MG: 80 INJECTION, SUSPENSION INTRA-ARTICULAR; INTRALESIONAL; INTRAMUSCULAR; SOFT TISSUE at 16:35

## 2020-10-01 DIAGNOSIS — M25.512 LEFT SHOULDER PAIN, UNSPECIFIED CHRONICITY: Primary | ICD-10-CM

## 2020-11-18 ENCOUNTER — OFFICE VISIT (OUTPATIENT)
Dept: FAMILY MEDICINE CLINIC | Facility: CLINIC | Age: 40
End: 2020-11-18

## 2020-11-18 VITALS
BODY MASS INDEX: 41.97 KG/M2 | HEART RATE: 101 BPM | TEMPERATURE: 98.4 F | WEIGHT: 293.2 LBS | DIASTOLIC BLOOD PRESSURE: 81 MMHG | SYSTOLIC BLOOD PRESSURE: 128 MMHG | HEIGHT: 70 IN | OXYGEN SATURATION: 96 %

## 2020-11-18 DIAGNOSIS — M62.81 DECREASED MUSCLE STRENGTH: ICD-10-CM

## 2020-11-18 DIAGNOSIS — F33.41 RECURRENT MAJOR DEPRESSIVE DISORDER, IN PARTIAL REMISSION (HCC): Primary | ICD-10-CM

## 2020-11-18 DIAGNOSIS — R53.82 CHRONIC FATIGUE: ICD-10-CM

## 2020-11-18 DIAGNOSIS — I10 ESSENTIAL HYPERTENSION: ICD-10-CM

## 2020-11-18 DIAGNOSIS — M51.36 DEGENERATION OF LUMBAR INTERVERTEBRAL DISC: ICD-10-CM

## 2020-11-18 DIAGNOSIS — K21.9 GASTROESOPHAGEAL REFLUX DISEASE WITHOUT ESOPHAGITIS: ICD-10-CM

## 2020-11-18 DIAGNOSIS — J45.20 MILD INTERMITTENT ASTHMATIC BRONCHITIS WITHOUT COMPLICATION: ICD-10-CM

## 2020-11-18 PROBLEM — J45.909 ASTHMATIC BRONCHITIS: Status: ACTIVE | Noted: 2020-11-18

## 2020-11-18 PROCEDURE — 99214 OFFICE O/P EST MOD 30 MIN: CPT | Performed by: FAMILY MEDICINE

## 2020-11-18 RX ORDER — PANTOPRAZOLE SODIUM 40 MG/1
40 TABLET, DELAYED RELEASE ORAL DAILY
Qty: 30 TABLET | Refills: 5 | Status: SHIPPED | OUTPATIENT
Start: 2020-11-18 | End: 2021-11-30 | Stop reason: SDUPTHER

## 2020-11-18 RX ORDER — SERTRALINE HYDROCHLORIDE 100 MG/1
100 TABLET, FILM COATED ORAL NIGHTLY
Qty: 30 TABLET | Refills: 5 | Status: SHIPPED | OUTPATIENT
Start: 2020-11-18 | End: 2021-08-27 | Stop reason: SDUPTHER

## 2020-11-18 RX ORDER — ALBUTEROL SULFATE 90 UG/1
2 AEROSOL, METERED RESPIRATORY (INHALATION) EVERY 4 HOURS PRN
Qty: 18 G | Refills: 5 | Status: SHIPPED | OUTPATIENT
Start: 2020-11-18 | End: 2021-04-19 | Stop reason: SDUPTHER

## 2020-11-18 RX ORDER — GABAPENTIN 600 MG/1
600 TABLET ORAL 3 TIMES DAILY
COMMUNITY
Start: 2020-10-24 | End: 2021-03-29

## 2020-11-18 NOTE — PATIENT INSTRUCTIONS
Living With Depression  Everyone experiences occasional disappointment, sadness, and loss in their lives. When you are feeling down, blue, or sad for at least 2 weeks in a row, it may mean that you have depression. Depression can affect your thoughts and feelings, relationships, daily activities, and physical health. It is caused by changes in the way your brain functions. If you receive a diagnosis of depression, your health care provider will tell you which type of depression you have and what treatment options are available to you.  If you are living with depression, there are ways to help you recover from it and also ways to prevent it from coming back.  How to cope with lifestyle changes  Coping with stress         Stress is your body’s reaction to life changes and events, both good and bad. Stressful situations may include:  · Getting .  · The death of a spouse.  · Losing a job.  · Retiring.  · Having a baby.  Stress can last just a few hours or it can be ongoing. Stress can play a major role in depression, so it is important to learn both how to cope with stress and how to think about it differently.  Talk with your health care provider or a counselor if you would like to learn more about stress reduction. He or she may suggest some stress reduction techniques, such as:  · Music therapy. This can include creating music or listening to music. Choose music that you enjoy and that inspires you.  · Mindfulness-based meditation. This kind of meditation can be done while sitting or walking. It involves being aware of your normal breaths, rather than trying to control your breathing.  · Centering prayer. This is a kind of meditation that involves focusing on a spiritual word or phrase. Choose a word, phrase, or sacred image that is meaningful to you and that brings you peace.  · Deep breathing. To do this, expand your stomach and inhale slowly through your nose. Hold your breath for 3-5 seconds, then exhale  slowly, allowing your stomach muscles to relax.  · Muscle relaxation. This involves intentionally tensing muscles then relaxing them.  Choose a stress reduction technique that fits your lifestyle and personality. Stress reduction techniques take time and practice to develop. Set aside 5-15 minutes a day to do them. Therapists can offer training in these techniques. The training may be covered by some insurance plans. Other things you can do to manage stress include:  · Keeping a stress diary. This can help you learn what triggers your stress and ways to control your response.  · Understanding what your limits are and saying no to requests or events that lead to a schedule that is too full.  · Thinking about how you respond to certain situations. You may not be able to control everything, but you can control how you react.  · Adding humor to your life by watching funny films or TV shows.  · Making time for activities that help you relax and not feeling guilty about spending your time this way.    Medicines  Your health care provider may suggest certain medicines if he or she feels that they will help improve your condition. Avoid using alcohol and other substances that may prevent your medicines from working properly (may interact). It is also important to:  · Talk with your pharmacist or health care provider about all the medicines that you take, their possible side effects, and what medicines are safe to take together.  · Make it your goal to take part in all treatment decisions (shared decision-making). This includes giving input on the side effects of medicines. It is best if shared decision-making with your health care provider is part of your total treatment plan.  If your health care provider prescribes a medicine, you may not notice the full benefits of it for 4-8 weeks. Most people who are treated for depression need to be on medicine for at least 6-12 months after they feel better. If you are taking  medicines as part of your treatment, do not stop taking medicines without first talking to your health care provider. You may need to have the medicine slowly decreased (tapered) over time to decrease the risk of harmful side effects.  Relationships  Your health care provider may suggest family therapy along with individual therapy and drug therapy. While there may not be family problems that are causing you to feel depressed, it is still important to make sure your family learns as much as they can about your mental health. Having your family’s support can help make your treatment successful.  How to recognize changes in your condition  Everyone has a different response to treatment for depression. Recovery from major depression happens when you have not had signs of major depression for two months. This may mean that you will start to:  · Have more interest in doing activities.  · Feel less hopeless than you did 2 months ago.  · Have more energy.  · Overeat less often, or have better or improving appetite.  · Have better concentration.  Your health care provider will work with you to decide the next steps in your recovery. It is also important to recognize when your condition is getting worse. Watch for these signs:  · Having fatigue or low energy.  · Eating too much or too little.  · Sleeping too much or too little.  · Feeling restless, agitated, or hopeless.  · Having trouble concentrating or making decisions.  · Having unexplained physical complaints.  · Feeling irritable, angry, or aggressive.  Get help as soon as you or your family members notice these symptoms coming back.  How to get support and help from others  How to talk with friends and family members about your condition    Talking to friends and family members about your condition can provide you with one way to get support and guidance. Reach out to trusted friends or family members, explain your symptoms to them, and let them know that you are  working with a health care provider to treat your depression.  Financial resources  Not all insurance plans cover mental health care, so it is important to check with your insurance carrier. If paying for co-pays or counseling services is a problem, search for a local or [facility] mental health care center. They may be able to offer public mental health care services at low or no cost when you are not able to see a private health care provider.  If you are taking medicine for depression, you may be able to get the generic form, which may be less expensive. Some makers of prescription medicines also offer help to patients who cannot afford the medicines they need.  Follow these instructions at home:    · Get the right amount and quality of sleep.  · Cut down on using caffeine, tobacco, alcohol, and other potentially harmful substances.  · Try to exercise, such as walking or lifting small weights.  · Take over-the-counter and prescription medicines only as told by your health care provider.  · Eat a healthy diet that includes plenty of vegetables, fruits, whole grains, low-fat dairy products, and lean protein. Do not eat a lot of foods that are high in solid fats, added sugars, or salt.  · Keep all follow-up visits as told by your health care provider. This is important.  Contact a health care provider if:  · You stop taking your antidepressant medicines, and you have any of these symptoms:  ? Nausea.  ? Headache.  ? Feeling lightheaded.  ? Chills and body aches.  ? Not being able to sleep (insomnia).  · You or your friends and family think your depression is getting worse.  Get help right away if:  · You have thoughts of hurting yourself or others.  If you ever feel like you may hurt yourself or others, or have thoughts about taking your own life, get help right away. You can go to your nearest emergency department or call:  · Your local emergency services (911 in the [place]  · A suicide crisis helpline, such as the  National Suicide Prevention Lifeline at 1-551.406.7307. This is open 24-hours a day.  Summary  · If you are living with depression, there are ways to help you recover from it and also ways to prevent it from coming back.  · Work with your health care team to create a management plan that includes counseling, stress management techniques, and healthy lifestyle habits.  This information is not intended to replace advice given to you by your health care provider. Make sure you discuss any questions you have with your health care provider.  Document Released: 11/20/2017 Document Revised: 04/10/2020 Document Reviewed: 11/20/2017  [employer] Patient Education © 2020 [employer]      Chronic Back Pain  When back pain lasts longer than 3 months, it is called chronic back pain. The cause of your back pain may not be known. Some common causes include:  · Wear and tear (degenerative disease) of the bones, ligaments, or disks in your back.  · Inflammation and stiffness in your back (arthritis).  People who have chronic back pain often go through certain periods in which the pain is more intense (flare-ups). Many people can learn to manage the pain with home care.  Follow these instructions at home:  Pay attention to any changes in your symptoms. Take these actions to help with your pain:  Activity    · Avoid bending and other activities that make the problem worse.  · Maintain a proper position when standing or sitting:  ? When standing, keep your upper back and neck straight, with your shoulders pulled back. Avoid slouching.  ? When sitting, keep your back straight and relax your shoulders. Do not round your shoulders or pull them backward.  · Do not sit or  one place for long periods of time.  · Take brief periods of rest throughout the day. This will reduce your pain. Resting in a lying or standing position is usually better than sitting to rest.  · When you are resting for longer periods, mix in some mild activity  or stretching between periods of rest. This will help to prevent stiffness and pain.  · Get regular exercise. Ask your health care provider what activities are safe for you.  · Do not lift anything that is heavier than 10 lb (4.5 kg). Always use proper lifting technique, which includes:  ? Bending your knees.  ? Keeping the load close to your body.  ? Avoiding twisting.  · Sleep on a firm mattress in a comfortable position. Try lying on your side with your knees slightly bent. If you lie on your back, put a pillow under your knees.  Managing pain  · If directed, apply ice to the painful area. Your health care provider may recommend applying ice during the first 24-48 hours after a flare-up begins.  ? Put ice in a plastic bag.  ? Place a towel between your skin and the bag.  ? Leave the ice on for 20 minutes, 2-3 times per day.  · If directed, apply heat to the affected area as often as told by your health care provider. Use the heat source that your health care provider recommends, such as a moist heat pack or a heating pad.  ? Place a towel between your skin and the heat source.  ? Leave the heat on for 20-30 minutes.  ? Remove the heat if your skin turns bright red. This is especially important if you are unable to feel pain, heat, or cold. You may have a greater risk of getting burned.  · Try soaking in a warm tub.  · Take over-the-counter and prescription medicines only as told by your health care provider.  · Keep all follow-up visits as told by your health care provider. This is important.  Contact a health care provider if:  · You have pain that is not relieved with rest or medicine.  Get help right away if:  · You have weakness or numbness in one or both of your legs or feet.  · You have trouble controlling your bladder or your bowels.  · You have nausea or vomiting.  · You have pain in your abdomen.  · You have shortness of breath or you faint.  This information is not intended to replace advice given to  you by your health care provider. Make sure you discuss any questions you have with your health care provider.  Document Released: 01/25/2006 Document Revised: 04/09/2020 Document Reviewed: 06/27/2018  [employer] Patient Education © 2020 [employer]

## 2020-11-18 NOTE — PROGRESS NOTES
OFFICE VISIT NOTE:    Rikki Alejandro is a 40 y.o. male who presents today for Depression.     Patient presents for follow-up of long term use of high risk medication (narcotics, sedatives, stimulants or other controlled medications). The patient has read and signed the [facility] Controlled Substance Contract - copy in chart and updated annually. A recent Anirudh was reviewed and is present in the chart, updated annually and as needed. UDS has been reviewed and is up to date, and performed at least annually and PRN discretion of provider. No aberrant behavioral issues are noted, and no significant side effects/complications are present. The patient is aware of the potential for addiction and dependence of these medications and accepts responsibility for proper med use. Patient is aware of the PRN use of these medications (unless otherwise prescribed), and not to be used routinely.     Depression  Visit Type: follow-up  Patient presents with the following symptoms: depressed mood, irritability and nervousness/anxiety.  Patient is not experiencing: shortness of breath, suicidal ideas, suicidal planning, thoughts of death, weight gain and weight loss.  Frequency of symptoms: most days   Severity: causing significant distress   Sleep quality: good  Nighttime awakenings: occasional  Compliance with medications:  %        Heartburn  He complains of belching and nausea. He reports no abdominal pain or no chest pain. This is a chronic problem. The current episode started more than 1 year ago. The problem occurs frequently. The problem has been unchanged. The symptoms are aggravated by certain foods, caffeine, ETOH, stress and smoking. Pertinent negatives include no fatigue, melena or weight loss. He has tried an antacid and a diet change for the symptoms. The treatment provided mild relief.   Back Pain  This is a chronic problem. The current episode started more than 1 year ago. The problem occurs constantly. The  problem is unchanged. The pain is present in the lumbar spine and sacro-iliac. The quality of the pain is described as aching. The pain does not radiate. The pain is severe. The pain is the same all the time. The symptoms are aggravated by bending, standing and twisting. Stiffness is present in the morning. Pertinent negatives include no abdominal pain, chest pain, fever or weight loss. He has tried analgesics, bed rest and home exercises for the symptoms. The treatment provided significant relief.        Past medical/surgical history, Family history, Social history, Allergies and Medications have been reviewed with the patient today and are updated in [facility] EMR. See below.  Past Medical History:   Diagnosis Date   • Anxiety    • Arthritis    • Bulging of cervical intervertebral disc    • Bulging of lumbar intervertebral disc    • Depression    • GERD (gastroesophageal reflux disease)    • Hypertension      Past Surgical History:   Procedure Laterality Date   • REPLACEMENT TOTAL HIP LATERAL POSITION Bilateral    • TOTAL HIP ARTHROPLASTY Bilateral 2017    Performed by Dr. Mejia in [place], [place]     Family History   Problem Relation Age of Onset   • COPD Mother    • Hyperlipidemia Mother    • Heart attack Father      Social History     Tobacco Use   • Smoking status: Current Every Day Smoker     Packs/day: 0.50     Types: Cigarettes   • Smokeless tobacco: Never Used   Substance Use Topics   • Alcohol use: Yes     Alcohol/week: 4.0 standard drinks     Types: 4 Cans of beer per week     Frequency: 4 or more times a week     Drinks per session: 3 or 4     Binge frequency: Never     Comment: 12 beers/week   • Drug use: No       ALLERGIES:  Aspirin, Mobic [meloxicam], Lisinopril, and Naproxen sodium    CURRENT MEDS:    Current Outpatient Medications:   •  albuterol sulfate  (90 Base) MCG/ACT inhaler, Inhale 2 puffs Every 4 (Four) Hours As Needed for Wheezing or Shortness of Air., Disp: 18 g, Rfl: 5  •  gabapentin  "(NEURONTIN) 600 MG tablet, Take 600 mg by mouth 3 (Three) Times a Day., Disp: , Rfl:   •  losartan (COZAAR) 100 MG tablet, Take 1 tablet by mouth Daily., Disp: 90 tablet, Rfl: 3  •  pantoprazole (Protonix) 40 MG EC tablet, Take 1 tablet by mouth Daily., Disp: 30 tablet, Rfl: 5  •  sertraline (Zoloft) 100 MG tablet, Take 1 tablet by mouth Every Night., Disp: 30 tablet, Rfl: 5    REVIEW OF SYSTEMS:  Review of Systems   Constitutional: Positive for irritability. Negative for activity change, appetite change, fatigue, fever, unexpected weight gain and unexpected weight loss.   Respiratory: Negative for shortness of breath.    Cardiovascular: Negative for chest pain.   Gastrointestinal: Positive for nausea. Negative for abdominal pain and melena.   Genitourinary: Negative for difficulty urinating.   Musculoskeletal: Positive for back pain.   Skin: Negative for rash.   Neurological: Negative for syncope and headache.   Psychiatric/Behavioral: Positive for depressed mood. Negative for suicidal ideas. The patient is nervous/anxious.        PHYSICAL EXAMINATION:  Vital Signs:  /81 (BP Location: Left arm, Patient Position: Sitting, Cuff Size: Large Adult)   Pulse 101   Temp 98.4 °F (36.9 °C) (Infrared)   Ht 177.8 cm (70\")   Wt 133 kg (293 lb 3.2 oz)   SpO2 96%   BMI 42.07 kg/m²   Vitals:    11/18/20 1418   Patient Position: Sitting       Physical Exam  Vitals signs and nursing note reviewed.   Constitutional:       General: He is not in acute distress.     Appearance: He is well-developed.   HENT:      Head: Normocephalic and atraumatic.      Mouth/Throat:      Mouth: Mucous membranes are moist.      Pharynx: Oropharynx is clear.   Eyes:      Extraocular Movements: Extraocular movements intact.      Pupils: Pupils are equal, round, and reactive to light.   Neck:      Musculoskeletal: Normal range of motion and neck supple.      Vascular: No JVD.   Cardiovascular:      Rate and Rhythm: Normal rate and regular " rhythm.      Pulses: Normal pulses.      Heart sounds: Normal heart sounds.   Pulmonary:      Effort: Pulmonary effort is normal. No respiratory distress.      Breath sounds: Normal breath sounds.   Abdominal:      General: Bowel sounds are normal. There is no distension.      Palpations: Abdomen is soft.      Tenderness: There is no abdominal tenderness.   Musculoskeletal: Normal range of motion.   Skin:     General: Skin is warm and dry.      Capillary Refill: Capillary refill takes less than 2 seconds.      Findings: No rash.   Neurological:      General: No focal deficit present.      Mental Status: He is alert and oriented to person, place, and time.      Cranial Nerves: No cranial nerve deficit.   Psychiatric:         Mood and Affect: Mood normal.         Behavior: Behavior normal.         Procedures    ASSESSMENT/ PLAN:  Problem List Items Addressed This Visit        Cardiovascular and Mediastinum    Essential hypertension (Chronic)    Relevant Orders    Comprehensive metabolic panel    Lipid panel    CBC and Differential    T4    TSH       Respiratory    Asthmatic bronchitis    Relevant Medications    albuterol sulfate  (90 Base) MCG/ACT inhaler       Digestive    Gastroesophageal reflux disease without esophagitis    Relevant Medications    pantoprazole (Protonix) 40 MG EC tablet       Musculoskeletal and Integument    Degeneration of lumbar intervertebral disc       Other    Recurrent major depressive disorder, in partial remission (CMS/HCC) - Primary    Relevant Medications    sertraline (Zoloft) 100 MG tablet      Other Visit Diagnoses     Chronic fatigue        Relevant Orders    Testosterone    Comprehensive metabolic panel    Lipid panel    CBC and Differential    T4    TSH    Decreased muscle strength        Relevant Orders    Testosterone            Specific Patient Instructions:  MEDICATION Instructions: Encouraged patient to continue routine medicines as prescribed and maintain compliance.  Patient instructed to report any adverse side effects or reactions to medicines promptly to the office. Patient instructed to make us aware of any OTC or herbal meds or supplement use.    DIET Recommendations: Patient instructed and provided information on the following nutrition and diet recommendations: Calorie restriction for weight reduction and maintenance. Necessity for adequate daily intake of fluids/water. Also, diet information provided in AVS for specifics.    EXERCISE Instructions: Discussed with patient the need for routine aerobic activity for cardiovascular fitness, 3 times a week for about 30 minutes. Daily exercise for increased fitness and weight reduction goals.    SMOKING Recommendations: Counseled patient and encouraged them on smoking and tobacco cessation if applicable. Discussed the benefits to all body systems with smoking/tobacco cessation, including decreased cardiac/lung/stroke/cancer risk. Encouraged no vaping use.    HEALTH MAINTENANCE:  Counseling provided to patient/family about routine health maintenance and ANNUAL physicals/labs. Counseling on recommended Vaccinations appropriate for age needed.        Patient's Body mass index is 42.07 kg/m². BMI is above normal parameters. Recommendations include: exercise counseling and nutrition counseling.      Medications or Orders placed this visit:  Orders Placed This Encounter   Procedures   • Testosterone     Pt requests Serrano.       Standing Status:   Future     Standing Expiration Date:   11/18/2021   • Comprehensive metabolic panel     Pt requests Serrano.       Standing Status:   Future     Standing Expiration Date:   11/18/2021   • Lipid panel     Pt requests Serrano.       Standing Status:   Future     Standing Expiration Date:   11/18/2021     Order Specific Question:   [facility] Has the patient fasted?     Answer:   Yes   • T4     Pt requests Serrano.       Standing Status:   Future     Standing Expiration Date:   11/18/2021   •  TSH     Pt requests Maggie.       Standing Status:   Future     Standing Expiration Date:   11/18/2021   • CBC and Differential     Pt requests Serrano.       Standing Status:   Future     Standing Expiration Date:   11/18/2021     Order Specific Question:   Manual Differential     Answer:   No       Medications DISCONTINUED this visit:  Medications Discontinued During This Encounter   Medication Reason   • citalopram (CeleXA) 40 MG tablet *Therapy completed   • fexofenadine-pseudoephedrine (ALLEGRA-D)  MG per 12 hr tablet *Therapy completed   • raNITIdine (ZANTAC) 150 MG tablet *Therapy completed   • tiZANidine (ZANAFLEX) 4 MG tablet *Therapy completed   • varenicline (CHANTIX) 1 MG tablet *Therapy completed   • OLANZapine (ZYPREXA) 5 MG tablet Not Efficacious   • albuterol sulfate  (90 Base) MCG/ACT inhaler Reorder       FOLLOW-UP:  Return in about 4 weeks (around 12/16/2020) for Next scheduled follow up, Recheck.    I discussed the patients findings and my recommendations with patient.  An After Visit Summary (AVS) was printed and given to the patient at discharge.        Thomas Henao MD, FAAFP  11/18/2020

## 2020-12-02 ENCOUNTER — RESULTS ENCOUNTER (OUTPATIENT)
Dept: FAMILY MEDICINE CLINIC | Facility: CLINIC | Age: 40
End: 2020-12-02

## 2020-12-02 DIAGNOSIS — I10 ESSENTIAL HYPERTENSION: ICD-10-CM

## 2020-12-02 DIAGNOSIS — R53.82 CHRONIC FATIGUE: ICD-10-CM

## 2020-12-02 DIAGNOSIS — M62.81 DECREASED MUSCLE STRENGTH: ICD-10-CM

## 2020-12-04 NOTE — PROGRESS NOTES
[place] [facility] [place] Alexandra davis is faxing them to our office.

## 2020-12-07 NOTE — PROGRESS NOTES
Spoke with [facility] and was advised labs were going to be refaxed today since we did not receive last week.

## 2020-12-23 ENCOUNTER — OFFICE VISIT (OUTPATIENT)
Dept: FAMILY MEDICINE CLINIC | Facility: CLINIC | Age: 40
End: 2020-12-23

## 2020-12-23 VITALS
DIASTOLIC BLOOD PRESSURE: 112 MMHG | WEIGHT: 276.4 LBS | HEART RATE: 105 BPM | HEIGHT: 71 IN | OXYGEN SATURATION: 98 % | SYSTOLIC BLOOD PRESSURE: 156 MMHG | BODY MASS INDEX: 38.69 KG/M2 | TEMPERATURE: 97.7 F

## 2020-12-23 DIAGNOSIS — M54.12 CERVICAL RADICULOPATHY: ICD-10-CM

## 2020-12-23 DIAGNOSIS — Z00.00 ANNUAL PHYSICAL EXAM: Primary | ICD-10-CM

## 2020-12-23 DIAGNOSIS — I10 ESSENTIAL HYPERTENSION: ICD-10-CM

## 2020-12-23 PROCEDURE — 99396 PREV VISIT EST AGE 40-64: CPT | Performed by: FAMILY MEDICINE

## 2020-12-23 RX ORDER — LOSARTAN POTASSIUM 100 MG/1
100 TABLET ORAL DAILY
Qty: 90 TABLET | Refills: 3 | Status: SHIPPED | OUTPATIENT
Start: 2020-12-23 | End: 2021-08-27 | Stop reason: SDUPTHER

## 2020-12-23 RX ORDER — AMLODIPINE BESYLATE 5 MG/1
5 TABLET ORAL DAILY
Qty: 30 TABLET | Refills: 5 | Status: SHIPPED | OUTPATIENT
Start: 2020-12-23 | End: 2021-03-29 | Stop reason: SDUPTHER

## 2020-12-23 NOTE — PATIENT INSTRUCTIONS
"Fat and Cholesterol Restricted Eating Plan  Getting too much fat and cholesterol in your diet may cause health problems. Choosing the right foods helps keep your fat and cholesterol at normal levels. This can keep you from getting certain diseases.  Your doctor may recommend an eating plan that includes:  · Total fat: ______% or less of total calories a day.  · Saturated fat: ______% or less of total calories a day.  · Cholesterol: less than _________mg a day.  · Fiber: ______g a day.  What are tips for following this plan?  Meal planning  · At meals, divide your plate into four equal parts:  ? Fill one-half of your plate with vegetables and green salads.  ? Fill one-fourth of your plate with whole grains.  ? Fill one-fourth of your plate with low-fat (lean) protein foods.  · Eat fish that is high in omega-3 fats at least two times a week. This includes mackerel, tuna, sardines, and salmon.  · Eat foods that are high in fiber, such as whole grains, beans, apples, broccoli, carrots, peas, and barley.  General tips    · Work with your doctor to lose weight if you need to.  · Avoid:  ? Foods with added sugar.  ? Fried foods.  ? Foods with partially hydrogenated oils.  · Limit alcohol intake to no more than 1 drink a day for nonpregnant women and 2 drinks a day for men. One drink equals 12 oz of beer, 5 oz of wine, or 1½ oz of hard liquor.  Reading food labels  · Check food labels for:  ? Trans fats.  ? Partially hydrogenated oils.  ? Saturated fat (g) in each serving.  ? Cholesterol (mg) in each serving.  ? Fiber (g) in each serving.  · Choose foods with healthy fats, such as:  ? Monounsaturated fats.  ? Polyunsaturated fats.  ? Omega-3 fats.  · Choose grain products that have whole grains. Look for the word \"whole\" as the first word in the ingredient list.  Cooking  · Cook foods using low-fat methods. These include baking, boiling, grilling, and broiling.  · Eat more home-cooked foods. Eat at restaurants and buffets " less often.  · Avoid cooking using saturated fats, such as butter, cream, palm oil, palm kernel oil, and coconut oil.  Recommended foods    Fruits  · All fresh, canned (in natural juice), or frozen fruits.  Vegetables  · Fresh or frozen vegetables (raw, steamed, roasted, or grilled). Green salads.  Grains  · Whole grains, such as whole wheat or whole grain breads, crackers, cereals, and pasta. Unsweetened oatmeal, bulgur, barley, quinoa, or brown rice. Corn or whole wheat flour tortillas.  Meats and other protein foods  · Ground beef (85% or leaner), grass-fed beef, or beef trimmed of fat. Skinless chicken or turkey. Ground chicken or turkey. Pork trimmed of fat. All fish and seafood. Egg whites. Dried beans, peas, or lentils. Unsalted nuts or seeds. Unsalted canned beans. Nut butters without added sugar or oil.  Dairy  · Low-fat or nonfat dairy products, such as skim or 1% milk, 2% or reduced-fat cheeses, low-fat and fat-free ricotta or cottage cheese, or plain low-fat and nonfat yogurt.  Fats and oils  · Tub margarine without trans fats. Light or reduced-fat mayonnaise and salad dressings. Avocado. Olive, canola, sesame, or safflower oils.  The items listed above may not be a complete list of foods and beverages you can eat. Contact a dietitian for more information.  Foods to avoid  Fruits  · Canned fruit in heavy syrup. Fruit in cream or butter sauce. Fried fruit.  Vegetables  · Vegetables cooked in cheese, cream, or butter sauce. Fried vegetables.  Grains  · White bread. White pasta. White rice. Cornbread. Bagels, pastries, and croissants. Crackers and snack foods that contain trans fat and hydrogenated oils.  Meats and other protein foods  · Fatty cuts of meat. Ribs, chicken wings, syed, sausage, bologna, salami, chitterlings, fatback, hot dogs, bratwurst, and packaged lunch meats. Liver and organ meats. Whole eggs and egg yolks. Chicken and turkey with skin. Fried meat.  Dairy  · Whole or 2% milk, cream,  half-and-half, and cream cheese. Whole milk cheeses. Whole-fat or sweetened yogurt. Full-fat cheeses. Nondairy creamers and whipped toppings. Processed cheese, cheese spreads, and cheese curds.  Beverages  · Alcohol. Sugar-sweetened drinks such as sodas, lemonade, and fruit drinks.  Fats and oils  · Butter, stick margarine, lard, shortening, ghee, or syed fat. Coconut, palm kernel, and palm oils.  Sweets and desserts  · Corn syrup, sugars, honey, and molasses. Candy. Jam and jelly. Syrup. Sweetened cereals. Cookies, pies, cakes, donuts, muffins, and ice cream.  The items listed above may not be a complete list of foods and beverages you should avoid. Contact a dietitian for more information.  Summary  · Choosing the right foods helps keep your fat and cholesterol at normal levels. This can keep you from getting certain diseases.  · At meals, fill one-half of your plate with vegetables and green salads.  · Eat high-fiber foods, like whole grains, beans, apples, carrots, peas, and barley.  · Limit added sugar, saturated fats, alcohol, and fried foods.  This information is not intended to replace advice given to you by your health care provider. Make sure you discuss any questions you have with your health care provider.  Document Revised: 08/21/2019 Document Reviewed: 09/04/2018  [employer] Patient Education © 2020 [employer]    Suspect Essential HTN.Good BP control is encouraged with Goal BP based on JNC 8 guidelines 2014 <140/90 for patients with known cardiac disease and diabetes. (CARMELO. 2014:322 (5):507-520. doi:10.1001/carmelo.2013.54540): general population <60 yr old goal BP <140/90 and for those >60 <150/90.  For patients of all ages with Diabetes, CKD, Known CAD <140/90. Recommended to the patient to obtain electronic home BP machine with upper arm blood pressure cuff and to check regularly as instructed.  Keep BP log and bring to subsequent visits. Stable, at goal.  a. LABS: routine for hypertension  recommended and ordered if necessary.  b. Recommend if you do not have a home BP machine to obtain an electronic machine with arm blood pressure cuff.      c. Monitor BP over the next week and keep log to bring back to office. Discussed medication therapy however pt wants to try to control with diet exercise. .  Your provider  has recommended self-monitoring of your blood pressure.  If you do not have a blood pressure cuff you may purchase one from the local pharmacy.  You may ask the pharmacist which brand and model they recommend.  Obtain your blood pressure measurement at least 2x per week.  You should also check your blood pressure if you experience any symptoms of blurred visit, dizziness or headache.  Please record all blood pressure measurements and bring them to next office visit.  If you have any questions about the accuracy of your blood pressure machine please bring it in to the office and our staff will be happy to check accuracy.   d. Encouraged to eat a low sodium heart healthy diet  e. Offered handout on HTN educational topics.  These were provided if patient requested these today.  f. MEDS: as listed in today's visit.  g. Risks/benefits of current and new medications discussed with the patient and or family today.  The patient/family are aware and accept that if there any side effects they should call or return to clinic as soon as possible.  Appropriate F/U discussed for topics addressed today. All questions were answered to the  satisfactory state of patient/family.  Should symptoms fail to improve or worsen they agree to call or return to clinic or to go to the ER. Education handouts were offered on any new Rx if requested.  Discussed the importance of following up with any needed screening tests/labs/specialist appointments and any requested follow-up recommended by me today.  Importance of maintaining follow-up discussed and patient accepts that missed appointments can delay diagnosis and  potentially lead to worsening of conditions.

## 2020-12-23 NOTE — PROGRESS NOTES
OFFICE VISIT NOTE:    Rikki Alejandro is a 40 y.o. male who presents today for Annual Exam.     Labs reviewed - ALT/AST up above 350, other liver tests ok, , TC and LDL up, and HDL low, and Testosterone level 185. Does drink alcohol daily.     Hyperlipidemia  This is a chronic problem. The current episode started more than 1 year ago. The problem is uncontrolled. Pertinent negatives include no chest pain or shortness of breath. Current antihyperlipidemic treatment includes diet change and exercise. The current treatment provides mild improvement of lipids. Compliance problems include adherence to exercise and adherence to diet.    Hypertension  This is a chronic problem. The current episode started more than 1 year ago. The problem is unchanged. The problem is uncontrolled. Pertinent negatives include no chest pain, headaches, orthopnea, palpitations, peripheral edema, PND or shortness of breath. There are no associated agents to hypertension. The current treatment provides significant improvement. There are no compliance problems.         Past medical/surgical history, Family history, Social history, Allergies and Medications have been reviewed with the patient today and are updated in [facility] EMR. See below.  Past Medical History:   Diagnosis Date   • Anxiety    • Arthritis    • Bulging of cervical intervertebral disc    • Bulging of lumbar intervertebral disc    • Depression    • GERD (gastroesophageal reflux disease)    • Hypertension      Past Surgical History:   Procedure Laterality Date   • REPLACEMENT TOTAL HIP LATERAL POSITION Bilateral    • TOTAL HIP ARTHROPLASTY Bilateral 2017    Performed by Dr. Mejia in [place], [place]     Family History   Problem Relation Age of Onset   • COPD Mother    • Hyperlipidemia Mother    • Heart attack Father      Social History     Tobacco Use   • Smoking status: Current Every Day Smoker     Packs/day: 1.00     Years: 26.00     Pack years: 26.00     Types: Cigarettes   •  "Smokeless tobacco: Never Used   Substance Use Topics   • Alcohol use: Yes     Alcohol/week: 4.0 standard drinks     Types: 4 Cans of beer per week     Frequency: 4 or more times a week     Drinks per session: 3 or 4     Binge frequency: Never     Comment: 12 beers/week   • Drug use: No       ALLERGIES:  Aspirin, Mobic [meloxicam], Lisinopril, and Naproxen sodium    CURRENT MEDS:    Current Outpatient Medications:   •  albuterol sulfate  (90 Base) MCG/ACT inhaler, Inhale 2 puffs Every 4 (Four) Hours As Needed for Wheezing or Shortness of Air., Disp: 18 g, Rfl: 5  •  gabapentin (NEURONTIN) 600 MG tablet, Take 600 mg by mouth 3 (Three) Times a Day., Disp: , Rfl:   •  losartan (COZAAR) 100 MG tablet, Take 1 tablet by mouth Daily., Disp: 90 tablet, Rfl: 3  •  pantoprazole (Protonix) 40 MG EC tablet, Take 1 tablet by mouth Daily., Disp: 30 tablet, Rfl: 5  •  sertraline (Zoloft) 100 MG tablet, Take 1 tablet by mouth Every Night., Disp: 30 tablet, Rfl: 5  •  amLODIPine (Norvasc) 5 MG tablet, Take 1 tablet by mouth Daily., Disp: 30 tablet, Rfl: 5    REVIEW OF SYSTEMS:  Review of Systems   Constitutional: Negative for activity change, appetite change, fatigue, fever, unexpected weight gain and unexpected weight loss.   Respiratory: Negative for shortness of breath.    Cardiovascular: Negative for chest pain, palpitations, orthopnea and PND.   Gastrointestinal: Negative for abdominal pain.   Genitourinary: Negative for difficulty urinating.   Skin: Negative for rash.   Neurological: Negative for syncope and headache.       PHYSICAL EXAMINATION:  Vital Signs:  BP (!) 156/112 (BP Location: Left arm, Patient Position: Sitting, Cuff Size: Large Adult)   Pulse 105   Temp 97.7 °F (36.5 °C)   Ht 180.3 cm (71\") Comment: pt reported  Wt 125 kg (276 lb 6.4 oz)   SpO2 98%   BMI 38.55 kg/m²   Vitals:    12/23/20 1513 12/23/20 1551   Patient Position: Sitting Sitting       Physical Exam  Vitals signs and nursing note reviewed. "   Constitutional:       General: He is not in acute distress.     Appearance: He is well-developed.   HENT:      Head: Normocephalic and atraumatic.      Mouth/Throat:      Mouth: Mucous membranes are moist.      Pharynx: Oropharynx is clear.   Eyes:      Extraocular Movements: Extraocular movements intact.      Pupils: Pupils are equal, round, and reactive to light.   Neck:      Musculoskeletal: Normal range of motion and neck supple.      Vascular: No JVD.   Cardiovascular:      Rate and Rhythm: Normal rate and regular rhythm.      Pulses: Normal pulses.      Heart sounds: Normal heart sounds.   Pulmonary:      Effort: Pulmonary effort is normal. No respiratory distress.      Breath sounds: Normal breath sounds.   Abdominal:      General: Bowel sounds are normal. There is no distension.      Palpations: Abdomen is soft.      Tenderness: There is no abdominal tenderness.   Musculoskeletal: Normal range of motion.   Skin:     General: Skin is warm and dry.      Capillary Refill: Capillary refill takes less than 2 seconds.      Findings: No rash.   Neurological:      General: No focal deficit present.      Mental Status: He is alert and oriented to person, place, and time.      Cranial Nerves: No cranial nerve deficit.   Psychiatric:         Mood and Affect: Mood normal.         Behavior: Behavior normal.         Procedures    ASSESSMENT/ PLAN:  Problem List Items Addressed This Visit        Cardiac and Vasculature    Essential hypertension (Chronic)    Relevant Medications    losartan (COZAAR) 100 MG tablet    amLODIPine (Norvasc) 5 MG tablet       Neuro    Cervical radiculopathy      Other Visit Diagnoses     Annual physical exam    -  Primary            Specific Patient Instructions:  MEDICATION Instructions: Encouraged patient to continue routine medicines as prescribed and maintain compliance. Patient instructed to report any adverse side effects or reactions to medicines promptly to the office. Patient  instructed to make us aware of any OTC or herbal meds or supplement use.    DIET Recommendations: Patient instructed and provided information on the following nutrition and diet recommendations: Calorie restriction for weight reduction and maintenance. Necessity for adequate daily intake of fluids/water. Also, diet information provided in AVS for specifics.    EXERCISE Instructions: Discussed with patient the need for routine aerobic activity for cardiovascular fitness, 3 times a week for about 30 minutes. Daily exercise for increased fitness and weight reduction goals.    SMOKING Recommendations: Counseled patient and encouraged them on smoking and tobacco cessation if applicable. Discussed the benefits to all body systems with smoking/tobacco cessation, including decreased cardiac/lung/stroke/cancer risk. Encouraged no vaping use.    HEALTH MAINTENANCE:  Counseling provided to patient/family about routine health maintenance and ANNUAL physicals/labs. Counseling on recommended Vaccinations appropriate for age needed.        Patient's Body mass index is 38.55 kg/m². BMI is above normal parameters. Recommendations include: exercise counseling and nutrition counseling.      Medications or Orders placed this visit:  No orders of the defined types were placed in this encounter.      Medications DISCONTINUED this visit:  Medications Discontinued During This Encounter   Medication Reason   • losartan (COZAAR) 100 MG tablet Reorder       FOLLOW-UP:  Return in about 3 months (around 3/23/2021) for Recheck, Next scheduled follow up.    I discussed the patients findings and my recommendations with patient.  An After Visit Summary (AVS) was printed and given to the patient at discharge.        Thomas Henao MD, FAAFP  12/23/2020

## 2021-02-05 DIAGNOSIS — J43.1 PANLOBULAR EMPHYSEMA (HCC): Primary | ICD-10-CM

## 2021-02-05 RX ORDER — ROFLUMILAST 250 UG/1
250 TABLET ORAL DAILY
Qty: 30 TABLET | Refills: 5 | Status: SHIPPED | OUTPATIENT
Start: 2021-02-05 | End: 2021-03-29

## 2021-03-29 ENCOUNTER — OFFICE VISIT (OUTPATIENT)
Dept: FAMILY MEDICINE CLINIC | Facility: CLINIC | Age: 41
End: 2021-03-29

## 2021-03-29 VITALS
OXYGEN SATURATION: 98 % | HEART RATE: 98 BPM | TEMPERATURE: 96.8 F | DIASTOLIC BLOOD PRESSURE: 102 MMHG | BODY MASS INDEX: 38.33 KG/M2 | WEIGHT: 273.8 LBS | HEIGHT: 71 IN | SYSTOLIC BLOOD PRESSURE: 153 MMHG

## 2021-03-29 DIAGNOSIS — F51.01 PRIMARY INSOMNIA: ICD-10-CM

## 2021-03-29 DIAGNOSIS — M77.8 RIGHT SHOULDER TENDINITIS: ICD-10-CM

## 2021-03-29 DIAGNOSIS — I10 ESSENTIAL HYPERTENSION: Primary | ICD-10-CM

## 2021-03-29 DIAGNOSIS — J43.1 PANLOBULAR EMPHYSEMA (HCC): ICD-10-CM

## 2021-03-29 DIAGNOSIS — M25.512 LEFT SHOULDER PAIN, UNSPECIFIED CHRONICITY: ICD-10-CM

## 2021-03-29 PROCEDURE — 99214 OFFICE O/P EST MOD 30 MIN: CPT | Performed by: FAMILY MEDICINE

## 2021-03-29 PROCEDURE — 96372 THER/PROPH/DIAG INJ SC/IM: CPT | Performed by: FAMILY MEDICINE

## 2021-03-29 RX ORDER — AMLODIPINE BESYLATE 10 MG/1
10 TABLET ORAL DAILY
Qty: 30 TABLET | Refills: 5 | Status: SHIPPED | OUTPATIENT
Start: 2021-03-29 | End: 2021-11-30 | Stop reason: SDUPTHER

## 2021-03-29 RX ORDER — TRAZODONE HYDROCHLORIDE 100 MG/1
100 TABLET ORAL NIGHTLY
Qty: 30 TABLET | Refills: 5 | Status: SHIPPED | OUTPATIENT
Start: 2021-03-29

## 2021-03-29 RX ORDER — METHYLPREDNISOLONE ACETATE 80 MG/ML
80 INJECTION, SUSPENSION INTRA-ARTICULAR; INTRALESIONAL; INTRAMUSCULAR; SOFT TISSUE ONCE
Status: COMPLETED | OUTPATIENT
Start: 2021-03-29 | End: 2021-03-29

## 2021-03-29 RX ADMIN — METHYLPREDNISOLONE ACETATE 80 MG: 80 INJECTION, SUSPENSION INTRA-ARTICULAR; INTRALESIONAL; INTRAMUSCULAR; SOFT TISSUE at 14:40

## 2021-04-19 DIAGNOSIS — J45.20 MILD INTERMITTENT ASTHMATIC BRONCHITIS WITHOUT COMPLICATION: ICD-10-CM

## 2021-04-19 RX ORDER — ALBUTEROL SULFATE 90 UG/1
2 AEROSOL, METERED RESPIRATORY (INHALATION) EVERY 4 HOURS PRN
Qty: 18 G | Refills: 5 | Status: SHIPPED | OUTPATIENT
Start: 2021-04-19 | End: 2021-08-27 | Stop reason: SDUPTHER

## 2021-04-26 ENCOUNTER — OFFICE VISIT (OUTPATIENT)
Dept: FAMILY MEDICINE CLINIC | Facility: CLINIC | Age: 41
End: 2021-04-26

## 2021-04-26 VITALS
SYSTOLIC BLOOD PRESSURE: 132 MMHG | HEIGHT: 71 IN | DIASTOLIC BLOOD PRESSURE: 90 MMHG | TEMPERATURE: 97.9 F | WEIGHT: 272.2 LBS | OXYGEN SATURATION: 99 % | HEART RATE: 96 BPM | BODY MASS INDEX: 38.11 KG/M2

## 2021-04-26 DIAGNOSIS — J43.1 PANLOBULAR EMPHYSEMA (HCC): ICD-10-CM

## 2021-04-26 DIAGNOSIS — M54.12 CERVICAL RADICULOPATHY: ICD-10-CM

## 2021-04-26 DIAGNOSIS — R79.89 LOW TESTOSTERONE: ICD-10-CM

## 2021-04-26 DIAGNOSIS — I10 ESSENTIAL HYPERTENSION: Primary | ICD-10-CM

## 2021-04-26 PROCEDURE — 99214 OFFICE O/P EST MOD 30 MIN: CPT | Performed by: FAMILY MEDICINE

## 2021-04-26 RX ORDER — SYRINGE W-NEEDLE,DISPOSAB,3 ML 25GX5/8"
1 SYRINGE, EMPTY DISPOSABLE MISCELLANEOUS
Qty: 50 EACH | Refills: 1 | Status: SHIPPED | OUTPATIENT
Start: 2021-04-26

## 2021-04-26 RX ORDER — TESTOSTERONE CYPIONATE 200 MG/ML
200 INJECTION, SOLUTION INTRAMUSCULAR
Qty: 10 ML | Refills: 0 | Status: SHIPPED | OUTPATIENT
Start: 2021-04-26

## 2021-07-26 ENCOUNTER — OFFICE VISIT (OUTPATIENT)
Dept: FAMILY MEDICINE CLINIC | Facility: CLINIC | Age: 41
End: 2021-07-26

## 2021-07-26 VITALS
HEIGHT: 70 IN | HEART RATE: 89 BPM | OXYGEN SATURATION: 93 % | TEMPERATURE: 98.2 F | SYSTOLIC BLOOD PRESSURE: 161 MMHG | BODY MASS INDEX: 42.83 KG/M2 | DIASTOLIC BLOOD PRESSURE: 112 MMHG | WEIGHT: 299.2 LBS

## 2021-07-26 DIAGNOSIS — I10 ESSENTIAL HYPERTENSION: Primary | ICD-10-CM

## 2021-07-26 DIAGNOSIS — R05.9 COUGH: ICD-10-CM

## 2021-07-26 DIAGNOSIS — R53.82 CHRONIC FATIGUE: ICD-10-CM

## 2021-07-26 DIAGNOSIS — I27.20 PULMONARY HTN (HCC): ICD-10-CM

## 2021-07-26 DIAGNOSIS — R60.0 PEDAL EDEMA: ICD-10-CM

## 2021-07-26 DIAGNOSIS — Z79.899 LONG-TERM USE OF HIGH-RISK MEDICATION: ICD-10-CM

## 2021-07-26 DIAGNOSIS — R06.02 SHORTNESS OF BREATH: ICD-10-CM

## 2021-07-26 PROCEDURE — 99214 OFFICE O/P EST MOD 30 MIN: CPT | Performed by: FAMILY MEDICINE

## 2021-07-26 RX ORDER — POTASSIUM CHLORIDE 1500 MG/1
20 TABLET, FILM COATED, EXTENDED RELEASE ORAL DAILY
Qty: 90 TABLET | Refills: 1 | Status: SHIPPED | OUTPATIENT
Start: 2021-07-26

## 2021-07-26 RX ORDER — FUROSEMIDE 40 MG/1
40 TABLET ORAL DAILY
Qty: 90 TABLET | Refills: 1 | Status: SHIPPED | OUTPATIENT
Start: 2021-07-26 | End: 2021-11-30 | Stop reason: SDUPTHER

## 2021-07-26 RX ORDER — BENZONATATE 100 MG/1
100 CAPSULE ORAL 3 TIMES DAILY PRN
Qty: 30 CAPSULE | Refills: 1 | Status: SHIPPED | OUTPATIENT
Start: 2021-07-26
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
## 2021-07-26 NOTE — PATIENT INSTRUCTIONS
Suspect Essential HTN.Good BP control is encouraged with Goal BP based on JNC 8 guidelines 2014 <140/90 for patients with known cardiac disease and diabetes. (CARMELO. 2014:322 (5):507-520. doi:10.1001/carmelo.2013.14622): general population <60 yr old goal BP <140/90 and for those >60 <150/90.  For patients of all ages with Diabetes, CKD, Known CAD <140/90. Recommended to the patient to obtain electronic home BP machine with upper arm blood pressure cuff and to check regularly as instructed.  Keep BP log and bring to subsequent visits. Stable, at goal.  a. LABS: routine for hypertension recommended and ordered if necessary.  b. Recommend if you do not have a home BP machine to obtain an electronic machine with arm blood pressure cuff.      c. Monitor BP over the next week and keep log to bring back to office. Discussed medication therapy however pt wants to try to control with diet exercise. .  Your provider  has recommended self-monitoring of your blood pressure.  If you do not have a blood pressure cuff you may purchase one from the local pharmacy.  You may ask the pharmacist which brand and model they recommend.  Obtain your blood pressure measurement at least 2x per week.  You should also check your blood pressure if you experience any symptoms of blurred visit, dizziness or headache.  Please record all blood pressure measurements and bring them to next office visit.  If you have any questions about the accuracy of your blood pressure machine please bring it in to the office and our staff will be happy to check accuracy.   d. Encouraged to eat a low sodium heart healthy diet  e. Offered handout on HTN educational topics.  These were provided if patient requested these today.  f. MEDS: as listed in today's visit.  g. Risks/benefits of current and new medications discussed with the patient and or family today.  The patient/family are aware and accept that if there any side effects they should call or return to clinic  as soon as possible.  Appropriate F/U discussed for topics addressed today. All questions were answered to the  satisfactory state of patient/family.  Should symptoms fail to improve or worsen they agree to call or return to clinic or to go to the ER. Education handouts were offered on any new Rx if requested.  Discussed the importance of following up with any needed screening tests/labs/specialist appointments and any requested follow-up recommended by me today.  Importance of maintaining follow-up discussed and patient accepts that missed appointments can delay diagnosis and potentially lead to worsening of conditions.

## 2021-07-31 LAB — DRUGS UR: NORMAL

## 2021-08-27 ENCOUNTER — OFFICE VISIT (OUTPATIENT)
Dept: FAMILY MEDICINE CLINIC | Facility: CLINIC | Age: 41
End: 2021-08-27

## 2021-08-27 VITALS
OXYGEN SATURATION: 97 % | HEIGHT: 70 IN | BODY MASS INDEX: 43.35 KG/M2 | HEART RATE: 100 BPM | TEMPERATURE: 97.7 F | DIASTOLIC BLOOD PRESSURE: 93 MMHG | SYSTOLIC BLOOD PRESSURE: 130 MMHG | WEIGHT: 302.8 LBS

## 2021-08-27 DIAGNOSIS — J45.20 MILD INTERMITTENT ASTHMATIC BRONCHITIS WITHOUT COMPLICATION: ICD-10-CM

## 2021-08-27 DIAGNOSIS — F33.41 RECURRENT MAJOR DEPRESSIVE DISORDER, IN PARTIAL REMISSION (HCC): ICD-10-CM

## 2021-08-27 DIAGNOSIS — S30.1XXD ABDOMINAL WALL HEMATOMA, SUBSEQUENT ENCOUNTER: Primary | ICD-10-CM

## 2021-08-27 DIAGNOSIS — I10 ESSENTIAL HYPERTENSION: ICD-10-CM

## 2021-08-27 PROCEDURE — 99214 OFFICE O/P EST MOD 30 MIN: CPT | Performed by: FAMILY MEDICINE

## 2021-08-27 RX ORDER — LOSARTAN POTASSIUM 100 MG/1
100 TABLET ORAL DAILY
Qty: 90 TABLET | Refills: 3 | Status: SHIPPED | OUTPATIENT
Start: 2021-08-27 | End: 2021-11-30 | Stop reason: SDUPTHER

## 2021-08-27 RX ORDER — ALBUTEROL SULFATE 90 UG/1
2 AEROSOL, METERED RESPIRATORY (INHALATION) EVERY 4 HOURS PRN
Qty: 18 G | Refills: 5 | Status: SHIPPED | OUTPATIENT
Start: 2021-08-27

## 2021-08-27 RX ORDER — SERTRALINE HYDROCHLORIDE 100 MG/1
100 TABLET, FILM COATED ORAL NIGHTLY
Qty: 30 TABLET | Refills: 5 | Status: SHIPPED | OUTPATIENT
Start: 2021-08-27
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
## 2021-08-27 NOTE — PATIENT INSTRUCTIONS
Abdominal Pain, Adult  Pain in the abdomen (abdominal pain) can be caused by many things. Often, abdominal pain is not serious and it gets better with no treatment or by being treated at home. However, sometimes abdominal pain is serious.  Your health care provider will ask questions about your medical history and do a physical exam to try to determine the cause of your abdominal pain.  Follow these instructions at home:  Medicines  · Take over-the-counter and prescription medicines only as told by your health care provider.  · Do not take a laxative unless told by your health care provider.  General instructions    · Watch your condition for any changes.  · Drink enough fluid to keep your urine pale yellow.  · Keep all follow-up visits as told by your health care provider. This is important.  Contact a health care provider if:  · Your abdominal pain changes or gets worse.  · You are not hungry or you lose weight without trying.  · You are constipated or have diarrhea for more than 2-3 days.  · You have pain when you urinate or have a bowel movement.  · Your abdominal pain wakes you up at night.  · Your pain gets worse with meals, after eating, or with certain foods.  · You are vomiting and cannot keep anything down.  · You have a fever.  · You have blood in your urine.  Get help right away if:  · Your pain does not go away as soon as your health care provider told you to expect.  · You cannot stop vomiting.  · Your pain is only in areas of the abdomen, such as the right side or the left lower portion of the abdomen. Pain on the right side could be caused by appendicitis.  · You have bloody or black stools, or stools that look like tar.  · You have severe pain, cramping, or bloating in your abdomen.  · You have signs of dehydration, such as:  ? Dark urine, very little urine, or no urine.  ? Cracked lips.  ? Dry mouth.  ? Sunken eyes.  ? Sleepiness.  ? Weakness.  · You have trouble breathing or chest  pain.  Summary  · Often, abdominal pain is not serious and it gets better with no treatment or by being treated at home. However, sometimes abdominal pain is serious.  · Watch your condition for any changes.  · Take over-the-counter and prescription medicines only as told by your health care provider.  · Contact a health care provider if your abdominal pain changes or gets worse.  · Get help right away if you have severe pain, cramping, or bloating in your abdomen.  This information is not intended to replace advice given to you by your health care provider. Make sure you discuss any questions you have with your health care provider.  Document Revised: 02/05/2021 Document Reviewed: 04/27/2020  Elsevier Patient Education © 2021 [employer] Inc.

## 2021-09-08 ENCOUNTER — TELEPHONE (OUTPATIENT)
Dept: FAMILY MEDICINE CLINIC | Facility: CLINIC | Age: 41
End: 2021-09-08

## 2021-09-08 NOTE — TELEPHONE ENCOUNTER
"Pt called, checking to see \"where the closest place is to be covid tested.\" Pt had exposure last week he thinks, and is now not feeling well. Pt was requesting information on same day testing/results. Advised to seek attention at urgent care of his choice for symptoms and testing.   "

## 2021-10-08 ENCOUNTER — OFFICE VISIT (OUTPATIENT)
Dept: FAMILY MEDICINE CLINIC | Facility: CLINIC | Age: 41
End: 2021-10-08

## 2021-10-08 DIAGNOSIS — I10 ESSENTIAL HYPERTENSION: ICD-10-CM

## 2021-10-08 DIAGNOSIS — Z20.822 CLOSE EXPOSURE TO COVID-19 VIRUS: ICD-10-CM

## 2021-10-08 DIAGNOSIS — I27.20 PULMONARY HTN (HCC): Primary | ICD-10-CM

## 2021-10-08 PROCEDURE — 99442 PR PHYS/QHP TELEPHONE EVALUATION 11-20 MIN: CPT | Performed by: FAMILY MEDICINE

## 2021-10-08 NOTE — PATIENT INSTRUCTIONS
Cough, Adult  Coughing is a reflex that clears your throat and your airways (respiratory system). Coughing helps to heal and protect your lungs. It is normal to cough occasionally, but a cough that happens with other symptoms or lasts a long time may be a sign of a condition that needs treatment. An acute cough may only last 2-3 weeks, while a chronic cough may last 8 or more weeks.  Coughing is commonly caused by:  · Infection of the respiratory systemby viruses or bacteria.  · Breathing in substances that irritate your lungs.  · Allergies.  · Asthma.  · Mucus that runs down the back of your throat (postnasal drip).  · Smoking.  · Acid backing up from the stomach into the esophagus (gastroesophageal reflux).  · Certain medicines.  · Chronic lung problems.  · Other medical conditions such as heart failure or a blood clot in the lung (pulmonary embolism).  Follow these instructions at home:  Medicines  · Take over-the-counter and prescription medicines only as told by your health care provider.  · Talk with your health care provider before you take a cough suppressant medicine.  Lifestyle    · Avoid cigarette smoke. Do not use any products that contain nicotine or tobacco, such as cigarettes, e-cigarettes, and chewing tobacco. If you need help quitting, ask your health care provider.  · Drink enough fluid to keep your urine pale yellow.  · Avoid caffeine.  · Do not drink alcohol if your health care provider tells you not to drink.    General instructions    · Pay close attention to changes in your cough. Tell your health care provider about them.  · Always cover your mouth when you cough.  · Avoid things that make you cough, such as perfume, candles, cleaning products, or campfire or tobacco smoke.  · If the air is dry, use a cool mist vaporizer or humidifier in your bedroom or your home to help loosen secretions.  · If your cough is worse at night, try to sleep in a semi-upright position.  · Rest as needed.  · Keep  all follow-up visits as told by your health care provider. This is important.    Contact a health care provider if you:  · Have new symptoms.  · Cough up pus.  · Have a cough that does not get better after 2-3 weeks or gets worse.  · Cannot control your cough with cough suppressant medicines and you are losing sleep.  · Have pain that gets worse or pain that is not helped with medicine.  · Have a fever.  · Have unexplained weight loss.  · Have night sweats.  Get help right away if:  · You cough up blood.  · You have difficulty breathing.  · Your heartbeat is very fast.  These symptoms may represent a serious problem that is an emergency. Do not wait to see if the symptoms will go away. Get medical help right away. Call your local emergency services (911 in the [place] Do not drive yourself to the hospital.  Summary  · Coughing is a reflex that clears your throat and your airways. It is normal to cough occasionally, but a cough that happens with other symptoms or lasts a long time may be a sign of a condition that needs treatment.  · Take over-the-counter and prescription medicines only as told by your health care provider.  · Always cover your mouth when you cough.  · Contact a health care provider if you have new symptoms or a cough that does not get better after 2-3 weeks or gets worse.  This information is not intended to replace advice given to you by your health care provider. Make sure you discuss any questions you have with your health care provider.  Document Revised: 01/06/2020 Document Reviewed: 01/06/2020  [employer] Patient Education © 2021 [employer].      Shortness of Breath, Adult  Shortness of breath means you have trouble breathing. Shortness of breath could be a sign of a medical problem.  Follow these instructions at home:    · Watch for any changes in your symptoms.  · Do not use any products that contain nicotine or tobacco, such as cigarettes, e-cigarettes, and chewing tobacco.  · Do not smoke.  Smoking can cause shortness of breath. If you need help to quit smoking, ask your doctor.  · Avoid things that can make it harder to breathe, such as:  ? Mold.  ? Dust.  ? Air pollution.  ? Chemical smells.  ? Things that can cause allergy symptoms (allergens), if you have allergies.  · Keep your living space clean. Use products that help remove mold and dust.  · Rest as needed. Slowly return to your normal activities.  · Take over-the-counter and prescription medicines only as told by your doctor. This includes oxygen therapy and inhaled medicines.  · Keep all follow-up visits as told by your doctor. This is important.  Contact a doctor if:  · Your condition does not get better as soon as expected.  · You have a hard time doing your normal activities, even after you rest.  · You have new symptoms.  Get help right away if:  · Your shortness of breath gets worse.  · You have trouble breathing when you are resting.  · You feel light-headed or you pass out (faint).  · You have a cough that is not helped by medicines.  · You cough up blood.  · You have pain with breathing.  · You have pain in your chest, arms, shoulders, or belly (abdomen).  · You have a fever.  · You cannot walk up stairs.  · You cannot exercise the way you normally do.  These symptoms may represent a serious problem that is an emergency. Do not wait to see if the symptoms will go away. Get medical help right away. Call your local emergency services (911 in the [place] Do not drive yourself to the hospital.  Summary  · Shortness of breath is when you have trouble breathing enough air. It can be a sign of a medical problem.  · Avoid things that make it hard for you to breathe, such as smoking, pollution, mold, and dust.  · Watch for any changes in your symptoms. Contact your doctor if you do not get better or you get worse.  This information is not intended to replace advice given to you by your health care provider. Make sure you discuss any questions  you have with your health care provider.  Document Revised: 05/20/2019 Document Reviewed: 05/20/2019  Elsevier Patient Education © 2021 Elsevier Inc.

## 2021-10-08 NOTE — PROGRESS NOTES
TELEPHONE VISIT NOTE:    Rikki Alejandro is a 40 y.o. male who participates in telephone visit today for Cough and Shortness of Breath ( Exposed this week to COVID).     This week, he tested his oxygen sats at home and it was 72% on RA after sitting at the kitchen table. Lives with Mom who has severe COPD and is oxygen dependent, and she was negative today for COVID. Had been exposed to employees that tested + at work at [employer] last Saturday.     Also, requests referral again for cardiology for evaluation of dyspnea and pulm HTN.     Cough  This is a chronic problem. The current episode started more than 1 year ago. The problem has been waxing and waning. The problem occurs every few hours. The cough is non-productive. Associated symptoms include shortness of breath. Pertinent negatives include no chest pain, fever or hemoptysis. He has tried a beta-agonist inhaler, body position changes, cool air and rest for the symptoms. The treatment provided significant relief.   Shortness of Breath  This is a chronic problem. The current episode started more than 1 year ago. The problem occurs intermittently. The problem has been waxing and waning. Pertinent negatives include no chest pain, fever or hemoptysis. He has tried beta agonist inhalers, body position changes, cool air and rest for the symptoms. The treatment provided significant relief.        Past medical/surgical history, Family history, Social history, Allergies and Medications have been reviewed with the patient today and are updated in [facility] EMR. See below.  Past Medical History:   Diagnosis Date   • Anxiety    • Arthritis    • Bulging of cervical intervertebral disc    • Bulging of lumbar intervertebral disc    • Depression    • GERD (gastroesophageal reflux disease)    • Hypertension      Past Surgical History:   Procedure Laterality Date   • REPLACEMENT TOTAL HIP LATERAL POSITION Bilateral    • TOTAL HIP ARTHROPLASTY Bilateral 2017    Performed by Dr. Mejia in  "[place], [place]     Family History   Problem Relation Age of Onset   • COPD Mother    • Hyperlipidemia Mother    • Heart attack Father      Social History     Tobacco Use   • Smoking status: Current Every Day Smoker     Packs/day: 1.00     Years: 26.00     Pack years: 26.00     Types: Cigarettes   • Smokeless tobacco: Never Used   Substance Use Topics   • Alcohol use: Yes     Alcohol/week: 4.0 standard drinks     Types: 4 Cans of beer per week     Comment: 12 beers/week   • Drug use: No       ALLERGIES:  Aspirin, Mobic [meloxicam], Lisinopril, and Naproxen sodium    CURRENT MEDS:    Current Outpatient Medications:   •  albuterol sulfate  (90 Base) MCG/ACT inhaler, Inhale 2 puffs Every 4 (Four) Hours As Needed for Wheezing or Shortness of Air., Disp: 18 g, Rfl: 5  •  amLODIPine (Norvasc) 10 MG tablet, Take 1 tablet by mouth Daily., Disp: 30 tablet, Rfl: 5  •  benzonatate (Tessalon Perles) 100 MG capsule, Take 1 capsule by mouth 3 (Three) Times a Day As Needed for Cough., Disp: 30 capsule, Rfl: 1  •  furosemide (Lasix) 40 MG tablet, Take 1 tablet by mouth Daily., Disp: 90 tablet, Rfl: 1  •  losartan (COZAAR) 100 MG tablet, Take 1 tablet by mouth Daily., Disp: 90 tablet, Rfl: 3  •  pantoprazole (Protonix) 40 MG EC tablet, Take 1 tablet by mouth Daily., Disp: 30 tablet, Rfl: 5  •  potassium chloride ER (K-TAB) 20 MEQ tablet controlled-release ER tablet, Take 1 tablet by mouth Daily., Disp: 90 tablet, Rfl: 1  •  Syringe 21G X 1\" 3 ML misc, Inject 1 each into the appropriate muscle as directed by prescriber Every 14 (Fourteen) Days., Disp: 50 each, Rfl: 1  •  traZODone (DESYREL) 100 MG tablet, Take 1 tablet by mouth Every Night., Disp: 30 tablet, Rfl: 5  •  sertraline (Zoloft) 100 MG tablet, Take 1 tablet by mouth Every Night., Disp: 30 tablet, Rfl: 5  •  Testosterone Cypionate (DEPOTESTOTERONE CYPIONATE) 200 MG/ML injection, Inject 1 mL into the appropriate muscle as directed by prescriber Every 28 (Twenty-Eight) " Days., Disp: 10 mL, Rfl: 0    REVIEW OF SYSTEMS:  Review of Systems   Constitutional: Negative for activity change, fatigue and fever.   Respiratory: Positive for cough and shortness of breath. Negative for hemoptysis.    Cardiovascular: Negative for chest pain.   Neurological: Negative for headache.       PHYSICAL EXAMINATION:  Vital Signs:  Not able to obtain in office, but any listed is from the patient's measurement at home.  There were no vitals taken for this visit.  Seems to be in no acute distress, with normal voice and not dyspneic. Speaks in normal sentences with normal affect and mood, and normal thought processes.     ASSESSMENT/ PLAN:  Problem List Items Addressed This Visit        Cardiac and Vasculature    Essential hypertension (Chronic)    Relevant Orders    Ambulatory Referral to Cardiology      Other Visit Diagnoses     Pulmonary HTN (HCC)    -  Primary    Relevant Orders    Ambulatory Referral to Cardiology    Close exposure to COVID-19 virus        Relevant Orders    COVID-19,[facility] ROUTINE, NP/OP SWAB IN TRANSPORT MEDIA OR ESWAB 72 HR TAT - Swab, Nasopharynx          Specific Patient Instructions:  MEDICATION Instructions:  Encouraged patient to continue routine medicines as prescribed and maintain compliance. Patient instructed to report any adverse side effects or reactions to medicines promptly to the office. Patient instructed to make us aware of any OTC or herbal meds or supplement use.  DIET Recommendations:  Patient instructed and provided information on the following nutrition and diet recommendations: Calorie restriction for weight reduction and maintenance. Necessity for adequate daily intake of fluids/water. Also, diet information available from AVS as necessary.   EXERCISE Instructions:  Discussed with patient the need for routine aerobic activity for cardiovascular fitness, 3 times a week for about 30 minutes. Daily exercise for increased fitness and weight reduction  goals.  SMOKING Recommendations:  Counseled patient and encouraged them on smoking and tobacco cessation, if applicable. Discussed the benefits to all body systems with smoking/tobacco cessation, including decreased cardiac/lung/stroke/cancer risk. Encouraged no vaping use.  HEALTH MAINTENANCE:  Counseling provided to patient/family about routine health maintenance and ANNUAL physicals/labs. Counseling on recommended Vaccinations appropriate for age needed.        Medications or Orders placed this visit:  Orders Placed This Encounter   Procedures   • COVID-19,[facility] ROUTINE, NP/OP SWAB IN TRANSPORT MEDIA OR ESWAB 72 HR TAT - Swab, Nasopharynx     Exposure to + individual - RAPID test needed.  Call Dr Henao's cell number for the RESULTS STAT.     Standing Status:   Future     Standing Expiration Date:   10/8/2022     Scheduling Instructions:      Pt requests Maggie.      RAPID     Order Specific Question:   Previously tested for COVID-19?     Answer:   Yes     Order Specific Question:   Employed in healthcare setting?     Answer:   No     Order Specific Question:   Symptomatic for COVID-19 as defined by CDC?     Answer:   No     Order Specific Question:   Hospitalized for COVID-19?     Answer:   No     Order Specific Question:   Admitted to ICU for COVID-19?     Answer:   No     Order Specific Question:   Resident in a congregate (group) care setting?     Answer:   No     Order Specific Question:   Release to patient     Answer:   Immediate   • Ambulatory Referral to Cardiology     Referral Priority:   Routine     Referral Type:   Consultation     Referral Reason:   Specialty Services Required     Requested Specialty:   Cardiology     Number of Visits Requested:   1       Medications DISCONTINUED this visit:  There are no discontinued medications.    FOLLOW-UP:  Return if symptoms worsen or fail to improve, for Recheck, Next scheduled follow up.    I discussed the patients findings and my recommendations with  patient.  An After Visit Summary (AVS) was made available to the patient at discharge through [facility].        Thomas Henao MD, FAAFP  10/8/2021    This visit has been rescheduled as a phone visit to comply with patient safety concerns in accordance with CDC recommendations. Total time of discussion was 12.5 minutes.

## 2021-10-11 ENCOUNTER — TELEPHONE (OUTPATIENT)
Dept: FAMILY MEDICINE CLINIC | Facility: CLINIC | Age: 41
End: 2021-10-11

## 2021-10-11 NOTE — TELEPHONE ENCOUNTER
Patient called and stated that [place] [facility] [place] a rapid COVID swab and Dr. Henao was going to call him with results and he has never heard from the COVID test. Patient is wanting to know test results. Patient is also asking for a referral to an audiologist, patient advised his hearing is getting worse. Please advise?

## 2021-10-12 DIAGNOSIS — Z20.822 CLOSE EXPOSURE TO COVID-19 VIRUS: ICD-10-CM

## 2021-10-12 DIAGNOSIS — H91.93 DECREASED HEARING OF BOTH EARS: Primary | ICD-10-CM

## 2021-10-12 NOTE — TELEPHONE ENCOUNTER
I had tried to call him and it would not go to a voicemail and no answer that evening. It was negative.     Will place the order for referral.

## 2021-10-14 ENCOUNTER — OFFICE VISIT (OUTPATIENT)
Dept: CARDIOLOGY | Facility: CLINIC | Age: 41
End: 2021-10-14

## 2021-10-14 ENCOUNTER — HOSPITAL ENCOUNTER (EMERGENCY)
Facility: HOSPITAL | Age: 41
Discharge: HOME | End: 2021-10-14
Attending: FAMILY MEDICINE | Admitting: FAMILY MEDICINE

## 2021-10-14 VITALS
RESPIRATION RATE: 22 BRPM | BODY MASS INDEX: 45.1 KG/M2 | OXYGEN SATURATION: 99 % | WEIGHT: 315 LBS | SYSTOLIC BLOOD PRESSURE: 150 MMHG | TEMPERATURE: 98 F | HEIGHT: 70 IN | DIASTOLIC BLOOD PRESSURE: 88 MMHG | HEART RATE: 99 BPM

## 2021-10-14 VITALS
HEART RATE: 152 BPM | WEIGHT: 312 LBS | HEIGHT: 70 IN | OXYGEN SATURATION: 98 % | BODY MASS INDEX: 44.67 KG/M2 | DIASTOLIC BLOOD PRESSURE: 110 MMHG | SYSTOLIC BLOOD PRESSURE: 132 MMHG

## 2021-10-14 DIAGNOSIS — I10 ESSENTIAL HYPERTENSION: Primary | Chronic | ICD-10-CM

## 2021-10-14 DIAGNOSIS — R60.0 LOWER EXTREMITY EDEMA: ICD-10-CM

## 2021-10-14 DIAGNOSIS — I48.92 ATRIAL FLUTTER WITH RAPID VENTRICULAR RESPONSE (HCC): ICD-10-CM

## 2021-10-14 DIAGNOSIS — Z87.891 FORMER TOBACCO USE: ICD-10-CM

## 2021-10-14 DIAGNOSIS — I48.3 TYPICAL ATRIAL FLUTTER (HCC): Primary | ICD-10-CM

## 2021-10-14 LAB
ALBUMIN SERPL-MCNC: 4.3 G/DL (ref 3.5–5.2)
ALBUMIN/GLOB SERPL: 1.7 G/DL
ALP SERPL-CCNC: 80 U/L (ref 39–117)
ALT SERPL W P-5'-P-CCNC: 158 U/L (ref 1–41)
ANION GAP SERPL CALCULATED.3IONS-SCNC: 10 MMOL/L (ref 5–15)
AST SERPL-CCNC: 71 U/L (ref 1–40)
BASOPHILS # BLD AUTO: 0.06 10*3/MM3 (ref 0–0.2)
BASOPHILS NFR BLD AUTO: 0.7 % (ref 0–1.5)
BILIRUB SERPL-MCNC: 0.3 MG/DL (ref 0–1.2)
BUN SERPL-MCNC: 11 MG/DL (ref 6–20)
BUN/CREAT SERPL: 12.5 (ref 7–25)
CALCIUM SPEC-SCNC: 9.2 MG/DL (ref 8.6–10.5)
CHLORIDE SERPL-SCNC: 105 MMOL/L (ref 98–107)
CO2 SERPL-SCNC: 27 MMOL/L (ref 22–29)
CREAT SERPL-MCNC: 0.88 MG/DL (ref 0.76–1.27)
DEPRECATED RDW RBC AUTO: 48.6 FL (ref 37–54)
EOSINOPHIL # BLD AUTO: 0.23 10*3/MM3 (ref 0–0.4)
EOSINOPHIL NFR BLD AUTO: 2.7 % (ref 0.3–6.2)
ERYTHROCYTE [DISTWIDTH] IN BLOOD BY AUTOMATED COUNT: 13.2 % (ref 12.3–15.4)
GFR SERPL CREATININE-BSD FRML MDRD: 95 ML/MIN/1.73
GLOBULIN UR ELPH-MCNC: 2.6 GM/DL
GLUCOSE SERPL-MCNC: 186 MG/DL (ref 65–99)
HCT VFR BLD AUTO: 46.8 % (ref 37.5–51)
HGB BLD-MCNC: 14.8 G/DL (ref 13–17.7)
IMM GRANULOCYTES # BLD AUTO: 0.05 10*3/MM3 (ref 0–0.05)
IMM GRANULOCYTES NFR BLD AUTO: 0.6 % (ref 0–0.5)
LYMPHOCYTES # BLD AUTO: 1.55 10*3/MM3 (ref 0.7–3.1)
LYMPHOCYTES NFR BLD AUTO: 18.3 % (ref 19.6–45.3)
MAGNESIUM SERPL-MCNC: 2 MG/DL (ref 1.6–2.6)
MCH RBC QN AUTO: 31.8 PG (ref 26.6–33)
MCHC RBC AUTO-ENTMCNC: 31.6 G/DL (ref 31.5–35.7)
MCV RBC AUTO: 100.6 FL (ref 79–97)
MONOCYTES # BLD AUTO: 0.61 10*3/MM3 (ref 0.1–0.9)
MONOCYTES NFR BLD AUTO: 7.2 % (ref 5–12)
NEUTROPHILS NFR BLD AUTO: 5.97 10*3/MM3 (ref 1.7–7)
NEUTROPHILS NFR BLD AUTO: 70.5 % (ref 42.7–76)
NRBC BLD AUTO-RTO: 0 /100 WBC (ref 0–0.2)
PLATELET # BLD AUTO: 220 10*3/MM3 (ref 140–450)
PMV BLD AUTO: 10.8 FL (ref 6–12)
POTASSIUM SERPL-SCNC: 4.4 MMOL/L (ref 3.5–5.2)
PROT SERPL-MCNC: 6.9 G/DL (ref 6–8.5)
RBC # BLD AUTO: 4.65 10*6/MM3 (ref 4.14–5.8)
SODIUM SERPL-SCNC: 142 MMOL/L (ref 136–145)
T4 FREE SERPL-MCNC: 0.93 NG/DL (ref 0.93–1.7)
TSH SERPL DL<=0.05 MIU/L-ACNC: 1.33 UIU/ML (ref 0.27–4.2)
WBC # BLD AUTO: 8.47 10*3/MM3 (ref 3.4–10.8)

## 2021-10-14 PROCEDURE — 99204 OFFICE O/P NEW MOD 45 MIN: CPT | Performed by: EMERGENCY MEDICINE

## 2021-10-14 PROCEDURE — 93005 ELECTROCARDIOGRAM TRACING: CPT | Performed by: STUDENT IN AN ORGANIZED HEALTH CARE EDUCATION/TRAINING PROGRAM

## 2021-10-14 PROCEDURE — 92960 CARDIOVERSION ELECTRIC EXT: CPT

## 2021-10-14 PROCEDURE — 84439 ASSAY OF FREE THYROXINE: CPT | Performed by: FAMILY MEDICINE

## 2021-10-14 PROCEDURE — 93010 ELECTROCARDIOGRAM REPORT: CPT | Performed by: INTERNAL MEDICINE

## 2021-10-14 PROCEDURE — 83735 ASSAY OF MAGNESIUM: CPT | Performed by: FAMILY MEDICINE

## 2021-10-14 PROCEDURE — 93005 ELECTROCARDIOGRAM TRACING: CPT | Performed by: FAMILY MEDICINE

## 2021-10-14 PROCEDURE — 80050 GENERAL HEALTH PANEL: CPT | Performed by: FAMILY MEDICINE

## 2021-10-14 PROCEDURE — 93000 ELECTROCARDIOGRAM COMPLETE: CPT | Performed by: EMERGENCY MEDICINE

## 2021-10-14 PROCEDURE — 99284 EMERGENCY DEPT VISIT MOD MDM: CPT

## 2021-10-14 RX ORDER — ATORVASTATIN CALCIUM 20 MG/1
20 TABLET, FILM COATED ORAL DAILY
Qty: 90 TABLET | Refills: 3 | Status: SHIPPED | OUTPATIENT
Start: 2021-10-14 | End: 2021-11-30 | Stop reason: SDUPTHER

## 2021-10-14 RX ORDER — ETOMIDATE 2 MG/ML
10 INJECTION INTRAVENOUS ONCE
Status: COMPLETED | OUTPATIENT
Start: 2021-10-14 | End: 2021-10-14

## 2021-10-14 RX ORDER — METOPROLOL SUCCINATE 50 MG/1
50 TABLET, EXTENDED RELEASE ORAL DAILY
Qty: 30 TABLET | Refills: 11 | Status: SHIPPED | OUTPATIENT
Start: 2021-10-14 | End: 2021-11-30 | Stop reason: SDUPTHER

## 2021-10-14 RX ORDER — RIVAROXABAN 15 MG-20MG
KIT ORAL
Qty: 51 EACH | Refills: 0 | Status: SHIPPED | OUTPATIENT
Start: 2021-10-14

## 2021-10-14 RX ADMIN — ETOMIDATE 10 MG: 20 INJECTION, SOLUTION INTRAVENOUS at 20:08

## 2021-10-14 NOTE — PROGRESS NOTES
P - CARDIOLOGY  New Patient Initial Outpatient [facility]    Primary Care Physician: Thomas Henao MD    Subjective     Chief Complaint   Patient presents with   • Shortness of Breath     NEW PT    • Palpitations   • Fatigue   • Syncope        History of Present Illness    Patient is a pleasant 41-year-old male with a past medical history of hypertension, obesity, and tobacco abuse who presents to the cardiology clinic today for initial evaluation.  Patient states that he has been having episodes of shortness of breath with palpitations at least 2 times per day as well as progressively worsening swelling in both of his legs for the past few months.  EKG performed in the clinic today shows that patient is in atrial flutter with a 2-1 conduction.  Blood pressure is 132/110 and heart rate is 152.    Review of Systems   Constitutional: Positive for malaise/fatigue. Negative for diaphoresis and fever.   HENT: Negative for congestion.    Eyes: Negative for vision loss in left eye and vision loss in right eye.   Cardiovascular: Positive for dyspnea on exertion, irregular heartbeat, leg swelling and palpitations. Negative for chest pain, claudication, orthopnea and syncope.   Respiratory: Positive for shortness of breath. Negative for cough and wheezing.    Hematologic/Lymphatic: Negative for adenopathy.   Skin: Negative for rash.   Musculoskeletal: Negative for joint pain and joint swelling.   Gastrointestinal: Negative for abdominal pain, diarrhea, nausea and vomiting.   Neurological: Negative for excessive daytime sleepiness, dizziness, focal weakness, light-headedness, numbness and weakness.   Psychiatric/Behavioral: Negative for depression. The patient does not have insomnia.         Otherwise complete ROS reviewed and negative except as mentioned in the HPI.      Past Medical History:   Past Medical History:   Diagnosis Date   • Anxiety    • Arthritis    • Bulging of cervical intervertebral disc    •  "Bulging of lumbar intervertebral disc    • Depression    • GERD (gastroesophageal reflux disease)    • Hypertension        Past Surgical History:  Past Surgical History:   Procedure Laterality Date   • REPLACEMENT TOTAL HIP LATERAL POSITION Bilateral    • TOTAL HIP ARTHROPLASTY Bilateral 2017    Performed by Dr. Mejia in [place], [place]       Family History: family history includes COPD in his mother; Heart attack in his father; Hyperlipidemia in his mother.    Social History:  reports that he has been smoking cigarettes. He has a 15.00 pack-year smoking history. He has never used smokeless tobacco. He reports current alcohol use of about 4.0 standard drinks of alcohol per week. He reports that he does not use drugs.    Medications:  Prior to Admission medications    Medication Sig Start Date End Date Taking? Authorizing Provider   albuterol sulfate  (90 Base) MCG/ACT inhaler Inhale 2 puffs Every 4 (Four) Hours As Needed for Wheezing or Shortness of Air. 8/27/21  Yes Thomas Henao MD   amLODIPine (Norvasc) 10 MG tablet Take 1 tablet by mouth Daily. 3/29/21  Yes Thomas Henao MD   benzonatate (Tessalon Perles) 100 MG capsule Take 1 capsule by mouth 3 (Three) Times a Day As Needed for Cough. 7/26/21  Yes Thomas Henao MD   furosemide (Lasix) 40 MG tablet Take 1 tablet by mouth Daily. 7/26/21  Yes Thomas Henao MD   losartan (COZAAR) 100 MG tablet Take 1 tablet by mouth Daily. 8/27/21  Yes Thomas Henao MD   pantoprazole (Protonix) 40 MG EC tablet Take 1 tablet by mouth Daily. 11/18/20  Yes Thomas Henao MD   potassium chloride ER (K-TAB) 20 MEQ tablet controlled-release ER tablet Take 1 tablet by mouth Daily. 7/26/21  Yes Thomas Henao MD   sertraline (Zoloft) 100 MG tablet Take 1 tablet by mouth Every Night. 8/27/21  Yes Thomas Henao MD   Syringe 21G X 1\" 3 ML misc Inject 1 each into the appropriate muscle as directed by prescriber Every 14 (Fourteen) " "Days. 4/26/21  Yes Thomas Henao MD   Testosterone Cypionate (DEPOTESTOTERONE CYPIONATE) 200 MG/ML injection Inject 1 mL into the appropriate muscle as directed by prescriber Every 28 (Twenty-Eight) Days. 4/26/21  Yes Thomas Henao MD   traZODone (DESYREL) 100 MG tablet Take 1 tablet by mouth Every Night. 3/29/21  Yes Thomas Henao MD     Allergies:  Allergies   Allergen Reactions   • Aspirin Anaphylaxis and Hives   • Mobic [Meloxicam] Hives and Swelling   • Lisinopril Cough   • Naproxen Sodium Hives and Swelling     intermittent       Objective     Vital Signs: BP (!) 132/110   Pulse (!) 152   Ht 177.8 cm (70\")   Wt (!) 142 kg (312 lb)   SpO2 98%   BMI 44.77 kg/m²     Vitals and nursing note reviewed.   Constitutional:       Appearance: Normal and healthy appearance. Well-developed and not in distress.   Eyes:      Extraocular Movements: Extraocular movements intact.      Pupils: Pupils are equal, round, and reactive to light.   HENT:      Head: Normocephalic and atraumatic.    Mouth/Throat:      Pharynx: Oropharynx is clear.   Neck:      Vascular: JVD normal.      Trachea: Trachea normal.   Pulmonary:      Effort: Pulmonary effort is normal.      Breath sounds: Normal breath sounds. No wheezing. No rhonchi. No rales.   Cardiovascular:      PMI at left midclavicular line. Tachycardia present. Irregular rhythm. Normal S1. Normal S2.      Murmurs: There is a grade 2/6 systolic murmur.      No gallop. No click. No rub.   Pulses:     Dorsalis pedis: 2+ bilaterally.     Posterior tibial: 2+ bilaterally.  Abdominal:      General: Bowel sounds are normal.      Palpations: Abdomen is soft.      Tenderness: There is no abdominal tenderness.   Musculoskeletal: Normal range of motion.      Cervical back: Normal range of motion and neck supple. Skin:     General: Skin is warm and dry.      Capillary Refill: Capillary refill takes less than 2 seconds.   Feet:      Right foot:      Skin integrity: Skin " integrity normal.      Left foot:      Skin integrity: Skin integrity normal.   Neurological:      Mental Status: Alert and oriented to person, place and time.      Cranial Nerves: Cranial nerves are intact.      Sensory: Sensation is intact.      Motor: Motor function is intact.      Coordination: Coordination is intact.   Psychiatric:         Speech: Speech normal.         Behavior: Behavior is cooperative.         Results Reviewed:      ECG 12 Lead    Date/Time: 10/14/2021 3:43 PM  Performed by: Anup Saleh DO  Authorized by: Anup Saleh DO   Previous ECG: no previous ECG available  Rhythm: atrial flutter  Rate: tachycardic  Conduction: conduction normal  QRS axis: normal  Other findings: non-specific ST-T wave changes    Clinical impression: abnormal EKG              No results found for: CHOL, TRIG, HDL, VLDL, LDLHDL  No results found for: HGBA1C    Assessment / Plan        Problem List Items Addressed This Visit        Cardiac and Vasculature    Essential hypertension - Primary (Chronic)    Atrial flutter with rapid ventricular response (HCC)       Endocrine and Metabolic    BMI 36.0-36.9,adult       Symptoms and Signs    Lower extremity edema       Tobacco    Former tobacco use          Plan:    81-year-old male who presents today for initial evaluation with complaints of shortness of breath and palpitations over the past few months.  EKG shows he is currently in 2-1 atrial flutter at a rate of 150 bpm.  I explained to the patient what was going on and that he needs to go to the emergency department to get IV medications to slow his rate down.  Also explained that we need to get him on daily medications to control his rhythm and anticoagulate him.  He expressed understanding but states that he cannot go to the ER here due to living in [place], [place].  He says that he cares for his mother at home who depends on him for all of her ADLs.  He says that he will go to the  emergency department when he gets back to [employer].  I called and discussed the patient with Dr. Henao who knows him well.  He will let the emergency department know at the local facility about the patient.    I think a rhythm control strategy with an antiarrhythmic would be the best option for this patient.  We will also need to be on full anticoagulation with either Xarelto or Eliquis.  His blood pressure is poorly controlled, his current regimen includes Norvasc 10, Lasix 40, Cozaar 100.  We will need to get this maximized.    A lipid panel from December 2020 showed a total cholesterol 217, triglyceride 327, HDL 25 and .  Based off of this alone, patient needs to be on a least a moderate intensity statin.  No need for aspirin therapy for primary prevention.  We will also repeat the lipid panel.    Patient also needs a transthoracic echocardiogram to assess his LV function.  We will schedule that.    Follow-up with cardiology in 1 month.    Thank you Dr. Henao for this referral.  Please call me with any questions at any time.  My cell phone number is [phone].    Anup Saleh, DO   Interventional cardiology  [facility]  10/14/21   15:44 CDT

## 2021-10-15 LAB
QT INTERVAL: 168 MS
QT INTERVAL: 342 MS
QTC INTERVAL: 264 MS
QTC INTERVAL: 443 MS

## 2021-10-15 NOTE — ED PROVIDER NOTES
Subjective   This patient is a 41-year-old gentleman who was seeing Dr. Saleh (cardiology) this morning for some swelling.  In the office it was noted that his EKG showed a flutter with a 2-1 conduction.  Dr. Saleh advised him that he should go to the ER for cardioversion the patient preferred to go closer to his home so he could check on his mom who is the sole care of.  He went to [facility] emergency department where he was told that they do not have the equipment for cardioversion so he came by private vehicle to our emergency department.  His symptoms currently are mild shortness of breath and a very mild burning sensation in his chest.  He denies fever or other systemic symptoms.          Review of Systems   Respiratory: Positive for chest tightness and shortness of breath.    All other systems reviewed and are negative.      Past Medical History:   Diagnosis Date   • Anxiety    • Arthritis    • Bulging of cervical intervertebral disc    • Bulging of lumbar intervertebral disc    • Depression    • GERD (gastroesophageal reflux disease)    • Hypertension        Allergies   Allergen Reactions   • Aspirin Anaphylaxis and Hives   • Mobic [Meloxicam] Hives and Swelling   • Lisinopril Cough   • Naproxen Sodium Hives and Swelling     intermittent       Past Surgical History:   Procedure Laterality Date   • REPLACEMENT TOTAL HIP LATERAL POSITION Bilateral    • TOTAL HIP ARTHROPLASTY Bilateral 2017    Performed by Dr. Mejia in [place], [place]       Family History   Problem Relation Age of Onset   • COPD Mother    • Hyperlipidemia Mother    • Heart attack Father        Social History     Socioeconomic History   • Marital status:    Tobacco Use   • Smoking status: Current Every Day Smoker     Packs/day: 0.50     Years: 30.00     Pack years: 15.00     Types: Cigarettes   • Smokeless tobacco: Never Used   Vaping Use   • Vaping Use: Every day   • Substances: Nicotine   • Devices: Pre-filled pod    Substance and Sexual Activity   • Alcohol use: Yes     Alcohol/week: 4.0 standard drinks     Types: 4 Cans of beer per week     Comment: 12 beers/week   • Drug use: No   • Sexual activity: Not Currently           Objective   Physical Exam  Vitals and nursing note reviewed.   Constitutional:       Appearance: He is well-developed.   HENT:      Head: Normocephalic and atraumatic.      Right Ear: External ear normal.      Left Ear: External ear normal.      Nose: Nose normal.   Eyes:      Conjunctiva/sclera: Conjunctivae normal.   Cardiovascular:      Rate and Rhythm: Regular rhythm. Tachycardia present.      Heart sounds: Normal heart sounds.   Pulmonary:      Effort: Pulmonary effort is normal.      Breath sounds: Normal breath sounds.   Abdominal:      General: Bowel sounds are normal.      Palpations: Abdomen is soft.   Musculoskeletal:         General: Normal range of motion.      Cervical back: Normal range of motion and neck supple.   Skin:     General: Skin is warm and dry.      Capillary Refill: Capillary refill takes less than 2 seconds.   Neurological:      Mental Status: He is alert and oriented to person, place, and time.   Psychiatric:         Behavior: Behavior normal.         Thought Content: Thought content normal.         Judgment: Judgment normal.         Electrical Cardioversion    Date/Time: 10/14/2021 8:40 PM  Performed by: Segundo Cruz MD  Authorized by: Segundo Cruz MD     Consent:     Consent obtained:  Written    Consent given by:  Patient    Risks discussed:  Cutaneous burn and induced arrhythmia    Alternatives discussed:  Delayed treatment  Pre-procedure details:     Cardioversion basis:  Elective    Rhythm:  Atrial flutter    Electrode placement:  Anterior-posterior  Attempt one:     Cardioversion mode:  Synchronous    Waveform:  Biphasic    Shock (Joules):  200    Shock outcome:  Conversion to normal sinus rhythm  Post-procedure details:     Patient status:  Awake     Patient tolerance of procedure:  Tolerated well, no immediate complications  Procedural Sedation    Date/Time: 10/14/2021 8:41 PM  Performed by: Segundo Cruz MD  Authorized by: Segundo Cruz MD     Consent:     Consent obtained:  Written    Consent given by:  Patient    Risks discussed:  Dysrhythmia, respiratory compromise necessitating ventilatory assistance and intubation and inadequate sedation  Indications:     Procedure performed:  Cardioversion    Procedure necessitating sedation performed by:  Physician performing sedation    Intended level of sedation:  Moderate (conscious sedation)  Pre-sedation assessment:     Time since last food or drink:  3    ASA classification: class 2 - patient with mild systemic disease      Neck mobility: normal      Mouth opening:  3 or more finger widths    Mallampati score:  I - soft palate, uvula, fauces, pillars visible    Pre-sedation assessments completed and reviewed: airway patency, anesthesia/sedation history, cardiovascular function, hydration status, mental status, nausea/vomiting, pain level, respiratory function and temperature      History of difficult intubation: no    Immediate pre-procedure details:     Reviewed: vital signs and NPO status      Verified: bag valve mask available, emergency equipment available, intubation equipment available, IV patency confirmed, oxygen available and suction available    Procedure details (see MAR for exact dosages):     Preoxygenation:  Nasal cannula    Sedation:  Etomidate    Intra-procedure monitoring:  Blood pressure monitoring, frequent LOC assessments, frequent vital sign checks, continuous pulse oximetry, continuous capnometry and cardiac monitor    Intra-procedure events: none    Post-procedure details:     Attendance: Constant attendance by certified staff until patient recovered      Recovery: Patient returned to pre-procedure baseline      Post-sedation assessments completed and reviewed: airway  patency, cardiovascular function, hydration status, mental status, nausea/vomiting, pain level, respiratory function and temperature      Patient is stable for discharge or admission: yes      Patient tolerance:  Tolerated well, no immediate complications               ED Course                                           MDM    Final diagnoses:   Typical atrial flutter (HCC)       ED Disposition  ED Disposition     ED Disposition Condition Comment    Discharge Stable           Anup Saleh DO  [address]  [facility] [address]  [phone]    Call in 1 day           Medication List      New Prescriptions    Xarelto Starter Pack tablet therapy pack starter pack  Generic drug: Rivaroxaban  Take one 15 mg tablet twice daily with food for 21 days.  Followed by one 20 mg tablet by mouth once daily with food. Take as directed           Where to Get Your Medications      These medications were sent to [facility] - [place], [place] - [address] [phone] [facility] [phone]   [address] [address]    Phone: [phone]   · Xarelto Starter Pack tablet therapy pack starter pack       The patient converted to sinus rhythm and was stable at the time of discharge.  Sending home with a prescription for anticoagulation and he will follow-up with Dr. Saleh.     Segundo Cruz MD  10/14/21 2044

## 2021-11-30 ENCOUNTER — OFFICE VISIT (OUTPATIENT)
Dept: CARDIOLOGY | Facility: CLINIC | Age: 41
End: 2021-11-30

## 2021-11-30 VITALS
OXYGEN SATURATION: 99 % | HEIGHT: 70 IN | WEIGHT: 315 LBS | SYSTOLIC BLOOD PRESSURE: 138 MMHG | BODY MASS INDEX: 45.1 KG/M2 | HEART RATE: 97 BPM | DIASTOLIC BLOOD PRESSURE: 80 MMHG

## 2021-11-30 DIAGNOSIS — R00.2 PALPITATIONS: ICD-10-CM

## 2021-11-30 DIAGNOSIS — R06.09 DYSPNEA ON EXERTION: Primary | ICD-10-CM

## 2021-11-30 DIAGNOSIS — I10 ESSENTIAL HYPERTENSION: ICD-10-CM

## 2021-11-30 DIAGNOSIS — I10 PRIMARY HYPERTENSION: ICD-10-CM

## 2021-11-30 DIAGNOSIS — R07.89 CHEST PAIN, ATYPICAL: ICD-10-CM

## 2021-11-30 DIAGNOSIS — K21.9 GASTROESOPHAGEAL REFLUX DISEASE WITHOUT ESOPHAGITIS: ICD-10-CM

## 2021-11-30 DIAGNOSIS — R60.0 PEDAL EDEMA: ICD-10-CM

## 2021-11-30 PROCEDURE — 99213 OFFICE O/P EST LOW 20 MIN: CPT | Performed by: EMERGENCY MEDICINE

## 2021-11-30 RX ORDER — ATORVASTATIN CALCIUM 20 MG/1
20 TABLET, FILM COATED ORAL DAILY
Qty: 90 TABLET | Refills: 3 | Status: SHIPPED | OUTPATIENT
Start: 2021-11-30

## 2021-11-30 RX ORDER — AMLODIPINE BESYLATE 10 MG/1
10 TABLET ORAL DAILY
Qty: 30 TABLET | Refills: 5 | Status: SHIPPED | OUTPATIENT
Start: 2021-11-30 | End: 2022-07-07

## 2021-11-30 RX ORDER — METOPROLOL SUCCINATE 50 MG/1
50 TABLET, EXTENDED RELEASE ORAL DAILY
Qty: 30 TABLET | Refills: 11 | Status: SHIPPED | OUTPATIENT
Start: 2021-11-30

## 2021-11-30 RX ORDER — LOSARTAN POTASSIUM 100 MG/1
100 TABLET ORAL DAILY
Qty: 90 TABLET | Refills: 3 | Status: SHIPPED | OUTPATIENT
Start: 2021-11-30

## 2021-11-30 RX ORDER — PANTOPRAZOLE SODIUM 40 MG/1
40 TABLET, DELAYED RELEASE ORAL DAILY
Qty: 30 TABLET | Refills: 5 | Status: SHIPPED | OUTPATIENT
Start: 2021-11-30 | End: 2022-07-07

## 2021-11-30 RX ORDER — FUROSEMIDE 40 MG/1
40 TABLET ORAL DAILY
Qty: 90 TABLET | Refills: 1 | Status: SHIPPED | OUTPATIENT
Start: 2021-11-30

## 2021-12-03 NOTE — PROGRESS NOTES
Subjective:     Encounter Date:11/30/2021      Patient ID: Rikki Alejandro is a 41 y.o. male.    Chief Complaint:  History of Present Illness    Patient is a 41-year-old male who is a 1 month follow-up today.  At his last appointment he complained of episodes of shortness of breath and palpitations.  EKG in the clinic showed he was in a 2-1 atrial flutter at a rate of 152.  He was sent to the emergency department and successfully cardioverted at 200 J.  He was discharged home on full anticoagulation with Xarelto.  He has not gotten any of the test we have ordered.    Today, he says he has been noticing abdominal pain for the past 2 days.  He describes it as a squeezing and burning sensation.  He also has significant fatigue and is tired all the time.  He is also having episodes of chest pain and shortness of breath.    Review of Systems   Constitutional: Positive for malaise/fatigue. Negative for diaphoresis and fever.   HENT: Negative for congestion.    Eyes: Negative for vision loss in left eye and vision loss in right eye.   Cardiovascular: Negative for chest pain, claudication, dyspnea on exertion, irregular heartbeat, leg swelling, orthopnea, palpitations and syncope.   Respiratory: Negative for cough, shortness of breath and wheezing.    Hematologic/Lymphatic: Negative for adenopathy.   Skin: Negative for rash.   Musculoskeletal: Negative for joint pain and joint swelling.   Gastrointestinal: Positive for abdominal pain. Negative for diarrhea, nausea and vomiting.   Neurological: Negative for excessive daytime sleepiness, dizziness, focal weakness, light-headedness, numbness and weakness.   Psychiatric/Behavioral: Negative for depression. The patient does not have insomnia.            Current Outpatient Medications:   •  albuterol sulfate  (90 Base) MCG/ACT inhaler, Inhale 2 puffs Every 4 (Four) Hours As Needed for Wheezing or Shortness of Air., Disp: 18 g, Rfl: 5  •  amLODIPine (Norvasc) 10 MG  "tablet, Take 1 tablet by mouth Daily., Disp: 30 tablet, Rfl: 5  •  atorvastatin (Lipitor) 20 MG tablet, Take 1 tablet by mouth Daily., Disp: 90 tablet, Rfl: 3  •  benzonatate (Tessalon Perles) 100 MG capsule, Take 1 capsule by mouth 3 (Three) Times a Day As Needed for Cough., Disp: 30 capsule, Rfl: 1  •  furosemide (Lasix) 40 MG tablet, Take 1 tablet by mouth Daily., Disp: 90 tablet, Rfl: 1  •  losartan (COZAAR) 100 MG tablet, Take 1 tablet by mouth Daily., Disp: 90 tablet, Rfl: 3  •  metoprolol succinate XL (TOPROL-XL) 50 MG 24 hr tablet, Take 1 tablet by mouth Daily., Disp: 30 tablet, Rfl: 11  •  pantoprazole (Protonix) 40 MG EC tablet, Take 1 tablet by mouth Daily., Disp: 30 tablet, Rfl: 5  •  potassium chloride ER (K-TAB) 20 MEQ tablet controlled-release ER tablet, Take 1 tablet by mouth Daily., Disp: 90 tablet, Rfl: 1  •  Rivaroxaban (Xarelto Starter Pack) tablet therapy pack starter pack, Take one 15 mg tablet twice daily with food for 21 days.  Followed by one 20 mg tablet by mouth once daily with food. Take as directed, Disp: 51 each, Rfl: 0  •  sertraline (Zoloft) 100 MG tablet, Take 1 tablet by mouth Every Night., Disp: 30 tablet, Rfl: 5  •  Syringe 21G X 1\" 3 ML misc, Inject 1 each into the appropriate muscle as directed by prescriber Every 14 (Fourteen) Days., Disp: 50 each, Rfl: 1  •  Testosterone Cypionate (DEPOTESTOTERONE CYPIONATE) 200 MG/ML injection, Inject 1 mL into the appropriate muscle as directed by prescriber Every 28 (Twenty-Eight) Days., Disp: 10 mL, Rfl: 0  •  traZODone (DESYREL) 100 MG tablet, Take 1 tablet by mouth Every Night., Disp: 30 tablet, Rfl: 5       Objective:      Vitals:    11/30/21 1436   BP: 138/80   Pulse: 97   SpO2: 99%     Vitals and nursing note reviewed.   Constitutional:       Appearance: Normal and healthy appearance. Well-developed and not in distress.   Eyes:      Extraocular Movements: Extraocular movements intact.      Pupils: Pupils are equal, round, and reactive " to light.   HENT:      Head: Normocephalic and atraumatic.    Mouth/Throat:      Pharynx: Oropharynx is clear.   Neck:      Vascular: JVD normal.      Trachea: Trachea normal.   Pulmonary:      Effort: Pulmonary effort is normal.      Breath sounds: Normal breath sounds. No wheezing. No rhonchi. No rales.   Cardiovascular:      PMI at left midclavicular line. Normal rate. Regular rhythm. Normal S1. Normal S2.      Murmurs: There is a grade 2/6 systolic murmur.      No gallop. No click. No rub.   Pulses:     Dorsalis pedis: 2+ bilaterally.     Posterior tibial: 2+ bilaterally.  Abdominal:      General: Bowel sounds are normal.      Palpations: Abdomen is soft.      Tenderness: There is no abdominal tenderness.   Musculoskeletal: Normal range of motion.      Cervical back: Normal range of motion and neck supple. Skin:     General: Skin is warm and dry.      Capillary Refill: Capillary refill takes less than 2 seconds.   Feet:      Right foot:      Skin integrity: Skin integrity normal.      Left foot:      Skin integrity: Skin integrity normal.   Neurological:      Mental Status: Alert and oriented to person, place and time.      Cranial Nerves: Cranial nerves are intact.      Sensory: Sensation is intact.      Motor: Motor function is intact.      Coordination: Coordination is intact.   Psychiatric:         Speech: Speech normal.         Behavior: Behavior is cooperative.         Lab Review:       Procedures      Assessment/Plan:     Problem List Items Addressed This Visit        Cardiac and Vasculature    Essential hypertension (Chronic)    Relevant Medications    amLODIPine (Norvasc) 10 MG tablet    furosemide (Lasix) 40 MG tablet    losartan (COZAAR) 100 MG tablet    metoprolol succinate XL (TOPROL-XL) 50 MG 24 hr tablet    Other Relevant Orders    Lipid Panel       Gastrointestinal Abdominal     Gastroesophageal reflux disease without esophagitis    Relevant Medications    pantoprazole (Protonix) 40 MG EC tablet       Other Visit Diagnoses     Dyspnea on exertion    -  Primary    Relevant Orders    Adult Transthoracic Echo Complete W/ Cont if Necessary Per Protocol    Pedal edema        Relevant Medications    furosemide (Lasix) 40 MG tablet    Palpitations        Relevant Orders    Holter Monitor - 72 Hour Up To 15 Days    Primary hypertension        Relevant Medications    amLODIPine (Norvasc) 10 MG tablet    furosemide (Lasix) 40 MG tablet    losartan (COZAAR) 100 MG tablet    metoprolol succinate XL (TOPROL-XL) 50 MG 24 hr tablet    Chest pain, atypical        Relevant Orders    Stress Test With Myocardial Perfusion (1 Day)            Recommendations/plans:    1.  Order a stress test to restratify him for coronary artery disease  2.  Order transthoracic echocardiogram to rule out structural and valvular disease  3.  Place patient on a Holter monitor to determine his burden of flutter  4.  Lipid panel today to see if he would benefit from high intensity statin and aspirin for primary prevention  5.  Refill or start the following medications: Lipitor 20, Norvasc 10, Lasix 40, Cozaar 100, Toprol-XL 50, Protonix 40  6.  Follow-up with cardiology in 6 weeks to go over test results and discuss any further work-up or treatment needed  7.  Counseled on smoking cessation      Anup Saleh,    Interventional cardiology  [facility]  11/30/2021  13:07 CST

## 2021-12-13 ENCOUNTER — HOSPITAL ENCOUNTER (OUTPATIENT)
Dept: CARDIOLOGY | Facility: HOSPITAL | Age: 41
Discharge: HOME | End: 2021-12-13

## 2021-12-13 VITALS
SYSTOLIC BLOOD PRESSURE: 138 MMHG | BODY MASS INDEX: 45.1 KG/M2 | HEART RATE: 103 BPM | WEIGHT: 315 LBS | HEIGHT: 70 IN | DIASTOLIC BLOOD PRESSURE: 77 MMHG

## 2021-12-13 DIAGNOSIS — R07.89 CHEST PAIN, ATYPICAL: ICD-10-CM

## 2021-12-13 LAB
BH CV REST NUCLEAR ISOTOPE DOSE: 10.7 MCI
BH CV STRESS BP STAGE 1: NORMAL
BH CV STRESS COMMENTS STAGE 1: NORMAL
BH CV STRESS DOSE REGADENOSON STAGE 1: 0.4
BH CV STRESS DURATION MIN STAGE 1: 0
BH CV STRESS DURATION SEC STAGE 1: 10
BH CV STRESS HR STAGE 1: 112
BH CV STRESS NUCLEAR ISOTOPE DOSE: 32.6 MCI
BH CV STRESS PROTOCOL 1: NORMAL
BH CV STRESS RECOVERY BP: NORMAL MMHG
BH CV STRESS RECOVERY HR: 111 BPM
BH CV STRESS STAGE 1: 1
MAXIMAL PREDICTED HEART RATE: 177 BPM
PERCENT MAX PREDICTED HR: 63.28 %
STRESS BASELINE BP: NORMAL MMHG
STRESS BASELINE HR: 103 BPM
STRESS PERCENT HR: 74 %
STRESS POST EXERCISE DUR SEC: 10 SEC
STRESS POST PEAK BP: NORMAL MMHG
STRESS POST PEAK HR: 112 BPM
STRESS TARGET HR: 150 BPM

## 2021-12-13 PROCEDURE — 0 TECHNETIUM SESTAMIBI: Performed by: EMERGENCY MEDICINE

## 2021-12-13 PROCEDURE — 93018 CV STRESS TEST I&R ONLY: CPT | Performed by: EMERGENCY MEDICINE

## 2021-12-13 PROCEDURE — A9500 TC99M SESTAMIBI: HCPCS | Performed by: EMERGENCY MEDICINE

## 2021-12-13 PROCEDURE — 25010000002 REGADENOSON 0.4 MG/5ML SOLUTION: Performed by: EMERGENCY MEDICINE

## 2021-12-13 PROCEDURE — 78452 HT MUSCLE IMAGE SPECT MULT: CPT

## 2021-12-13 PROCEDURE — 78452 HT MUSCLE IMAGE SPECT MULT: CPT | Performed by: EMERGENCY MEDICINE

## 2021-12-13 PROCEDURE — 93017 CV STRESS TEST TRACING ONLY: CPT

## 2021-12-13 RX ADMIN — TECHNETIUM TC 99M SESTAMIBI 1 DOSE: 1 INJECTION INTRAVENOUS at 10:02

## 2021-12-13 RX ADMIN — REGADENOSON 0.4 MG: 0.08 INJECTION, SOLUTION INTRAVENOUS at 10:01

## 2021-12-13 RX ADMIN — TECHNETIUM TC 99M SESTAMIBI 1 DOSE: 1 INJECTION INTRAVENOUS at 08:35

## 2022-07-07 ENCOUNTER — TELEPHONE (OUTPATIENT)
Dept: CARDIOLOGY | Facility: CLINIC | Age: 42
End: 2022-07-07

## 2022-07-07 DIAGNOSIS — K21.9 GASTROESOPHAGEAL REFLUX DISEASE WITHOUT ESOPHAGITIS: ICD-10-CM

## 2022-07-07 DIAGNOSIS — I10 ESSENTIAL HYPERTENSION: ICD-10-CM

## 2022-07-07 RX ORDER — AMLODIPINE BESYLATE 10 MG/1
TABLET ORAL
Qty: 30 TABLET | Refills: 0 | Status: SHIPPED | OUTPATIENT
Start: 2022-07-07

## 2022-07-07 RX ORDER — PANTOPRAZOLE SODIUM 40 MG/1
TABLET, DELAYED RELEASE ORAL
Qty: 30 TABLET | Refills: 0 | Status: SHIPPED | OUTPATIENT
Start: 2022-07-07

## 2022-07-07 NOTE — TELEPHONE ENCOUNTER
CALL PLACED TO PATIENT ON LABS ORDERED 11/21. I HAVE FAXED LAB REQUISITION TO [facility]

## 2022-08-19 ENCOUNTER — TELEPHONE (OUTPATIENT)
Dept: NEUROSURGERY | Age: 42
End: 2022-08-19

## 2022-08-23 ENCOUNTER — TELEPHONE (OUTPATIENT)
Dept: NEUROLOGY | Age: 42
End: 2022-08-23

## 2022-08-24 DIAGNOSIS — M54.50 CHRONIC LOW BACK PAIN, UNSPECIFIED BACK PAIN LATERALITY, UNSPECIFIED WHETHER SCIATICA PRESENT: Primary | ICD-10-CM

## 2022-08-24 DIAGNOSIS — G89.29 CHRONIC LOW BACK PAIN, UNSPECIFIED BACK PAIN LATERALITY, UNSPECIFIED WHETHER SCIATICA PRESENT: Primary | ICD-10-CM

## 2022-09-21 ENCOUNTER — HOSPITAL ENCOUNTER (OUTPATIENT)
Dept: GENERAL RADIOLOGY | Age: 42
Discharge: HOME | End: 2022-09-21
Payer: MEDICAID

## 2022-09-21 ENCOUNTER — OFFICE VISIT (OUTPATIENT)
Dept: NEUROSURGERY | Age: 42
End: 2022-09-21
Payer: MEDICAID

## 2022-09-21 ENCOUNTER — TELEPHONE (OUTPATIENT)
Dept: NEUROSURGERY | Age: 42
End: 2022-09-21

## 2022-09-21 VITALS
HEIGHT: 70 IN | HEART RATE: 97 BPM | OXYGEN SATURATION: 93 % | RESPIRATION RATE: 18 BRPM | DIASTOLIC BLOOD PRESSURE: 112 MMHG | BODY MASS INDEX: 43.09 KG/M2 | WEIGHT: 301 LBS | SYSTOLIC BLOOD PRESSURE: 148 MMHG

## 2022-09-21 DIAGNOSIS — R06.83 SNORING: ICD-10-CM

## 2022-09-21 DIAGNOSIS — R29.898 FINE MOTOR IMPAIRMENT: ICD-10-CM

## 2022-09-21 DIAGNOSIS — R26.81 UNSTEADY GAIT: ICD-10-CM

## 2022-09-21 DIAGNOSIS — R20.0 BILATERAL HAND NUMBNESS: ICD-10-CM

## 2022-09-21 DIAGNOSIS — M54.2 NECK PAIN: ICD-10-CM

## 2022-09-21 DIAGNOSIS — R29.818 FINE MOTOR IMPAIRMENT: ICD-10-CM

## 2022-09-21 DIAGNOSIS — R20.8 DECREASED SENSATION: ICD-10-CM

## 2022-09-21 DIAGNOSIS — G47.10 HYPERSOMNOLENCE: ICD-10-CM

## 2022-09-21 DIAGNOSIS — R40.0 HAS DAYTIME DROWSINESS: ICD-10-CM

## 2022-09-21 DIAGNOSIS — M50.30 DDD (DEGENERATIVE DISC DISEASE), CERVICAL: ICD-10-CM

## 2022-09-21 DIAGNOSIS — G89.29 CHRONIC LOW BACK PAIN, UNSPECIFIED BACK PAIN LATERALITY, UNSPECIFIED WHETHER SCIATICA PRESENT: ICD-10-CM

## 2022-09-21 DIAGNOSIS — M54.2 NECK PAIN: Primary | ICD-10-CM

## 2022-09-21 DIAGNOSIS — G89.29 CHRONIC MIDLINE LOW BACK PAIN WITH BILATERAL SCIATICA: ICD-10-CM

## 2022-09-21 DIAGNOSIS — M54.42 CHRONIC MIDLINE LOW BACK PAIN WITH BILATERAL SCIATICA: ICD-10-CM

## 2022-09-21 DIAGNOSIS — M54.50 CHRONIC LOW BACK PAIN, UNSPECIFIED BACK PAIN LATERALITY, UNSPECIFIED WHETHER SCIATICA PRESENT: ICD-10-CM

## 2022-09-21 DIAGNOSIS — M51.36 DDD (DEGENERATIVE DISC DISEASE), LUMBAR: ICD-10-CM

## 2022-09-21 DIAGNOSIS — M54.41 CHRONIC MIDLINE LOW BACK PAIN WITH BILATERAL SCIATICA: ICD-10-CM

## 2022-09-21 DIAGNOSIS — R48.1 LOSS OF PERCEPTION FOR TEMPERATURE: ICD-10-CM

## 2022-09-21 PROCEDURE — 99204 OFFICE O/P NEW MOD 45 MIN: CPT | Performed by: NURSE PRACTITIONER

## 2022-09-21 PROCEDURE — 72050 X-RAY EXAM NECK SPINE 4/5VWS: CPT

## 2022-09-21 PROCEDURE — 72120 X-RAY BEND ONLY L-S SPINE: CPT

## 2022-09-21 RX ORDER — FUROSEMIDE 40 MG/1
40 TABLET ORAL DAILY
COMMUNITY
Start: 2022-08-16

## 2022-09-21 RX ORDER — IBUPROFEN 200 MG
200 TABLET ORAL DAILY
COMMUNITY

## 2022-09-21 RX ORDER — PANTOPRAZOLE SODIUM 40 MG/1
40 TABLET, DELAYED RELEASE ORAL DAILY
COMMUNITY
Start: 2022-07-07

## 2022-09-21 RX ORDER — METOPROLOL SUCCINATE 50 MG/1
50 TABLET, EXTENDED RELEASE ORAL DAILY
COMMUNITY
Start: 2022-08-16

## 2022-09-21 RX ORDER — LOSARTAN POTASSIUM 100 MG/1
100 TABLET ORAL DAILY
COMMUNITY
Start: 2022-07-07

## 2022-09-21 RX ORDER — POTASSIUM CHLORIDE 20 MEQ/1
20 TABLET, EXTENDED RELEASE ORAL DAILY
COMMUNITY
Start: 2021-07-26

## 2022-09-21 RX ORDER — AMLODIPINE BESYLATE 10 MG/1
10 TABLET ORAL DAILY
COMMUNITY
Start: 2022-08-16

## 2022-09-21 RX ORDER — ATORVASTATIN CALCIUM 20 MG/1
20 TABLET, FILM COATED ORAL DAILY
COMMUNITY
Start: 2022-07-04

## 2022-09-21 ASSESSMENT — ENCOUNTER SYMPTOMS
BACK PAIN: 1
RESPIRATORY NEGATIVE: 1
EYES NEGATIVE: 1
GASTROINTESTINAL NEGATIVE: 1
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
## 2022-09-21 NOTE — PROGRESS NOTES
Review of Systems   Constitutional: Negative. HENT: Negative. Eyes: Negative. Respiratory: Negative. Cardiovascular: Negative. Gastrointestinal: Negative. Genitourinary: Negative. Musculoskeletal:  Positive for back pain, joint pain and myalgias. Skin: Negative. Neurological: Negative. Endo/Heme/Allergies: Negative. Psychiatric/Behavioral: Negative.

## 2022-09-29 NOTE — TELEPHONE ENCOUNTER
MRI Cervical was denied by patient insurance, patient will need to complete at least 6 week of PT.   MRI Lumbar was approved and will fax to [facility].

## 2022-09-29 NOTE — TELEPHONE ENCOUNTER
He can either return after the MRI lumbar is done to review this or I can order the PT for his neck and have him return in 6-8 weeks. Whatever he would like.

## 2022-10-10 ENCOUNTER — HOSPITAL ENCOUNTER (OUTPATIENT)
Dept: MRI IMAGING | Age: 42
Discharge: HOME | End: 2022-10-10
Payer: MEDICAID

## 2022-10-10 ENCOUNTER — APPOINTMENT (OUTPATIENT)
Dept: MRI IMAGING | Age: 42
End: 2022-10-10
Payer: MEDICAID

## 2022-10-10 DIAGNOSIS — M54.42 CHRONIC MIDLINE LOW BACK PAIN WITH BILATERAL SCIATICA: ICD-10-CM

## 2022-10-10 DIAGNOSIS — M51.36 DDD (DEGENERATIVE DISC DISEASE), LUMBAR: ICD-10-CM

## 2022-10-10 DIAGNOSIS — R26.81 UNSTEADY GAIT: ICD-10-CM

## 2022-10-10 DIAGNOSIS — R20.8 DECREASED SENSATION: ICD-10-CM

## 2022-10-10 DIAGNOSIS — M54.41 CHRONIC MIDLINE LOW BACK PAIN WITH BILATERAL SCIATICA: ICD-10-CM

## 2022-10-10 DIAGNOSIS — G89.29 CHRONIC MIDLINE LOW BACK PAIN WITH BILATERAL SCIATICA: ICD-10-CM

## 2022-10-10 PROCEDURE — 72148 MRI LUMBAR SPINE W/O DYE: CPT

## 2022-10-13 ENCOUNTER — OFFICE VISIT (OUTPATIENT)
Dept: NEUROSURGERY | Age: 42
End: 2022-10-13
Payer: MEDICAID

## 2022-10-13 VITALS
HEIGHT: 70 IN | WEIGHT: 305 LBS | BODY MASS INDEX: 43.67 KG/M2 | HEART RATE: 98 BPM | DIASTOLIC BLOOD PRESSURE: 86 MMHG | OXYGEN SATURATION: 95 % | SYSTOLIC BLOOD PRESSURE: 145 MMHG

## 2022-10-13 DIAGNOSIS — R20.8 DECREASED SENSATION: ICD-10-CM

## 2022-10-13 DIAGNOSIS — R20.0 BILATERAL HAND NUMBNESS: ICD-10-CM

## 2022-10-13 DIAGNOSIS — M50.30 DDD (DEGENERATIVE DISC DISEASE), CERVICAL: ICD-10-CM

## 2022-10-13 DIAGNOSIS — R29.818 FINE MOTOR IMPAIRMENT: ICD-10-CM

## 2022-10-13 DIAGNOSIS — R26.81 UNSTEADY GAIT: ICD-10-CM

## 2022-10-13 DIAGNOSIS — M54.2 NECK PAIN: Primary | ICD-10-CM

## 2022-10-13 DIAGNOSIS — R29.898 FINE MOTOR IMPAIRMENT: ICD-10-CM

## 2022-10-13 PROCEDURE — 99213 OFFICE O/P EST LOW 20 MIN: CPT | Performed by: NURSE PRACTITIONER

## 2022-10-13 ASSESSMENT — ENCOUNTER SYMPTOMS
EYES NEGATIVE: 1
RESPIRATORY NEGATIVE: 1
GASTROINTESTINAL NEGATIVE: 1
BACK PAIN: 1

## 2022-10-13 NOTE — PROGRESS NOTES
Review of Systems   Constitutional: Negative. HENT: Negative. Eyes: Negative. Respiratory: Negative. Cardiovascular: Negative. Gastrointestinal: Negative. Genitourinary: Negative. Musculoskeletal:  Positive for back pain and neck pain. Skin: Negative. Neurological: Negative. Endo/Heme/Allergies: Negative. Psychiatric/Behavioral: Negative.
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
## 2022-11-17 DIAGNOSIS — Z86.69 HISTORY OF SLEEP APNEA: Primary | ICD-10-CM

## 2022-12-14 ENCOUNTER — OFFICE VISIT (OUTPATIENT)
Dept: NEUROSURGERY | Age: 42
End: 2022-12-14
Payer: MEDICAID

## 2022-12-14 VITALS
BODY MASS INDEX: 43.67 KG/M2 | RESPIRATION RATE: 18 BRPM | HEART RATE: 89 BPM | DIASTOLIC BLOOD PRESSURE: 110 MMHG | SYSTOLIC BLOOD PRESSURE: 148 MMHG | OXYGEN SATURATION: 95 % | HEIGHT: 70 IN | WEIGHT: 305 LBS

## 2022-12-14 DIAGNOSIS — R20.8 DECREASED SENSATION: ICD-10-CM

## 2022-12-14 DIAGNOSIS — R06.83 SNORING: ICD-10-CM

## 2022-12-14 DIAGNOSIS — R26.81 UNSTEADY GAIT: ICD-10-CM

## 2022-12-14 DIAGNOSIS — G47.10 HYPERSOMNOLENCE: ICD-10-CM

## 2022-12-14 DIAGNOSIS — M54.2 NECK PAIN: Primary | ICD-10-CM

## 2022-12-14 DIAGNOSIS — R29.818 FINE MOTOR IMPAIRMENT: ICD-10-CM

## 2022-12-14 DIAGNOSIS — R29.898 FINE MOTOR IMPAIRMENT: ICD-10-CM

## 2022-12-14 DIAGNOSIS — M50.30 DDD (DEGENERATIVE DISC DISEASE), CERVICAL: ICD-10-CM

## 2022-12-14 DIAGNOSIS — R40.0 HAS DAYTIME DROWSINESS: ICD-10-CM

## 2022-12-14 DIAGNOSIS — R20.0 BILATERAL HAND NUMBNESS: ICD-10-CM

## 2022-12-14 PROCEDURE — 99214 OFFICE O/P EST MOD 30 MIN: CPT | Performed by: NURSE PRACTITIONER

## 2022-12-14 RX ORDER — METHOCARBAMOL 750 MG/1
750 TABLET, FILM COATED ORAL 3 TIMES DAILY PRN
Qty: 90 TABLET | Refills: 1 | Status: SHIPPED | OUTPATIENT
Start: 2022-12-14 | End: 2023-01-13

## 2022-12-14 ASSESSMENT — ENCOUNTER SYMPTOMS
RESPIRATORY NEGATIVE: 1
GASTROINTESTINAL NEGATIVE: 1
EYES NEGATIVE: 1

## 2022-12-14 NOTE — PROGRESS NOTES
[facility]  Office Visit      Chief Complaint   Patient presents with    Follow-up     Eval after PT     Neck Pain     Patient states he feels PT made his symptoms worse. He states he went to 12 visits. He states he is dropping things more often now. Numbness     Patient states he is starting to experience numbness/tingling in his fingertips. 12/14/2022: Mr. Harpal Arias returns to clinic today to discuss his progress with PT. He states it has made his pain worse. He is now starting to experience numbness and paresthesias of the finger. Dropping things more often. Describes bilateral arm pain R>L. He is very drowsy and fatigued. I again asked about sleep apnea. He states the insurance again denied the home sleep study. He reports talking in his sleep often, sleep walking, eating in his sleep, fighting in sleep. 10/13/2022: Mr. Harpal Arias returns to clinic today to review new films. MRI lumbar spine was completed, however, MRI cervical spine was denied by insurance due to no PT for cervical.    He continues to have bilateral hand cramping, dropping objects frequently. He sometimes drops objects and doesn't even know it. He is occasionally unsteady when he walks. Of note, sleep study is scheduled for 10/26/2022. HISTORY OF PRESENT ILLNESS:    Gabriel Salazar is a 38 y.o. 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 The pain does radiate into BLE R>L that travels into the entire legs. His pain is mostly located in the low back. The patient complains of numbness of the bilateral legs from mid thigh to the shin. He states that he has to be sitting upright to get relief. When lying down in a recliner or bed he cannot lie for very long due to low back pain.       Of note, has had bilateral hip replacements about 4-5 years ago due to severe arthritis and avascular necrosis. His pain is not changed when going from a seated to standing position. His pain is worsened with walking. His pain is not changed when lying flat. Overall, indicative that the patient does have a mechanical nature to their pain. He states that 70% of his pain is located in the back and 30% is leg pain. Does have neck pain, occasional arm pain. Has bilaterally hand numbness. Drops objects often. Has fine motor impairment. Often burns himself from dropping his cigarettes and also mentions severe sleep apnea. When he burns himself he hardly feels it until it's too late. He does feel off balance when he walks. He cannot drive much because he falls asleep in about 20 minutes after sitting down. He states his PCP ordered a sleep study but no one has called him. Of note he was been seen at the [address] [place] [address] per Dr. Opal Stoddard for his cervical and lumbar spine. He states they told him that he had to have his neck fixed prior to his low back and that he was too young to proceed with surgery. The patient has underwent a non-operative treatment course that has included:  NSAIDs (meloxicam, ibuprofen)  Muscle Relaxers (did not help)  Opiates (in the past helped)  Oral Steroids (only helps for about 48 hours)  Physical Therapy (In the past two years x 20 visits - not much help)  Aqua therapy   Epidural Steroid Injections ([facility] in years past, no help)  Ablations (not help)  Chiropractic Manipulation (years ago)      Of note he does use tobacco and does not take blood thinning medications. States he is highly allergic to ASA or ALEVE.                  Past Medical History:   Diagnosis Date    Chronic pain     Hypertension     Osteoarthritis        Past Surgical History:   Procedure Laterality Date    TOTAL HIP ARTHROPLASTY      bilateral       Current Outpatient Medications   Medication Sig Dispense Refill    methocarbamol (ROBAXIN-750) 750 MG tablet Take 1 tablet by mouth 3 times daily as needed (spasms) 90 tablet 1    PROAIR  (90 Base) MCG/ACT inhaler       amLODIPine (NORVASC) 10 MG tablet Take 10 mg by mouth daily      atorvastatin (LIPITOR) 20 MG tablet Take 20 mg by mouth daily      furosemide (LASIX) 40 MG tablet Take 40 mg by mouth daily      ibuprofen (ADVIL;MOTRIN) 200 MG tablet Take 200 mg by mouth daily      losartan (COZAAR) 100 MG tablet Take 100 mg by mouth daily      metoprolol succinate (TOPROL XL) 50 MG extended release tablet Take 50 mg by mouth daily      pantoprazole (PROTONIX) 40 MG tablet Take 40 mg by mouth daily      potassium chloride (KLOR-CON M) 20 MEQ extended release tablet Take 20 mEq by mouth daily       No current facility-administered medications for this visit. Allergies:  Aspirin, Lisinopril, and Aleve [naproxen sodium]    Social History:   Social History     Tobacco Use   Smoking Status Every Day    Packs/day: 0.50    Types: Cigarettes   Smokeless Tobacco Never     Social History     Substance and Sexual Activity   Alcohol Use Not Currently         Family History:   No family history on file. REVIEW OF SYSTEMS:  Constitutional: Negative. HENT: Negative. Eyes: Negative. Respiratory: Negative. Cardiovascular: Negative. Gastrointestinal: Negative. Genitourinary: Negative. Musculoskeletal:  Positive for back pain, joint pain and myalgias. Skin: Negative. Endo/Heme/Allergies: Negative. Psychiatric/Behavioral: Negative. PHYSICAL EXAM:  Vitals:    12/14/22 1443   BP: (!) 148/110   Pulse: 89   Resp: 18   SpO2: 95%         Constitutional: appears well-developed and well-nourished.    Eyes - conjunctiva normal.  Pupils react to light  Ear, nose, throat - hearing intact to finger rub, No scars, masses, or lesions over external nose or ears, no atrophy oftongue  Neck- symmetric, no masses noted, no jugular vein distension  Respiration- chest wall appears symmetric, good expansion, normal effort without use of accessory muscles  Musculoskeletal - no significant wasting of muscles noted, no bony deformities, gait no gross ataxia  Extremities- no clubbing, cyanosis, has multiple cigarette burns on his left forearm from dropping his cigarettes while falling asleep. Some cigarette kam on his hands as well. Has BLE pitting edema. Skin - warm, dry, and intact. No rash, erythema, or pallor. Psychiatric - mood, affect, and behavior appear normal.     Neurologic Examination  Awake, Alert and oriented x 4  Normal speech pattern, following commands    Motor:  RIGHT: hand grasp 5/5    finger extension 5/5    bicep 5/5    triceps 5/5    deltoid 5/5      iliopsoas 5/5    knee flexor 5/5    knee extension 5/5    EHL 5/5   dorsiflexion 5/5    plantar flexion 5/5    LEFT:   hand grasp 5/5    finger extension 5/5    bicep 5/5    triceps 5/5    deltoid 5/5      iliopsoas 5/5    knee flexor 5/5    knee extension 5/5    EHL 5/5   dorsiflexion 5/5    plantar flexion 5/5    Decrease to pinprick sensation throughout entire body  Reflexes are 1+ and symmetric BUE, BLE  No clonus or Hoffmans sign  No myofacial tenderness to palpation  Antalgic Gait pattern        DATA and IMAGING:    Nursing/pcp notes, imaging, labs, and vitals reviewed.      PT,OT and/or speech notes reviewed    No results found for: WBC, HGB, HCT, MCV, PLTNo results found for: NA, K, CL, CO2, BUN, CREATININE, GLUCOSE, CALCIUM, PROT, LABALBU, BILITOT, ALKPHOS, AST, ALT, LABGLOM, GFRAA, AGRATIO, GLOBNo results found for: INR, PROTIME      XR Lumbar Spine (9/21/2022) Kirsten Barrow  I have personally reviewed these images and my interpretation is:  DDD L5-S1  No spondylolisthesis or abnormal motion on flexion and extension  No compression fractures        Narrative   Clinical history: Neck pain   Finding: Multiple views of the cervical spine show the vertebral body heights,   intervertebral spaces and vertebral alignment to appear normal.The flexion and   extension demonstrate no evidence of malalignment. There is osteoarthritic   changes with ventral marginal spur seen most pronounced at C4-C5 followed by   C3-C4. There is no evidence of fracture or subluxation. The prevertebral soft   tissue structures appear normal.The C1 to C2 relationship appears to be normal.       Impression   NO evidence of FRACTURE OR MALALIGNMENT. Followed by Osteoarthritic changes with ventral marginal spur most pronounced at   C4-C5 followed by C3-C4   I have personally reviewed the images and my interpretation is:  Very mild DDD  No abnormal motion with flexion and extension         Narrative   Exam: MRI lumbar spine without contrast   History: Unsteady gait   Comparison: None. Technique: Multiplanar multisequence magnetic resonance imaging of the lumbar   spine was performed without contrast.   Findings:   Bone marrow signal is homogeneous without focal suspicious osseous lesion. The   last formed disc is referred to as L5-S1 for numbering purposes. There is no   acute fracture in the lumbar spine. The conus terminates normally. Multilevel   degenerative changes are demonstrated, with pertinent level by level findings as   follows: At L5-S1, there is 7 mm of retrolisthesis of L5 on S1. There is a focal central   disc protrusion. Mild central canal narrowing. Mild facet degeneration. Moderate   right and moderate to severe left foraminal narrowing. At L4-L5, there is no significant central canal narrowing. Mild facet   degeneration. Mild bilateral foraminal narrowing. In the remainder of the lumbar spine, there is no significant central canal or   foraminal narrowing. No additional soft tissue incidental abnormalities identified. Impression   Degenerative disease at L4-L5 and L5-S1, with moderate to severe left foraminal   narrowing at L5-S1.    I have personally reviewed the images and my interpretation is:  DDD L5-S1  L5-S1 mild to moderate left foraminal stenosis       ASSESSMENT:    Rafat Hart is a 38 y.o. male with neck pain, fine motor impairment, dropping of objects, decreased sensation of arms, hands, legs, low back pain, BLE pain. ICD-10-CM    1. Neck pain  M54.2 MRI CERVICAL SPINE WO CONTRAST      2. DDD (degenerative disc disease), cervical  M50.30 MRI CERVICAL SPINE WO CONTRAST      3. Unsteady gait  R26.81 MRI CERVICAL SPINE WO CONTRAST      4. Fine motor impairment  R29.818 MRI CERVICAL SPINE WO CONTRAST    R29.898       5. Bilateral hand numbness  R20.0 MRI CERVICAL SPINE WO CONTRAST      6. Decreased sensation  R20.8 MRI CERVICAL SPINE WO CONTRAST      7. Has daytime drowsiness  R40.0 Ximena Wells, Neurology, [facility]      8. Snoring  [address] [place] [place]      9.  Hypersomnolence  [facility] [address], Neurology, [place]            PLAN:  -Obtain MRI cervical spine   -Refer to Neurology Sleep  -Follow up after films      CHRISS Dejesus

## 2023-01-10 ENCOUNTER — HOSPITAL ENCOUNTER (OUTPATIENT)
Dept: MRI IMAGING | Age: 43
Discharge: HOME | End: 2023-01-10
Payer: MEDICAID

## 2023-01-10 DIAGNOSIS — R20.8 DECREASED SENSATION: ICD-10-CM

## 2023-01-10 DIAGNOSIS — R29.818 FINE MOTOR IMPAIRMENT: ICD-10-CM

## 2023-01-10 DIAGNOSIS — M54.2 NECK PAIN: ICD-10-CM

## 2023-01-10 DIAGNOSIS — R29.898 FINE MOTOR IMPAIRMENT: ICD-10-CM

## 2023-01-10 DIAGNOSIS — R20.0 BILATERAL HAND NUMBNESS: ICD-10-CM

## 2023-01-10 DIAGNOSIS — R26.81 UNSTEADY GAIT: ICD-10-CM

## 2023-01-10 DIAGNOSIS — M50.30 DDD (DEGENERATIVE DISC DISEASE), CERVICAL: ICD-10-CM

## 2023-01-10 PROCEDURE — 72141 MRI NECK SPINE W/O DYE: CPT

## 2023-01-10 PROCEDURE — 72141 MRI NECK SPINE W/O DYE: CPT | Performed by: RADIOLOGY

## 2023-01-17 ENCOUNTER — OFFICE VISIT (OUTPATIENT)
Dept: NEUROSURGERY | Age: 43
End: 2023-01-17
Payer: MEDICAID

## 2023-01-17 VITALS
RESPIRATION RATE: 18 BRPM | WEIGHT: 305 LBS | DIASTOLIC BLOOD PRESSURE: 85 MMHG | HEART RATE: 80 BPM | BODY MASS INDEX: 43.67 KG/M2 | SYSTOLIC BLOOD PRESSURE: 138 MMHG | HEIGHT: 70 IN

## 2023-01-17 DIAGNOSIS — R20.8 DECREASED SENSATION: ICD-10-CM

## 2023-01-17 DIAGNOSIS — R20.0 BILATERAL HAND NUMBNESS: ICD-10-CM

## 2023-01-17 DIAGNOSIS — G95.20 CERVICAL SPINAL CORD COMPRESSION (HCC): Primary | ICD-10-CM

## 2023-01-17 DIAGNOSIS — M54.2 NECK PAIN: ICD-10-CM

## 2023-01-17 DIAGNOSIS — R29.898 FINE MOTOR IMPAIRMENT: ICD-10-CM

## 2023-01-17 DIAGNOSIS — R40.0 HAS DAYTIME DROWSINESS: ICD-10-CM

## 2023-01-17 DIAGNOSIS — M50.30 DDD (DEGENERATIVE DISC DISEASE), CERVICAL: ICD-10-CM

## 2023-01-17 DIAGNOSIS — R26.81 UNSTEADY GAIT: ICD-10-CM

## 2023-01-17 DIAGNOSIS — R29.818 FINE MOTOR IMPAIRMENT: ICD-10-CM

## 2023-01-17 PROCEDURE — 99213 OFFICE O/P EST LOW 20 MIN: CPT | Performed by: NEUROLOGICAL SURGERY

## 2023-01-17 ASSESSMENT — ENCOUNTER SYMPTOMS
EYES NEGATIVE: 1
GASTROINTESTINAL NEGATIVE: 1
RESPIRATORY NEGATIVE: 1

## 2023-01-17 NOTE — PROGRESS NOTES
Review of Systems   Constitutional: Negative. HENT: Negative. Eyes: Negative. Respiratory: Negative. Cardiovascular: Negative. Gastrointestinal: Negative. Genitourinary: Negative. Musculoskeletal:  Positive for joint pain, myalgias and neck pain. Skin: Negative. Neurological:  Positive for tingling, weakness and headaches. Endo/Heme/Allergies: Negative. Psychiatric/Behavioral: Negative.

## 2023-01-17 NOTE — PROGRESS NOTES
[facility]  Office Visit      Chief Complaint   Patient presents with    Results     [facility]    Follow-up     Patient states that his neck pain is 7/10 and remains 6/10-7/10. Numbness     Patient states that he has numbness in his fingertips. Denies weakness. 1/17/2023: Mr. Ashley Merritt returns to clinic today to review the MRI cervical spine. He continues to have numbness in the hands, trouble with fine motor impairment, drops objects very often. He states anything with coordination \"is off\". He was asleep upon entering the room. When driving he is having to pull off on the side and sleep. 12/14/2022: Mr. Ashley Merritt returns to clinic today to discuss his progress with PT. He states it has made his pain worse. He is now starting to experience numbness and paresthesias of the finger. Dropping things more often. Describes bilateral arm pain R>L. He is very drowsy and fatigued. I again asked about sleep apnea. He states the insurance again denied the home sleep study. He reports talking in his sleep often, sleep walking, eating in his sleep, fighting in sleep. 10/13/2022: Mr. Ashley Merritt returns to clinic today to review new films. MRI lumbar spine was completed, however, MRI cervical spine was denied by insurance due to no PT for cervical.    He continues to have bilateral hand cramping, dropping objects frequently. He sometimes drops objects and doesn't even know it. He is occasionally unsteady when he walks. Of note, sleep study is scheduled for 10/26/2022. HISTORY OF PRESENT ILLNESS:    Yara Ceelste is a 38 y.o. 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 The pain does radiate into BLE R>L that travels into the entire legs. His pain is mostly located in the low back.   The patient complains of numbness of the bilateral legs from mid thigh to the shin. He states that he has to be sitting upright to get relief.  When lying down in a recliner or bed he cannot lie for very long due to low back pain.      Of note, has had bilateral hip replacements about 4-5 years ago due to severe arthritis and avascular necrosis.      His pain is not changed when going from a seated to standing position. His pain is worsened with walking. His pain is not changed when lying flat. Overall, indicative that the patient does have a mechanical nature to their pain. He states that 70% of his pain is located in the back and 30% is leg pain.      Does have neck pain, occasional arm pain.  Has bilaterally hand numbness. Drops objects often.  Has fine motor impairment.  Often burns himself from dropping his cigarettes and also mentions severe sleep apnea. When he burns himself he hardly feels it until it's too late.  He does feel off balance when he walks.  He cannot drive much because he falls asleep in about 20 minutes after sitting down.  He states his PCP ordered a sleep study but no one has called him.        Of note he was been seen at the [facility] per Dr. Rivers for his cervical and lumbar spine.  He states they told him that he had to have his neck fixed prior to his low back and that he was too young to proceed with surgery.        The patient has underwent a non-operative treatment course that has included:  NSAIDs (meloxicam, ibuprofen)  Muscle Relaxers (did not help)  Opiates (in the past helped)  Oral Steroids (only helps for about 48 hours)  Physical Therapy (In the past two years x 20 visits - not much help)  Aqua therapy   Epidural Steroid Injections ([facility] in years past, no help)  Ablations (not help)  Chiropractic Manipulation (years ago)      Of note he does use tobacco and does not take blood thinning medications.    States he is highly allergic to ASA or ALEVE.                 Past Medical History:   Diagnosis Date    Chronic pain     Hypertension      Osteoarthritis        Past Surgical History:   Procedure Laterality Date    TOTAL HIP ARTHROPLASTY      bilateral       Current Outpatient Medications   Medication Sig Dispense Refill    PROAIR  (90 Base) MCG/ACT inhaler       amLODIPine (NORVASC) 10 MG tablet Take 10 mg by mouth daily      atorvastatin (LIPITOR) 20 MG tablet Take 20 mg by mouth daily      furosemide (LASIX) 40 MG tablet Take 40 mg by mouth daily      ibuprofen (ADVIL;MOTRIN) 200 MG tablet Take 200 mg by mouth daily      losartan (COZAAR) 100 MG tablet Take 100 mg by mouth daily      metoprolol succinate (TOPROL XL) 50 MG extended release tablet Take 50 mg by mouth daily      pantoprazole (PROTONIX) 40 MG tablet Take 40 mg by mouth daily      potassium chloride (KLOR-CON M) 20 MEQ extended release tablet Take 20 mEq by mouth daily       No current facility-administered medications for this visit. Allergies:  Aspirin, Lisinopril, and Aleve [naproxen sodium]    Social History:   Social History     Tobacco Use   Smoking Status Every Day    Packs/day: 0.50    Types: Cigarettes   Smokeless Tobacco Never     Social History     Substance and Sexual Activity   Alcohol Use Not Currently         Family History:   No family history on file. REVIEW OF SYSTEMS:  Constitutional: Negative. HENT: Negative. Eyes: Negative. Respiratory: Negative. Cardiovascular: Negative. Gastrointestinal: Negative. Genitourinary: Negative. Musculoskeletal:  Positive for joint pain, myalgias and neck pain. Skin: Negative. Neurological:  Positive for tingling, weakness and headaches. Endo/Heme/Allergies: Negative. Psychiatric/Behavioral: Negative. PHYSICAL EXAM:  Vitals:    01/17/23 1416   BP: 138/85   Pulse: 80   Resp: 18         Constitutional: appears well-developed and well-nourished.    Eyes - conjunctiva normal.  Pupils react to light  Ear, nose, throat - hearing intact to finger rub, No scars, masses, or lesions over external nose or ears, no atrophy oftongue  Neck- symmetric, no masses noted, no jugular vein distension  Respiration- chest wall appears symmetric, good expansion, normal effort without use of accessory muscles  Musculoskeletal - no significant wasting of muscles noted, no bony deformities, gait no gross ataxia  Extremities- no clubbing, cyanosis, has multiple cigarette burns on his left forearm from dropping his cigarettes while falling asleep. Some cigarette kam on his hands as well. Has BLE pitting edema. Skin - warm, dry, and intact. No rash, erythema, or pallor. Psychiatric - mood, affect, and behavior appear normal.     Neurologic Examination  Awake, Alert and oriented x 4  Normal speech pattern, following commands    Motor:  RIGHT: hand grasp 5/5    finger extension 5/5    bicep 5/5    triceps 5/5    deltoid 5/5      iliopsoas 5/5    knee flexor 5/5    knee extension 5/5    EHL 5/5   dorsiflexion 5/5    plantar flexion 5/5    LEFT:   hand grasp 5/5    finger extension 5/5    bicep 5/5    triceps 5/5    deltoid 5/5      iliopsoas 5/5    knee flexor 5/5    knee extension 5/5    EHL 5/5   dorsiflexion 5/5    plantar flexion 5/5    Decrease to pinprick sensation throughout entire body  Reflexes are 1+ and symmetric BUE, BLE  No clonus or Hoffmans sign  No myofacial tenderness to palpation  Antalgic Gait pattern        DATA and IMAGING:    Nursing/pcp notes, imaging, labs, and vitals reviewed.      PT,OT and/or speech notes reviewed    No results found for: WBC, HGB, HCT, MCV, PLTNo results found for: NA, K, CL, CO2, BUN, CREATININE, GLUCOSE, CALCIUM, PROT, LABALBU, BILITOT, ALKPHOS, AST, ALT, LABGLOM, GFRAA, AGRATIO, GLOBNo results found for: INR, PROTIME      XR Lumbar Spine (9/21/2022) [facility]  I have personally reviewed these images and my interpretation is:  DDD L5-S1  No spondylolisthesis or abnormal motion on flexion and extension  No compression fractures        Narrative   Clinical history: Neck pain   Finding: Multiple views of the cervical spine show the vertebral body heights,   intervertebral spaces and vertebral alignment to appear normal.The flexion and   extension demonstrate no evidence of malalignment. There is osteoarthritic   changes with ventral marginal spur seen most pronounced at C4-C5 followed by   C3-C4. There is no evidence of fracture or subluxation. The prevertebral soft   tissue structures appear normal.The C1 to C2 relationship appears to be normal.       Impression   NO evidence of FRACTURE OR MALALIGNMENT. Followed by Osteoarthritic changes with ventral marginal spur most pronounced at   C4-C5 followed by C3-C4   I have personally reviewed the images and my interpretation is:  Very mild DDD  No abnormal motion with flexion and extension         Narrative   Exam: MRI lumbar spine without contrast   History: Unsteady gait   Comparison: None. Technique: Multiplanar multisequence magnetic resonance imaging of the lumbar   spine was performed without contrast.   Findings:   Bone marrow signal is homogeneous without focal suspicious osseous lesion. The   last formed disc is referred to as L5-S1 for numbering purposes. There is no   acute fracture in the lumbar spine. The conus terminates normally. Multilevel   degenerative changes are demonstrated, with pertinent level by level findings as   follows: At L5-S1, there is 7 mm of retrolisthesis of L5 on S1. There is a focal central   disc protrusion. Mild central canal narrowing. Mild facet degeneration. Moderate   right and moderate to severe left foraminal narrowing. At L4-L5, there is no significant central canal narrowing. Mild facet   degeneration. Mild bilateral foraminal narrowing. In the remainder of the lumbar spine, there is no significant central canal or   foraminal narrowing. No additional soft tissue incidental abnormalities identified.        Impression   Degenerative disease at L4-L5 and L5-S1, with moderate to severe left foraminal   narrowing at L5-S1. I have personally reviewed the images and my interpretation is:  DDD L5-S1  L5-S1 mild to moderate left foraminal stenosis     Examination: Cervical spine MRI without IV contrast   Clinical history:38year-old male with neck pain   Comparison: None   Technique: Multiplanar multisequence MR images of the cervical spine were   obtained without administration of IV gadolinium contrast.   Findings:   Cervical spine alignment appears unremarkable. There is a 1.7 cm hemangioma   posteriorly within the T1 vertebral body. Otherwise, the visualized marrow   signal is unremarkable. The craniocervical and atlantoaxial relationships are   normal.Vertebral body heights are normal.Cervical discs are mildly desiccated. No cervical cord signal abnormality is seen. The paraspinal soft tissues appear   unremarkable. Limited visualization of the intracranial structures is   unremarkable. C2-C3: No significant spinal canal or neural foraminal stenosis. C3-C4: LEFT greater than RIGHT uncovertebral and facet arthropathy contribute to   mild to moderate LEFT neural foraminal narrowing. Spinal canal and RIGHT neural   foramen appear patent. C4-C5: No significant spinal canal or neural foraminal stenosis. C5-C6: Uncovertebral and facet arthropathy contribute to mild bilateral neural   foraminal narrowing. Spinal canal does not appear significantly narrowed. C6-C7: There is a LEFT paramedian disc protrusion which moderately effaces the   LEFT anterolateral thecal sac and narrows the LEFT lateral recess. Central   spinal canal is mildly narrowed. Bilateral neural foramina appear patent. C7-T1: No significant spinal canal or neural foraminal stenosis. Impression   Impression: 1. At C6-C7, there is a LEFT paramedian disc protrusion which moderately       effaces the LEFT anterolateral thecal sac and narrows the LEFT lateral       recess. Central spinal canal is mildly narrowed. Bilateral neural foramina       appear patent at this level. 2.Additional neural foraminal narrowing at C3-C4 and C5-C6. 3.Please see above for additional details. I have personally reviewed the images and my interpretation is: There is DDD throughout the cervical spine  C6-7 there is a disc-osteophyte that mildly abuts and distorts the spinal cord on the left        ASSESSMENT:    Jaylen Loza is a 38 y.o. male with neck pain, fine motor impairment, dropping of objects, decreased sensation of arms, hands, legs, low back pain, BLE pain. ICD-10-CM    1. Cervical spinal cord compression (HCC)  G95.20       2. DDD (degenerative disc disease), cervical  M50.30       3. Neck pain  M54.2       4. Unsteady gait  R26.81       5. Fine motor impairment  R29.818     R29.898       6. Bilateral hand numbness  R20.0       7. Decreased sensation  R20.8       8. Has daytime drowsiness  R40.0               PLAN:  -We have discussed and reviewed the results of the MRI cervical spine with Mr. Lynn Conchita at length. We explained that he does have one level with a disc osteophyte on the left that slightly abuts the spinal cord.   He does have some symptoms of myelopathy, however, he is not myelopathic on exam.  At this point we feel that his sleep apnea should be addressed first since this seems so severe and after he gets formally diagnosed and treated we can see him again to re-evaluate his situation.    -Follow up 3 months       George Capellan DO

## 2023-01-25 ENCOUNTER — OFFICE VISIT (OUTPATIENT)
Dept: NEUROLOGY | Age: 43
End: 2023-01-25
Payer: MEDICAID

## 2023-01-25 VITALS
OXYGEN SATURATION: 96 % | DIASTOLIC BLOOD PRESSURE: 96 MMHG | WEIGHT: 300 LBS | HEART RATE: 84 BPM | BODY MASS INDEX: 42.95 KG/M2 | SYSTOLIC BLOOD PRESSURE: 145 MMHG | HEIGHT: 70 IN

## 2023-01-25 DIAGNOSIS — G47.9 SLEEP DISTURBANCE: ICD-10-CM

## 2023-01-25 DIAGNOSIS — G47.52 DREAM ENACTMENT BEHAVIOR: ICD-10-CM

## 2023-01-25 DIAGNOSIS — G47.33 OBSTRUCTIVE SLEEP APNEA: Primary | ICD-10-CM

## 2023-01-25 DIAGNOSIS — R40.0 SOMNOLENCE, DAYTIME: ICD-10-CM

## 2023-01-25 PROCEDURE — 99204 OFFICE O/P NEW MOD 45 MIN: CPT | Performed by: PHYSICIAN ASSISTANT
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
## 2023-01-25 NOTE — PATIENT INSTRUCTIONS

## 2023-01-25 NOTE — PROGRESS NOTES
REVIEW OF SYSTEMS    Constitutional: []Fever []Sweats []Chills [] Recent Injury   [x] Denies all unless marked  HENT:[]Headache  [] Head Injury  [] Sore Throat  [] Ear Pain  [] Dizziness [] Hearing Loss   [x] Denies all unless marked  Musculoskeletal: [] Arthralgia  [] Myalgias [] Muscle cramps  [] Muscle twitches   [x] Denies all unless marked   Spine:  [] Neck pain  [] Back pain  [] Sciatica  [x] Denies all unless marked  Neurological:[] Visual Disturbance [] Double Vision [] Slurred Speech [] Trouble swallowing  [] Vertigo [] Tingling [] Numbness [] Weakness [] Loss of Balance   [] Loss of Consciousness [] Memory Loss [] Seizures  [x] Denies all unless marked  Psychiatric/Behavioral:[] Depression [] Anxiety  [x] Denies all unless marked  Sleep: [x]  Insomnia [x] Sleep Disturbance [x] Snoring [x] Restless Legs [x] Daytime Sleepiness [] Sleep Apnea  [] Denies all unless marked

## 2023-01-26 PROBLEM — R40.0 SOMNOLENCE, DAYTIME: Status: ACTIVE | Noted: 2023-01-26

## 2023-01-26 PROBLEM — G47.33 OBSTRUCTIVE SLEEP APNEA: Status: ACTIVE | Noted: 2023-01-26

## 2023-01-26 PROBLEM — G47.9 SLEEP DISTURBANCE: Status: ACTIVE | Noted: 2023-01-26

## 2023-01-26 PROBLEM — G47.52 DREAM ENACTMENT BEHAVIOR: Status: ACTIVE | Noted: 2023-01-26

## 2023-02-15 ENCOUNTER — TELEPHONE (OUTPATIENT)
Dept: NEUROLOGY | Age: 43
End: 2023-02-15

## 2023-02-15 DIAGNOSIS — G47.52 DREAM ENACTMENT BEHAVIOR: ICD-10-CM

## 2023-02-15 DIAGNOSIS — R40.0 SOMNOLENCE, DAYTIME: ICD-10-CM

## 2023-02-15 DIAGNOSIS — G47.33 OBSTRUCTIVE SLEEP APNEA: Primary | ICD-10-CM

## 2023-02-15 DIAGNOSIS — G47.9 SLEEP DISTURBANCE: ICD-10-CM

## 2023-02-15 NOTE — TELEPHONE ENCOUNTER
Pt called asking if Mellisa Mancuso could please send a referral to Sleep Center for pt to get sleep study done.   Thank you

## 2023-02-15 NOTE — TELEPHONE ENCOUNTER
Called and spoke with Uli Molina to let him order has been dropped and the sleep lab will be getting him scheduled.

## 2023-03-07 ENCOUNTER — HOSPITAL ENCOUNTER (OUTPATIENT)
Dept: SLEEP CENTER | Age: 43
Discharge: HOME | End: 2023-03-09
Payer: MEDICAID

## 2023-03-07 PROCEDURE — 95811 POLYSOM 6/>YRS CPAP 4/> PARM: CPT
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
## 2023-04-09 DIAGNOSIS — R09.02 HYPOXEMIA: ICD-10-CM

## 2023-04-09 DIAGNOSIS — G47.33 SLEEP APNEA, OBSTRUCTIVE: Primary | ICD-10-CM

## 2023-04-09 NOTE — PROGRESS NOTES
[address] [place] cron[place] Santamaria  Phone [phone] Fax [phone]     Patient Name: Harmony Díaz 3/10/2023  : 1980  Age: 38 y.o.   Patient Address:  [place]  [address]       Patient Phone: [phone] (home)     REFERRAL  Referred to: DME provider of patient's choice  Harmony Díaz is referred for the following:    DME Equipment HPCPS Code Setting   Auto Adjusting BiPAP device with flex or comparable pressure relief per comfort  Min EPAP: 12cm to   Max IPAP: 25cm,   Pressure Support Range:  5cm to 6cm   Heated Humidifier  Patient Choice   Supplemental oxygen  3LPM through BiPAP during sleep       Replinishible PAP Supplies, 1 year supply  Item HPCPS Code Frequency   Mask of choice  or  1 per 3 months   Nasal Mask cushion/pillows  or  2 per 30 days   Full Face Mask Interface  1 per 30 days   Headgear  1 per 6 months   Tubing, length of choice  or  1 per 3 months   Water Chamber  1 per 6 months   Chinstrap  1 per 6 months   Disposable Filters  2 per 30 days   Reusable Filters  1 per 6 months     Diagnoses:  Obstructive sleep apnea (G47.33)     Hypoxemia (R09.02)  Length of Need: Lifetime, 99    Ordering Provider: LARRY Bernabe: [id number]         Signature:       Date: 3/10/2023   Electronically Signed by Kristen Fitzpatrick M.D.

## 2023-04-18 ENCOUNTER — OFFICE VISIT (OUTPATIENT)
Dept: NEUROSURGERY | Age: 43
End: 2023-04-18
Payer: MEDICAID

## 2023-04-18 VITALS
HEIGHT: 70 IN | BODY MASS INDEX: 42.95 KG/M2 | SYSTOLIC BLOOD PRESSURE: 142 MMHG | DIASTOLIC BLOOD PRESSURE: 88 MMHG | WEIGHT: 300 LBS | HEART RATE: 80 BPM

## 2023-04-18 DIAGNOSIS — R20.8 DECREASED SENSATION: ICD-10-CM

## 2023-04-18 DIAGNOSIS — R29.898 FINE MOTOR IMPAIRMENT: ICD-10-CM

## 2023-04-18 DIAGNOSIS — R29.818 FINE MOTOR IMPAIRMENT: ICD-10-CM

## 2023-04-18 DIAGNOSIS — M54.2 NECK PAIN: ICD-10-CM

## 2023-04-18 DIAGNOSIS — R20.0 BILATERAL HAND NUMBNESS: ICD-10-CM

## 2023-04-18 DIAGNOSIS — R26.81 UNSTEADY GAIT: ICD-10-CM

## 2023-04-18 DIAGNOSIS — G95.20 CERVICAL SPINAL CORD COMPRESSION (HCC): Primary | ICD-10-CM

## 2023-04-18 DIAGNOSIS — M50.30 DDD (DEGENERATIVE DISC DISEASE), CERVICAL: ICD-10-CM

## 2023-04-18 PROCEDURE — 99215 OFFICE O/P EST HI 40 MIN: CPT | Performed by: NEUROLOGICAL SURGERY
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
## 2023-04-18 NOTE — PROGRESS NOTES
Review of Systems   Musculoskeletal:  Positive for joint pain, myalgias and neck pain. Neurological:  Positive for dizziness, tingling, weakness and headaches. All other systems reviewed and are negative.

## 2023-05-31 ENCOUNTER — TELEPHONE (OUTPATIENT)
Dept: NEUROSURGERY | Age: 43
End: 2023-05-31

## 2023-05-31 NOTE — TELEPHONE ENCOUNTER
Patient called earlier this morning about getting on the surgery schedule. I let patient know I would have to speak with Dr. Morris Veliz first because he would be the one to approve it. Talked with Dr. Morris Veliz and he offered for patient to come in tomorrow. I called patient back and he isn't able to make it due to not having a vehicle. Scheduled patient for 6/6/2023 @ 11:30am. Patient thanked me and voiced understanding.

## 2023-06-15 PROBLEM — E66.2 OBESITY HYPOVENTILATION SYNDROME: Status: ACTIVE | Noted: 2023-06-15

## 2023-06-15 PROBLEM — J44.9 COPD (CHRONIC OBSTRUCTIVE PULMONARY DISEASE): Status: ACTIVE | Noted: 2023-06-15

## 2023-06-15 PROBLEM — J96.22 ACUTE ON CHRONIC RESPIRATORY FAILURE WITH HYPERCAPNIA: Status: ACTIVE | Noted: 2023-06-15

## 2023-06-15 PROBLEM — E11.9 TYPE 2 DIABETES MELLITUS, WITHOUT LONG-TERM CURRENT USE OF INSULIN: Status: ACTIVE | Noted: 2023-06-15

## 2023-06-15 PROBLEM — E66.01 MORBID OBESITY: Status: ACTIVE | Noted: 2023-06-15

## 2023-06-17 PROBLEM — A49.9 UTI (URINARY TRACT INFECTION), BACTERIAL: Status: ACTIVE | Noted: 2023-06-15

## 2023-06-17 PROBLEM — N39.0 UTI (URINARY TRACT INFECTION), BACTERIAL: Status: ACTIVE | Noted: 2023-06-15

## 2023-06-20 ENCOUNTER — TELEPHONE (OUTPATIENT)
Dept: NEUROSURGERY | Age: 43
End: 2023-06-20

## 2023-06-20 NOTE — TELEPHONE ENCOUNTER
Patient wife called stating that appt needs to be canceled due to being on a ventilator at [facility].

## 2023-06-21 PROBLEM — G47.33 OSA (OBSTRUCTIVE SLEEP APNEA): Status: ACTIVE | Noted: 2023-06-21

## 2023-07-03 PROBLEM — Z99.11 VENTILATOR DEPENDENT: Status: ACTIVE | Noted: 2023-07-03

## 2023-07-03 PROBLEM — J96.22 ACUTE ON CHRONIC RESPIRATORY FAILURE WITH HYPOXIA AND HYPERCAPNIA: Status: ACTIVE | Noted: 2023-07-03

## 2023-07-03 PROBLEM — J96.21 ACUTE ON CHRONIC RESPIRATORY FAILURE WITH HYPOXIA AND HYPERCAPNIA: Status: ACTIVE | Noted: 2023-07-03

## 2023-07-03 PROBLEM — Z43.0 ACUTE TRACHEOSTOMY MANAGEMENT: Status: ACTIVE | Noted: 2023-07-03

## 2023-07-03 PROBLEM — E66.2 OBESITY HYPOVENTILATION SYNDROME: Status: ACTIVE | Noted: 2023-07-03

## 2023-07-24 ENCOUNTER — HOSPITAL ENCOUNTER (OUTPATIENT)
Dept: WOUND CARE | Age: 43
Discharge: HOME | End: 2023-07-24
Payer: MEDICAID

## 2023-07-24 VITALS
BODY MASS INDEX: 40.09 KG/M2 | DIASTOLIC BLOOD PRESSURE: 92 MMHG | SYSTOLIC BLOOD PRESSURE: 140 MMHG | TEMPERATURE: 97.1 F | HEIGHT: 70 IN | RESPIRATION RATE: 20 BRPM | WEIGHT: 280 LBS | HEART RATE: 123 BPM

## 2023-07-24 DIAGNOSIS — L05.91 PILONIDAL CYST: ICD-10-CM

## 2023-07-24 DIAGNOSIS — L98.422 SACRAL ULCER, WITH FAT LAYER EXPOSED (HCC): Primary | ICD-10-CM

## 2023-07-24 PROCEDURE — 99215 OFFICE O/P EST HI 40 MIN: CPT | Performed by: NURSE PRACTITIONER

## 2023-07-24 PROCEDURE — 97605 NEG PRS WND THER DME<=50SQCM: CPT

## 2023-07-24 PROCEDURE — 99213 OFFICE O/P EST LOW 20 MIN: CPT

## 2023-07-24 PROCEDURE — 11042 DBRDMT SUBQ TIS 1ST 20SQCM/<: CPT | Performed by: NURSE PRACTITIONER

## 2023-07-24 PROCEDURE — 11042 DBRDMT SUBQ TIS 1ST 20SQCM/<: CPT

## 2023-07-24 RX ORDER — SODIUM CHLOR/HYPOCHLOROUS ACID 0.033 %
SOLUTION, IRRIGATION IRRIGATION ONCE
Status: CANCELLED | OUTPATIENT
Start: 2023-07-24 | End: 2023-07-24

## 2023-07-24 RX ORDER — GENTAMICIN SULFATE 1 MG/G
OINTMENT TOPICAL ONCE
Status: CANCELLED | OUTPATIENT
Start: 2023-07-24 | End: 2023-07-24

## 2023-07-24 RX ORDER — LIDOCAINE HYDROCHLORIDE 20 MG/ML
JELLY TOPICAL ONCE
Status: CANCELLED | OUTPATIENT
Start: 2023-07-24 | End: 2023-07-24

## 2023-07-24 RX ORDER — LIDOCAINE 40 MG/G
CREAM TOPICAL ONCE
Status: CANCELLED | OUTPATIENT
Start: 2023-07-24 | End: 2023-07-24

## 2023-07-24 RX ORDER — GINSENG 100 MG
CAPSULE ORAL ONCE
Status: CANCELLED | OUTPATIENT
Start: 2023-07-24 | End: 2023-07-24

## 2023-07-24 RX ORDER — BETAMETHASONE DIPROPIONATE 0.05 %
OINTMENT (GRAM) TOPICAL ONCE
Status: CANCELLED | OUTPATIENT
Start: 2023-07-24 | End: 2023-07-24

## 2023-07-24 RX ORDER — LIDOCAINE HYDROCHLORIDE 40 MG/ML
SOLUTION TOPICAL ONCE
Status: CANCELLED | OUTPATIENT
Start: 2023-07-24 | End: 2023-07-24

## 2023-07-24 RX ORDER — LIDOCAINE 50 MG/G
OINTMENT TOPICAL ONCE
Status: CANCELLED | OUTPATIENT
Start: 2023-07-24 | End: 2023-07-24

## 2023-07-24 RX ORDER — BACITRACIN ZINC AND POLYMYXIN B SULFATE 500; 1000 [USP'U]/G; [USP'U]/G
OINTMENT TOPICAL ONCE
Status: CANCELLED | OUTPATIENT
Start: 2023-07-24 | End: 2023-07-24

## 2023-07-24 RX ORDER — CLOBETASOL PROPIONATE 0.5 MG/G
OINTMENT TOPICAL ONCE
Status: CANCELLED | OUTPATIENT
Start: 2023-07-24 | End: 2023-07-24

## 2023-07-24 RX ORDER — SODIUM HYPOCHLORITE 1.25 MG/ML
SOLUTION TOPICAL
Qty: 1000 ML | Refills: 5 | Status: SHIPPED | OUTPATIENT
Start: 2023-07-24

## 2023-07-24 RX ORDER — IBUPROFEN 200 MG
TABLET ORAL ONCE
Status: CANCELLED | OUTPATIENT
Start: 2023-07-24 | End: 2023-07-24

## 2023-07-24 RX ORDER — LIDOCAINE HYDROCHLORIDE 20 MG/ML
JELLY TOPICAL ONCE
Status: DISCONTINUED | OUTPATIENT
Start: 2023-07-24 | End: 2023-07-26 | Stop reason: HOSPADM

## 2023-07-24 ASSESSMENT — VISUAL ACUITY: OU: 1

## 2023-07-24 NOTE — PROGRESS NOTES
Patient Care Team:  Madeline Mcarthur MD as PCP - General (Family Medicine)  CHRISS Crowley as Advanced Practice Nurse (Nurse Practitioner Family)  Enedina Weinstein DO as Consulting Physician (Neurosurgery)  JOSE Cano (Physician Assistant Medical)    TODAY'S DATE:  7/24/2023 7/6/23-Dr. Hughes N [address] [place] [address] CYST POSSIBLE WOUND VAC PLACEMENT     HISTORY of PRESENTILLNESS HPI   Marcela Royal is a 38 y.o. male who presents today for wound evaluation. He reports he developed a wound on sacrum. This started 3 week(s) ago. He believes this is  healing. He has been applying wound vac since surgery. He has not had  fever or chills. He has a history of pilonidal cyst.  Wound Type: surgical  Wound Location: sacrum  Modifying factors:chronic pressure, decreased mobility, and obesity    Patient Active Problem List   Diagnosis Code    Obstructive sleep apnea G47.33    Sleep disturbance G47.9    Dream enactment behavior G47.52    Somnolence, daytime R40.0    BMI 40.0-44.9, adult ([facility]) Z68.41    Sacral ulcer, with fat layer exposed ([address]) L98.422    Pilonidal cyst L05.91       Marcela Royal is a 38 y.o. male with the following history reviewed and recorded in [facility]:    Current Outpatient Medications   Medication Sig Dispense Refill    sodium hypochlorite (DAKINS) 0.125 % SOLN external solution Apply topically as directed twice daily. 1000 mL 5    PROAIR  (90 Base) MCG/ACT inhaler       amLODIPine (NORVASC) 10 MG tablet Take 1 tablet by mouth daily      furosemide (LASIX) 40 MG tablet Take 1 tablet by mouth daily      losartan (COZAAR) 100 MG tablet Take 1 tablet by mouth daily      pantoprazole (PROTONIX) 40 MG tablet Take 1 tablet by mouth daily (Patient not taking: Reported on 7/24/2023)      potassium chloride (KLOR-CON M) 20 MEQ extended release tablet Take 1 tablet by mouth daily       No current facility-administered medications for this encounter.      Allergies: Aspirin, Lisinopril,

## 2023-07-24 NOTE — HOME CARE
Length (cm) 10 cm 07/24/23 1333   Post-Procedure Width (cm) 1.2 cm 07/24/23 1333   Post-Procedure Depth (cm) 3.5 cm 07/24/23 1333   Post-Procedure Surface Area (cm^2) 12 cm^2 07/24/23 1333   Post-Procedure Volume (cm^3) 42 cm^3 07/24/23 1333   Wound Assessment Pink/red;Slough 07/24/23 1319   Drainage Amount Moderate 07/24/23 1319   Drainage Description Serosanguinous 07/24/23 1319   Odor None 07/24/23 1319   Rita-wound Assessment Intact 07/24/23 1319   Margins Defined edges 07/24/23 1319   Wound Thickness Description not for Pressure Injury Full thickness 07/24/23 1319   Number of days: 0          Supplies Requested :      WOUND #: 1   PRIMARY DRESSING:  None   Cover and Secure with: 4X4 gauze pad  ABD pad     FREQUENCY OF DRESSING CHANGES:  Daily         ADDITIONAL ITEMS:  [] Gloves Small  [x] Gloves Medium [] Gloves Large [] Gloves XLarge  [] Tape 1\" [] Tape 2\" [x] Tape 3\"  [x] Medipore Tape  [x] Saline  [] Vashe  [] Skin Prep   [] Adhesive Remover   [] Cotton Tip Applicators   [] Other:    Patient Wound(s) Debrided: [x] Yes if yes please add date 7/24/23  [] No    Debribement Type: Excisional/Sharp and Mechanical     Is the patient currently on an antibiotic for their Wound(s): [] Yes if yes please add name and dose    [x] No    Patient currently being seen by Home Health: [] Yes   [x] No    Duration for needed supplies:  [x]15  []30  []60  []90 Days    Electronically signed by CHRISS Gonzalez CNP on 7/24/2023 at 4:06 PM     Provider Information:      PROVIDER'S NAME: Pablo BANERJEE    NPI: [id number]

## 2023-07-24 NOTE — DISCHARGE INSTRUCTIONS
[address] and Hyperbaric Oxygen Therapy   Physician Orders and Discharge Instructions  [address], [address] [facility]  Telephone: [phone]    NAME:  Salina Damian  YOB: 1980  MEDICAL RECORD NUMBER:  [id number]  DATE:  7/24/2023    Discharge condition: Stable    Discharge to: Home    Left via:Private automobile    Accompanied by:  spouse    ECF/HHA: [facility]    Dressing Orders:  Coccyx Wound:  Wash with soap and water  Apply wound vac as follows: Apply Skin Prep to periwound. Apply drape - window pane to surrounding area (periwound). (May use duoderm instead of drape to prevent skin breakdown. Use ostomy paste to fill any creases to prevent leakage. If skin becomes red due to tape irritation please apply skin prep. Then apply light dusting of ostomy powder or antifungal powder to periwound. Then blot with skin prep to form crusting to protect skin. May repeat skin prep, powdering steps until proper crusting is made. )  Then apply drape to periwound. DO NOT PUT FOAM ON GOOD SKIN. Apply PROMOGRAN then tuck black foam to wound bed. BRIDGE to hip. Set wound vac to 125 mmHG, continuous. Change wound vac dressing 3 times per week (MWF or TTS)  Reapply vac dressing if vac stops working or dressing begins to leak or cannot be reinforced. Alternative dressing: Wash with soap and water. Apply 1/4 STR Dakins moistened gauze to wound bed. Cover with gauze. Secure with roll gauze and medipore tape. Change twice daily . Treatment Orders:  Protein rich diet  Multivitamin  Avoid pressure by always sitting with waffle cushion     [address] [place] [address] follow up visit _____Wednesday________________________  (Please note your next appointment above and if you are unable to keep, kindly give a 24 hour notice.  Thank you.)          If you experience any of the following, please call the [address] [place] [address] during business hours:    * Increase in Pain  * 40.1

## 2023-07-25 NOTE — PLAN OF CARE
Problem: Pressure Ulcer:  Goal: Signs of wound healing will improve  Description: Signs of wound healing will improve  Outcome: Progressing  Goal: Absence of new pressure ulcer  Description: Absence of new pressure ulcer  Outcome: Progressing  Goal: Will show no infection signs and symptoms  Description: Will show no infection signs and symptoms  Outcome: Progressing     Problem: Smoking cessation:  Goal: Ability to formulate a plan to maintain a tobacco-free life will be supported  Description: Ability to formulate a plan to maintain a tobacco-free life will be supported  Outcome: Progressing     Problem: Weight control:  Goal: Ability to maintain an optimal weight for height and age will be supported  Description: Ability to maintain an optimal weight for height and age will be supported  Outcome: Progressing     Problem: Falls - Risk of:  Goal: Will remain free from falls  Description: Will remain free from falls  Outcome: Progressing
ARNP      Electronically signed by Justin Young RN on 7/25/2023 at 2:54 PM

## 2023-07-26 ENCOUNTER — HOSPITAL ENCOUNTER (OUTPATIENT)
Dept: WOUND CARE | Age: 43
Discharge: HOME | End: 2023-07-26
Payer: MEDICAID

## 2023-07-26 DIAGNOSIS — L98.422 SACRAL ULCER, WITH FAT LAYER EXPOSED (HCC): Primary | ICD-10-CM

## 2023-07-26 DIAGNOSIS — L05.91 PILONIDAL CYST: ICD-10-CM

## 2023-07-26 PROCEDURE — 97605 NEG PRS WND THER DME<=50SQCM: CPT

## 2023-07-26 RX ORDER — IBUPROFEN 200 MG
TABLET ORAL ONCE
OUTPATIENT
Start: 2023-07-26 | End: 2023-07-26

## 2023-07-26 RX ORDER — LIDOCAINE HYDROCHLORIDE 20 MG/ML
JELLY TOPICAL ONCE
OUTPATIENT
Start: 2023-07-26 | End: 2023-07-26

## 2023-07-26 RX ORDER — SODIUM CHLOR/HYPOCHLOROUS ACID 0.033 %
SOLUTION, IRRIGATION IRRIGATION ONCE
OUTPATIENT
Start: 2023-07-26 | End: 2023-07-26

## 2023-07-26 RX ORDER — BACITRACIN ZINC AND POLYMYXIN B SULFATE 500; 1000 [USP'U]/G; [USP'U]/G
OINTMENT TOPICAL ONCE
OUTPATIENT
Start: 2023-07-26 | End: 2023-07-26

## 2023-07-26 RX ORDER — GENTAMICIN SULFATE 1 MG/G
OINTMENT TOPICAL ONCE
OUTPATIENT
Start: 2023-07-26 | End: 2023-07-26

## 2023-07-26 RX ORDER — GINSENG 100 MG
CAPSULE ORAL ONCE
OUTPATIENT
Start: 2023-07-26 | End: 2023-07-26

## 2023-07-26 RX ORDER — LIDOCAINE 50 MG/G
OINTMENT TOPICAL ONCE
OUTPATIENT
Start: 2023-07-26 | End: 2023-07-26

## 2023-07-26 RX ORDER — CLOBETASOL PROPIONATE 0.5 MG/G
OINTMENT TOPICAL ONCE
OUTPATIENT
Start: 2023-07-26 | End: 2023-07-26

## 2023-07-26 RX ORDER — LIDOCAINE HYDROCHLORIDE 40 MG/ML
SOLUTION TOPICAL ONCE
OUTPATIENT
Start: 2023-07-26 | End: 2023-07-26

## 2023-07-26 RX ORDER — LIDOCAINE 40 MG/G
CREAM TOPICAL ONCE
OUTPATIENT
Start: 2023-07-26 | End: 2023-07-26

## 2023-07-26 RX ORDER — BETAMETHASONE DIPROPIONATE 0.05 %
OINTMENT (GRAM) TOPICAL ONCE
OUTPATIENT
Start: 2023-07-26 | End: 2023-07-26

## 2023-07-26 NOTE — PLAN OF CARE
Problem: Discharge Planning  Goal: Discharge to home or other facility with appropriate resources  Outcome: Progressing     Problem: Wound:  Goal: Will show signs of wound healing; wound closure and no evidence of infection  Description: Will show signs of wound healing; wound closure and no evidence of infection  Outcome: Progressing     Problem: Smoking cessation:  Goal: Ability to formulate a plan to maintain a tobacco-free life will be supported  Description: Ability to formulate a plan to maintain a tobacco-free life will be supported  Outcome: Progressing     Problem: Compression therapy:  Goal: Will be free from complications associated with compression therapy  Description: Will be free from complications associated with compression therapy  Outcome: Progressing     Problem: Weight control:  Goal: Ability to maintain an optimal weight for height and age will be supported  Description: Ability to maintain an optimal weight for height and age will be supported  Outcome: Progressing     Problem: Falls - Risk of:  Goal: Will remain free from falls  Description: Will remain free from falls  Outcome: Progressing     Problem: Blood Glucose:  Goal: Ability to maintain appropriate glucose levels will improve  Description: Ability to maintain appropriate glucose levels will improve  Outcome: Progressing

## 2023-07-26 NOTE — PLAN OF CARE
Negative Pressure Wound Therapy    NAME:  Naveen Thomson  YOB: 1980  MEDICAL RECORD NUMBER:  [id number]  DATE:  7/26/2023    Applied Negative Pressure to coccyx wound(s)/ulcer(s). [x] Applied skin barrier prep to julián-wound. [x] Cut strips of plastic drape to picture frame wound so that julián-wound is     covered with the drape. [x] If bridging dressing to less prominent site, cover any intact skin that will come in contact with the Negative Pressure Therapy sponge, gauze or channel drain with plastic drape. The sponge should never touch intact skin. [x] Cut sponge, gauze or channel drain to size which will fit into the wound/ulcer bed without being forced. [x] Be sure the sponge is large enough to hold the entire round plastic flange which is attached to the tubing. Never allow flange to be larger than the sponge or it will produce suction damaging intact skin. Total number of individual pieces of foam used within the wound bed: 2    [x] If bridging the dressing away from the primary site, be sure the bridge leads to a piece of sponge large enough to hold the entire flange without allowing any of the flange to overlap onto intact skin. [x] Covered sponge, gauze or channel drain with plastic drape. [x] Cut a hole in this plastic drape directly over the sponge the same size as the plastic drain tubing. [x] Removed plastic liner from flange and apply it directly over the hole you cut. [x] Removed the plastic cover from the flange. [x] Attached the tubing to the wound/ulcer Negative Pressure Therapy and turn it on to be sure a vacuum is created and that there are no leaks. [x] If air leaks occur, use plastic drape to patch them. [] Secured Negative Pressure Therapy dressing with ace wrap loosely if located on an extremity. Maintain tubing outside of ace wrap. Tubing must not exert pressure on intact skin.     Applied per  Guidelines      Electronically signed by Lady Ray

## 2023-07-26 NOTE — DISCHARGE INSTRUCTIONS
[address] and Hyperbaric Oxygen Therapy   Physician Orders and Discharge Instructions  [address], [address] [facility]  Telephone: [phone]    NAME:  Bruna Atkinson  YOB: 1980  MEDICAL RECORD NUMBER:  [id number]  DATE:  7/26/2023    Discharge condition: Stable    Discharge to: Home    Left via:Private automobile    Accompanied by:  spouse      ECF/HHA: [facility]    Dressing Orders:  Coccyx Wound:  Wash with soap and water  Apply wound vac as follows: Apply Skin Prep to periwound. Apply drape - window pane to surrounding area (periwound). (May use duoderm instead of drape to prevent skin breakdown. Use ostomy paste to fill any creases to prevent leakage. If skin becomes red due to tape irritation please apply skin prep. Then apply light dusting of ostomy powder or antifungal powder to periwound. Then blot with skin prep to form crusting to protect skin. May repeat skin prep, powdering steps until proper crusting is made. )  Then apply drape to periwound. DO NOT PUT FOAM ON GOOD SKIN. Apply PROMOGRAN then tuck black foam to wound bed. BRIDGE to hip. Set wound vac to 125 mmHG, continuous. Change wound vac dressing 3 times per week (MWF or TTS)  Reapply vac dressing if vac stops working or dressing begins to leak or cannot be reinforced. Alternative dressing: Wash with soap and water. Apply 1/4 STR Dakins moistened gauze to wound bed. Cover with gauze. Secure with roll gauze and medipore tape. Change twice daily . Treatment Orders:  Protein rich diet  Multivitamin  Avoid pressure by always sitting with waffle cushion     [facility] follow up visit ______Friday_______________________  (Please note your next appointment above and if you are unable to keep, kindly give a 24 hour notice.  Thank you.)          If you experience any of the following, please call the [facility] [place] during business hours:    * Increase in Pain  *

## 2023-07-27 NOTE — DISCHARGE INSTRUCTIONS
[address] and Hyperbaric Oxygen Therapy   Physician Orders and Discharge Instructions  [address], [address] [facility]  Telephone: [phone]    NAME:  Jesús Corona  YOB: 1980  MEDICAL RECORD NUMBER:  [id number]  DATE:  7/27/2023    Discharge condition: Stable    Discharge to: Home    Left via:Private automobile    Accompanied by:  spouse    ECF/HHA: n/a    Dressing Orders:    Coccyx Wound:  Wash with soap and water  Apply wound vac as follows: Apply Skin Prep to periwound. Apply drape - window pane to surrounding area (periwound). (May use duoderm instead of drape to prevent skin breakdown. Use ostomy paste to fill any creases to prevent leakage. If skin becomes red due to tape irritation please apply skin prep. Then apply light dusting of ostomy powder or antifungal powder to periwound. Then blot with skin prep to form crusting to protect skin. May repeat skin prep, powdering steps until proper crusting is made. )  Then apply drape to periwound. DO NOT PUT FOAM ON GOOD SKIN. Apply PROMOGRAN then tuck black foam to wound bed. BRIDGE to hip. Set wound vac to 125 mmHG, continuous. Change wound vac dressing 3 times per week (MWF or TTS)  Reapply vac dressing if vac stops working or dressing begins to leak or cannot be reinforced. Alternative dressing: Wash with soap and water. Apply 1/4 STR Dakins moistened gauze to wound bed. Cover with gauze. Secure with roll gauze and medipore tape. Change twice daily . Treatment Orders:  Protein rich diet  Multivitamin  Avoid pressure by always sitting with waffle cushion      [facility] follow up visit __________keep scheduled follow-up__________________  (Please note your next appointment above and if you are unable to keep, kindly give a 24 hour notice.  Thank you.)              If you experience any of the following, please call the [address] [place] [address] during business hours:     *

## 2023-07-28 ENCOUNTER — HOSPITAL ENCOUNTER (OUTPATIENT)
Dept: WOUND CARE | Age: 43
Discharge: HOME | End: 2023-07-28
Payer: MEDICAID

## 2023-07-28 ENCOUNTER — TELEPHONE (OUTPATIENT)
Dept: GASTROENTEROLOGY | Facility: CLINIC | Age: 43
End: 2023-07-28
Payer: COMMERCIAL

## 2023-07-28 DIAGNOSIS — L98.422 SACRAL ULCER, WITH FAT LAYER EXPOSED (HCC): Primary | ICD-10-CM

## 2023-07-28 DIAGNOSIS — L05.91 PILONIDAL CYST: ICD-10-CM

## 2023-07-28 PROCEDURE — 97605 NEG PRS WND THER DME<=50SQCM: CPT

## 2023-07-28 RX ORDER — SODIUM CHLOR/HYPOCHLOROUS ACID 0.033 %
SOLUTION, IRRIGATION IRRIGATION ONCE
OUTPATIENT
Start: 2023-07-28 | End: 2023-07-28

## 2023-07-28 RX ORDER — LIDOCAINE 40 MG/G
CREAM TOPICAL ONCE
OUTPATIENT
Start: 2023-07-28 | End: 2023-07-28

## 2023-07-28 RX ORDER — IBUPROFEN 200 MG
TABLET ORAL ONCE
OUTPATIENT
Start: 2023-07-28 | End: 2023-07-28

## 2023-07-28 RX ORDER — GENTAMICIN SULFATE 1 MG/G
OINTMENT TOPICAL ONCE
OUTPATIENT
Start: 2023-07-28 | End: 2023-07-28

## 2023-07-28 RX ORDER — LIDOCAINE HYDROCHLORIDE 40 MG/ML
SOLUTION TOPICAL ONCE
OUTPATIENT
Start: 2023-07-28 | End: 2023-07-28

## 2023-07-28 RX ORDER — LIDOCAINE HYDROCHLORIDE 20 MG/ML
JELLY TOPICAL ONCE
OUTPATIENT
Start: 2023-07-28 | End: 2023-07-28

## 2023-07-28 RX ORDER — CLOBETASOL PROPIONATE 0.5 MG/G
OINTMENT TOPICAL ONCE
OUTPATIENT
Start: 2023-07-28 | End: 2023-07-28

## 2023-07-28 RX ORDER — BACITRACIN ZINC AND POLYMYXIN B SULFATE 500; 1000 [USP'U]/G; [USP'U]/G
OINTMENT TOPICAL ONCE
OUTPATIENT
Start: 2023-07-28 | End: 2023-07-28

## 2023-07-28 RX ORDER — LIDOCAINE 50 MG/G
OINTMENT TOPICAL ONCE
OUTPATIENT
Start: 2023-07-28 | End: 2023-07-28

## 2023-07-28 RX ORDER — BETAMETHASONE DIPROPIONATE 0.05 %
OINTMENT (GRAM) TOPICAL ONCE
OUTPATIENT
Start: 2023-07-28 | End: 2023-07-28

## 2023-07-28 RX ORDER — GINSENG 100 MG
CAPSULE ORAL ONCE
OUTPATIENT
Start: 2023-07-28 | End: 2023-07-28

## 2023-07-28 NOTE — DISCHARGE INSTRUCTIONS
[address] and Hyperbaric Oxygen Therapy   Physician Orders and Discharge Instructions  [address] [facility],[address] [place] [address] Blvd, [address]  Telephone: [phone]    NAME:  Naveen Thomson  YOB: 1980  MEDICAL RECORD NUMBER:  [id number]  DATE:  7/31/23    Discharge condition: Stable    Discharge to: Home    Left via:Private automobile    Accompanied by:  spouse    ECF/HHA: [facility]    Wound Culture done in clinic today    Take antibiotic Levaquin as directed     Dressing Orders:  Coccyx Wound:  Wash with soap and water  Apply wound vac as follows: Apply Skin Prep to periwound. Apply drape - window pane to surrounding area (periwound). (May use duoderm instead of drape to prevent skin breakdown. Use ostomy paste to fill any creases to prevent leakage. If skin becomes red due to tape irritation please apply skin prep. Then apply light dusting of ostomy powder or antifungal powder to periwound. Then blot with skin prep to form crusting to protect skin. May repeat skin prep, powdering steps until proper crusting is made. )  Then apply drape to periwound. DO NOT PUT FOAM ON GOOD SKIN. Apply PROMOGRAN then tuck black foam to wound bed. BRIDGE to hip. Set wound vac to 125 mmHG, continuous. Change wound vac dressing 3 times per week (MWF or TTS)  Reapply vac dressing if vac stops working or dressing begins to leak or cannot be reinforced. Alternative dressing: Wash with soap and water. Apply 1/4 STR Dakins moistened gauze to wound bed. Cover with gauze. Secure with roll gauze and medipore tape. Change twice daily . Treatment Orders:  Protein rich diet  Multivitamin  Avoid pressure by always sitting with waffle cushion     [facility] follow up visit _____________________________  (Please note your next appointment above and if you are unable to keep, kindly give a 24 hour notice.  Thank you.)          If you experience any of the following, please call the

## 2023-07-28 NOTE — PLAN OF CARE
Negative Pressure Wound Therapy    NAME:  Storm Cheung  YOB: 1980  MEDICAL RECORD NUMBER:  [id number]  DATE:  7/28/2023    Applied Negative Pressure to buttocks wound(s)/ulcer(s). [x] Applied skin barrier prep to julián-wound. [x] Cut strips of plastic drape to picture frame wound so that julián-wound is     covered with the drape. [x] If bridging dressing to less prominent site, cover any intact skin that will come in contact with the Negative Pressure Therapy sponge, gauze or channel drain with plastic drape. The sponge should never touch intact skin. [x] Cut sponge, gauze or channel drain to size which will fit into the wound/ulcer bed without being forced. [x] Be sure the sponge is large enough to hold the entire round plastic flange which is attached to the tubing. Never allow flange to be larger than the sponge or it will produce suction damaging intact skin. Total number of individual pieces of foam used within the wound bed: 1    [x] If bridging the dressing away from the primary site, be sure the bridge leads to a piece of sponge large enough to hold the entire flange without allowing any of the flange to overlap onto intact skin. [x] Covered sponge, gauze or channel drain with plastic drape. [x] Cut a hole in this plastic drape directly over the sponge the same size as the plastic drain tubing. [x] Removed plastic liner from flange and apply it directly over the hole you cut. [x] Removed the plastic cover from the flange. [x] Attached the tubing to the wound/ulcer Negative Pressure Therapy and turn it on to be sure a vacuum is created and that there are no leaks. [x] If air leaks occur, use plastic drape to patch them. [] Secured Negative Pressure Therapy dressing with ace wrap loosely if located on an extremity. Maintain tubing outside of ace wrap. Tubing must not exert pressure on intact skin.     Applied per  Guidelines      Electronically signed by Ary Gilmore

## 2023-07-28 NOTE — TELEPHONE ENCOUNTER
Received referral to remove G-tube.  Spoke to Alannah Marcelo in Dr Henao's office to let her know that it will need to be removed by the dr who put it in.  Voiced understanding

## 2023-07-28 NOTE — PLAN OF CARE
Problem: Discharge Planning  Goal: Discharge to home or other facility with appropriate resources  Outcome: Progressing     Problem: Wound:  Goal: Will show signs of wound healing; wound closure and no evidence of infection  Description: Will show signs of wound healing; wound closure and no evidence of infection  Outcome: Progressing     Problem: Smoking cessation:  Goal: Ability to formulate a plan to maintain a tobacco-free life will be supported  Description: Ability to formulate a plan to maintain a tobacco-free life will be supported  Outcome: Progressing     Problem: Weight control:  Goal: Ability to maintain an optimal weight for height and age will be supported  Description: Ability to maintain an optimal weight for height and age will be supported  Outcome: Progressing     Problem: Falls - Risk of:  Goal: Will remain free from falls  Description: Will remain free from falls  Outcome: Progressing     Problem: Blood Glucose:  Goal: Ability to maintain appropriate glucose levels will improve  Description: Ability to maintain appropriate glucose levels will improve  Outcome: Progressing

## 2023-07-31 ENCOUNTER — HOSPITAL ENCOUNTER (OUTPATIENT)
Dept: WOUND CARE | Age: 43
Discharge: HOME | End: 2023-07-31
Payer: MEDICAID

## 2023-07-31 VITALS
BODY MASS INDEX: 40.09 KG/M2 | DIASTOLIC BLOOD PRESSURE: 87 MMHG | WEIGHT: 280 LBS | RESPIRATION RATE: 20 BRPM | TEMPERATURE: 97 F | HEART RATE: 89 BPM | SYSTOLIC BLOOD PRESSURE: 132 MMHG | HEIGHT: 70 IN

## 2023-07-31 DIAGNOSIS — L98.422 SACRAL ULCER, WITH FAT LAYER EXPOSED (HCC): ICD-10-CM

## 2023-07-31 DIAGNOSIS — L05.91 PILONIDAL CYST: ICD-10-CM

## 2023-07-31 DIAGNOSIS — L98.422 SACRAL ULCER, WITH FAT LAYER EXPOSED (HCC): Primary | ICD-10-CM

## 2023-07-31 PROCEDURE — 11042 DBRDMT SUBQ TIS 1ST 20SQCM/<: CPT | Performed by: NURSE PRACTITIONER

## 2023-07-31 PROCEDURE — 11042 DBRDMT SUBQ TIS 1ST 20SQCM/<: CPT

## 2023-07-31 PROCEDURE — 97605 NEG PRS WND THER DME<=50SQCM: CPT

## 2023-07-31 PROCEDURE — 6370000000 HC RX 637 (ALT 250 FOR IP): Performed by: NURSE PRACTITIONER

## 2023-07-31 RX ORDER — LIDOCAINE HYDROCHLORIDE 20 MG/ML
JELLY TOPICAL ONCE
OUTPATIENT
Start: 2023-07-31 | End: 2023-07-31

## 2023-07-31 RX ORDER — GINSENG 100 MG
CAPSULE ORAL ONCE
OUTPATIENT
Start: 2023-07-31 | End: 2023-07-31

## 2023-07-31 RX ORDER — LIDOCAINE 50 MG/G
OINTMENT TOPICAL ONCE
OUTPATIENT
Start: 2023-07-31 | End: 2023-07-31

## 2023-07-31 RX ORDER — BACITRACIN ZINC AND POLYMYXIN B SULFATE 500; 1000 [USP'U]/G; [USP'U]/G
OINTMENT TOPICAL ONCE
OUTPATIENT
Start: 2023-07-31 | End: 2023-07-31

## 2023-07-31 RX ORDER — SODIUM CHLOR/HYPOCHLOROUS ACID 0.033 %
SOLUTION, IRRIGATION IRRIGATION ONCE
OUTPATIENT
Start: 2023-07-31 | End: 2023-07-31

## 2023-07-31 RX ORDER — LEVOFLOXACIN 500 MG/1
500 TABLET, FILM COATED ORAL DAILY
Qty: 10 TABLET | Refills: 0 | Status: SHIPPED | OUTPATIENT
Start: 2023-07-31 | End: 2023-08-10

## 2023-07-31 RX ORDER — LIDOCAINE HYDROCHLORIDE 40 MG/ML
SOLUTION TOPICAL ONCE
OUTPATIENT
Start: 2023-07-31 | End: 2023-07-31

## 2023-07-31 RX ORDER — GENTAMICIN SULFATE 1 MG/G
OINTMENT TOPICAL ONCE
OUTPATIENT
Start: 2023-07-31 | End: 2023-07-31

## 2023-07-31 RX ORDER — CLOBETASOL PROPIONATE 0.5 MG/G
OINTMENT TOPICAL ONCE
OUTPATIENT
Start: 2023-07-31 | End: 2023-07-31

## 2023-07-31 RX ORDER — IBUPROFEN 200 MG
TABLET ORAL ONCE
OUTPATIENT
Start: 2023-07-31 | End: 2023-07-31

## 2023-07-31 RX ORDER — LIDOCAINE 40 MG/G
CREAM TOPICAL ONCE
OUTPATIENT
Start: 2023-07-31 | End: 2023-07-31

## 2023-07-31 RX ORDER — LIDOCAINE HYDROCHLORIDE 20 MG/ML
JELLY TOPICAL ONCE
Status: COMPLETED | OUTPATIENT
Start: 2023-07-31 | End: 2023-07-31

## 2023-07-31 RX ORDER — BETAMETHASONE DIPROPIONATE 0.05 %
OINTMENT (GRAM) TOPICAL ONCE
OUTPATIENT
Start: 2023-07-31 | End: 2023-07-31

## 2023-07-31 RX ADMIN — LIDOCAINE HYDROCHLORIDE: 20 JELLY TOPICAL at 11:56

## 2023-07-31 NOTE — PLAN OF CARE
Negative Pressure Wound Therapy    NAME:  Jesús Corona  YOB: 1980  MEDICAL RECORD NUMBER:  [id number]  DATE:  7/31/2023    Applied Negative Pressure to coccyx wound(s)/ulcer(s). [x] Applied skin barrier prep to julián-wound. [x] Cut strips of plastic drape to picture frame wound so that julián-wound is     covered with the drape. [x] If bridging dressing to less prominent site, cover any intact skin that will come in contact with the Negative Pressure Therapy sponge, gauze or channel drain with plastic drape. The sponge should never touch intact skin. [x] Cut sponge, gauze or channel drain to size which will fit into the wound/ulcer bed without being forced. [x] Be sure the sponge is large enough to hold the entire round plastic flange which is attached to the tubing. Never allow flange to be larger than the sponge or it will produce suction damaging intact skin. Total number of individual pieces of foam used within the wound bed: 1    [x] If bridging the dressing away from the primary site, be sure the bridge leads to a piece of sponge large enough to hold the entire flange without allowing any of the flange to overlap onto intact skin. [x] Covered sponge, gauze or channel drain with plastic drape. [x] Cut a hole in this plastic drape directly over the sponge the same size as the plastic drain tubing. [x] Removed plastic liner from flange and apply it directly over the hole you cut. [x] Removed the plastic cover from the flange. [x] Attached the tubing to the wound/ulcer Negative Pressure Therapy and turn it on to be sure a vacuum is created and that there are no leaks. [x] If air leaks occur, use plastic drape to patch them. [] Secured Negative Pressure Therapy dressing with ace wrap loosely if located on an extremity. Maintain tubing outside of ace wrap. Tubing must not exert pressure on intact skin.     Applied per  Guidelines      Electronically signed by Deb Reeves

## 2023-07-31 NOTE — PROGRESS NOTES
[address]   Progress Note and Procedure Note      Rashida Murray RECORD NUMBER:  [id number]  AGE: 38 y.o. GENDER: male  : 1980  EPISODE DATE:  2023    Subjective:     Chief Complaint   Patient presents with    Wound Check      HISTORY of PRESENT ILLNESS HPI     Marcela Royal is a 38 y.o. male who presents today for wound/ulcer evaluation. History of Wound Context: buttock wound follow up/eval and treat    Ulcer Identification:  Ulcer Type:  surgical  Contributing Factors: obesity, smoking, and infection    Wound: Surgical incision        PAST MEDICAL HISTORY        Diagnosis Date    Chronic pain     Hyperlipidemia     Hypertension     Osteoarthritis        PAST SURGICAL HISTORY    Past Surgical History:   Procedure Laterality Date    PILONIDAL CYST EXCISION N/A     TOTAL HIP ARTHROPLASTY      bilateral       FAMILY HISTORY    History reviewed. No pertinent family history. SOCIAL HISTORY    Social History     Tobacco Use    Smoking status: Every Day     Packs/day: 0.50     Types: Cigarettes    Smokeless tobacco: Never   Substance Use Topics    Alcohol use: Not Currently    Drug use: Never       ALLERGIES    Allergies   Allergen Reactions    Aspirin Anaphylaxis and Hives    Lisinopril Cough    Aleve [Naproxen Sodium]        MEDICATIONS    Current Outpatient Medications on File Prior to Encounter   Medication Sig Dispense Refill    sodium hypochlorite (DAKINS) 0.125 % SOLN external solution Apply topically as directed twice daily. 1000 mL 5    PROAIR  (90 Base) MCG/ACT inhaler       amLODIPine (NORVASC) 10 MG tablet Take 1 tablet by mouth daily      furosemide (LASIX) 40 MG tablet Take 1 tablet by mouth daily      losartan (COZAAR) 100 MG tablet Take 1 tablet by mouth daily      potassium chloride (KLOR-CON M) 20 MEQ extended release tablet Take 1 tablet by mouth daily       No current facility-administered medications on file prior to encounter.

## 2023-07-31 NOTE — PLAN OF CARE
Problem: Discharge Planning  Goal: Discharge to home or other facility with appropriate resources  Outcome: Progressing     Problem: Safety - Adult  Goal: Free from fall injury  Outcome: Progressing     Problem: Discharge Planning  Goal: Discharge to home or other facility with appropriate resources  Outcome: Progressing     Problem: Wound:  Goal: Will show signs of wound healing; wound closure and no evidence of infection  Description: Will show signs of wound healing; wound closure and no evidence of infection  Outcome: Progressing     Problem: Smoking cessation:  Goal: Ability to formulate a plan to maintain a tobacco-free life will be supported  Description: Ability to formulate a plan to maintain a tobacco-free life will be supported  Outcome: Progressing     Problem: Weight control:  Goal: Ability to maintain an optimal weight for height and age will be supported  Description: Ability to maintain an optimal weight for height and age will be supported  Outcome: Progressing     Problem: Falls - Risk of:  Goal: Will remain free from falls  Description: Will remain free from falls  Outcome: Progressing     Problem: Blood Glucose:  Goal: Ability to maintain appropriate glucose levels will improve  Description: Ability to maintain appropriate glucose levels will improve  Outcome: Progressing

## 2023-08-02 ENCOUNTER — HOSPITAL ENCOUNTER (OUTPATIENT)
Dept: WOUND CARE | Age: 43
Discharge: HOME | End: 2023-08-02
Payer: MEDICAID

## 2023-08-02 VITALS
HEIGHT: 70 IN | WEIGHT: 280 LBS | RESPIRATION RATE: 20 BRPM | BODY MASS INDEX: 40.09 KG/M2 | SYSTOLIC BLOOD PRESSURE: 137 MMHG | DIASTOLIC BLOOD PRESSURE: 100 MMHG | HEART RATE: 97 BPM | TEMPERATURE: 96.7 F

## 2023-08-02 DIAGNOSIS — L98.422 SACRAL ULCER, WITH FAT LAYER EXPOSED (HCC): Primary | ICD-10-CM

## 2023-08-02 DIAGNOSIS — L05.91 PILONIDAL CYST: ICD-10-CM

## 2023-08-02 PROCEDURE — 97605 NEG PRS WND THER DME<=50SQCM: CPT

## 2023-08-02 RX ORDER — LIDOCAINE 50 MG/G
OINTMENT TOPICAL ONCE
OUTPATIENT
Start: 2023-08-02 | End: 2023-08-02

## 2023-08-02 RX ORDER — SODIUM CHLOR/HYPOCHLOROUS ACID 0.033 %
SOLUTION, IRRIGATION IRRIGATION ONCE
OUTPATIENT
Start: 2023-08-02 | End: 2023-08-02

## 2023-08-02 RX ORDER — BETAMETHASONE DIPROPIONATE 0.05 %
OINTMENT (GRAM) TOPICAL ONCE
OUTPATIENT
Start: 2023-08-02 | End: 2023-08-02

## 2023-08-02 RX ORDER — LIDOCAINE 40 MG/G
CREAM TOPICAL ONCE
OUTPATIENT
Start: 2023-08-02 | End: 2023-08-02

## 2023-08-02 RX ORDER — METRONIDAZOLE 500 MG/1
500 TABLET ORAL 2 TIMES DAILY
Qty: 14 TABLET | Refills: 0 | Status: SHIPPED | OUTPATIENT
Start: 2023-08-02 | End: 2023-08-09

## 2023-08-02 RX ORDER — CLOBETASOL PROPIONATE 0.5 MG/G
OINTMENT TOPICAL ONCE
OUTPATIENT
Start: 2023-08-02 | End: 2023-08-02

## 2023-08-02 RX ORDER — LIDOCAINE HYDROCHLORIDE 20 MG/ML
JELLY TOPICAL ONCE
OUTPATIENT
Start: 2023-08-02 | End: 2023-08-02

## 2023-08-02 RX ORDER — GENTAMICIN SULFATE 1 MG/G
OINTMENT TOPICAL ONCE
OUTPATIENT
Start: 2023-08-02 | End: 2023-08-02

## 2023-08-02 RX ORDER — LIDOCAINE HYDROCHLORIDE 40 MG/ML
SOLUTION TOPICAL ONCE
OUTPATIENT
Start: 2023-08-02 | End: 2023-08-02

## 2023-08-02 RX ORDER — GINSENG 100 MG
CAPSULE ORAL ONCE
OUTPATIENT
Start: 2023-08-02 | End: 2023-08-02

## 2023-08-02 RX ORDER — IBUPROFEN 200 MG
TABLET ORAL ONCE
OUTPATIENT
Start: 2023-08-02 | End: 2023-08-02

## 2023-08-02 RX ORDER — BACITRACIN ZINC AND POLYMYXIN B SULFATE 500; 1000 [USP'U]/G; [USP'U]/G
OINTMENT TOPICAL ONCE
OUTPATIENT
Start: 2023-08-02 | End: 2023-08-02

## 2023-08-02 ASSESSMENT — PAIN DESCRIPTION - LOCATION: LOCATION: FOOT

## 2023-08-02 ASSESSMENT — PAIN - FUNCTIONAL ASSESSMENT: PAIN_FUNCTIONAL_ASSESSMENT: PREVENTS OR INTERFERES SOME ACTIVE ACTIVITIES AND ADLS

## 2023-08-02 ASSESSMENT — PAIN DESCRIPTION - ORIENTATION: ORIENTATION: RIGHT;LEFT

## 2023-08-02 ASSESSMENT — PAIN DESCRIPTION - FREQUENCY: FREQUENCY: INTERMITTENT

## 2023-08-02 ASSESSMENT — PAIN DESCRIPTION - DESCRIPTORS: DESCRIPTORS: BURNING

## 2023-08-02 ASSESSMENT — PAIN SCALES - GENERAL: PAINLEVEL_OUTOF10: 6

## 2023-08-02 ASSESSMENT — PAIN DESCRIPTION - ONSET: ONSET: ON-GOING

## 2023-08-02 ASSESSMENT — PAIN DESCRIPTION - PAIN TYPE: TYPE: CHRONIC PAIN

## 2023-08-02 NOTE — PLAN OF CARE
Problem: Discharge Planning  Goal: Discharge to home or other facility with appropriate resources  Outcome: Progressing     Problem: Pain  Goal: Verbalizes/displays adequate comfort level or baseline comfort level  Outcome: Progressing     Problem: Wound:  Goal: Will show signs of wound healing; wound closure and no evidence of infection  Description: Will show signs of wound healing; wound closure and no evidence of infection  Outcome: Progressing     Problem: Weight control:  Goal: Ability to maintain an optimal weight for height and age will be supported  Description: Ability to maintain an optimal weight for height and age will be supported  Outcome: Progressing     Problem: Falls - Risk of:  Goal: Will remain free from falls  Description: Will remain free from falls  Outcome: Progressing     Problem: Blood Glucose:  Goal: Ability to maintain appropriate glucose levels will improve  Description: Ability to maintain appropriate glucose levels will improve  Outcome: Progressing

## 2023-08-02 NOTE — DISCHARGE INSTRUCTIONS
[address] and Hyperbaric Oxygen Therapy   Physician Orders and Discharge Instructions  [address] [facility],[address] [place] [address] Blvd, [address]  Telephone: [phone]    NAME:  Danuta Pappas  YOB: 1980  MEDICAL RECORD NUMBER:  [id number]  DATE:  8/2/2023    Discharge condition: Stable    Discharge to: Home    Left via:Private automobile    Accompanied by:  spouse    ECF/HHA: n/a    Dressing Orders:  Coccyx Wound:  Wash with soap and water  Apply wound vac as follows: Apply Skin Prep to periwound. Apply drape - window pane to surrounding area (periwound). (May use duoderm instead of drape to prevent skin breakdown. Use ostomy paste to fill any creases to prevent leakage. If skin becomes red due to tape irritation please apply skin prep. Then apply light dusting of ostomy powder or antifungal powder to periwound. Then blot with skin prep to form crusting to protect skin. May repeat skin prep, powdering steps until proper crusting is made. )  Then apply drape to periwound. DO NOT PUT FOAM ON GOOD SKIN. Apply PROMOGRAN then tuck black foam to wound bed. BRIDGE to hip. Set wound vac to 125 mmHG, continuous. Change wound vac dressing 3 times per week (MWF or TTS)  Reapply vac dressing if vac stops working or dressing begins to leak or cannot be reinforced. Alternative dressing: Wash with soap and water. Apply 1/4 STR Dakins moistened gauze to wound bed. Cover with gauze. Secure with roll gauze and medipore tape. Change twice daily . Treatment Orders:  Protein rich diet  Multivitamin  Avoid pressure by always sitting with waffle cushion      [address] [place] [address] follow up visit ____________keep scheduled follow-up_________________  (Please note your next appointment above and if you are unable to keep, kindly give a 24 hour notice.  Thank you.)              If you experience any of the following, please call the [address] [place] [address] during business hours:     *

## 2023-08-02 NOTE — PLAN OF CARE
Negative Pressure Wound Therapy    NAME:  Carmen Rasmussen  YOB: 1980  MEDICAL RECORD NUMBER:  [id number]  DATE:  8/2/2023    Applied Negative Pressure to coccyx wound(s)/ulcer(s). [x] Applied skin barrier prep to julián-wound. [x] Cut strips of plastic drape to picture frame wound so that julián-wound is     covered with the drape. [x] If bridging dressing to less prominent site, cover any intact skin that will come in contact with the Negative Pressure Therapy sponge, gauze or channel drain with plastic drape. The sponge should never touch intact skin. [x] Cut sponge, gauze or channel drain to size which will fit into the wound/ulcer bed without being forced. [x] Be sure the sponge is large enough to hold the entire round plastic flange which is attached to the tubing. Never allow flange to be larger than the sponge or it will produce suction damaging intact skin. Total number of individual pieces of foam used within the wound bed: 1    [x] If bridging the dressing away from the primary site, be sure the bridge leads to a piece of sponge large enough to hold the entire flange without allowing any of the flange to overlap onto intact skin. [x] Covered sponge, gauze or channel drain with plastic drape. [x] Cut a hole in this plastic drape directly over the sponge the same size as the plastic drain tubing. [x] Removed plastic liner from flange and apply it directly over the hole you cut. [x] Removed the plastic cover from the flange. [x] Attached the tubing to the wound/ulcer Negative Pressure Therapy and turn it on to be sure a vacuum is created and that there are no leaks. [x] If air leaks occur, use plastic drape to patch them. [] Secured Negative Pressure Therapy dressing with ace wrap loosely if located on an extremity. Maintain tubing outside of ace wrap. Tubing must not exert pressure on intact skin.     Applied per  Guidelines      Electronically signed by Alejandro Mosher (R)

## 2023-08-04 ENCOUNTER — HOSPITAL ENCOUNTER (OUTPATIENT)
Dept: WOUND CARE | Age: 43
Discharge: HOME | End: 2023-08-04
Payer: MEDICAID

## 2023-08-04 DIAGNOSIS — L05.91 PILONIDAL CYST: ICD-10-CM

## 2023-08-04 DIAGNOSIS — L98.422 SACRAL ULCER, WITH FAT LAYER EXPOSED (HCC): Primary | ICD-10-CM

## 2023-08-04 LAB
BACTERIA SPEC ANAEROBE CULT: ABNORMAL
BACTERIA SPEC ANAEROBE CULT: ABNORMAL
BACTERIA SPEC ANAEROBE+AEROBE CULT: ABNORMAL
GRAM STN SPEC: ABNORMAL
ORGANISM: ABNORMAL

## 2023-08-04 PROCEDURE — 97605 NEG PRS WND THER DME<=50SQCM: CPT

## 2023-08-04 NOTE — DISCHARGE INSTRUCTIONS
in Pain  * Temperature over 101  * Increase in drainage from your wound  * Drainage with a foul odor  * Bleeding  * Increase in swelling  * Need for compression bandage changes due to slippage, breakthrough drainage. If you need medical attention outside of the business hours of the [facility] please contact your PCP or go to the nearest emergency room.

## 2023-08-04 NOTE — PLAN OF CARE
Negative Pressure Wound Therapy    NAME:  Varinder Molina  YOB: 1980  MEDICAL RECORD NUMBER:  [id number]  DATE:  8/4/2023    Applied Negative Pressure to coccyx wound(s)/ulcer(s). [x] Applied skin barrier prep to julián-wound. [x] Cut strips of plastic drape to picture frame wound so that julián-wound is     covered with the drape. [x] If bridging dressing to less prominent site, cover any intact skin that will come in contact with the Negative Pressure Therapy sponge, gauze or channel drain with plastic drape. The sponge should never touch intact skin. [x] Cut sponge, gauze or channel drain to size which will fit into the wound/ulcer bed without being forced. [x] Be sure the sponge is large enough to hold the entire round plastic flange which is attached to the tubing. Never allow flange to be larger than the sponge or it will produce suction damaging intact skin. Total number of individual pieces of foam used within the wound bed: 1    [x] If bridging the dressing away from the primary site, be sure the bridge leads to a piece of sponge large enough to hold the entire flange without allowing any of the flange to overlap onto intact skin. [x] Covered sponge, gauze or channel drain with plastic drape. [x] Cut a hole in this plastic drape directly over the sponge the same size as the plastic drain tubing. [x] Removed plastic liner from flange and apply it directly over the hole you cut. [x] Removed the plastic cover from the flange. [x] Attached the tubing to the wound/ulcer Negative Pressure Therapy and turn it on to be sure a vacuum is created and that there are no leaks. [x] If air leaks occur, use plastic drape to patch them. [] Secured Negative Pressure Therapy dressing with ace wrap loosely if located on an extremity. Maintain tubing outside of ace wrap. Tubing must not exert pressure on intact skin.     Applied per  Guidelines      Electronically signed by Alejandro Mosher (R)

## 2023-08-04 NOTE — DISCHARGE INSTRUCTIONS
[address] and Hyperbaric Oxygen Therapy   Physician Orders and Discharge Instructions  [address] [facility],[address] [place] [address] Blvd, [address]  Telephone: [phone]    NAME:  Jesús Corona  YOB: 1980  MEDICAL RECORD NUMBER:  [id number]  DATE:  8/4/2023    Discharge condition: Stable    Discharge to: Home    Left via:Private automobile    Accompanied by:  spouse    ECF/HHA: [facility]     Take antibiotic Levaquin as directed       Place Kylie to wound bed today before placing wound vac dressing    Dressing Orders:   Coccyx Wound:   Wash with soap and water  Apply wound vac as follows: Apply Skin Prep to periwound. Apply drape - window pane to surrounding area (periwound). (May use duoderm instead of drape to prevent skin breakdown. Use ostomy paste to fill any creases to prevent leakage. If skin becomes red due to tape irritation please apply skin prep. Then apply light dusting of ostomy powder or antifungal powder to periwound. Then blot with skin prep to form crusting to protect skin. May repeat skin prep, powdering steps until proper crusting is made. )   Then apply drape to periwound. DO NOT PUT FOAM ON GOOD SKIN. Apply PROMOGRAN then tuck black foam to wound bed. BRIDGE to hip. Set wound vac to 125 mmHG, continuous. Change wound vac dressing 3 times per week (MWF or TTS)   Reapply vac dressing if vac stops working or dressing begins to leak or cannot be reinforced. Alternative dressing: Wash with soap and water. Apply 1/4 STR Dakins moistened gauze to wound bed. Cover with gauze. Secure with roll gauze and medipore tape. Change twice daily . Treatment Orders:   Protein rich diet   Multivitamin   Avoid pressure by always sitting with MEMORY FOAM CUSHION OR PILLOW    [facility] follow up visit ________1 week with Viji_____________________  (Please note your next appointment above and if you are unable to keep, kindly give a 24 hour notice.

## 2023-08-07 ENCOUNTER — HOSPITAL ENCOUNTER (OUTPATIENT)
Dept: WOUND CARE | Age: 43
Discharge: HOME | End: 2023-08-07
Payer: MEDICAID

## 2023-08-07 VITALS
HEIGHT: 70 IN | WEIGHT: 280 LBS | TEMPERATURE: 98 F | BODY MASS INDEX: 40.09 KG/M2 | RESPIRATION RATE: 20 BRPM | DIASTOLIC BLOOD PRESSURE: 88 MMHG | SYSTOLIC BLOOD PRESSURE: 129 MMHG | HEART RATE: 101 BPM

## 2023-08-07 DIAGNOSIS — L05.91 PILONIDAL CYST: ICD-10-CM

## 2023-08-07 DIAGNOSIS — E11.622 TYPE 2 DIABETES MELLITUS WITH OTHER SKIN ULCER, WITH LONG-TERM CURRENT USE OF INSULIN (HCC): ICD-10-CM

## 2023-08-07 DIAGNOSIS — L98.422 SACRAL ULCER, WITH FAT LAYER EXPOSED (HCC): Primary | ICD-10-CM

## 2023-08-07 DIAGNOSIS — Z79.4 TYPE 2 DIABETES MELLITUS WITH OTHER SKIN ULCER, WITH LONG-TERM CURRENT USE OF INSULIN (HCC): ICD-10-CM

## 2023-08-07 PROBLEM — E11.628 TYPE 2 DIABETES MELLITUS WITH SKIN COMPLICATION, WITH LONG-TERM CURRENT USE OF INSULIN (HCC): Status: ACTIVE | Noted: 2023-08-07

## 2023-08-07 PROCEDURE — 11042 DBRDMT SUBQ TIS 1ST 20SQCM/<: CPT

## 2023-08-07 PROCEDURE — 97605 NEG PRS WND THER DME<=50SQCM: CPT

## 2023-08-07 PROCEDURE — 0JB70ZZ EXCISION OF BACK SUBCUTANEOUS TISSUE AND FASCIA, OPEN APPROACH: ICD-10-PCS | Performed by: NURSE PRACTITIONER

## 2023-08-07 PROCEDURE — 6370000000 HC RX 637 (ALT 250 FOR IP): Performed by: NURSE PRACTITIONER

## 2023-08-07 PROCEDURE — 11042 DBRDMT SUBQ TIS 1ST 20SQCM/<: CPT | Performed by: NURSE PRACTITIONER

## 2023-08-07 RX ORDER — GENTAMICIN SULFATE 1 MG/G
OINTMENT TOPICAL ONCE
OUTPATIENT
Start: 2023-08-07 | End: 2023-08-07

## 2023-08-07 RX ORDER — LIDOCAINE HYDROCHLORIDE 40 MG/ML
SOLUTION TOPICAL ONCE
OUTPATIENT
Start: 2023-08-07 | End: 2023-08-07

## 2023-08-07 RX ORDER — BETAMETHASONE DIPROPIONATE 0.05 %
OINTMENT (GRAM) TOPICAL ONCE
OUTPATIENT
Start: 2023-08-07 | End: 2023-08-07

## 2023-08-07 RX ORDER — IBUPROFEN 200 MG
TABLET ORAL ONCE
OUTPATIENT
Start: 2023-08-07 | End: 2023-08-07

## 2023-08-07 RX ORDER — BACITRACIN ZINC AND POLYMYXIN B SULFATE 500; 1000 [USP'U]/G; [USP'U]/G
OINTMENT TOPICAL ONCE
OUTPATIENT
Start: 2023-08-07 | End: 2023-08-07

## 2023-08-07 RX ORDER — LIDOCAINE HYDROCHLORIDE 20 MG/ML
JELLY TOPICAL ONCE
Status: COMPLETED | OUTPATIENT
Start: 2023-08-07 | End: 2023-08-07

## 2023-08-07 RX ORDER — SODIUM CHLOR/HYPOCHLOROUS ACID 0.033 %
SOLUTION, IRRIGATION IRRIGATION ONCE
OUTPATIENT
Start: 2023-08-07 | End: 2023-08-07

## 2023-08-07 RX ORDER — LIDOCAINE HYDROCHLORIDE 20 MG/ML
JELLY TOPICAL ONCE
OUTPATIENT
Start: 2023-08-07 | End: 2023-08-07

## 2023-08-07 RX ORDER — CLOBETASOL PROPIONATE 0.5 MG/G
OINTMENT TOPICAL ONCE
OUTPATIENT
Start: 2023-08-07 | End: 2023-08-07

## 2023-08-07 RX ORDER — LIDOCAINE 40 MG/G
CREAM TOPICAL ONCE
OUTPATIENT
Start: 2023-08-07 | End: 2023-08-07

## 2023-08-07 RX ORDER — GINSENG 100 MG
CAPSULE ORAL ONCE
OUTPATIENT
Start: 2023-08-07 | End: 2023-08-07

## 2023-08-07 RX ORDER — LIDOCAINE 50 MG/G
OINTMENT TOPICAL ONCE
OUTPATIENT
Start: 2023-08-07 | End: 2023-08-07

## 2023-08-07 RX ADMIN — LIDOCAINE HYDROCHLORIDE: 20 JELLY TOPICAL at 12:01

## 2023-08-07 ASSESSMENT — PAIN DESCRIPTION - DESCRIPTORS: DESCRIPTORS: BURNING;DISCOMFORT;PINS AND NEEDLES

## 2023-08-07 ASSESSMENT — PAIN DESCRIPTION - LOCATION: LOCATION: FOOT

## 2023-08-07 ASSESSMENT — PAIN DESCRIPTION - FREQUENCY: FREQUENCY: INTERMITTENT

## 2023-08-07 ASSESSMENT — PAIN SCALES - GENERAL: PAINLEVEL_OUTOF10: 6

## 2023-08-07 ASSESSMENT — PAIN DESCRIPTION - ONSET: ONSET: ON-GOING

## 2023-08-07 ASSESSMENT — PAIN DESCRIPTION - PAIN TYPE: TYPE: NEUROPATHIC PAIN

## 2023-08-07 ASSESSMENT — PAIN DESCRIPTION - ORIENTATION: ORIENTATION: RIGHT;LEFT

## 2023-08-07 ASSESSMENT — PAIN - FUNCTIONAL ASSESSMENT: PAIN_FUNCTIONAL_ASSESSMENT: PREVENTS OR INTERFERES SOME ACTIVE ACTIVITIES AND ADLS

## 2023-08-07 NOTE — PROGRESS NOTES
[address]   Progress Note and Procedure Note      Rashida Murray RECORD NUMBER:  [id number]  AGE: 38 y.o. GENDER: male  : 1980  EPISODE DATE:  2023    Subjective:     Chief Complaint   Patient presents with    Wound Check     Patient presents for recheck of coccyx wound      HISTORY of PRESENT ILLNESS HPI     Brookie Romberg is a 38 y.o. male who presents today for wound/ulcer evaluation. History of Wound Context: coccyx wound follow up/eval and treat    Ulcer Identification:  Ulcer Type:  surgical  Contributing Factors: diabetes, chronic pressure, shear force, and infection    Wound: Surgical incision        PAST MEDICAL HISTORY        Diagnosis Date    Chronic pain     Hyperlipidemia     Hypertension     Osteoarthritis        PAST SURGICAL HISTORY    Past Surgical History:   Procedure Laterality Date    PILONIDAL CYST EXCISION N/A     TOTAL HIP ARTHROPLASTY      bilateral       FAMILY HISTORY    History reviewed. No pertinent family history. SOCIAL HISTORY    Social History     Tobacco Use    Smoking status: Every Day     Packs/day: 0.50     Types: Cigarettes    Smokeless tobacco: Never   Substance Use Topics    Alcohol use: Not Currently    Drug use: Never       ALLERGIES    Allergies   Allergen Reactions    Aspirin Anaphylaxis and Hives    Lisinopril Cough    Aleve [Naproxen Sodium]        MEDICATIONS    Current Outpatient Medications on File Prior to Encounter   Medication Sig Dispense Refill    metroNIDAZOLE (FLAGYL) 500 MG tablet Take 1 tablet by mouth 2 times daily for 7 days 14 tablet 0    levoFLOXacin (LEVAQUIN) 500 MG tablet Take 1 tablet by mouth daily for 10 days 10 tablet 0    sodium hypochlorite (DAKINS) 0.125 % SOLN external solution Apply topically as directed twice daily.  1000 mL 5    PROAIR  (90 Base) MCG/ACT inhaler       amLODIPine (NORVASC) 10 MG tablet Take 1 tablet by mouth daily      furosemide (LASIX) 40 MG tablet Take 1

## 2023-08-07 NOTE — PLAN OF CARE
Problem: Discharge Planning  Goal: Discharge to home or other facility with appropriate resources  8/7/2023 1205 by Cecelia Everett RN  Outcome: Progressing  8/7/2023 1205 by Cecelia Everett RN  Outcome: Progressing     Problem: Pain  Goal: Verbalizes/displays adequate comfort level or baseline comfort level  8/7/2023 1205 by Cecelia Everett RN  Outcome: Progressing  8/7/2023 1205 by Cecelia Everett RN  Outcome: Progressing     Problem: Wound:  Goal: Will show signs of wound healing; wound closure and no evidence of infection  Description: Will show signs of wound healing; wound closure and no evidence of infection  Outcome: Progressing     Problem: Weight control:  Goal: Ability to maintain an optimal weight for height and age will be supported  Description: Ability to maintain an optimal weight for height and age will be supported  Outcome: Progressing     Problem: Falls - Risk of:  Goal: Will remain free from falls  Description: Will remain free from falls  Outcome: Progressing     Problem: Blood Glucose:  Goal: Ability to maintain appropriate glucose levels will improve  Description: Ability to maintain appropriate glucose levels will improve  Outcome: Progressing

## 2023-08-09 ENCOUNTER — HOSPITAL ENCOUNTER (OUTPATIENT)
Dept: WOUND CARE | Age: 43
Discharge: HOME | End: 2023-08-09
Payer: MEDICAID

## 2023-08-09 ENCOUNTER — ANESTHESIA (OUTPATIENT)
Dept: OPERATING ROOM | Age: 43
End: 2023-08-09
Payer: MEDICAID

## 2023-08-09 ENCOUNTER — HOSPITAL ENCOUNTER (INPATIENT)
Age: 43
LOS: 5 days | Discharge: HOME | DRG: 856 | End: 2023-08-14
Attending: HOSPITALIST
Payer: MEDICAID

## 2023-08-09 ENCOUNTER — APPOINTMENT (OUTPATIENT)
Dept: CT IMAGING | Age: 43
DRG: 856 | End: 2023-08-09
Payer: MEDICAID

## 2023-08-09 ENCOUNTER — ANESTHESIA EVENT (OUTPATIENT)
Dept: OPERATING ROOM | Age: 43
End: 2023-08-09
Payer: MEDICAID

## 2023-08-09 VITALS
TEMPERATURE: 97.2 F | SYSTOLIC BLOOD PRESSURE: 141 MMHG | BODY MASS INDEX: 39.08 KG/M2 | HEART RATE: 96 BPM | DIASTOLIC BLOOD PRESSURE: 86 MMHG | HEIGHT: 70 IN | RESPIRATION RATE: 20 BRPM | WEIGHT: 273 LBS

## 2023-08-09 DIAGNOSIS — L05.91 PILONIDAL CYST: ICD-10-CM

## 2023-08-09 DIAGNOSIS — L02.31 ABSCESS OF BUTTOCK: ICD-10-CM

## 2023-08-09 DIAGNOSIS — E11.622 TYPE 2 DIABETES MELLITUS WITH OTHER SKIN ULCER, WITH LONG-TERM CURRENT USE OF INSULIN (HCC): ICD-10-CM

## 2023-08-09 DIAGNOSIS — Z79.4 INSULIN DEPENDENT TYPE 2 DIABETES MELLITUS (HCC): ICD-10-CM

## 2023-08-09 DIAGNOSIS — E11.9 INSULIN DEPENDENT TYPE 2 DIABETES MELLITUS (HCC): ICD-10-CM

## 2023-08-09 DIAGNOSIS — M46.28 ABSCESS, SACRUM (HCC): Primary | ICD-10-CM

## 2023-08-09 DIAGNOSIS — L98.422 SACRAL ULCER, WITH FAT LAYER EXPOSED (HCC): Primary | ICD-10-CM

## 2023-08-09 DIAGNOSIS — Z79.4 TYPE 2 DIABETES MELLITUS WITH OTHER SKIN ULCER, WITH LONG-TERM CURRENT USE OF INSULIN (HCC): ICD-10-CM

## 2023-08-09 DIAGNOSIS — T81.89XS: ICD-10-CM

## 2023-08-09 PROBLEM — T81.49XA SURGICAL WOUND INFECTION: Status: ACTIVE | Noted: 2023-08-09

## 2023-08-09 LAB
ALBUMIN SERPL-MCNC: 4 G/DL (ref 3.5–5.2)
ALP SERPL-CCNC: 74 U/L (ref 40–130)
ALT SERPL-CCNC: 20 U/L (ref 5–41)
ANION GAP SERPL CALCULATED.3IONS-SCNC: 11 MMOL/L (ref 7–19)
AST SERPL-CCNC: 19 U/L (ref 5–40)
BASOPHILS # BLD: 0.1 K/UL (ref 0–0.2)
BASOPHILS NFR BLD: 0.6 % (ref 0–1)
BILIRUB SERPL-MCNC: <0.2 MG/DL (ref 0.2–1.2)
BUN SERPL-MCNC: 10 MG/DL (ref 6–20)
CALCIUM SERPL-MCNC: 9.5 MG/DL (ref 8.6–10)
CHLORIDE SERPL-SCNC: 100 MMOL/L (ref 98–111)
CO2 SERPL-SCNC: 27 MMOL/L (ref 22–29)
CREAT SERPL-MCNC: 0.8 MG/DL (ref 0.5–1.2)
CRP SERPL HS-MCNC: 3.13 MG/DL (ref 0–0.5)
EOSINOPHIL # BLD: 0.2 K/UL (ref 0–0.6)
EOSINOPHIL NFR BLD: 2.1 % (ref 0–5)
ERYTHROCYTE [DISTWIDTH] IN BLOOD BY AUTOMATED COUNT: 14 % (ref 11.5–14.5)
GLUCOSE BLD-MCNC: 102 MG/DL (ref 70–99)
GLUCOSE SERPL-MCNC: 113 MG/DL (ref 74–109)
HBA1C MFR BLD: 7 % (ref 4–6)
HCT VFR BLD AUTO: 42.4 % (ref 42–52)
HGB BLD-MCNC: 13.3 G/DL (ref 14–18)
IMM GRANULOCYTES # BLD: 0.1 K/UL
LACTATE BLDV-SCNC: 1.5 MMOL/L (ref 0.5–1.9)
LYMPHOCYTES # BLD: 1.6 K/UL (ref 1.1–4.5)
LYMPHOCYTES NFR BLD: 14.6 % (ref 20–40)
MCH RBC QN AUTO: 30.4 PG (ref 27–31)
MCHC RBC AUTO-ENTMCNC: 31.4 G/DL (ref 33–37)
MCV RBC AUTO: 97 FL (ref 80–94)
MONOCYTES # BLD: 0.6 K/UL (ref 0–0.9)
MONOCYTES NFR BLD: 5.7 % (ref 0–10)
NEUTROPHILS # BLD: 8.1 K/UL (ref 1.5–7.5)
NEUTS SEG NFR BLD: 76.5 % (ref 50–65)
PERFORMED ON: ABNORMAL
PLATELET # BLD AUTO: 440 K/UL (ref 130–400)
PMV BLD AUTO: 9.2 FL (ref 9.4–12.4)
POTASSIUM SERPL-SCNC: 4.3 MMOL/L (ref 3.5–5)
PROT SERPL-MCNC: 7.8 G/DL (ref 6.6–8.7)
RBC # BLD AUTO: 4.37 M/UL (ref 4.7–6.1)
SODIUM SERPL-SCNC: 138 MMOL/L (ref 136–145)
WBC # BLD AUTO: 10.6 K/UL (ref 4.8–10.8)

## 2023-08-09 PROCEDURE — 6360000002 HC RX W HCPCS: Performed by: HOSPITALIST

## 2023-08-09 PROCEDURE — 83036 HEMOGLOBIN GLYCOSYLATED A1C: CPT

## 2023-08-09 PROCEDURE — 3600000003 HC SURGERY LEVEL 3 BASE: Performed by: SURGERY

## 2023-08-09 PROCEDURE — 0K9P0ZZ DRAINAGE OF LEFT HIP MUSCLE, OPEN APPROACH: ICD-10-PCS | Performed by: SURGERY

## 2023-08-09 PROCEDURE — 2709999900 HC NON-CHARGEABLE SUPPLY: Performed by: SURGERY

## 2023-08-09 PROCEDURE — 36415 COLL VENOUS BLD VENIPUNCTURE: CPT

## 2023-08-09 PROCEDURE — 85025 COMPLETE CBC W/AUTO DIFF WBC: CPT

## 2023-08-09 PROCEDURE — 72192 CT PELVIS W/O DYE: CPT

## 2023-08-09 PROCEDURE — C9399 UNCLASSIFIED DRUGS OR BIOLOG: HCPCS | Performed by: NURSE ANESTHETIST, CERTIFIED REGISTERED

## 2023-08-09 PROCEDURE — 80053 COMPREHEN METABOLIC PANEL: CPT

## 2023-08-09 PROCEDURE — 87070 CULTURE OTHR SPECIMN AEROBIC: CPT

## 2023-08-09 PROCEDURE — 7100000000 HC PACU RECOVERY - FIRST 15 MIN: Performed by: SURGERY

## 2023-08-09 PROCEDURE — 0KBP0ZZ EXCISION OF LEFT HIP MUSCLE, OPEN APPROACH: ICD-10-PCS | Performed by: SURGERY

## 2023-08-09 PROCEDURE — 87075 CULTR BACTERIA EXCEPT BLOOD: CPT

## 2023-08-09 PROCEDURE — 87205 SMEAR GRAM STAIN: CPT

## 2023-08-09 PROCEDURE — 6370000000 HC RX 637 (ALT 250 FOR IP): Performed by: NURSE PRACTITIONER

## 2023-08-09 PROCEDURE — 86850 RBC ANTIBODY SCREEN: CPT

## 2023-08-09 PROCEDURE — 1210000000 HC MED SURG R&B

## 2023-08-09 PROCEDURE — 2580000003 HC RX 258: Performed by: NURSE ANESTHETIST, CERTIFIED REGISTERED

## 2023-08-09 PROCEDURE — 2500000003 HC RX 250 WO HCPCS: Performed by: NURSE ANESTHETIST, CERTIFIED REGISTERED

## 2023-08-09 PROCEDURE — 6360000002 HC RX W HCPCS: Performed by: ANESTHESIOLOGY

## 2023-08-09 PROCEDURE — 82962 GLUCOSE BLOOD TEST: CPT

## 2023-08-09 PROCEDURE — 2580000003 HC RX 258: Performed by: HOSPITALIST

## 2023-08-09 PROCEDURE — 6370000000 HC RX 637 (ALT 250 FOR IP): Performed by: SURGERY

## 2023-08-09 PROCEDURE — 3700000000 HC ANESTHESIA ATTENDED CARE: Performed by: SURGERY

## 2023-08-09 PROCEDURE — 99214 OFFICE O/P EST MOD 30 MIN: CPT

## 2023-08-09 PROCEDURE — 99253 IP/OBS CNSLTJ NEW/EST LOW 45: CPT | Performed by: SURGERY

## 2023-08-09 PROCEDURE — 10061 I&D ABSCESS COMP/MULTIPLE: CPT | Performed by: SURGERY

## 2023-08-09 PROCEDURE — 87040 BLOOD CULTURE FOR BACTERIA: CPT

## 2023-08-09 PROCEDURE — 7100000001 HC PACU RECOVERY - ADDTL 15 MIN: Performed by: SURGERY

## 2023-08-09 PROCEDURE — 99285 EMERGENCY DEPT VISIT HI MDM: CPT

## 2023-08-09 PROCEDURE — 83605 ASSAY OF LACTIC ACID: CPT

## 2023-08-09 PROCEDURE — 6360000002 HC RX W HCPCS: Performed by: NURSE ANESTHETIST, CERTIFIED REGISTERED

## 2023-08-09 PROCEDURE — 86140 C-REACTIVE PROTEIN: CPT

## 2023-08-09 PROCEDURE — 3600000013 HC SURGERY LEVEL 3 ADDTL 15MIN: Performed by: SURGERY

## 2023-08-09 PROCEDURE — 99214 OFFICE O/P EST MOD 30 MIN: CPT | Performed by: NURSE PRACTITIONER

## 2023-08-09 PROCEDURE — 3700000001 HC ADD 15 MINUTES (ANESTHESIA): Performed by: SURGERY

## 2023-08-09 RX ORDER — INSULIN GLARGINE 100 [IU]/ML
30 INJECTION, SOLUTION SUBCUTANEOUS EVERY MORNING
Status: DISCONTINUED | OUTPATIENT
Start: 2023-08-10 | End: 2023-08-14 | Stop reason: HOSPADM

## 2023-08-09 RX ORDER — SODIUM CHLORIDE 0.9 % (FLUSH) 0.9 %
5-40 SYRINGE (ML) INJECTION PRN
Status: DISCONTINUED | OUTPATIENT
Start: 2023-08-09 | End: 2023-08-10 | Stop reason: SDUPTHER

## 2023-08-09 RX ORDER — SODIUM CHLOR/HYPOCHLOROUS ACID 0.033 %
SOLUTION, IRRIGATION IRRIGATION ONCE
OUTPATIENT
Start: 2023-08-09 | End: 2023-08-09

## 2023-08-09 RX ORDER — INSULIN LISPRO 100 [IU]/ML
0-4 INJECTION, SOLUTION INTRAVENOUS; SUBCUTANEOUS NIGHTLY
Status: DISCONTINUED | OUTPATIENT
Start: 2023-08-09 | End: 2023-08-14 | Stop reason: HOSPADM

## 2023-08-09 RX ORDER — ONDANSETRON 2 MG/ML
4 INJECTION INTRAMUSCULAR; INTRAVENOUS EVERY 6 HOURS PRN
Status: DISCONTINUED | OUTPATIENT
Start: 2023-08-09 | End: 2023-08-14 | Stop reason: HOSPADM

## 2023-08-09 RX ORDER — CALCIUM CARBONATE 500 MG/1
500 TABLET, CHEWABLE ORAL 3 TIMES DAILY PRN
Status: DISCONTINUED | OUTPATIENT
Start: 2023-08-09 | End: 2023-08-14 | Stop reason: HOSPADM

## 2023-08-09 RX ORDER — ENOXAPARIN SODIUM 100 MG/ML
30 INJECTION SUBCUTANEOUS 2 TIMES DAILY
Status: DISCONTINUED | OUTPATIENT
Start: 2023-08-10 | End: 2023-08-14 | Stop reason: HOSPADM

## 2023-08-09 RX ORDER — PROPOFOL 10 MG/ML
INJECTION, EMULSION INTRAVENOUS PRN
Status: DISCONTINUED | OUTPATIENT
Start: 2023-08-09 | End: 2023-08-09 | Stop reason: SDUPTHER

## 2023-08-09 RX ORDER — GINSENG 100 MG
CAPSULE ORAL ONCE
OUTPATIENT
Start: 2023-08-09 | End: 2023-08-09

## 2023-08-09 RX ORDER — POLYETHYLENE GLYCOL 3350 17 G/17G
17 POWDER, FOR SOLUTION ORAL DAILY PRN
Status: DISCONTINUED | OUTPATIENT
Start: 2023-08-09 | End: 2023-08-10 | Stop reason: SDUPTHER

## 2023-08-09 RX ORDER — BACITRACIN ZINC AND POLYMYXIN B SULFATE 500; 1000 [USP'U]/G; [USP'U]/G
OINTMENT TOPICAL ONCE
OUTPATIENT
Start: 2023-08-09 | End: 2023-08-09

## 2023-08-09 RX ORDER — FENTANYL CITRATE 50 UG/ML
INJECTION, SOLUTION INTRAMUSCULAR; INTRAVENOUS PRN
Status: DISCONTINUED | OUTPATIENT
Start: 2023-08-09 | End: 2023-08-09 | Stop reason: SDUPTHER

## 2023-08-09 RX ORDER — POTASSIUM CHLORIDE 20 MEQ/1
20 TABLET, EXTENDED RELEASE ORAL DAILY
Status: DISCONTINUED | OUTPATIENT
Start: 2023-08-10 | End: 2023-08-14 | Stop reason: HOSPADM

## 2023-08-09 RX ORDER — PROCHLORPERAZINE EDISYLATE 5 MG/ML
5 INJECTION INTRAMUSCULAR; INTRAVENOUS
Status: DISCONTINUED | OUTPATIENT
Start: 2023-08-09 | End: 2023-08-09 | Stop reason: HOSPADM

## 2023-08-09 RX ORDER — GENTAMICIN SULFATE 1 MG/G
OINTMENT TOPICAL ONCE
OUTPATIENT
Start: 2023-08-09 | End: 2023-08-09

## 2023-08-09 RX ORDER — INSULIN DETEMIR 100 [IU]/ML
INJECTION, SOLUTION SUBCUTANEOUS
COMMUNITY
Start: 2023-06-30

## 2023-08-09 RX ORDER — DIPHENHYDRAMINE HYDROCHLORIDE 50 MG/ML
12.5 INJECTION INTRAMUSCULAR; INTRAVENOUS
Status: DISCONTINUED | OUTPATIENT
Start: 2023-08-09 | End: 2023-08-09 | Stop reason: HOSPADM

## 2023-08-09 RX ORDER — SODIUM CHLORIDE 9 MG/ML
INJECTION, SOLUTION INTRAVENOUS PRN
Status: DISCONTINUED | OUTPATIENT
Start: 2023-08-09 | End: 2023-08-10 | Stop reason: SDUPTHER

## 2023-08-09 RX ORDER — LIDOCAINE 50 MG/G
OINTMENT TOPICAL ONCE
OUTPATIENT
Start: 2023-08-09 | End: 2023-08-09

## 2023-08-09 RX ORDER — ENOXAPARIN SODIUM 100 MG/ML
30 INJECTION SUBCUTANEOUS DAILY
Status: DISCONTINUED | OUTPATIENT
Start: 2023-08-10 | End: 2023-08-10 | Stop reason: SDUPTHER

## 2023-08-09 RX ORDER — ONDANSETRON 4 MG/1
4 TABLET, ORALLY DISINTEGRATING ORAL EVERY 8 HOURS PRN
Status: DISCONTINUED | OUTPATIENT
Start: 2023-08-09 | End: 2023-08-10 | Stop reason: SDUPTHER

## 2023-08-09 RX ORDER — MEPERIDINE HYDROCHLORIDE 25 MG/ML
12.5 INJECTION INTRAMUSCULAR; INTRAVENOUS; SUBCUTANEOUS
Status: COMPLETED | OUTPATIENT
Start: 2023-08-09 | End: 2023-08-09

## 2023-08-09 RX ORDER — LOSARTAN POTASSIUM 100 MG/1
100 TABLET ORAL DAILY
Status: DISCONTINUED | OUTPATIENT
Start: 2023-08-10 | End: 2023-08-14 | Stop reason: HOSPADM

## 2023-08-09 RX ORDER — HYDROMORPHONE HYDROCHLORIDE 1 MG/ML
0.5 INJECTION, SOLUTION INTRAMUSCULAR; INTRAVENOUS; SUBCUTANEOUS EVERY 5 MIN PRN
Status: DISCONTINUED | OUTPATIENT
Start: 2023-08-09 | End: 2023-08-09 | Stop reason: HOSPADM

## 2023-08-09 RX ORDER — SODIUM CHLORIDE 0.9 % (FLUSH) 0.9 %
5-40 SYRINGE (ML) INJECTION PRN
Status: DISCONTINUED | OUTPATIENT
Start: 2023-08-09 | End: 2023-08-14 | Stop reason: HOSPADM

## 2023-08-09 RX ORDER — SODIUM CHLORIDE 0.9 % (FLUSH) 0.9 %
5-40 SYRINGE (ML) INJECTION PRN
Status: DISCONTINUED | OUTPATIENT
Start: 2023-08-09 | End: 2023-08-09 | Stop reason: HOSPADM

## 2023-08-09 RX ORDER — ACETAMINOPHEN 325 MG/1
650 TABLET ORAL EVERY 4 HOURS PRN
Status: DISCONTINUED | OUTPATIENT
Start: 2023-08-09 | End: 2023-08-14 | Stop reason: HOSPADM

## 2023-08-09 RX ORDER — MECOBALAMIN 5000 MCG
5 TABLET,DISINTEGRATING ORAL NIGHTLY PRN
Status: DISCONTINUED | OUTPATIENT
Start: 2023-08-09 | End: 2023-08-14 | Stop reason: HOSPADM

## 2023-08-09 RX ORDER — ONDANSETRON 2 MG/ML
4 INJECTION INTRAMUSCULAR; INTRAVENOUS EVERY 6 HOURS PRN
Status: DISCONTINUED | OUTPATIENT
Start: 2023-08-09 | End: 2023-08-10 | Stop reason: SDUPTHER

## 2023-08-09 RX ORDER — OXYCODONE AND ACETAMINOPHEN 10; 325 MG/1; MG/1
1 TABLET ORAL ONCE
Status: COMPLETED | OUTPATIENT
Start: 2023-08-09 | End: 2023-08-09

## 2023-08-09 RX ORDER — SODIUM HYPOCHLORITE 1.25 MG/ML
SOLUTION TOPICAL 2 TIMES DAILY
Status: DISCONTINUED | OUTPATIENT
Start: 2023-08-10 | End: 2023-08-14 | Stop reason: HOSPADM

## 2023-08-09 RX ORDER — ONDANSETRON 4 MG/1
4 TABLET, ORALLY DISINTEGRATING ORAL EVERY 8 HOURS PRN
Status: DISCONTINUED | OUTPATIENT
Start: 2023-08-09 | End: 2023-08-14 | Stop reason: HOSPADM

## 2023-08-09 RX ORDER — LIDOCAINE 40 MG/G
CREAM TOPICAL ONCE
OUTPATIENT
Start: 2023-08-09 | End: 2023-08-09

## 2023-08-09 RX ORDER — ONDANSETRON 2 MG/ML
INJECTION INTRAMUSCULAR; INTRAVENOUS PRN
Status: DISCONTINUED | OUTPATIENT
Start: 2023-08-09 | End: 2023-08-09 | Stop reason: SDUPTHER

## 2023-08-09 RX ORDER — LIDOCAINE HYDROCHLORIDE 20 MG/ML
JELLY TOPICAL ONCE
OUTPATIENT
Start: 2023-08-09 | End: 2023-08-09

## 2023-08-09 RX ORDER — LIDOCAINE HYDROCHLORIDE 10 MG/ML
INJECTION, SOLUTION INFILTRATION; PERINEURAL PRN
Status: DISCONTINUED | OUTPATIENT
Start: 2023-08-09 | End: 2023-08-09 | Stop reason: SDUPTHER

## 2023-08-09 RX ORDER — SODIUM CHLORIDE 0.9 % (FLUSH) 0.9 %
5-40 SYRINGE (ML) INJECTION EVERY 12 HOURS SCHEDULED
Status: DISCONTINUED | OUTPATIENT
Start: 2023-08-09 | End: 2023-08-10 | Stop reason: SDUPTHER

## 2023-08-09 RX ORDER — OXYCODONE HYDROCHLORIDE 5 MG/1
10 TABLET ORAL EVERY 4 HOURS PRN
Status: DISCONTINUED | OUTPATIENT
Start: 2023-08-09 | End: 2023-08-14 | Stop reason: HOSPADM

## 2023-08-09 RX ORDER — MORPHINE SULFATE 2 MG/ML
2 INJECTION, SOLUTION INTRAMUSCULAR; INTRAVENOUS
Status: DISCONTINUED | OUTPATIENT
Start: 2023-08-09 | End: 2023-08-14 | Stop reason: HOSPADM

## 2023-08-09 RX ORDER — DEXTROSE MONOHYDRATE 100 MG/ML
INJECTION, SOLUTION INTRAVENOUS CONTINUOUS PRN
Status: DISCONTINUED | OUTPATIENT
Start: 2023-08-09 | End: 2023-08-14 | Stop reason: HOSPADM

## 2023-08-09 RX ORDER — OXYCODONE HYDROCHLORIDE 5 MG/1
5 TABLET ORAL EVERY 4 HOURS PRN
Status: DISCONTINUED | OUTPATIENT
Start: 2023-08-09 | End: 2023-08-14 | Stop reason: HOSPADM

## 2023-08-09 RX ORDER — METRONIDAZOLE 500 MG/1
500 TABLET ORAL 2 TIMES DAILY
Status: COMPLETED | OUTPATIENT
Start: 2023-08-09 | End: 2023-08-10

## 2023-08-09 RX ORDER — AMLODIPINE BESYLATE 10 MG/1
10 TABLET ORAL DAILY
Status: DISCONTINUED | OUTPATIENT
Start: 2023-08-10 | End: 2023-08-14 | Stop reason: HOSPADM

## 2023-08-09 RX ORDER — ACETAMINOPHEN 650 MG/1
650 SUPPOSITORY RECTAL EVERY 4 HOURS PRN
Status: DISCONTINUED | OUTPATIENT
Start: 2023-08-09 | End: 2023-08-14 | Stop reason: HOSPADM

## 2023-08-09 RX ORDER — IBUPROFEN 200 MG
TABLET ORAL ONCE
OUTPATIENT
Start: 2023-08-09 | End: 2023-08-09

## 2023-08-09 RX ORDER — SODIUM CHLORIDE 0.9 % (FLUSH) 0.9 %
5-40 SYRINGE (ML) INJECTION EVERY 12 HOURS SCHEDULED
Status: DISCONTINUED | OUTPATIENT
Start: 2023-08-09 | End: 2023-08-14 | Stop reason: HOSPADM

## 2023-08-09 RX ORDER — INSULIN LISPRO 100 [IU]/ML
4-24 INJECTION, SOLUTION INTRAVENOUS; SUBCUTANEOUS
COMMUNITY
Start: 2023-06-30

## 2023-08-09 RX ORDER — HYDROMORPHONE HYDROCHLORIDE 1 MG/ML
0.25 INJECTION, SOLUTION INTRAMUSCULAR; INTRAVENOUS; SUBCUTANEOUS EVERY 5 MIN PRN
Status: DISCONTINUED | OUTPATIENT
Start: 2023-08-09 | End: 2023-08-09 | Stop reason: HOSPADM

## 2023-08-09 RX ORDER — SODIUM CHLORIDE 9 MG/ML
INJECTION, SOLUTION INTRAVENOUS PRN
Status: DISCONTINUED | OUTPATIENT
Start: 2023-08-09 | End: 2023-08-14 | Stop reason: HOSPADM

## 2023-08-09 RX ORDER — MIDAZOLAM HYDROCHLORIDE 1 MG/ML
INJECTION INTRAMUSCULAR; INTRAVENOUS PRN
Status: DISCONTINUED | OUTPATIENT
Start: 2023-08-09 | End: 2023-08-09 | Stop reason: SDUPTHER

## 2023-08-09 RX ORDER — LIDOCAINE HYDROCHLORIDE 40 MG/ML
SOLUTION TOPICAL ONCE
OUTPATIENT
Start: 2023-08-09 | End: 2023-08-09

## 2023-08-09 RX ORDER — SODIUM CHLORIDE, SODIUM LACTATE, POTASSIUM CHLORIDE, CALCIUM CHLORIDE 600; 310; 30; 20 MG/100ML; MG/100ML; MG/100ML; MG/100ML
INJECTION, SOLUTION INTRAVENOUS CONTINUOUS PRN
Status: DISCONTINUED | OUTPATIENT
Start: 2023-08-09 | End: 2023-08-09 | Stop reason: SDUPTHER

## 2023-08-09 RX ORDER — CLOBETASOL PROPIONATE 0.5 MG/G
OINTMENT TOPICAL ONCE
OUTPATIENT
Start: 2023-08-09 | End: 2023-08-09

## 2023-08-09 RX ORDER — MORPHINE SULFATE 4 MG/ML
4 INJECTION, SOLUTION INTRAMUSCULAR; INTRAVENOUS
Status: DISCONTINUED | OUTPATIENT
Start: 2023-08-09 | End: 2023-08-14 | Stop reason: HOSPADM

## 2023-08-09 RX ORDER — SODIUM CHLORIDE 9 MG/ML
INJECTION, SOLUTION INTRAVENOUS CONTINUOUS
Status: DISCONTINUED | OUTPATIENT
Start: 2023-08-09 | End: 2023-08-14 | Stop reason: HOSPADM

## 2023-08-09 RX ORDER — MAGNESIUM SULFATE IN WATER 40 MG/ML
2000 INJECTION, SOLUTION INTRAVENOUS PRN
Status: DISCONTINUED | OUTPATIENT
Start: 2023-08-09 | End: 2023-08-14 | Stop reason: HOSPADM

## 2023-08-09 RX ORDER — POLYETHYLENE GLYCOL 3350 17 G/17G
17 POWDER, FOR SOLUTION ORAL DAILY PRN
Status: DISCONTINUED | OUTPATIENT
Start: 2023-08-09 | End: 2023-08-14 | Stop reason: HOSPADM

## 2023-08-09 RX ORDER — NALOXONE HYDROCHLORIDE 0.4 MG/ML
INJECTION, SOLUTION INTRAMUSCULAR; INTRAVENOUS; SUBCUTANEOUS PRN
Status: DISCONTINUED | OUTPATIENT
Start: 2023-08-09 | End: 2023-08-14 | Stop reason: HOSPADM

## 2023-08-09 RX ORDER — BETAMETHASONE DIPROPIONATE 0.05 %
OINTMENT (GRAM) TOPICAL ONCE
OUTPATIENT
Start: 2023-08-09 | End: 2023-08-09

## 2023-08-09 RX ORDER — POTASSIUM CHLORIDE 20 MEQ/1
40 TABLET, EXTENDED RELEASE ORAL PRN
Status: DISCONTINUED | OUTPATIENT
Start: 2023-08-09 | End: 2023-08-14 | Stop reason: HOSPADM

## 2023-08-09 RX ORDER — ROCURONIUM BROMIDE 10 MG/ML
INJECTION, SOLUTION INTRAVENOUS PRN
Status: DISCONTINUED | OUTPATIENT
Start: 2023-08-09 | End: 2023-08-09 | Stop reason: SDUPTHER

## 2023-08-09 RX ORDER — FUROSEMIDE 40 MG/1
40 TABLET ORAL DAILY
Status: DISCONTINUED | OUTPATIENT
Start: 2023-08-10 | End: 2023-08-14 | Stop reason: HOSPADM

## 2023-08-09 RX ORDER — POTASSIUM CHLORIDE 7.45 MG/ML
10 INJECTION INTRAVENOUS PRN
Status: DISCONTINUED | OUTPATIENT
Start: 2023-08-09 | End: 2023-08-14 | Stop reason: HOSPADM

## 2023-08-09 RX ORDER — SODIUM CHLORIDE 9 MG/ML
INJECTION, SOLUTION INTRAVENOUS PRN
Status: DISCONTINUED | OUTPATIENT
Start: 2023-08-09 | End: 2023-08-09 | Stop reason: HOSPADM

## 2023-08-09 RX ORDER — INSULIN LISPRO 100 [IU]/ML
0-16 INJECTION, SOLUTION INTRAVENOUS; SUBCUTANEOUS
Status: DISCONTINUED | OUTPATIENT
Start: 2023-08-10 | End: 2023-08-14 | Stop reason: HOSPADM

## 2023-08-09 RX ORDER — ALBUTEROL SULFATE 2.5 MG/3ML
2.5 SOLUTION RESPIRATORY (INHALATION) EVERY 4 HOURS PRN
Status: DISCONTINUED | OUTPATIENT
Start: 2023-08-09 | End: 2023-08-14 | Stop reason: HOSPADM

## 2023-08-09 RX ORDER — SODIUM CHLORIDE 0.9 % (FLUSH) 0.9 %
5-40 SYRINGE (ML) INJECTION EVERY 12 HOURS SCHEDULED
Status: DISCONTINUED | OUTPATIENT
Start: 2023-08-09 | End: 2023-08-09 | Stop reason: HOSPADM

## 2023-08-09 RX ORDER — LEVOFLOXACIN 500 MG/1
500 TABLET, FILM COATED ORAL DAILY
Status: COMPLETED | OUTPATIENT
Start: 2023-08-10 | End: 2023-08-10

## 2023-08-09 RX ADMIN — MIDAZOLAM 2 MG: 1 INJECTION INTRAMUSCULAR; INTRAVENOUS at 21:25

## 2023-08-09 RX ADMIN — MEPERIDINE HYDROCHLORIDE 12.5 MG: 25 INJECTION, SOLUTION INTRAMUSCULAR; INTRAVENOUS; SUBCUTANEOUS at 22:44

## 2023-08-09 RX ADMIN — SUGAMMADEX 300 MG: 100 INJECTION, SOLUTION INTRAVENOUS at 22:12

## 2023-08-09 RX ADMIN — ROCURONIUM BROMIDE 50 MG: 10 INJECTION, SOLUTION INTRAVENOUS at 21:30

## 2023-08-09 RX ADMIN — HYDROMORPHONE HYDROCHLORIDE 0.5 MG: 1 INJECTION, SOLUTION INTRAMUSCULAR; INTRAVENOUS; SUBCUTANEOUS at 22:48

## 2023-08-09 RX ADMIN — LIDOCAINE HYDROCHLORIDE 50 MG: 10 INJECTION, SOLUTION INFILTRATION; PERINEURAL at 21:29

## 2023-08-09 RX ADMIN — PHENYLEPHRINE HYDROCHLORIDE 100 MCG: 10 INJECTION INTRAVENOUS at 21:48

## 2023-08-09 RX ADMIN — PROPOFOL 200 MG: 10 INJECTION, EMULSION INTRAVENOUS at 21:30

## 2023-08-09 RX ADMIN — FENTANYL CITRATE 100 MCG: 0.05 INJECTION, SOLUTION INTRAMUSCULAR; INTRAVENOUS at 21:29

## 2023-08-09 RX ADMIN — OXYCODONE HYDROCHLORIDE AND ACETAMINOPHEN 1 TABLET: 10; 325 TABLET ORAL at 17:22

## 2023-08-09 RX ADMIN — SODIUM CHLORIDE, SODIUM LACTATE, POTASSIUM CHLORIDE, AND CALCIUM CHLORIDE: 600; 310; 30; 20 INJECTION, SOLUTION INTRAVENOUS at 21:25

## 2023-08-09 RX ADMIN — VANCOMYCIN HYDROCHLORIDE 2500 MG: 10 INJECTION, POWDER, LYOPHILIZED, FOR SOLUTION INTRAVENOUS at 20:31

## 2023-08-09 RX ADMIN — ONDANSETRON 4 MG: 2 INJECTION INTRAMUSCULAR; INTRAVENOUS at 21:58

## 2023-08-09 ASSESSMENT — PAIN DESCRIPTION - LOCATION
LOCATION: BUTTOCKS

## 2023-08-09 ASSESSMENT — ENCOUNTER SYMPTOMS
RESPIRATORY NEGATIVE: 1
GASTROINTESTINAL NEGATIVE: 1

## 2023-08-09 ASSESSMENT — PAIN SCALES - GENERAL
PAINLEVEL_OUTOF10: 6
PAINLEVEL_OUTOF10: 10
PAINLEVEL_OUTOF10: 7
PAINLEVEL_OUTOF10: 7

## 2023-08-09 ASSESSMENT — LIFESTYLE VARIABLES: SMOKING_STATUS: 0

## 2023-08-09 NOTE — PLAN OF CARE
Problem: Chronic Conditions and Co-morbidities  Goal: Patient's chronic conditions and co-morbidity symptoms are monitored and maintained or improved  Outcome: Progressing     Problem: Discharge Planning  Goal: Discharge to home or other facility with appropriate resources  Outcome: Progressing     Problem: Safety - Adult  Goal: Free from fall injury  Outcome: Progressing     Problem: Discharge Planning  Goal: Discharge to home or other facility with appropriate resources  Outcome: Progressing     Problem: Pain  Goal: Verbalizes/displays adequate comfort level or baseline comfort level  Outcome: Progressing     Problem: Wound:  Goal: Will show signs of wound healing; wound closure and no evidence of infection  Description: Will show signs of wound healing; wound closure and no evidence of infection  Outcome: Progressing     Problem: Weight control:  Goal: Ability to maintain an optimal weight for height and age will be supported  Description: Ability to maintain an optimal weight for height and age will be supported  Outcome: Progressing     Problem: Falls - Risk of:  Goal: Will remain free from falls  Description: Will remain free from falls  Outcome: Progressing     Problem: Blood Glucose:  Goal: Ability to maintain appropriate glucose levels will improve  Description: Ability to maintain appropriate glucose levels will improve  Outcome: Progressing     Problem: Chronic Conditions and Co-morbidities  Goal: Patient's chronic conditions and co-morbidity symptoms are monitored and maintained or improved  Outcome: Progressing

## 2023-08-09 NOTE — DISCHARGE INSTRUCTIONS
[address] and Hyperbaric Oxygen Therapy   Physician Orders and Discharge Instructions  [address] [facility],[address] [place] [address] Blvd, [address]  Telephone: [phone]    NAME:  Nuzhat Mendez  YOB: 1980  MEDICAL RECORD NUMBER:  [id number]  DATE:  8/9/2023    Discharge condition: Stable    Discharge to: ER    Left via:Private automobile    Accompanied by:  spouse    ECF/HHA: [facility]     Take antibiotic Flagyl as directed     Dressing Orders:   Coccyx Wound:  Start Alternative dressing. Wound Vac dressing on hold till seen in [facility]. Wash with soap and water  Apply wound vac as follows: Apply Skin Prep to periwound. Apply drape - window pane to surrounding area (periwound). (May use duoderm instead of drape to prevent skin breakdown. Use ostomy paste to fill any creases to prevent leakage. If skin becomes red due to tape irritation please apply skin prep. Then apply light dusting of ostomy powder or antifungal powder to periwound. Then blot with skin prep to form crusting to protect skin. May repeat skin prep, powdering steps until proper crusting is made. )   Then apply drape to periwound. DO NOT PUT FOAM ON GOOD SKIN. Apply PROMOGRAN then tuck black foam to wound bed. BRIDGE to hip. Set wound vac to 125 mmHG, continuous. Change wound vac dressing 3 times per week (MWF or TTS)   Reapply vac dressing if vac stops working or dressing begins to leak or cannot be reinforced. Alternative dressing: Wash with soap and water. Apply 1/4 STR Dakins moistened gauze to wound bed. Cover with gauze. Secure with roll gauze and medipore tape. Change twice daily .        Treatment Orders:   Protein rich diet   Multivitamin   Avoid pressure by always sitting with MEMORY FOAM CUSHION OR PILLOW     [facility] follow up visit ________keep scheduled follow-up_____________________  (Please note your next appointment above and if you are unable to keep, kindly give

## 2023-08-09 NOTE — PROGRESS NOTES
[address]   Progress Note and Procedure Note      Rashida Murray RECORD NUMBER:  [id number]  AGE: 38 y.o. GENDER: male  : 1980  EPISODE DATE:  2023    Subjective:     Chief Complaint   Patient presents with    Dressing Change    Wound Check       7/3/23- Dr. Yolis Garcia of PRESENT ILLNESS HPI     Jesús Corona is a 38 y.o. male who presents today for wound/ulcer evaluation. History of Wound Context: sacral wound follow up/eval and treat    Ulcer Identification:  Ulcer Type: non-healing surgical  Contributing Factors: diabetes, poor glucose control, chronic pressure, shear force, obesity, and infection    Wound: Surgical incision        PAST MEDICAL HISTORY        Diagnosis Date    Chronic pain     Hyperlipidemia     Hypertension     Osteoarthritis        PAST SURGICAL HISTORY    Past Surgical History:   Procedure Laterality Date    PILONIDAL CYST EXCISION N/A     TOTAL HIP ARTHROPLASTY      bilateral       FAMILY HISTORY    No family history on file. SOCIAL HISTORY    Social History     Tobacco Use    Smoking status: Every Day     Packs/day: 0.50     Types: Cigarettes    Smokeless tobacco: Never   Substance Use Topics    Alcohol use: Not Currently    Drug use: Never       ALLERGIES    Allergies   Allergen Reactions    Aspirin Anaphylaxis and Hives    Lisinopril Cough    Aleve [Naproxen Sodium]        MEDICATIONS    Current Outpatient Medications on File Prior to Encounter   Medication Sig Dispense Refill    metroNIDAZOLE (FLAGYL) 500 MG tablet Take 1 tablet by mouth 2 times daily for 7 days 14 tablet 0    levoFLOXacin (LEVAQUIN) 500 MG tablet Take 1 tablet by mouth daily for 10 days 10 tablet 0    sodium hypochlorite (DAKINS) 0.125 % SOLN external solution Apply topically as directed twice daily.  1000 mL 5    PROAIR  (90 Base) MCG/ACT inhaler       amLODIPine (NORVASC) 10

## 2023-08-09 NOTE — ED NOTES
Iv attempt x2 by thise nurse. Iv attempt x2 by Diana Castillo, CLAUDETTE. Dolly Mcdaniel RN to attempted ultrasound IV as soon as available.       Migdalia Spencer RN  08/09/23 7411

## 2023-08-09 NOTE — ED PROVIDER NOTES
[address] [facility] EMERGENCY DEPT  eMERGENCY dEPARTMENT eNCOUnter      Pt Name: Varinder Molina  MRN: [id number]  [facility] 1980  Date of evaluation: 8/9/2023  Provider: CHRISS Franco NP    CHIEF COMPLAINT       Chief Complaint   Patient presents with    Abscess     Had cyst drained x1 month pta and had wound vac, wound care sent over for possible abscess         HISTORY OF PRESENT ILLNESS   (Location/Symptom, Timing/Onset,Context/Setting, Quality, Duration, Modifying Factors, Severity)  Note limiting factors. Varinder Molina is a 38 y.o. chronically ill appearing male who presents to the emergency department because of  non-healing wound on his sacrum. He has history of pilonidal cyst and has had I & D's of the wound. He was sent to ER by wound care for a CT. History of poorly controlled diabetes. He denies fever or chills but reports \"stinky\" drainage from the wound. He is currently seeing wound care in this facility 3 times weekly. The history is provided by the patient and the spouse. NursingNotes were reviewed. REVIEW OF SYSTEMS    (2-9 systems for level 4, 10 or more for level 5)     Review of Systems   Constitutional:  Positive for activity change and fatigue. Negative for appetite change, chills and fever. HENT: Negative. Respiratory: Negative. Cardiovascular: Negative. Gastrointestinal: Negative. Musculoskeletal: Negative. Skin:  Positive for pallor. Wound at sacrum with malodorous drainage. Dressing from wound care in place.            PAST MEDICALHISTORY     Past Medical History:   Diagnosis Date    Chronic pain     Hyperlipidemia     Hypertension     Osteoarthritis          SURGICAL HISTORY       Past Surgical History:   Procedure Laterality Date    PILONIDAL CYST EXCISION N/A     TOTAL HIP ARTHROPLASTY      bilateral         CURRENT MEDICATIONS     Previous Medications    AMLODIPINE (NORVASC) 10 MG TABLET    Take 1 tablet by mouth daily    FUROSEMIDE (LASIX) 40 administration of oxycodone. Admission orders per Dr. Allyssa Mahan. Reassessment  Pain improved after administration of oxycodone. CONSULTS:  IP CONSULT TO HOSPITALIST  IP CONSULT TO GENERAL SURGERY  PHARMACY TO DOSE VANCOMYCIN    PROCEDURES:  Unless otherwise noted below, none     Procedures    FINAL IMPRESSION      1. Abscess, sacrum ([address])    2. Postprocedural non-healing wound, sequela    3. Insulin dependent type 2 diabetes mellitus ([address])          DISPOSITION/PLAN   DISPOSITION Admitted 08/09/2023 07:33:33 PM      PATIENT REFERRED TO:  No follow-up provider specified.     DISCHARGE MEDICATIONS:  New Prescriptions    No medications on file          (Please note that portions of this note were completed with a voice recognition program.  Efforts were made to edit thedictations but occasionally words are mis-transcribed.)    CHRISS Carballo NP (electronically signed)  Attending Emergency Physician        CHRISS Carballo NP  08/09/23 [id number]

## 2023-08-10 LAB
ABO/RH: NORMAL
ANION GAP SERPL CALCULATED.3IONS-SCNC: 12 MMOL/L (ref 7–19)
ANTIBODY SCREEN: NORMAL
APTT PPP: 31.6 SEC (ref 26–36.2)
BASOPHILS # BLD: 0.1 K/UL (ref 0–0.2)
BASOPHILS NFR BLD: 0.4 % (ref 0–1)
BUN SERPL-MCNC: 9 MG/DL (ref 6–20)
CALCIUM SERPL-MCNC: 8.6 MG/DL (ref 8.6–10)
CHLORIDE SERPL-SCNC: 102 MMOL/L (ref 98–111)
CO2 SERPL-SCNC: 23 MMOL/L (ref 22–29)
CREAT SERPL-MCNC: 0.7 MG/DL (ref 0.5–1.2)
EOSINOPHIL # BLD: 0.2 K/UL (ref 0–0.6)
EOSINOPHIL NFR BLD: 1.6 % (ref 0–5)
ERYTHROCYTE [DISTWIDTH] IN BLOOD BY AUTOMATED COUNT: 14.1 % (ref 11.5–14.5)
ERYTHROCYTE [SEDIMENTATION RATE] IN BLOOD BY WESTERGREN METHOD: 48 MM/HR (ref 0–10)
GLUCOSE BLD-MCNC: 112 MG/DL (ref 70–99)
GLUCOSE BLD-MCNC: 114 MG/DL (ref 70–99)
GLUCOSE BLD-MCNC: 130 MG/DL (ref 70–99)
GLUCOSE BLD-MCNC: 133 MG/DL (ref 70–99)
GLUCOSE BLD-MCNC: 137 MG/DL (ref 70–99)
GLUCOSE SERPL-MCNC: 125 MG/DL (ref 74–109)
HCT VFR BLD AUTO: 38.4 % (ref 42–52)
HGB BLD-MCNC: 12.1 G/DL (ref 14–18)
IMM GRANULOCYTES # BLD: 0.1 K/UL
INR PPP: 1.16 (ref 0.88–1.18)
LYMPHOCYTES # BLD: 0.7 K/UL (ref 1.1–4.5)
LYMPHOCYTES NFR BLD: 5.7 % (ref 20–40)
MCH RBC QN AUTO: 30.6 PG (ref 27–31)
MCHC RBC AUTO-ENTMCNC: 31.5 G/DL (ref 33–37)
MCV RBC AUTO: 97 FL (ref 80–94)
MONOCYTES # BLD: 0.7 K/UL (ref 0–0.9)
MONOCYTES NFR BLD: 5.1 % (ref 0–10)
NEUTROPHILS # BLD: 11.2 K/UL (ref 1.5–7.5)
NEUTS SEG NFR BLD: 86.7 % (ref 50–65)
PERFORMED ON: ABNORMAL
PLATELET # BLD AUTO: 376 K/UL (ref 130–400)
PMV BLD AUTO: 9.4 FL (ref 9.4–12.4)
POTASSIUM SERPL-SCNC: 3.8 MMOL/L (ref 3.5–5)
PROTHROMBIN TIME: 14.5 SEC (ref 12–14.6)
RBC # BLD AUTO: 3.96 M/UL (ref 4.7–6.1)
SODIUM SERPL-SCNC: 137 MMOL/L (ref 136–145)
WBC # BLD AUTO: 12.9 K/UL (ref 4.8–10.8)

## 2023-08-10 PROCEDURE — 85025 COMPLETE CBC W/AUTO DIFF WBC: CPT

## 2023-08-10 PROCEDURE — 6370000000 HC RX 637 (ALT 250 FOR IP): Performed by: HOSPITALIST

## 2023-08-10 PROCEDURE — 2580000003 HC RX 258: Performed by: HOSPITALIST

## 2023-08-10 PROCEDURE — 6370000000 HC RX 637 (ALT 250 FOR IP): Performed by: SURGERY

## 2023-08-10 PROCEDURE — 36415 COLL VENOUS BLD VENIPUNCTURE: CPT

## 2023-08-10 PROCEDURE — 6360000002 HC RX W HCPCS: Performed by: HOSPITALIST

## 2023-08-10 PROCEDURE — 6360000002 HC RX W HCPCS: Performed by: SURGERY

## 2023-08-10 PROCEDURE — 82962 GLUCOSE BLOOD TEST: CPT

## 2023-08-10 PROCEDURE — 85652 RBC SED RATE AUTOMATED: CPT

## 2023-08-10 PROCEDURE — 94760 N-INVAS EAR/PLS OXIMETRY 1: CPT

## 2023-08-10 PROCEDURE — 85610 PROTHROMBIN TIME: CPT

## 2023-08-10 PROCEDURE — 99024 POSTOP FOLLOW-UP VISIT: CPT | Performed by: SURGERY

## 2023-08-10 PROCEDURE — 85730 THROMBOPLASTIN TIME PARTIAL: CPT

## 2023-08-10 PROCEDURE — 1210000000 HC MED SURG R&B

## 2023-08-10 PROCEDURE — 80048 BASIC METABOLIC PNL TOTAL CA: CPT

## 2023-08-10 PROCEDURE — 2580000003 HC RX 258: Performed by: SURGERY

## 2023-08-10 RX ADMIN — VANCOMYCIN HYDROCHLORIDE 1500 MG: 10 INJECTION, POWDER, LYOPHILIZED, FOR SOLUTION INTRAVENOUS at 08:46

## 2023-08-10 RX ADMIN — POTASSIUM CHLORIDE 20 MEQ: 1500 TABLET, EXTENDED RELEASE ORAL at 08:42

## 2023-08-10 RX ADMIN — FUROSEMIDE 40 MG: 40 TABLET ORAL at 08:42

## 2023-08-10 RX ADMIN — SODIUM CHLORIDE, PRESERVATIVE FREE 10 ML: 5 INJECTION INTRAVENOUS at 00:43

## 2023-08-10 RX ADMIN — LOSARTAN POTASSIUM 100 MG: 100 TABLET, FILM COATED ORAL at 08:42

## 2023-08-10 RX ADMIN — DAKIN'S SOLUTION 0.125% (QUARTER STRENGTH): 0.12 SOLUTION at 08:44

## 2023-08-10 RX ADMIN — METRONIDAZOLE 500 MG: 500 TABLET ORAL at 00:40

## 2023-08-10 RX ADMIN — OXYCODONE HYDROCHLORIDE 10 MG: 5 TABLET ORAL at 07:35

## 2023-08-10 RX ADMIN — OXYCODONE HYDROCHLORIDE 10 MG: 5 TABLET ORAL at 18:11

## 2023-08-10 RX ADMIN — VANCOMYCIN HYDROCHLORIDE 1500 MG: 10 INJECTION, POWDER, LYOPHILIZED, FOR SOLUTION INTRAVENOUS at 21:49

## 2023-08-10 RX ADMIN — MORPHINE SULFATE 4 MG: 4 INJECTION, SOLUTION INTRAMUSCULAR; INTRAVENOUS at 14:36

## 2023-08-10 RX ADMIN — ENOXAPARIN SODIUM 30 MG: 100 INJECTION SUBCUTANEOUS at 21:48

## 2023-08-10 RX ADMIN — METRONIDAZOLE 500 MG: 500 TABLET ORAL at 08:45

## 2023-08-10 RX ADMIN — LEVOFLOXACIN 500 MG: 500 TABLET, FILM COATED ORAL at 08:42

## 2023-08-10 RX ADMIN — AMLODIPINE BESYLATE 10 MG: 10 TABLET ORAL at 08:42

## 2023-08-10 RX ADMIN — SODIUM CHLORIDE: 9 INJECTION, SOLUTION INTRAVENOUS at 00:42

## 2023-08-10 SDOH — ECONOMIC STABILITY: INCOME INSECURITY: HOW HARD IS IT FOR YOU TO PAY FOR THE VERY BASICS LIKE FOOD, HOUSING, MEDICAL CARE, AND HEATING?: SOMEWHAT HARD

## 2023-08-10 SDOH — ECONOMIC STABILITY: FOOD INSECURITY: WITHIN THE PAST 12 MONTHS, YOU WORRIED THAT YOUR FOOD WOULD RUN OUT BEFORE YOU GOT MONEY TO BUY MORE.: NEVER TRUE

## 2023-08-10 SDOH — ECONOMIC STABILITY: INCOME INSECURITY: IN THE PAST 12 MONTHS, HAS THE ELECTRIC, GAS, OIL, OR WATER COMPANY THREATENED TO SHUT OFF SERVICE IN YOUR HOME?: NO

## 2023-08-10 ASSESSMENT — PATIENT HEALTH QUESTIONNAIRE - PHQ9
SUM OF ALL RESPONSES TO PHQ QUESTIONS 1-9: 1
2. FEELING DOWN, DEPRESSED OR HOPELESS: 1
SUM OF ALL RESPONSES TO PHQ9 QUESTIONS 1 & 2: 1
1. LITTLE INTEREST OR PLEASURE IN DOING THINGS: 0
SUM OF ALL RESPONSES TO PHQ QUESTIONS 1-9: 1

## 2023-08-10 ASSESSMENT — ENCOUNTER SYMPTOMS
CHEST TIGHTNESS: 0
WHEEZING: 0
CONSTIPATION: 0
ABDOMINAL DISTENTION: 0
COUGH: 0
ABDOMINAL PAIN: 0
VOMITING: 0
COLOR CHANGE: 0
SHORTNESS OF BREATH: 0
NAUSEA: 0

## 2023-08-10 ASSESSMENT — PAIN SCALES - GENERAL
PAINLEVEL_OUTOF10: 9
PAINLEVEL_OUTOF10: 7
PAINLEVEL_OUTOF10: 8

## 2023-08-10 NOTE — ED NOTES
Dr. Isabel Mishra states that she will be taking patient to OR tonight. Pt informed that he will be NPO. [facility]. Pt signed consent for Incision and Drainage of buttock wound.       Familia Bowman RN  08/09/23 2057

## 2023-08-10 NOTE — H&P
Diamond Azar - History & Physical      PCP: Dr. Shelly La M.D., MD    Date of Admission: 8/9/2023    Date of Service: 8/9/2023    Chief Complaint: Sacral wound    History Of Present Illness: The patient is a 38 y.o. male who presented to [facility] ER with PMH morbid obesity, COPD, type II DM, chronic respiratory failure, GERD, HTN complaining of sacral wound. Patient was recently admitted on 6/30 [facility] with concerns of leg wound. While hospitalized had markedly elevated CO2, unable to tolerate BiPAP and placed on mechanical ventilation. Became febrile 101.3 began broad-spectrum antibiotics. Pulmonary was consulted for vent management, attempted to do breathing trials however unable to tolerate. Pulmonology noted patient had several bronchospasms and bedside bronc performed with thick mucopurulent secretions. Was then placed on Bumex drip. Patient had skin breakdown to sacral wound determined to be a DTI. Was taken to the OR by general surgery for wound debridement. ENT consulted for trach, trach and PEG placement performed. Patient was not sent to [facility] for further therapy. ENT did decannulate his trach prior to discharge and patient tolerated well. Patient completed course of cefepime and Unasyn and wound care followed throughout stay. He was discharged with wound VAC and outpatient follow-up on discharged on 7/21. Has been following 3 times weekly with wound care had been placed on Levaquin and Flagyl. Today wound care evaluated sacral wound noticed purulent drainage, concern for abscess and instructed him to [facility] ER for further evaluation. Patient denies fever, chills, nausea, vomiting, abdominal pain, shortness breath, chest pain. Patient evaluated after procedure, wound not evaluated. Workup in ER CT pelvis suspected abscess in left gluteus muscle 7.4 cm, sacral decubitus ulcer, WBC 10.6, and blood cultures pending.   Patient is to be admitted to the

## 2023-08-10 NOTE — PROGRESS NOTES
Physician Progress Note      Taina Javier  CSN #:                  [id number]  :                       1980  ADMIT DATE:       2023 1:10 PM  [facility] DATE:  RESPONDING  PROVIDER #:        Harry SHEETS          QUERY TEXT:    Pt admitted with surgical wound infection. Pt noted to have WBC 12.9 P 109 105   103 RR 20 24, surgical wound infection. If possible, please document in the   progress notes and discharge summary if you are evaluating and /or treating   any of the following: The medical record reflects the following:  Risk Factors: surgical wound infection, abnormal lab values  Clinical Indicators:  H&P noted ?surgical wound infection? 8/10- WBC 12.9  - P 109 105 103   - RR 20 24  Treatment: IV Fluids, IV vancomycin      Joelle Felipe RN-BSN, [facility]  Clinical   [email or web address]  Options provided:  -- Sepsis due to surgical wound infection, present on admission  -- surgical wound infection without Sepsis  -- Other - I will add my own diagnosis  -- Disagree - Not applicable / Not valid  -- Disagree - Clinically unable to determine / Unknown  -- Refer to Clinical Documentation Reviewer    PROVIDER RESPONSE TEXT:    This patient has surgical wound infection without Sepsis.     Query created by: Jared Stein on 8/10/2023 12:47 PM      Electronically signed by:  Clau Patricia 8/10/2023 6:06 PM

## 2023-08-10 NOTE — CONSULTS
Mr. Sarah Watts is a 38year old male who presents from wound care clinic with concern for wound abscess. The patient recently had an extended hospitalization at [facility], during which he underwent I&D and debridement of an area over his coccyx. He also had extended intubation with trach and PEG. After [facility], he was sent to [facility]. He has been home for a couple weeks, and has been going to wound care. There has been purulent drainage from the wound. CT in the ER shows a likely abscess in the left buttock adjacent to the surgical wound. Past Medical History:   Diagnosis Date    Chronic pain     Diabetes ([address])     Hyperlipidemia     Hypertension     Osteoarthritis      Past Surgical History:   Procedure Laterality Date    PILONIDAL CYST EXCISION N/A     TOTAL HIP ARTHROPLASTY      bilateral     Current Facility-Administered Medications   Medication Dose Route Frequency Provider Last Rate Last Admin    vancomycin (VANCOCIN) 2,500 mg in sodium chloride 0.9 % 500 mL IVPB  2,500 mg IntraVENous Once Maddy Geller .7 mL/hr at 08/09/23 2031 2,500 mg at 08/09/23 2031     Allergies: Aspirin, Lisinopril, and Aleve [naproxen sodium]    History reviewed. No pertinent family history. Social History     Tobacco Use    Smoking status: Every Day     Packs/day: 0.50     Types: Cigarettes    Smokeless tobacco: Never   Substance Use Topics    Alcohol use: Not Currently       Review of Systems  Constitutional:  Positive for activity change and fatigue. Negative for chills, diaphoresis and fever. Respiratory:  Negative for cough, chest tightness, shortness of breath and wheezing. Cardiovascular:  Negative for chest pain and palpitations. Gastrointestinal:  Negative for abdominal distention, abdominal pain, constipation, nausea and vomiting. Musculoskeletal:  Positive for arthralgias, gait problem and myalgias. Skin:  Positive for wound. Negative for color change, pallor and rash.    Neurological:  Positive for weakness. Negative for tremors, syncope, light-headedness, numbness and headaches. Psychiatric/Behavioral:  Negative for agitation, behavioral problems and confusion. Physical Exam  Vitals reviewed. Constitutional:       General: He is not in acute distress. Appearance: He is obese. HENT:      Head: Normocephalic and atraumatic. Nose: Nose normal.   Eyes:      General: No scleral icterus. Pupils: Pupils are equal, round, and reactive to light. Cardiovascular:      Rate and Rhythm: Normal rate and regular rhythm. Pulmonary:      Effort: Pulmonary effort is normal. No respiratory distress. Abdominal:      General: There is no distension. Palpations: Abdomen is soft. Comments: PEG in place, no erythema or drainage   Musculoskeletal:         General: No deformity. Cervical back: Neck supple. No rigidity. Skin:     General: Skin is warm and dry. Comments: Opening along midline, small opening angled towards left buttock. See photo from wound care previously   Neurological:      General: No focal deficit present. Mental Status: He is alert. Mental status is at baseline. Psychiatric:         Mood and Affect: Mood normal.         Behavior: Behavior normal.     HISTORY:  Sacral wound     COMPARISON:  None. FINDINGS:     Axial CT images of the pelvis without contrast.  Multiplanar reformatted images. Sacral decubitus ulcer. There is a tract extending from the skin surface overlying the lower sacrum into the left gluteus muscles. There is a complex collection with foci of air fluid level in the left gluteus muscle measuring 5.9 cm transverse by 7.4   cm cranial-caudal.  There is surrounding superficial and deep soft tissue edema. MRI is more sensitive for osteomyelitis. However, there are no findings of osseous erosion or cortical disruption in the adjacent sacrum. Postsurgical changes from bilateral hip arthroplasties.   Metallic artifact

## 2023-08-10 NOTE — PROGRESS NOTES
4 Eyes Skin Assessment     NAME:  Storm Cheung  YOB: 1980  MEDICAL RECORD NUMBER:  [id number]    The patient is being assessed for  Admission    I agree that at least one RN has performed a thorough Head to Toe Skin Assessment on the patient. ALL assessment sites listed below have been assessed. Areas assessed by both nurses:    Head, Face, Ears, Shoulders, Back, Chest, Arms, Elbows, Hands, Sacrum. Buttock, Coccyx, Ischium, Legs. Feet and Heels, and Under Medical Devices         Does the Patient have a Wound? Yes wound(s) were present on assessment.  LDA wound assessment was Initiated and completed by RN       Devon Prevention initiated by RN: Yes  Wound Care Orders initiated by RN: No    Pressure Injury (Stage 3,4, Unstageable, DTI, NWPT, and Complex wounds) if present, place Wound referral order by RN under : No    New Ostomies, if present place, Ostomy referral order under : No     Nurse 1 eSignature: Electronically signed by Franky Saleem RN on 8/10/23 at 12:09 AM CDT    **SHARE this note so that the co-signing nurse can place an eSignature**    Nurse 2 eSignature: Electronically signed by Laurence Hawkins RN on 8/10/23 at 12:33 AM CDT

## 2023-08-10 NOTE — PROGRESS NOTES
PHYSICAL THERAPY    Unable to see pt at this time due to an active bedrest order. Please discontinue to begin mobility assessment.     Electronically signed by Franky Whelan PT on 8/10/2023 at 7:11 AM

## 2023-08-10 NOTE — BRIEF OP NOTE
Brief Postoperative Note      Patient: Carmen Rasmussen  YOB: 1980  MRN: [id number]    Date of Procedure: 8/9/2023    Pre-Op Diagnosis Codes:     * Abscess of buttock [L02.31]    Post-Op Diagnosis: Same       Procedure(s):  INCISION AND DRAINAGE OF BUTTOCK ABSCESS AND DEBRIDMENT OF MUSCLE  Wound 7 by 4, 4 cm deep, tunnels 8 cm laterally  Surgeon(s):  Eben Camara MD    Assistant:  * No surgical staff found *    Anesthesia: Monitor Anesthesia Care    Estimated Blood Loss (mL): less than 831     Complications: None    Specimens:   ID Type Source Tests Collected by Time Destination   1 : Buttock abscess Swab Buttock CULTURE, SURGICAL Eben Camara MD 8/9/2023 2319        Implants:  * No implants in log *      Drains:   Negative Pressure Wound Therapy Buttocks (Active)   Unit Type 3 M 08/07/23 1158   Dressing Type Black Foam 08/07/23 1158   Number of pieces used 1 08/04/23 1227   Number of pieces removed 1 08/07/23 1158   Cycle Continuous 08/07/23 1158   Target Pressure (mmHg) 125 08/07/23 1158   Canister changed? No 08/07/23 1158   Dressing Status Old drainage noted 08/07/23 1158   Dressing Changed Changed/New 08/04/23 1227   Drainage Amount Large 08/07/23 1158   Drainage Description Serosanguinous;Purulent 08/07/23 1158   Wound Assessment Granulation tissue;Slough 08/07/23 1158   Rita-wound Assessment Intact 08/07/23 1158   Odor Mild 08/07/23 1158       Findings: midline wound. Small opening towards patient's left side. Digitally probed, and then opened further with cautery. Large pocket of purulent fluid entered and drained. Necrotic muscle debrided sharply. Midline incision lengthened with slight curve to left. Counter incision laterally. Penrose drain inserted, and packed with dakins dampened kerlex.       Electronically signed by Eben Camara MD on 8/9/2023 at 10:50 PM

## 2023-08-10 NOTE — CARE COORDINATION
wound infection [T81.49XA]  Abscess, sacrum ([address]) [M46.28]  Insulin dependent type 2 diabetes mellitus ([address]) [E11.9, Z79.4]  Postprocedural non-healing wound, sequela [B02.25TK]    IF APPLICABLE: The Patient and/or patient representative Donato Hernandez and his family were provided with a choice of provider and agrees with the discharge plan. Freedom of choice list with basic dialogue that supports the patient's individualized plan of care/goals and shares the quality data associated with the providers was provided to: (P) Patient       The Patient and/or Patient Representative Agree with the Discharge Plan?  (P) Yes    Ya Mota RN  Case Management Department  Ph: [phone] Fax: [phone]

## 2023-08-11 LAB
ANION GAP SERPL CALCULATED.3IONS-SCNC: 12 MMOL/L (ref 7–19)
BASOPHILS # BLD: 0.1 K/UL (ref 0–0.2)
BASOPHILS NFR BLD: 0.8 % (ref 0–1)
BUN SERPL-MCNC: 9 MG/DL (ref 6–20)
CALCIUM SERPL-MCNC: 8.4 MG/DL (ref 8.6–10)
CHLORIDE SERPL-SCNC: 102 MMOL/L (ref 98–111)
CO2 SERPL-SCNC: 26 MMOL/L (ref 22–29)
CREAT SERPL-MCNC: 0.8 MG/DL (ref 0.5–1.2)
EOSINOPHIL # BLD: 0.3 K/UL (ref 0–0.6)
EOSINOPHIL NFR BLD: 3.2 % (ref 0–5)
ERYTHROCYTE [DISTWIDTH] IN BLOOD BY AUTOMATED COUNT: 14.6 % (ref 11.5–14.5)
GLUCOSE BLD-MCNC: 109 MG/DL (ref 70–99)
GLUCOSE BLD-MCNC: 118 MG/DL (ref 70–99)
GLUCOSE BLD-MCNC: 136 MG/DL (ref 70–99)
GLUCOSE BLD-MCNC: 156 MG/DL (ref 70–99)
GLUCOSE SERPL-MCNC: 107 MG/DL (ref 74–109)
HCT VFR BLD AUTO: 39.2 % (ref 42–52)
HGB BLD-MCNC: 11.8 G/DL (ref 14–18)
IMM GRANULOCYTES # BLD: 0.1 K/UL
LYMPHOCYTES # BLD: 1.1 K/UL (ref 1.1–4.5)
LYMPHOCYTES NFR BLD: 14.2 % (ref 20–40)
MCH RBC QN AUTO: 30.7 PG (ref 27–31)
MCHC RBC AUTO-ENTMCNC: 30.1 G/DL (ref 33–37)
MCV RBC AUTO: 102.1 FL (ref 80–94)
MONOCYTES # BLD: 0.5 K/UL (ref 0–0.9)
MONOCYTES NFR BLD: 6.2 % (ref 0–10)
NEUTROPHILS # BLD: 5.9 K/UL (ref 1.5–7.5)
NEUTS SEG NFR BLD: 74.8 % (ref 50–65)
PERFORMED ON: ABNORMAL
PLATELET # BLD AUTO: 365 K/UL (ref 130–400)
PMV BLD AUTO: 9.7 FL (ref 9.4–12.4)
POTASSIUM SERPL-SCNC: 3.7 MMOL/L (ref 3.5–5)
RBC # BLD AUTO: 3.84 M/UL (ref 4.7–6.1)
SODIUM SERPL-SCNC: 140 MMOL/L (ref 136–145)
VANCOMYCIN TROUGH SERPL-MCNC: 16.2 UG/ML (ref 10–20)
WBC # BLD AUTO: 7.9 K/UL (ref 4.8–10.8)

## 2023-08-11 PROCEDURE — 94760 N-INVAS EAR/PLS OXIMETRY 1: CPT

## 2023-08-11 PROCEDURE — 82962 GLUCOSE BLOOD TEST: CPT

## 2023-08-11 PROCEDURE — 6370000000 HC RX 637 (ALT 250 FOR IP): Performed by: SURGERY

## 2023-08-11 PROCEDURE — 97166 OT EVAL MOD COMPLEX 45 MIN: CPT

## 2023-08-11 PROCEDURE — 85025 COMPLETE CBC W/AUTO DIFF WBC: CPT

## 2023-08-11 PROCEDURE — 97535 SELF CARE MNGMENT TRAINING: CPT

## 2023-08-11 PROCEDURE — 1210000000 HC MED SURG R&B

## 2023-08-11 PROCEDURE — 80048 BASIC METABOLIC PNL TOTAL CA: CPT

## 2023-08-11 PROCEDURE — 6370000000 HC RX 637 (ALT 250 FOR IP): Performed by: HOSPITALIST

## 2023-08-11 PROCEDURE — 6360000002 HC RX W HCPCS: Performed by: SURGERY

## 2023-08-11 PROCEDURE — 6360000002 HC RX W HCPCS: Performed by: HOSPITALIST

## 2023-08-11 PROCEDURE — 97530 THERAPEUTIC ACTIVITIES: CPT

## 2023-08-11 PROCEDURE — 2580000003 HC RX 258: Performed by: SURGERY

## 2023-08-11 PROCEDURE — 99024 POSTOP FOLLOW-UP VISIT: CPT | Performed by: SURGERY

## 2023-08-11 PROCEDURE — 97162 PT EVAL MOD COMPLEX 30 MIN: CPT

## 2023-08-11 PROCEDURE — 36415 COLL VENOUS BLD VENIPUNCTURE: CPT

## 2023-08-11 PROCEDURE — 80202 ASSAY OF VANCOMYCIN: CPT

## 2023-08-11 PROCEDURE — 2580000003 HC RX 258: Performed by: HOSPITALIST

## 2023-08-11 RX ADMIN — ENOXAPARIN SODIUM 30 MG: 100 INJECTION SUBCUTANEOUS at 19:45

## 2023-08-11 RX ADMIN — OXYCODONE HYDROCHLORIDE 10 MG: 5 TABLET ORAL at 07:12

## 2023-08-11 RX ADMIN — POTASSIUM CHLORIDE 20 MEQ: 1500 TABLET, EXTENDED RELEASE ORAL at 09:20

## 2023-08-11 RX ADMIN — FUROSEMIDE 40 MG: 40 TABLET ORAL at 09:20

## 2023-08-11 RX ADMIN — PIPERACILLIN AND TAZOBACTAM 4500 MG: 4; .5 INJECTION, POWDER, LYOPHILIZED, FOR SOLUTION INTRAVENOUS at 19:49

## 2023-08-11 RX ADMIN — SODIUM CHLORIDE, PRESERVATIVE FREE 10 ML: 5 INJECTION INTRAVENOUS at 19:46

## 2023-08-11 RX ADMIN — ENOXAPARIN SODIUM 30 MG: 100 INJECTION SUBCUTANEOUS at 09:21

## 2023-08-11 RX ADMIN — SODIUM CHLORIDE: 9 INJECTION, SOLUTION INTRAVENOUS at 19:48

## 2023-08-11 RX ADMIN — VANCOMYCIN HYDROCHLORIDE 1500 MG: 10 INJECTION, POWDER, LYOPHILIZED, FOR SOLUTION INTRAVENOUS at 09:22

## 2023-08-11 RX ADMIN — DAKIN'S SOLUTION 0.125% (QUARTER STRENGTH): 0.12 SOLUTION at 03:26

## 2023-08-11 RX ADMIN — INSULIN GLARGINE 30 UNITS: 100 INJECTION, SOLUTION SUBCUTANEOUS at 09:21

## 2023-08-11 RX ADMIN — DAKIN'S SOLUTION 0.125% (QUARTER STRENGTH): 0.12 SOLUTION at 19:46

## 2023-08-11 RX ADMIN — DAKIN'S SOLUTION 0.125% (QUARTER STRENGTH): 0.12 SOLUTION at 09:26

## 2023-08-11 RX ADMIN — MORPHINE SULFATE 4 MG: 4 INJECTION, SOLUTION INTRAMUSCULAR; INTRAVENOUS at 02:43

## 2023-08-11 RX ADMIN — MORPHINE SULFATE 4 MG: 4 INJECTION, SOLUTION INTRAMUSCULAR; INTRAVENOUS at 11:02

## 2023-08-11 RX ADMIN — OXYCODONE HYDROCHLORIDE 10 MG: 5 TABLET ORAL at 19:45

## 2023-08-11 ASSESSMENT — PAIN DESCRIPTION - DESCRIPTORS
DESCRIPTORS: SORE
DESCRIPTORS: ACHING
DESCRIPTORS: THROBBING;TENDER

## 2023-08-11 ASSESSMENT — PAIN DESCRIPTION - LOCATION
LOCATION: SACRUM
LOCATION: COCCYX;BUTTOCKS
LOCATION: BUTTOCKS;FOOT

## 2023-08-11 ASSESSMENT — PAIN SCALES - GENERAL
PAINLEVEL_OUTOF10: 7
PAINLEVEL_OUTOF10: 8
PAINLEVEL_OUTOF10: 9

## 2023-08-11 ASSESSMENT — PAIN DESCRIPTION - ORIENTATION
ORIENTATION: LEFT;RIGHT
ORIENTATION: LOWER
ORIENTATION: POSTERIOR;LEFT

## 2023-08-11 NOTE — PROGRESS NOTES
Physical Therapy  Facility/Department: [facility] SURG SERVICES  Physical Therapy Initial Assessment    Name: Rachel Womack  : 1980  MRN: [id number]  Date of Service: 2023    Discharge Recommendations:  Continue to assess pending progress, 24 hour supervision or assist, Patient would benefit from continued therapy after discharge          Patient Diagnosis(es): The primary encounter diagnosis was Abscess, sacrum ([address] Diagnoses of Postprocedural non-healing wound, sequela, Insulin dependent type 2 diabetes mellitus ([address]), Abscess of buttock, and Abscess of buttock were also pertinent to this visit. Past Medical History:  has a past medical history of Chronic pain, Diabetes ([address]), Hyperlipidemia, Hypertension, Osteoarthritis, and Sleep apnea. Past Surgical History:  has a past surgical history that includes Total hip arthroplasty; Pilonidal cyst excision (N/A); and back surgery (N/A, 2023). Assessment   Body Structures, Functions, Activity Limitations Requiring Skilled Therapeutic Intervention: Decreased functional mobility ; Decreased ROM; Decreased strength;Decreased sensation;Decreased balance;Decreased coordination; Increased pain  Assessment: Pt. will benefit from cont. PT to decrease impairments. Pt. needs 24 hr care upon d/c, but he is ready to increase gait distances. Waiting to see if wound would drain with mobility. He would likely need to use a RW, gait belt and depends for further gait distances. Will need to watch PEG tube site for gait belt placement. He has foot drop due to neuropathy, so needs to watch foot placement. Unable to sit at this time until clarified by MD due to surgical site. If he d/c home soon, would recommend 24 hr A and then likely could progress to PRN A.   Treatment Diagnosis: impaired gait and mobility  Therapy Prognosis: Good  Decision Making: Medium Complexity  Barriers to Learning: none noted  Requires PT Follow-Up: Yes  Activity Tolerance  Activity Tolerance: Patient Good;-  Standing - Static: Fair;+  Standing - Dynamic: Fair;-           OutComes Score                                                  AM-PAC Score             Tinneti Score       Goals  Short Term Goals  Time Frame for Short Term Goals: 2 wks  Short Term Goal 1: sit to stand indep  Short Term Goal 2: sit to stand indep  Short Term Goal 3: amb. 200' with or without RW mod indep  Patient Goals   Patient Goals : go home       Education  Patient Education  Education Given To: Patient  Education Provided: Role of Therapy;Plan of Care;Transfer Training  Education Provided Comments: use of call light, staff A, positioning/offloading buttocks/sacral wound  Education Method: Demonstration;Verbal  Barriers to Learning: None  Education Outcome: Verbalized understanding;Demonstrated understanding;Continued education needed      Therapy Time   Individual Concurrent Group Co-treatment   Time In           Time Out           Minutes                   Araceli Gandara PT     Electronically signed by Araceli Gandara PT on 8/11/2023 at 11:37 AM

## 2023-08-11 NOTE — PROGRESS NOTES
Physician Progress Note      Tyesha Marion  CSN #:                  [id number]  :                       1980  ADMIT DATE:       2023 1:10 PM  [address] DATE:  RESPONDING  PROVIDER #:        Abebe Chen MD          QUERY TEXT:    Patient admitted with infected surgical wound. Per Op note dated  23   documentation of debridement. To accurately reflect the procedure performed   please document if debridement was excisional or nonexcisional and the deepest   depth of tissue removed as down to and including: The medical record reflects the following:  Risk Factors: Recent surgery, Abscess of Buttock  Clinical Indicators: OP Note states \"Incision and drainage of Buttock Abscess   and debridement of muscle\"  Treatment: Cultures Sent, IV abx.,  surgical I&D    Bee Herring RN-BSN, [facility]  Clinical   [email or web address]  [facility]  Options provided:  -- Nonexcisional debridement of muscle  -- Excisional debridement of muscle  -- Other - I will add my own diagnosis  -- Disagree - Not applicable / Not valid  -- Disagree - Clinically unable to determine / Unknown  -- Refer to Clinical Documentation Reviewer    PROVIDER RESPONSE TEXT:    Excisional debridement of muscle of buttock was performed during procedure on   23.     Query created by: Misty Garnett on 8/10/2023 12:42 PM      Electronically signed by:  Abebe Chen MD 2023 6:53 PM

## 2023-08-11 NOTE — PROGRESS NOTES
[facility]  [facility]  Consult Note       NAME:  Salma Moore  MEDICAL RECORD NUMBER:  [id number]  AGE: 38 y.o. GENDER: male  : 1980  TODAY'S DATE:  2023    Subjective   Reason for Wound Nurse Evaluation and Assessment: Coccyx/buttock wound      Salma Moore is a 38 y.o. male referred by:   [x] Physician  [] Nursing  [] Other:     Wound Identification:  Wound Type:  surgical  Contributing Factors: diabetes, obesity, and anticoagulation therapy    Wound History: Patient had pre-existing surgical wound at the coccyx that developed an abscess behind and tunneling into left buttock  Current Wound Care Treatment:  Dakin's moist packing    Patient Goal of Care:  [x] Wound Healing  [] Odor Control  [] Palliative Care  [x] Pain Control   [] Other:         PAST MEDICAL HISTORY        Diagnosis Date    Chronic pain     Diabetes ([address])     Hyperlipidemia     Hypertension     Osteoarthritis     Sleep apnea        PAST SURGICAL HISTORY    Past Surgical History:   Procedure Laterality Date    BACK SURGERY N/A 2023    INCISION AND DRAINAGE OF BUTTOCK ABSCESS AND DEBRIDMENT OF MUSCLE performed by Clarence Alexander MD at [address] [place] [address]      bilateral       FAMILY HISTORY    History reviewed. No pertinent family history. SOCIAL HISTORY    Social History     Tobacco Use    Smoking status: Every Day     Packs/day: 0.50     Types: Cigarettes    Smokeless tobacco: Never   Substance Use Topics    Alcohol use: Not Currently    Drug use: Never       ALLERGIES    Allergies   Allergen Reactions    Aspirin Anaphylaxis and Hives    Lisinopril Cough    Aleve [Naproxen Sodium]        MEDICATIONS    No current facility-administered medications on file prior to encounter.      Current Outpatient Medications on File Prior to Encounter   Medication Sig Dispense Refill    insulin detemir (LEVEMIR) 100 UNIT/ML injection vial 30 units Subcutaneous daily      insulin lispro (HUMALOG) 100 Continuous 08/07/23 1158   Target Pressure (mmHg) 125 08/07/23 1158   Canister changed? No 08/07/23 1158   Dressing Status Old drainage noted 08/07/23 1158   Dressing Changed Changed/New 08/04/23 1227   Drainage Amount Large 08/07/23 1158   Drainage Description Serosanguinous;Purulent 08/07/23 1158   Wound Assessment Granulation tissue;Slough 08/07/23 1158   Rita-wound Assessment Intact 08/07/23 1158   Odor Mild 08/07/23 1158   Number of days: 38       Wound 07/24/23 Coccyx Medial wound 1-coccyx surgical (Active)   Wound Image   08/11/23 1050   Wound Etiology Surgical 08/11/23 1050   Dressing Status New dressing applied 08/11/23 1050   Wound Cleansed Wound cleanser 08/11/23 1050   Dressing/Treatment Packing; Pharmaceutical agent (see MAR);ABD 08/11/23 1050   Dressing Change Due 08/11/23 08/11/23 1050   Wound Length (cm) 10 cm 08/11/23 1050   Wound Width (cm) 3 cm 08/11/23 1050   Wound Depth (cm) 5.2 cm 08/11/23 1050   Wound Surface Area (cm^2) 30 cm^2 08/11/23 1050   Change in Wound Size % (l*w) -150 08/11/23 1050   Wound Volume (cm^3) 156 cm^3 08/11/23 1050   Wound Healing % -271 08/11/23 1050   Post-Procedure Length (cm) 9 cm 08/07/23 1204   Post-Procedure Width (cm) 1.2 cm 08/07/23 1204   Post-Procedure Depth (cm) 3.5 cm 08/07/23 1204   Post-Procedure Surface Area (cm^2) 10.8 cm^2 08/07/23 1204   Post-Procedure Volume (cm^3) 37.8 cm^3 08/07/23 1204   Distance Tunneling (cm) 8.5 cm 08/11/23 1050   Tunneling Position ___ O'Clock 9 08/11/23 1050   Undermining Starts ___ O'Clock 0 08/07/23 1158   Undermining Ends___ O'Clock 0 08/07/23 1158   Undermining Maxium Distance (cm) 0 08/07/23 1158   Wound Assessment Exposed structure fascia; Exposed structure muscle 08/11/23 1050   Drainage Amount Small (< 25%) 08/11/23 1050   Drainage Description Serosanguinous 08/11/23 1050   Odor None 08/11/23 1050   Rita-wound Assessment Intact 08/11/23 1050   Margins Unattached edges 08/11/23 1050   Wound Thickness Description not for

## 2023-08-11 NOTE — PROGRESS NOTES
Pharmacy Adjustment per [facility] protocol    Amina Javier is a 38 y.o. male. Pharmacy has adjusted medications per [facility] protocol. Recent Labs     08/10/23  0311 08/11/23  0220   BUN 9 9       Recent Labs     08/10/23  0311 08/11/23  0220   CREATININE 0.7 0.8       Estimated Creatinine Clearance: 156 mL/min (based on SCr of 0.8 mg/dL).     Height:   Ht Readings from Last 1 Encounters:   08/09/23 5' 10\" (1.778 m)     Weight:  Wt Readings from Last 1 Encounters:   08/09/23 273 lb (123.8 kg)         Plan: Adjust the following medications based on [facility] protocol:           Zosyn to 4500 mg IV once over 30 minutes followed by 3375 mg IV every 8 hours extended infusion over 240 minutes      Electronically signed by Ellie Pink, [address] [place] [address] on 8/11/2023 at 6:54 PM

## 2023-08-11 NOTE — PROGRESS NOTES
[facility]    Patient:  Noah Fitzpatrick  YOB: 1980  Date of Service: 8/11/2023  MRN: [id number]   Acct: [de-identified]   Primary Care Physician: Dr. Ira Baldwin M.D., MD  Advance Directive: Full Code  Admit Date: 8/9/2023       Hospital Day: 2        CHIEF COMPLAINT:     Chief Complaint   Patient presents with    Abscess     Had cyst drained x1 month pta and had wound vac, wound care sent over for possible abscess       8/11/2023 7:15 AM  Subjective / Interval History:   08/11/2023  No acute changes or acute overnight event reported. Patient well. No new complaints. Laying comfortably in bed in no apparent acute distress. Denies any acute complaints or distress at this time. 08/10/2023  Patient seen and examined this a.m. No new complaints. Lying comfortably bed in no acute distress. Endorses generalized weakness and fatigue. Reports bilateral lower extremity neuropathic pain which is stable. Patient denies any acute complaint of stress exam          Review of Systems:   Review of Systems  ROS: 14 point review of systems is negative except as specifically addressed above. ADULT DIET;  Regular; 3 carb choices (45 gm/meal)    Intake/Output Summary (Last 24 hours) at 8/11/2023 0715  Last data filed at 8/11/2023 0236  Gross per 24 hour   Intake 3548 ml   Output 2201 ml   Net 1347 ml         Medications:   dextrose      sodium chloride 125 mL/hr at 08/10/23 0042    sodium chloride       Current Facility-Administered Medications   Medication Dose Route Frequency Provider Last Rate Last Admin    vancomycin (VANCOCIN) intermittent dosing (placeholder)   Other RX Curtis Hirsch MD        vancomycin (VANCOCIN) 1500 mg in sodium chloride 0.9 % 250 mL IVPB  1,500 mg IntraVENous Q12H Maddy Geller MD   Stopped at 08/11/23 0241    albuterol (PROVENTIL) (2.5 MG/3ML) 0.083% nebulizer solution 2.5 mg  2.5 mg Nebulization Q4H PRN Maddy Geller MD        insulin obesity, COPD, type II DM, chronic respiratory failure, GERD, HTN complaining of sacral wound. Patient was recently admitted on 6/30 [facility] with concerns of leg wound. While hospitalized had markedly elevated CO2, unable to tolerate BiPAP and placed on mechanical ventilation. Became febrile 101.3 began broad-spectrum antibiotics. Pulmonary was consulted for vent management, attempted to do breathing trials however unable to tolerate. Pulmonology noted patient had several bronchospasms and bedside bronc performed with thick mucopurulent secretions. Was then placed on Bumex drip. Patient had skin breakdown to sacral wound determined to be a DTI. Was taken to the OR by general surgery for wound debridement. ENT consulted for trach, trach and PEG placement performed. Patient was not sent to [facility] for further therapy. ENT did decannulate his trach prior to discharge and patient tolerated well. Patient completed course of cefepime and Unasyn and wound care followed throughout stay. He was discharged with wound VAC and outpatient follow-up on discharged on 7/21. Has been following 3 times weekly with wound care had been placed on Levaquin and Flagyl. Today wound care evaluated sacral wound noticed purulent drainage, concern for abscess and instructed him to [address] ER for further evaluation. Patient denies fever, chills, nausea, vomiting, abdominal pain, shortness breath, chest pain. Patient evaluated after procedure, wound not evaluated. Workup in ER CT pelvis suspected abscess in left gluteus muscle 7.4 cm, sacral decubitus ulcer, WBC 10.6, and blood cultures pending. Patient is to be admitted to the hospital service with consultation to general surgery\"      Surgical wound infection   Abscess of buttock  IV antibiotics  Blood cultures pending  General surgery on board.   Status post I&D of buttock abscess and debridement of muscle (08/09/2023)  Continue symptomatic and supportive management,

## 2023-08-12 LAB
ANION GAP SERPL CALCULATED.3IONS-SCNC: 11 MMOL/L (ref 7–19)
BASOPHILS # BLD: 0.1 K/UL (ref 0–0.2)
BASOPHILS NFR BLD: 0.6 % (ref 0–1)
BUN SERPL-MCNC: 6 MG/DL (ref 6–20)
CALCIUM SERPL-MCNC: 8.7 MG/DL (ref 8.6–10)
CHLORIDE SERPL-SCNC: 102 MMOL/L (ref 98–111)
CO2 SERPL-SCNC: 26 MMOL/L (ref 22–29)
CREAT SERPL-MCNC: 0.7 MG/DL (ref 0.5–1.2)
EOSINOPHIL # BLD: 0.3 K/UL (ref 0–0.6)
EOSINOPHIL NFR BLD: 3.5 % (ref 0–5)
ERYTHROCYTE [DISTWIDTH] IN BLOOD BY AUTOMATED COUNT: 14.2 % (ref 11.5–14.5)
GLUCOSE BLD-MCNC: 101 MG/DL (ref 70–99)
GLUCOSE BLD-MCNC: 130 MG/DL (ref 70–99)
GLUCOSE BLD-MCNC: 136 MG/DL (ref 70–99)
GLUCOSE BLD-MCNC: 93 MG/DL (ref 70–99)
GLUCOSE SERPL-MCNC: 90 MG/DL (ref 74–109)
HCT VFR BLD AUTO: 35.6 % (ref 42–52)
HGB BLD-MCNC: 10.9 G/DL (ref 14–18)
IMM GRANULOCYTES # BLD: 0.1 K/UL
LYMPHOCYTES # BLD: 1.2 K/UL (ref 1.1–4.5)
LYMPHOCYTES NFR BLD: 14.8 % (ref 20–40)
MCH RBC QN AUTO: 30.6 PG (ref 27–31)
MCHC RBC AUTO-ENTMCNC: 30.6 G/DL (ref 33–37)
MCV RBC AUTO: 100 FL (ref 80–94)
MONOCYTES # BLD: 0.6 K/UL (ref 0–0.9)
MONOCYTES NFR BLD: 6.9 % (ref 0–10)
NEUTROPHILS # BLD: 5.9 K/UL (ref 1.5–7.5)
NEUTS SEG NFR BLD: 73.4 % (ref 50–65)
PERFORMED ON: ABNORMAL
PERFORMED ON: NORMAL
PLATELET # BLD AUTO: 337 K/UL (ref 130–400)
PMV BLD AUTO: 9.3 FL (ref 9.4–12.4)
POTASSIUM SERPL-SCNC: 4 MMOL/L (ref 3.5–5)
RBC # BLD AUTO: 3.56 M/UL (ref 4.7–6.1)
SODIUM SERPL-SCNC: 139 MMOL/L (ref 136–145)
WBC # BLD AUTO: 8 K/UL (ref 4.8–10.8)

## 2023-08-12 PROCEDURE — 36415 COLL VENOUS BLD VENIPUNCTURE: CPT

## 2023-08-12 PROCEDURE — 1210000000 HC MED SURG R&B

## 2023-08-12 PROCEDURE — 80048 BASIC METABOLIC PNL TOTAL CA: CPT

## 2023-08-12 PROCEDURE — 94760 N-INVAS EAR/PLS OXIMETRY 1: CPT

## 2023-08-12 PROCEDURE — 2580000003 HC RX 258: Performed by: SURGERY

## 2023-08-12 PROCEDURE — 82962 GLUCOSE BLOOD TEST: CPT

## 2023-08-12 PROCEDURE — 6360000002 HC RX W HCPCS: Performed by: SURGERY

## 2023-08-12 PROCEDURE — 6360000002 HC RX W HCPCS: Performed by: HOSPITALIST

## 2023-08-12 PROCEDURE — 6370000000 HC RX 637 (ALT 250 FOR IP): Performed by: HOSPITALIST

## 2023-08-12 PROCEDURE — 85025 COMPLETE CBC W/AUTO DIFF WBC: CPT

## 2023-08-12 PROCEDURE — 6370000000 HC RX 637 (ALT 250 FOR IP): Performed by: SURGERY

## 2023-08-12 PROCEDURE — 97116 GAIT TRAINING THERAPY: CPT

## 2023-08-12 RX ORDER — DIAZEPAM 5 MG/1
5 TABLET ORAL EVERY 12 HOURS PRN
COMMUNITY

## 2023-08-12 RX ADMIN — ACETAMINOPHEN 650 MG: 325 TABLET ORAL at 21:29

## 2023-08-12 RX ADMIN — PIPERACILLIN AND TAZOBACTAM 3375 MG: 3; .375 INJECTION, POWDER, LYOPHILIZED, FOR SOLUTION INTRAVENOUS at 17:33

## 2023-08-12 RX ADMIN — MORPHINE SULFATE 4 MG: 4 INJECTION, SOLUTION INTRAMUSCULAR; INTRAVENOUS at 16:00

## 2023-08-12 RX ADMIN — POTASSIUM CHLORIDE 20 MEQ: 1500 TABLET, EXTENDED RELEASE ORAL at 09:31

## 2023-08-12 RX ADMIN — AMLODIPINE BESYLATE 10 MG: 10 TABLET ORAL at 09:31

## 2023-08-12 RX ADMIN — OXYCODONE HYDROCHLORIDE 10 MG: 5 TABLET ORAL at 14:58

## 2023-08-12 RX ADMIN — OXYCODONE HYDROCHLORIDE 10 MG: 5 TABLET ORAL at 09:31

## 2023-08-12 RX ADMIN — ENOXAPARIN SODIUM 30 MG: 100 INJECTION SUBCUTANEOUS at 21:29

## 2023-08-12 RX ADMIN — INSULIN GLARGINE 30 UNITS: 100 INJECTION, SOLUTION SUBCUTANEOUS at 09:32

## 2023-08-12 RX ADMIN — PIPERACILLIN AND TAZOBACTAM 3375 MG: 3; .375 INJECTION, POWDER, LYOPHILIZED, FOR SOLUTION INTRAVENOUS at 02:22

## 2023-08-12 RX ADMIN — OXYCODONE HYDROCHLORIDE 10 MG: 5 TABLET ORAL at 04:40

## 2023-08-12 RX ADMIN — ENOXAPARIN SODIUM 30 MG: 100 INJECTION SUBCUTANEOUS at 09:31

## 2023-08-12 RX ADMIN — MORPHINE SULFATE 4 MG: 4 INJECTION, SOLUTION INTRAMUSCULAR; INTRAVENOUS at 02:19

## 2023-08-12 RX ADMIN — SODIUM CHLORIDE, PRESERVATIVE FREE 10 ML: 5 INJECTION INTRAVENOUS at 21:29

## 2023-08-12 RX ADMIN — DAKIN'S SOLUTION 0.125% (QUARTER STRENGTH): 0.12 SOLUTION at 21:33

## 2023-08-12 RX ADMIN — DAKIN'S SOLUTION 0.125% (QUARTER STRENGTH): 0.12 SOLUTION at 11:21

## 2023-08-12 RX ADMIN — PIPERACILLIN AND TAZOBACTAM 3375 MG: 3; .375 INJECTION, POWDER, LYOPHILIZED, FOR SOLUTION INTRAVENOUS at 09:44

## 2023-08-12 RX ADMIN — ANTACID TABLETS 500 MG: 500 TABLET, CHEWABLE ORAL at 21:50

## 2023-08-12 RX ADMIN — SODIUM CHLORIDE: 9 INJECTION, SOLUTION INTRAVENOUS at 09:30

## 2023-08-12 RX ADMIN — OXYCODONE HYDROCHLORIDE 10 MG: 5 TABLET ORAL at 21:30

## 2023-08-12 RX ADMIN — FUROSEMIDE 40 MG: 40 TABLET ORAL at 09:31

## 2023-08-12 RX ADMIN — LOSARTAN POTASSIUM 100 MG: 100 TABLET, FILM COATED ORAL at 09:31

## 2023-08-12 ASSESSMENT — PAIN DESCRIPTION - LOCATION
LOCATION: COCCYX
LOCATION: BUTTOCKS
LOCATION: COCCYX

## 2023-08-12 ASSESSMENT — PAIN DESCRIPTION - PAIN TYPE: TYPE: ACUTE PAIN

## 2023-08-12 ASSESSMENT — PAIN - FUNCTIONAL ASSESSMENT: PAIN_FUNCTIONAL_ASSESSMENT: PREVENTS OR INTERFERES SOME ACTIVE ACTIVITIES AND ADLS

## 2023-08-12 ASSESSMENT — PAIN DESCRIPTION - DESCRIPTORS
DESCRIPTORS: BURNING;THROBBING
DESCRIPTORS: TINGLING;BURNING
DESCRIPTORS: ACHING

## 2023-08-12 ASSESSMENT — PAIN DESCRIPTION - ORIENTATION
ORIENTATION: LEFT;MID
ORIENTATION: MID
ORIENTATION: MID

## 2023-08-12 ASSESSMENT — PAIN DESCRIPTION - ONSET: ONSET: ON-GOING

## 2023-08-12 ASSESSMENT — PAIN SCALES - GENERAL
PAINLEVEL_OUTOF10: 8
PAINLEVEL_OUTOF10: 10
PAINLEVEL_OUTOF10: 10
PAINLEVEL_OUTOF10: 8

## 2023-08-12 ASSESSMENT — PAIN DESCRIPTION - FREQUENCY: FREQUENCY: CONTINUOUS

## 2023-08-12 NOTE — PROGRESS NOTES
Sacral wound dressing changed. Patient tolerated well and is resting comfortably with no complaints at this time.    Electronically signed by Marisela Adkins LPN on 2/87/8635 at 5:05 PM

## 2023-08-12 NOTE — PROGRESS NOTES
Surgical wound packing changed per orders, pt pre-medicated with prn medications prior to dressing change, see MAR.  Electronically signed by Nasir Park RN on 8/12/2023 at 2:49 AM

## 2023-08-12 NOTE — PROGRESS NOTES
Physical Therapy  Name: Gabriel Marion  MRN:  [id number]  Date of service:  8/12/2023 08/12/23 1115   Bed Mobility   Supine to Sit Modified independent   Sit to Supine Modified independent   Transfers   Sit to Stand Contact guard assistance   Stand to Sit Contact guard assistance   Ambulation   Surface Level tile   Device Rolling Walker   Assistance Contact guard assistance   Quality of Gait difficulty with foot placement due to neuropathy   Gait Deviations Slow Zakiya;Decreased step length;Decreased step height   Distance 40 feet   Short Term Goals   Time Frame for Short Term Goals 2 wks   Short Term Goal 1 sit to stand indep   Short Term Goal 2 sit to stand indep   Short Term Goal 3 amb. 200' with or without RW mod indep   Conditions Requiring Skilled Therapeutic Intervention   Body Structures, Functions, Activity Limitations Requiring Skilled Therapeutic Intervention Decreased functional mobility ; Decreased ROM; Decreased strength;Decreased sensation;Decreased balance;Decreased coordination; Increased pain   Treatment Diagnosis impaired gait and mobility   Discharge Recommendations Continue to assess pending progress;24 hour supervision or assist;Patient would benefit from continued therapy after discharge   Activity Tolerance   Activity Tolerance Patient tolerated treatment well   Physcial Therapy Plan   General Plan 6-7 times per week   Therapy Duration 2 Weeks   Current Treatment Recommendations Strengthening;Balance training;ROM; Functional mobility training;Transfer training;Gait training; Endurance training;Patient/Caregiver education & training;Positioning; Safety education & training;Equipment evaluation, education, & procurement; Therapeutic activities   PT Plan of Care   Saturday X   Safety Devices   Type of Devices Call light within reach; Left in bed;Nurse notified         Electronically signed by Lana Penaloza PTA on 8/12/2023 at 11:19 AM

## 2023-08-12 NOTE — PROGRESS NOTES
[facility] Progress Note    Patient:  Sandy Rodgers  YOB: 1980  Date of Service: 8/12/2023  MRN: [id number]   Acct: [de-identified]   Primary Care Physician: Dr. Yolie Cole M.D., MD  Advance Directive: Full Code  Admit Date: 8/9/2023       Hospital Day: 3        CHIEF COMPLAINT:     Chief Complaint   Patient presents with    Abscess     Had cyst drained x1 month pta and had wound vac, wound care sent over for possible abscess       8/12/2023 10:21 AM  Subjective / Interval History:   08/12/2023  Patient seen and examined this AM.  Denies any acute complaints or distress at this time. Laying comfortably in bed in no acute distress. Patient endorses some anxiety, for which she reportedly takes diazepam as needed at home. No acute changes or acute overnight event reported. 08/11/2023  No acute changes or acute overnight event reported. Patient well. No new complaints. Laying comfortably in bed in no apparent acute distress. Denies any acute complaints or distress at this time. 08/10/2023  Patient seen and examined this a.m. No new complaints. Lying comfortably bed in no acute distress. Endorses generalized weakness and fatigue. Reports bilateral lower extremity neuropathic pain which is stable. Patient denies any acute complaint of stress exam          Review of Systems:   Review of Systems  ROS: 14 point review of systems is negative except as specifically addressed above. ADULT DIET;  Regular; 3 carb choices (45 gm/meal)    Intake/Output Summary (Last 24 hours) at 8/12/2023 1021  Last data filed at 8/12/2023 6317  Gross per 24 hour   Intake 2332 ml   Output 1720 ml   Net 612 ml         Medications:   dextrose      sodium chloride 125 mL/hr at 08/12/23 0930    sodium chloride       Current Facility-Administered Medications   Medication Dose Route Frequency Provider Last Rate Last Admin    piperacillin-tazobactam (ZOSYN) 3,375 mg in sodium chloride 0.9 % 50 mL IVPB Breanna Pagan MD     Added automatically from request for surgery [id number]        Principal Problem:    Surgical wound infection  Active Problems:    Obstructive sleep apnea    Type 2 diabetes mellitus with skin complication, with long-term current use of insulin (HCC)    Abscess of buttock  Resolved Problems:    * No resolved hospital problems. *          Brief History/Summary:   As per Initial admission [facility] 8/9/2023, quoted below;  Mr Leda Dooley, a  \"40 y.o. male who presented to [facility] ER with PMH morbid obesity, COPD, type II DM, chronic respiratory failure, GERD, HTN complaining of sacral wound. Patient was recently admitted on 6/30 [facility] with concerns of leg wound. While hospitalized had markedly elevated CO2, unable to tolerate BiPAP and placed on mechanical ventilation. Became febrile 101.3 began broad-spectrum antibiotics. Pulmonary was consulted for vent management, attempted to do breathing trials however unable to tolerate. Pulmonology noted patient had several bronchospasms and bedside bronc performed with thick mucopurulent secretions. Was then placed on Bumex drip. Patient had skin breakdown to sacral wound determined to be a DTI. Was taken to the OR by general surgery for wound debridement. ENT consulted for trach, trach and PEG placement performed. Patient was not sent to [facility] for further therapy. ENT did decannulate his trach prior to discharge and patient tolerated well. Patient completed course of cefepime and Unasyn and wound care followed throughout stay. He was discharged with wound VAC and outpatient follow-up on discharged on 7/21. Has been following 3 times weekly with wound care had been placed on Levaquin and Flagyl. Today wound care evaluated sacral wound noticed purulent drainage, concern for abscess and instructed him to [address] [facility] ER for further evaluation. Patient denies fever, chills, nausea, vomiting, abdominal pain, shortness breath, chest pain.   Patient evaluated after

## 2023-08-13 LAB
ANION GAP SERPL CALCULATED.3IONS-SCNC: 14 MMOL/L (ref 7–19)
BACTERIA SPEC AEROBE CULT: ABNORMAL
BACTERIA SPEC AEROBE CULT: ABNORMAL
BACTERIA SPEC ANAEROBE CULT: ABNORMAL
BACTERIA SPEC ANAEROBE CULT: ABNORMAL
BASOPHILS # BLD: 0.1 K/UL (ref 0–0.2)
BASOPHILS NFR BLD: 0.9 % (ref 0–1)
BUN SERPL-MCNC: 9 MG/DL (ref 6–20)
CALCIUM SERPL-MCNC: 8.7 MG/DL (ref 8.6–10)
CHLORIDE SERPL-SCNC: 100 MMOL/L (ref 98–111)
CO2 SERPL-SCNC: 25 MMOL/L (ref 22–29)
CREAT SERPL-MCNC: 0.7 MG/DL (ref 0.5–1.2)
EOSINOPHIL # BLD: 0.3 K/UL (ref 0–0.6)
EOSINOPHIL NFR BLD: 3.7 % (ref 0–5)
ERYTHROCYTE [DISTWIDTH] IN BLOOD BY AUTOMATED COUNT: 14.2 % (ref 11.5–14.5)
GLUCOSE BLD-MCNC: 103 MG/DL (ref 70–99)
GLUCOSE BLD-MCNC: 141 MG/DL (ref 70–99)
GLUCOSE BLD-MCNC: 151 MG/DL (ref 70–99)
GLUCOSE BLD-MCNC: 97 MG/DL (ref 70–99)
GLUCOSE SERPL-MCNC: 102 MG/DL (ref 74–109)
GRAM STN SPEC: ABNORMAL
HCT VFR BLD AUTO: 37.4 % (ref 42–52)
HGB BLD-MCNC: 11.4 G/DL (ref 14–18)
IMM GRANULOCYTES # BLD: 0.1 K/UL
LYMPHOCYTES # BLD: 1.5 K/UL (ref 1.1–4.5)
LYMPHOCYTES NFR BLD: 18.8 % (ref 20–40)
MCH RBC QN AUTO: 30.6 PG (ref 27–31)
MCHC RBC AUTO-ENTMCNC: 30.5 G/DL (ref 33–37)
MCV RBC AUTO: 100.5 FL (ref 80–94)
MONOCYTES # BLD: 0.5 K/UL (ref 0–0.9)
MONOCYTES NFR BLD: 6.4 % (ref 0–10)
NEUTROPHILS # BLD: 5.7 K/UL (ref 1.5–7.5)
NEUTS SEG NFR BLD: 69.6 % (ref 50–65)
ORGANISM: ABNORMAL
PERFORMED ON: ABNORMAL
PERFORMED ON: NORMAL
PLATELET # BLD AUTO: 345 K/UL (ref 130–400)
PMV BLD AUTO: 9.5 FL (ref 9.4–12.4)
POTASSIUM SERPL-SCNC: 3.9 MMOL/L (ref 3.5–5)
RBC # BLD AUTO: 3.72 M/UL (ref 4.7–6.1)
SODIUM SERPL-SCNC: 139 MMOL/L (ref 136–145)
WBC # BLD AUTO: 8.2 K/UL (ref 4.8–10.8)

## 2023-08-13 PROCEDURE — 6360000002 HC RX W HCPCS: Performed by: HOSPITALIST

## 2023-08-13 PROCEDURE — 82962 GLUCOSE BLOOD TEST: CPT

## 2023-08-13 PROCEDURE — 80048 BASIC METABOLIC PNL TOTAL CA: CPT

## 2023-08-13 PROCEDURE — 1210000000 HC MED SURG R&B

## 2023-08-13 PROCEDURE — 6370000000 HC RX 637 (ALT 250 FOR IP): Performed by: SURGERY

## 2023-08-13 PROCEDURE — 36415 COLL VENOUS BLD VENIPUNCTURE: CPT

## 2023-08-13 PROCEDURE — 6370000000 HC RX 637 (ALT 250 FOR IP): Performed by: HOSPITALIST

## 2023-08-13 PROCEDURE — 85025 COMPLETE CBC W/AUTO DIFF WBC: CPT

## 2023-08-13 PROCEDURE — 6360000002 HC RX W HCPCS: Performed by: SURGERY

## 2023-08-13 PROCEDURE — 2580000003 HC RX 258: Performed by: SURGERY

## 2023-08-13 PROCEDURE — 94760 N-INVAS EAR/PLS OXIMETRY 1: CPT

## 2023-08-13 RX ADMIN — POTASSIUM CHLORIDE 20 MEQ: 1500 TABLET, EXTENDED RELEASE ORAL at 11:52

## 2023-08-13 RX ADMIN — DAKIN'S SOLUTION 0.125% (QUARTER STRENGTH): 0.12 SOLUTION at 16:17

## 2023-08-13 RX ADMIN — SODIUM CHLORIDE: 9 INJECTION, SOLUTION INTRAVENOUS at 17:54

## 2023-08-13 RX ADMIN — ANTACID TABLETS 500 MG: 500 TABLET, CHEWABLE ORAL at 21:41

## 2023-08-13 RX ADMIN — INSULIN GLARGINE 30 UNITS: 100 INJECTION, SOLUTION SUBCUTANEOUS at 10:25

## 2023-08-13 RX ADMIN — OXYCODONE HYDROCHLORIDE 10 MG: 5 TABLET ORAL at 10:22

## 2023-08-13 RX ADMIN — LOSARTAN POTASSIUM 100 MG: 100 TABLET, FILM COATED ORAL at 10:24

## 2023-08-13 RX ADMIN — AMLODIPINE BESYLATE 10 MG: 10 TABLET ORAL at 10:24

## 2023-08-13 RX ADMIN — PIPERACILLIN AND TAZOBACTAM 3375 MG: 3; .375 INJECTION, POWDER, LYOPHILIZED, FOR SOLUTION INTRAVENOUS at 17:57

## 2023-08-13 RX ADMIN — ACETAMINOPHEN 650 MG: 325 TABLET ORAL at 21:41

## 2023-08-13 RX ADMIN — ACETAMINOPHEN 650 MG: 325 TABLET ORAL at 12:01

## 2023-08-13 RX ADMIN — OXYCODONE HYDROCHLORIDE 10 MG: 5 TABLET ORAL at 16:21

## 2023-08-13 RX ADMIN — SODIUM CHLORIDE: 9 INJECTION, SOLUTION INTRAVENOUS at 01:37

## 2023-08-13 RX ADMIN — ACETAMINOPHEN 650 MG: 325 TABLET ORAL at 01:37

## 2023-08-13 RX ADMIN — MORPHINE SULFATE 4 MG: 4 INJECTION, SOLUTION INTRAMUSCULAR; INTRAVENOUS at 03:09

## 2023-08-13 RX ADMIN — ENOXAPARIN SODIUM 30 MG: 100 INJECTION SUBCUTANEOUS at 10:24

## 2023-08-13 RX ADMIN — OXYCODONE HYDROCHLORIDE 10 MG: 5 TABLET ORAL at 20:00

## 2023-08-13 RX ADMIN — SODIUM CHLORIDE, PRESERVATIVE FREE 10 ML: 5 INJECTION INTRAVENOUS at 21:42

## 2023-08-13 RX ADMIN — OXYCODONE HYDROCHLORIDE 10 MG: 5 TABLET ORAL at 01:35

## 2023-08-13 RX ADMIN — PIPERACILLIN AND TAZOBACTAM 3375 MG: 3; .375 INJECTION, POWDER, LYOPHILIZED, FOR SOLUTION INTRAVENOUS at 02:23

## 2023-08-13 RX ADMIN — FUROSEMIDE 40 MG: 40 TABLET ORAL at 10:24

## 2023-08-13 RX ADMIN — ENOXAPARIN SODIUM 30 MG: 100 INJECTION SUBCUTANEOUS at 21:41

## 2023-08-13 RX ADMIN — PIPERACILLIN AND TAZOBACTAM 3375 MG: 3; .375 INJECTION, POWDER, LYOPHILIZED, FOR SOLUTION INTRAVENOUS at 10:32

## 2023-08-13 RX ADMIN — MORPHINE SULFATE 4 MG: 4 INJECTION, SOLUTION INTRAMUSCULAR; INTRAVENOUS at 14:59

## 2023-08-13 ASSESSMENT — PAIN SCALES - GENERAL
PAINLEVEL_OUTOF10: 8
PAINLEVEL_OUTOF10: 8
PAINLEVEL_OUTOF10: 7
PAINLEVEL_OUTOF10: 10
PAINLEVEL_OUTOF10: 8
PAINLEVEL_OUTOF10: 7
PAINLEVEL_OUTOF10: 7

## 2023-08-13 ASSESSMENT — PAIN DESCRIPTION - DESCRIPTORS
DESCRIPTORS: ACHING

## 2023-08-13 ASSESSMENT — PAIN - FUNCTIONAL ASSESSMENT
PAIN_FUNCTIONAL_ASSESSMENT: PREVENTS OR INTERFERES SOME ACTIVE ACTIVITIES AND ADLS

## 2023-08-13 ASSESSMENT — PAIN DESCRIPTION - LOCATION
LOCATION: BUTTOCKS
LOCATION: FOOT;BUTTOCKS
LOCATION: BUTTOCKS;FOOT
LOCATION: BUTTOCKS;FOOT
LOCATION: BUTTOCKS
LOCATION: BUTTOCKS
LOCATION: BUTTOCKS;FOOT

## 2023-08-13 ASSESSMENT — PAIN DESCRIPTION - ORIENTATION
ORIENTATION: MID
ORIENTATION: RIGHT;LEFT;MID
ORIENTATION: RIGHT;LEFT;LOWER
ORIENTATION: RIGHT;LEFT;MID
ORIENTATION: MID
ORIENTATION: RIGHT;LEFT
ORIENTATION: MID

## 2023-08-13 ASSESSMENT — PAIN DESCRIPTION - FREQUENCY
FREQUENCY: CONTINUOUS

## 2023-08-13 ASSESSMENT — PAIN SCALES - WONG BAKER: WONGBAKER_NUMERICALRESPONSE: 0

## 2023-08-13 ASSESSMENT — PAIN DESCRIPTION - ONSET
ONSET: ON-GOING

## 2023-08-13 ASSESSMENT — PAIN DESCRIPTION - PAIN TYPE
TYPE: ACUTE PAIN

## 2023-08-13 NOTE — PLAN OF CARE
Problem: Discharge Planning  Goal: Discharge to home or other facility with appropriate resources  Outcome: Progressing  Flowsheets (Taken 8/13/2023 0900)  Discharge to home or other facility with appropriate resources:   Identify barriers to discharge with patient and caregiver   Arrange for needed discharge resources and transportation as appropriate   Identify discharge learning needs (meds, wound care, etc)   Arrange for interpreters to assist at discharge as needed   Refer to discharge planning if patient needs post-hospital services based on physician order or complex needs related to functional status, cognitive ability or social support system     Problem: Pain  Goal: Verbalizes/displays adequate comfort level or baseline comfort level  8/13/2023 1531 by Xin Lara RN  Outcome: Progressing  8/13/2023 0510 by Nelly Rubin RN  Outcome: Progressing     Problem: Safety - Adult  Goal: Free from fall injury  8/13/2023 1531 by Xin Lara RN  Outcome: Progressing  Flowsheets (Taken 8/13/2023 1204)  Free From Fall Injury:   Instruct family/caregiver on patient safety   Based on caregiver fall risk screen, instruct family/caregiver to ask for assistance with transferring infant if caregiver noted to have fall risk factors  8/13/2023 0510 by Nelly Rubin RN  Outcome: Progressing     Problem: ABCDS Injury Assessment  Goal: Absence of physical injury  8/13/2023 1531 by Xin Lara RN  Outcome: Progressing  Flowsheets (Taken 8/13/2023 1204)  Absence of Physical Injury: Implement safety measures based on patient assessment  8/13/2023 0510 by Nelly Rubin RN  Outcome: Progressing     Problem: Chronic Conditions and Co-morbidities  Goal: Patient's chronic conditions and co-morbidity symptoms are monitored and maintained or improved  8/13/2023 1531 by Xin Lara RN  Outcome: Progressing  Flowsheets (Taken 8/13/2023 0900)  Care Plan - Patient's Chronic Conditions and Co-Morbidity Symptoms are

## 2023-08-13 NOTE — PROGRESS NOTES
3.7 4.0 3.9    102 100   CO2 26 26 25   ANIONGAP 12 11 14   GLUCOSE 107 90 102   BUN 9 6 9   CREATININE 0.8 0.7 0.7   LABGLOM >60 >60 >60   CALCIUM 8.4* 8.7 8.7           CRP:    No results for input(s): CRP in the last 72 hours. Sed Rate:    No results for input(s): SEDRATE in the last 72 hours. HgBA1c:  No components found for: HGBA1C  FLP:  No results found for: TRIG, HDL, LDLCALC, LDLDIRECT, LABVLDL  TSH:  No results found for: TSH  Troponin T: No results for input(s): TROPONINI in the last 72 hours. Pro-BNP: No results for input(s): BNP in the last 72 hours. INR:   No results for input(s): INR in the last 72 hours. ABGs: No results found for: PHART, PO2ART, EQQ2HQV  UA:No results for input(s): NITRITE, COLORU, PHUR, LABCAST, WBCUA, RBCUA, MUCUS, TRICHOMONAS, YEAST, BACTERIA, CLARITYU, SPECGRAV, LEUKOCYTESUR, UROBILINOGEN, BILIRUBINUR, BLOODU, GLUCOSEU, AMORPHOUS in the last 72 hours. Invalid input(s): Delisa Greener      Culture Results:    Recent Labs     08/09/23 2059   CXSURG Light growth  No further workup    Light growth  No further workup           Blood Culture Recent:   Recent Labs     08/09/23  1640   BC No Growth to date. Any change in status will be called. No results for input(s): BC, BLOODCULT2, ORG in the last 72 hours. Cultures:   No results for input(s): CULTURE in the last 72 hours. No results for input(s): BC, Paul Lies in the last 72 hours. No results for input(s): CXSURG in the last 72 hours. No results for input(s): MG, PHOS in the last 72 hours. No results for input(s): AST, ALT, ALB, BILITOT, ALKPHOS, ALB in the last 72 hours. RAD:   CT PELVIS WO CONTRAST Additional Contrast? None    Result Date: 8/9/2023  EXAM:  CT PELVIS WITHOUT CONTRAST  HISTORY:  Sacral wound  COMPARISON:  None. FINDINGS:  Axial CT images of the pelvis without contrast.  Multiplanar reformatted images. Sacral decubitus ulcer.   There is a tract extending be admitted to the hospital service with consultation to general surgery\"      Surgical wound infection   Abscess of buttock  Continue IV antibiotics  Blood cultures no growth to date  General surgery on board. Status post I&D of buttock abscess and debridement of muscle (08/09/2023)  Continue symptomatic and supportive management, including pain management as needed. Obstructive sleep apnea  -CPAP nightly       Continue management of other chronic medical conditions - see above and orders. Advance Directive: Full Code    ADULT DIET; Regular; 3 carb choices (45 gm/meal)         Consults Made:   IP CONSULT TO GENERAL SURGERY  PHARMACY TO DOSE VANCOMYCIN      DVT prophylaxis: Enoxaparin      Current medications reviewed  Lab work reviewed  Radiology  films reviewed  Much appreciated treatment recommendations from suspecialities reviewed  Discussed treatment plan with the nurse and addressed all questions/concerns  Discussed with Patient  at the bedside in detail . .. they understand and agree with the management plan.         Discharge planning: tbd  Likely discharge with Wound Vac      Multiple complex medical problems  Morbidity mortality high risk  Medical decision making High complexity     Electronically signed by   Linda Cox MD,   Internal Medicine Hospitalist   8/13/2023 7:52 AM

## 2023-08-14 ENCOUNTER — HOSPITAL ENCOUNTER (OUTPATIENT)
Dept: WOUND CARE | Age: 43
Discharge: HOME | End: 2023-08-14

## 2023-08-14 VITALS
BODY MASS INDEX: 39.8 KG/M2 | RESPIRATION RATE: 18 BRPM | WEIGHT: 278 LBS | DIASTOLIC BLOOD PRESSURE: 81 MMHG | HEART RATE: 86 BPM | TEMPERATURE: 97.9 F | SYSTOLIC BLOOD PRESSURE: 122 MMHG | OXYGEN SATURATION: 96 % | HEIGHT: 70 IN

## 2023-08-14 LAB
ANION GAP SERPL CALCULATED.3IONS-SCNC: 13 MMOL/L (ref 7–19)
BACTERIA BLD CULT ORG #2: NORMAL
BACTERIA BLD CULT: NORMAL
BASOPHILS # BLD: 0.1 K/UL (ref 0–0.2)
BASOPHILS NFR BLD: 0.7 % (ref 0–1)
BUN SERPL-MCNC: 9 MG/DL (ref 6–20)
CALCIUM SERPL-MCNC: 9.1 MG/DL (ref 8.6–10)
CHLORIDE SERPL-SCNC: 99 MMOL/L (ref 98–111)
CO2 SERPL-SCNC: 26 MMOL/L (ref 22–29)
CREAT SERPL-MCNC: 0.8 MG/DL (ref 0.5–1.2)
EOSINOPHIL # BLD: 0.4 K/UL (ref 0–0.6)
EOSINOPHIL NFR BLD: 4.3 % (ref 0–5)
ERYTHROCYTE [DISTWIDTH] IN BLOOD BY AUTOMATED COUNT: 14.2 % (ref 11.5–14.5)
GLUCOSE BLD-MCNC: 121 MG/DL (ref 70–99)
GLUCOSE BLD-MCNC: 90 MG/DL (ref 70–99)
GLUCOSE SERPL-MCNC: 86 MG/DL (ref 74–109)
HCT VFR BLD AUTO: 37.2 % (ref 42–52)
HGB BLD-MCNC: 11.4 G/DL (ref 14–18)
IMM GRANULOCYTES # BLD: 0.1 K/UL
LYMPHOCYTES # BLD: 1.8 K/UL (ref 1.1–4.5)
LYMPHOCYTES NFR BLD: 21.2 % (ref 20–40)
MCH RBC QN AUTO: 30.6 PG (ref 27–31)
MCHC RBC AUTO-ENTMCNC: 30.6 G/DL (ref 33–37)
MCV RBC AUTO: 100 FL (ref 80–94)
MONOCYTES # BLD: 0.5 K/UL (ref 0–0.9)
MONOCYTES NFR BLD: 6.5 % (ref 0–10)
NEUTROPHILS # BLD: 5.6 K/UL (ref 1.5–7.5)
NEUTS SEG NFR BLD: 66.3 % (ref 50–65)
PERFORMED ON: ABNORMAL
PERFORMED ON: NORMAL
PLATELET # BLD AUTO: 372 K/UL (ref 130–400)
PMV BLD AUTO: 10 FL (ref 9.4–12.4)
POTASSIUM SERPL-SCNC: 3.9 MMOL/L (ref 3.5–5)
RBC # BLD AUTO: 3.72 M/UL (ref 4.7–6.1)
SODIUM SERPL-SCNC: 138 MMOL/L (ref 136–145)
WBC # BLD AUTO: 8.4 K/UL (ref 4.8–10.8)

## 2023-08-14 PROCEDURE — 82962 GLUCOSE BLOOD TEST: CPT

## 2023-08-14 PROCEDURE — 97116 GAIT TRAINING THERAPY: CPT

## 2023-08-14 PROCEDURE — 6360000002 HC RX W HCPCS: Performed by: SURGERY

## 2023-08-14 PROCEDURE — 2W15X6Z COMPRESSION OF BACK USING PRESSURE DRESSING: ICD-10-PCS | Performed by: SURGERY

## 2023-08-14 PROCEDURE — 2580000003 HC RX 258: Performed by: SURGERY

## 2023-08-14 PROCEDURE — 6360000002 HC RX W HCPCS: Performed by: HOSPITALIST

## 2023-08-14 PROCEDURE — 6370000000 HC RX 637 (ALT 250 FOR IP): Performed by: HOSPITALIST

## 2023-08-14 PROCEDURE — 6370000000 HC RX 637 (ALT 250 FOR IP): Performed by: SURGERY

## 2023-08-14 PROCEDURE — 94760 N-INVAS EAR/PLS OXIMETRY 1: CPT

## 2023-08-14 PROCEDURE — 36415 COLL VENOUS BLD VENIPUNCTURE: CPT

## 2023-08-14 PROCEDURE — 85025 COMPLETE CBC W/AUTO DIFF WBC: CPT

## 2023-08-14 PROCEDURE — 6370000000 HC RX 637 (ALT 250 FOR IP): Performed by: INTERNAL MEDICINE

## 2023-08-14 PROCEDURE — 80048 BASIC METABOLIC PNL TOTAL CA: CPT

## 2023-08-14 PROCEDURE — 99024 POSTOP FOLLOW-UP VISIT: CPT | Performed by: SURGERY

## 2023-08-14 RX ORDER — METRONIDAZOLE 500 MG/1
500 TABLET ORAL 2 TIMES DAILY
Qty: 14 TABLET | Refills: 0 | Status: SHIPPED | OUTPATIENT
Start: 2023-08-14 | End: 2023-08-21

## 2023-08-14 RX ORDER — DIAZEPAM 5 MG/1
5 TABLET ORAL EVERY 12 HOURS PRN
Status: DISCONTINUED | OUTPATIENT
Start: 2023-08-14 | End: 2023-08-14 | Stop reason: HOSPADM

## 2023-08-14 RX ADMIN — SODIUM CHLORIDE, PRESERVATIVE FREE 5 ML: 5 INJECTION INTRAVENOUS at 08:42

## 2023-08-14 RX ADMIN — OXYCODONE HYDROCHLORIDE 10 MG: 5 TABLET ORAL at 01:17

## 2023-08-14 RX ADMIN — INSULIN GLARGINE 30 UNITS: 100 INJECTION, SOLUTION SUBCUTANEOUS at 08:43

## 2023-08-14 RX ADMIN — PIPERACILLIN AND TAZOBACTAM 3375 MG: 3; .375 INJECTION, POWDER, LYOPHILIZED, FOR SOLUTION INTRAVENOUS at 09:13

## 2023-08-14 RX ADMIN — AMLODIPINE BESYLATE 10 MG: 10 TABLET ORAL at 08:42

## 2023-08-14 RX ADMIN — DAKIN'S SOLUTION 0.125% (QUARTER STRENGTH): 0.12 SOLUTION at 06:08

## 2023-08-14 RX ADMIN — MORPHINE SULFATE 4 MG: 4 INJECTION, SOLUTION INTRAMUSCULAR; INTRAVENOUS at 04:17

## 2023-08-14 RX ADMIN — FUROSEMIDE 40 MG: 40 TABLET ORAL at 08:42

## 2023-08-14 RX ADMIN — LOSARTAN POTASSIUM 100 MG: 100 TABLET, FILM COATED ORAL at 08:42

## 2023-08-14 RX ADMIN — OXYCODONE HYDROCHLORIDE 10 MG: 5 TABLET ORAL at 06:32

## 2023-08-14 RX ADMIN — PIPERACILLIN AND TAZOBACTAM 3375 MG: 3; .375 INJECTION, POWDER, LYOPHILIZED, FOR SOLUTION INTRAVENOUS at 02:21

## 2023-08-14 RX ADMIN — OXYCODONE HYDROCHLORIDE 10 MG: 5 TABLET ORAL at 11:49

## 2023-08-14 RX ADMIN — MORPHINE SULFATE 4 MG: 4 INJECTION, SOLUTION INTRAMUSCULAR; INTRAVENOUS at 09:09

## 2023-08-14 RX ADMIN — ENOXAPARIN SODIUM 30 MG: 100 INJECTION SUBCUTANEOUS at 08:43

## 2023-08-14 RX ADMIN — POTASSIUM CHLORIDE 20 MEQ: 1500 TABLET, EXTENDED RELEASE ORAL at 08:42

## 2023-08-14 RX ADMIN — DIAZEPAM 5 MG: 5 TABLET ORAL at 08:54

## 2023-08-14 ASSESSMENT — PAIN DESCRIPTION - ORIENTATION
ORIENTATION: MID

## 2023-08-14 ASSESSMENT — PAIN SCALES - GENERAL
PAINLEVEL_OUTOF10: 10
PAINLEVEL_OUTOF10: 8

## 2023-08-14 ASSESSMENT — PAIN DESCRIPTION - LOCATION
LOCATION: BUTTOCKS
LOCATION: SACRUM
LOCATION: COCCYX
LOCATION: BUTTOCKS

## 2023-08-14 ASSESSMENT — PAIN - FUNCTIONAL ASSESSMENT
PAIN_FUNCTIONAL_ASSESSMENT: ACTIVITIES ARE NOT PREVENTED
PAIN_FUNCTIONAL_ASSESSMENT: PREVENTS OR INTERFERES SOME ACTIVE ACTIVITIES AND ADLS
PAIN_FUNCTIONAL_ASSESSMENT: PREVENTS OR INTERFERES SOME ACTIVE ACTIVITIES AND ADLS
PAIN_FUNCTIONAL_ASSESSMENT: ACTIVITIES ARE NOT PREVENTED

## 2023-08-14 ASSESSMENT — PAIN DESCRIPTION - PAIN TYPE
TYPE: ACUTE PAIN
TYPE: ACUTE PAIN

## 2023-08-14 ASSESSMENT — PAIN DESCRIPTION - FREQUENCY
FREQUENCY: CONTINUOUS
FREQUENCY: CONTINUOUS

## 2023-08-14 ASSESSMENT — PAIN DESCRIPTION - DESCRIPTORS
DESCRIPTORS: STABBING
DESCRIPTORS: ACHING
DESCRIPTORS: BURNING;THROBBING
DESCRIPTORS: ACHING

## 2023-08-14 ASSESSMENT — PAIN DESCRIPTION - ONSET
ONSET: ON-GOING
ONSET: ON-GOING

## 2023-08-14 NOTE — PROCEDURES
Penrose drain removed on order from Dr. Pichardo Necessary for wound vac placement. Hair under vac drape was cleaned and skin washed with soap and water. Wound at coccyx that tunnels and undermines to left buttock flushed and cleaned with NS. Skin prep applied to periwound skin and under all vac drape. Ostomy ring used to build seal at gluteal cleft. White foam placed to drain site. Black foam into wound space and bridged over vac drape to white foam at drain site then to left lateral thigh for tracpad. Patient tolerated with complaints of pain. Patient was pretreated by primary nurse for pain. Plan:    Discharge likely today with home vac. Patient spouse to bring home vac to room at discharge. Follow up at wound care center for vac dressing changes.     Electronically signed by Vicente Goodson RN on 8/14/2023 at 10:43 AM

## 2023-08-14 NOTE — DISCHARGE SUMMARY
[facility] [address]  DEPARTMENT OF HOSPITALIST MEDICINE    DISCHARGE SUMMARY:        Linda Marion  :  1980  MRN:  [id number]    Admit date:  2023  Discharge date:  2023    Admitting Physician:  No admitting provider for patient encounter. Advance Directive: Full Code    Consults Made:   IP CONSULT TO GENERAL SURGERY  PHARMACY TO DOSE VANCOMYCIN      Primary Care Physician:  Dr. Ping Toure M.D., MD    Discharge Diagnoses:  Principal Problem:    Surgical wound infection  Active Problems:    Obstructive sleep apnea    Type 2 diabetes mellitus with skin complication, with long-term current use of insulin (HCC)    Abscess of buttock  Resolved Problems:    * No resolved hospital problems. *          Pertinent Labs:  CBC with DIFF:  Recent Labs     23   WBC 8.0 8.2 8.4   RBC 3.56* 3.72* 3.72*   HGB 10.9* 11.4* 11.4*   HCT 35.6* 37.4* 37.2*   .0* 100.5* 100.0*   MCH 30.6 30.6 30.6   MCHC 30.6* 30.5* 30.6*   RDW 14.2 14.2 14.2    345 372   MPV 9.3* 9.5 10.0   NEUTOPHILPCT 73.4* 69.6* 66.3*   LYMPHOPCT 14.8* 18.8* 21.2   MONOPCT 6.9 6.4 6.5   EOSRELPCT 3.5 3.7 4.3   BASOPCT 0.6 0.9 0.7   NEUTROABS 5.9 5.7 5.6   LYMPHSABS 1.2 1.5 1.8   MONOSABS 0.60 0.50 0.50   EOSABS 0.30 0.30 0.40   BASOSABS 0.10 0.10 0.10       CMP/BMP:  Recent Labs     23    139 138   K 4.0 3.9 3.9    100 99   CO2 26 25 26   ANIONGAP 11 14 13   GLUCOSE 90 102 86   BUN 6 9 9   CREATININE 0.7 0.7 0.8   LABGLOM >60 >60 >60   CALCIUM 8.7 8.7 9.1         CRP:  No results for input(s): CRP in the last 72 hours. Sed Rate:  No results for input(s): SEDRATE in the last 72 hours.       HgBA1c:  No components found for: HGBA1C  FLP:  No results found for: TRIG, HDL, LDLCALC, LDLDIRECT, LABVLDL  TSH:  No results found for: TSH  Troponin T: No results for input(s): TROPONINI in the last 72 4:27 PM      Thank you Dr. Leanne Harris M.D., MD for the opportunity to be involved in this patient's care. If you have any questions or concerns please feel free to contact me at [phone]        EMR Dragon/Transcription disclaimer:   Much of this encounter note is an electronic transcription/translation of spoken language to printed text.  The electronic translation of spoken language may permit erroneous, or at times, nonsensical words or phrases to be inadvertently transcribed; although attempts have made to review the note for such errors, some may still exist.

## 2023-08-14 NOTE — PROGRESS NOTES
Physical Therapy     08/14/23 1500   Subjective   Subjective attempted afternoon tx with pt refusing due to report of DC home with wound vac. will continue to follow up.      Electronically signed by Nigel Serra PTA on 8/14/2023 at 3:31 PM

## 2023-08-14 NOTE — PROGRESS NOTES
Physical Therapy     08/14/23 0804   Restrictions/Precautions   Restrictions/Precautions Fall Risk;Contact Precautions   Position Activity Restriction   Other position/activity restrictions limited sitting due to surgical site/wound, abominal binder   Subjective   Subjective pt c/o pain in R shoulder due to attempt to reposition self in bed last night. agreeable to tx. Subjective   Pain 7/10 R shoulder, buttocks,   Bed mobility   Supine to Sit Modified independent   Sit to Supine Modified independent   Scooting Modified independent   Transfers   Sit to Stand Contact guard assistance   Stand to Sit Contact guard assistance   Comment STS from EOB<>RW   Ambulation   Surface Level tile   Device Rolling Walker   Assistance Contact guard assistance   Quality of Gait difficulty with foot placement due to neuropathy   Distance 60'   Short Term Goals   Time Frame for Short Term Goals 2 wks   Short Term Goal 1 sit to stand indep   Short Term Goal 2 sit to stand indep   Short Term Goal 3 amb. 200' with or without RW mod indep   Activity Tolerance   Activity Tolerance Patient limited by pain; Patient limited by endurance   Assessment   Assessment Pt able to increase AMB up to 61' with RW CGA with notable loading through BUE due to c/o pain/discomfort in bilat feet/buttocks that increases during AMB. Unable to transfer to chair  due to pain from wound drain in L buttocks so pt returned to partial L side lying post AMB. No LOB or unsteadiness noted during AMB. Bed alarm activated with all basic needs in reach.    PT Plan of Care   Monday X   Safety Devices   Type of Devices Bed alarm in place;Call light within reach;Gait belt     Electronically signed by Michael Cervantes PTA on 8/14/2023 at 10:06 AM

## 2023-08-14 NOTE — DISCHARGE INSTR - DIET
Good nutrition is important when healing from an illness, injury, or surgery. Follow any nutrition recommendations given to you during your hospital stay. If you were given an oral nutrition supplement while in the hospital, continue to take this supplement at home. You can take it with meals, in-between meals, and/or before bedtime. These supplements can be purchased at most local grocery stores, pharmacies, and chain [employer]-stores. If you have any questions about your diet or nutrition, call the hospital and ask for the dietitian. ADULT DIET;  Regular; 3 carb choices/meal

## 2023-08-14 NOTE — PROGRESS NOTES
The patient has stated that he did not  a prescription for flagyl on his last discharge and would need to  the prescription today before he was discharged.

## 2023-08-14 NOTE — PROGRESS NOTES
The patients IV has been removed and the cannula is intact. The patient has been provided with discharge instructions and verbalized understanding.

## 2023-08-14 NOTE — PROGRESS NOTES
CLINICAL PHARMACY NOTE: MEDS TO BEDS    Total # of Prescriptions Filled: 1   The following medications were delivered to the patient:  Discharge Medication List as of 8/14/2023  2:24 PM      Metronidazole 500 mg      Additional Documentation:    Handed script to patient. Zero copay.

## 2023-08-14 NOTE — DISCHARGE INSTRUCTIONS
Keep follow up appointments  Take medications as directed  Keep wound vac dressing clean and dry report if cont beeping occluded   Follow up with Dr. Kole Mishra for feeding tube removal and can continue care of his orginal surgical wound

## 2023-08-14 NOTE — PROGRESS NOTES
Physical Therapy     08/14/23 0804   Restrictions/Precautions   Restrictions/Precautions Fall Risk;Contact Precautions   Position Activity Restriction   Other position/activity restrictions limited sitting due to surgical site/wound, abominal binder   Subjective   Subjective pt c/o pain in R shoulder due to attempt to reposition self in bed last night. agreeable to tx. Subjective   Pain 7/10 R shoulder, buttocks,   Bed mobility   Supine to Sit Modified independent   Sit to Supine Modified independent   Scooting Modified independent   Transfers   Sit to Stand Contact guard assistance   Stand to Sit Contact guard assistance   Comment STS from EOB<>RW   Ambulation   Surface Level tile   Device Rolling Walker   Assistance Contact guard assistance   Quality of Gait difficulty with foot placement due to neuropathy   Distance 60'   Short Term Goals   Time Frame for Short Term Goals 2 wks   Short Term Goal 1 sit to stand indep   Short Term Goal 2 sit to stand indep   Short Term Goal 3 amb. 200' with or without RW mod indep   Activity Tolerance   Activity Tolerance Patient limited by pain; Patient limited by endurance   PT Plan of Care   Monday X   Safety Devices   Type of Devices Bed alarm in place;Call light within reach;Gait belt     Electronically signed by Jung Milton PTA on 8/14/2023 at 10:02 AM

## 2023-08-16 ENCOUNTER — HOSPITAL ENCOUNTER (OUTPATIENT)
Dept: WOUND CARE | Age: 43
Discharge: HOME | End: 2023-08-16
Payer: MEDICAID

## 2023-08-16 ENCOUNTER — OFFICE VISIT (OUTPATIENT)
Dept: SURGERY | Facility: CLINIC | Age: 43
End: 2023-08-16
Payer: MEDICAID

## 2023-08-16 VITALS
DIASTOLIC BLOOD PRESSURE: 93 MMHG | SYSTOLIC BLOOD PRESSURE: 130 MMHG | TEMPERATURE: 98 F | WEIGHT: 278 LBS | HEIGHT: 70 IN | RESPIRATION RATE: 20 BRPM | HEART RATE: 100 BPM | BODY MASS INDEX: 39.8 KG/M2

## 2023-08-16 VITALS
HEART RATE: 102 BPM | HEIGHT: 70 IN | OXYGEN SATURATION: 97 % | BODY MASS INDEX: 39.08 KG/M2 | SYSTOLIC BLOOD PRESSURE: 103 MMHG | WEIGHT: 273 LBS | DIASTOLIC BLOOD PRESSURE: 60 MMHG

## 2023-08-16 DIAGNOSIS — Z93.1 S/P PERCUTANEOUS ENDOSCOPIC GASTROSTOMY (PEG) TUBE PLACEMENT: Primary | ICD-10-CM

## 2023-08-16 DIAGNOSIS — Z98.890 S/P SURGICAL REMOVAL OF PILONIDAL CYST: ICD-10-CM

## 2023-08-16 PROCEDURE — 97605 NEG PRS WND THER DME<=50SQCM: CPT

## 2023-08-16 ASSESSMENT — PAIN DESCRIPTION - ONSET: ONSET: ON-GOING

## 2023-08-16 ASSESSMENT — PAIN DESCRIPTION - PAIN TYPE: TYPE: ACUTE PAIN

## 2023-08-16 ASSESSMENT — PAIN - FUNCTIONAL ASSESSMENT: PAIN_FUNCTIONAL_ASSESSMENT: ACTIVITIES ARE NOT PREVENTED

## 2023-08-16 ASSESSMENT — PAIN DESCRIPTION - FREQUENCY: FREQUENCY: INTERMITTENT

## 2023-08-16 ASSESSMENT — PAIN DESCRIPTION - ORIENTATION: ORIENTATION: MID

## 2023-08-16 ASSESSMENT — PAIN DESCRIPTION - LOCATION: LOCATION: SACRUM

## 2023-08-16 ASSESSMENT — PAIN DESCRIPTION - DESCRIPTORS: DESCRIPTORS: STABBING

## 2023-08-16 ASSESSMENT — PAIN SCALES - GENERAL: PAINLEVEL_OUTOF10: 10

## 2023-08-16 NOTE — PLAN OF CARE
Problem: Chronic Conditions and Co-morbidities  Goal: Patient's chronic conditions and co-morbidity symptoms are monitored and maintained or improved  Outcome: Progressing     Problem: Discharge Planning  Goal: Discharge to home or other facility with appropriate resources  Outcome: Progressing     Problem: Pain  Goal: Verbalizes/displays adequate comfort level or baseline comfort level  Outcome: Progressing     Problem: Safety - Adult  Goal: Free from fall injury  Outcome: Progressing     Problem: Wound:  Goal: Will show signs of wound healing; wound closure and no evidence of infection  Description: Will show signs of wound healing; wound closure and no evidence of infection  Outcome: Progressing     Problem: Blood Glucose:  Goal: Ability to maintain appropriate glucose levels will improve  Description: Ability to maintain appropriate glucose levels will improve  Outcome: Progressing

## 2023-08-16 NOTE — DISCHARGE INSTR - COC
Continuity of Care Form    Patient Name: Lucie Norris   :  1980  MRN:  [id number]    Admit date:  2023  Discharge date:  ***    Code Status Order: Prior   Advance Directives:     Admitting Physician:  No admitting provider for patient encounter. PCP: Dr. Carolyn Fields M.D., MD    Discharging Nurse: [facility] Unit/Room#: No information available for this encounter. Discharging Unit Phone Number: ***    Emergency Contact:   Extended Emergency Contact Information  Primary Emergency Contact: Yu Burt  Address: [address] [facility], [address] [place] [address],[address] [place] [address] [place] Phone: [phone]  Mobile Phone: [phone]  Relation: Spouse    Past Surgical History:  Past Surgical History:   Procedure Laterality Date    BACK SURGERY N/A 2023    INCISION AND DRAINAGE OF BUTTOCK ABSCESS AND DEBRIDMENT OF MUSCLE performed by Sharron Walter MD at [address]      bilateral       Immunization History: There is no immunization history on file for this patient. 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    Somnolence, daytime R40.0    BMI 40.0-44.9, adult ([facility]) Z68.41    Sacral ulcer, with fat layer exposed ([address]) R52.118    Pilonidal cyst L05.91    Type 2 diabetes mellitus with skin complication, with long-term current use of insulin ([facility]) E11.628, Z79.4    Surgical wound infection T81.49XA    Abscess of buttock L02.31       Isolation/Infection:   Isolation            No Isolation          Patient Infection Status       None to display            Nurse Assessment:  Last Vital Signs: There were no vitals taken for this visit.     Last documented pain score (0-10 scale):    Last Weight:   Wt Readings from Last 1 Encounters:   23 278 lb (126.1 kg)     Mental Status:  {IP PT MENTAL

## 2023-08-16 NOTE — PROGRESS NOTES
Office Established Patient Note:     Referring Provider: No ref. provider found    Chief Complaint   Patient presents with    peg tube removal     Mr. Alejandro is here for a peg tube removal.        Subjective .     History of present illness:  Rikki Alejandro III is a 42 y.o. male s/p debridement of infected/necrotic pilonidal with wound vac placement (7/3/23) and PEG tube placement (6/27/23) with recent hospitalization. He still has a wound vac in place over his pilonidal debridement area. He has not used his feeding tube since he left [facility]. He has had nothing to eat or drink since 0000.      He held his blood thinners x 2 days. He is NPO since 0000.     History  Past Medical History:   Diagnosis Date    Anxiety     Arthritis     Bulging of cervical intervertebral disc     Bulging of lumbar intervertebral disc     Depression     GERD (gastroesophageal reflux disease)     Hypertension    ,   Past Surgical History:   Procedure Laterality Date    PEG TUBE INSERTION N/A 6/27/2023    Procedure: PERCUTANEOUS ENDOSCOPIC GASTROSTOMY TUBE INSERTION;  Surgeon: Iris Cool MD;  Location:  [facility] OR;  Service: General;  Laterality: N/A;    PILONIDAL CYSTECTOMY N/A 7/3/2023    Procedure: EXCISION & DEBRIDEMENT OF PILONIDAL CYST POSSIBLE WOUND VAC PLACEMENT;  Surgeon: Iris Cool MD;  Location:  [facility] OR;  Service: General;  Laterality: N/A;    REPLACEMENT TOTAL HIP LATERAL POSITION Bilateral     TOTAL HIP ARTHROPLASTY Bilateral 2017    Performed by Dr. Mejia in [place], [place]    TRACHEOSTOMY N/A 6/27/2023    Procedure: TRACHEOSTOMY;  Surgeon: Juan Pablo Marte MD;  Location:  [facility] OR;  Service: ENT;  Laterality: N/A;   ,   Family History   Problem Relation Age of Onset    COPD Mother     Hyperlipidemia Mother     Heart attack Father    ,   Social History     Tobacco Use    Smoking status: Every Day     Packs/day: 0.50     Years: 30.00     Pack years: 15.00     Types: Cigarettes    Smokeless tobacco: Never    Vaping Use    Vaping Use: Every day    Substances: Nicotine    Devices: Pre-filled pod   Substance Use Topics    Alcohol use: Yes     Alcohol/week: 4.0 standard drinks     Types: 4 Cans of beer per week     Comment: 12 beers/week    Drug use: No   , (Not in a hospital admission)   and Allergies:  Aspirin, Mobic [meloxicam], Lisinopril, and Naproxen sodium    Current Outpatient Medications:     amLODIPine (NORVASC) 5 MG tablet, Administer 1 tablet per G tube Daily., Disp: , Rfl:     budesonide (PULMICORT) 0.5 MG/2ML nebulizer solution, Take 2 mL by nebulization 2 (Two) Times a Day., Disp: , Rfl:     chlorhexidine (PERIDEX) 0.12 % solution, Apply 15 mL to the mouth or throat Every 12 (Twelve) Hours., Disp: , Rfl:     Chlorhexidine Gluconate Cloth 2 % pads, Apply 1 application topically to the appropriate area as directed Daily., Disp: , Rfl:     collagenase 250 UNIT/GM ointment, Apply 1 application topically to the appropriate area as directed Every 12 (Twelve) Hours., Disp: , Rfl:     dexmedetomidine (PRECEDEX) 400 MCG/100ML solution infusion, Infuse 30.4-228 mcg/hr into a venous catheter Dose Adjusted By Provider As Needed., Disp: , Rfl:     Enoxaparin Sodium (LOVENOX) 40 MG/0.4ML solution prefilled syringe syringe, Inject 0.4 mL under the skin into the appropriate area as directed Daily. Indications: Prevention of Unwanted Clot in Veins, Disp: , Rfl:     famotidine (PEPCID) 10 MG/ML solution injection, Infuse 2 mL into a venous catheter 2 (Two) Times a Day., Disp: , Rfl:     furosemide (LASIX) 10 MG/ML injection, Infuse 4 mL into a venous catheter Daily., Disp: , Rfl:     insulin detemir (LEVEMIR) 100 UNIT/ML injection, Inject 30 Units under the skin into the appropriate area as directed Daily., Disp: , Rfl: 12    Insulin Lispro (humaLOG) 100 UNIT/ML injection, Inject 4-24 Units under the skin into the appropriate area as directed Every 6 (Six) Hours., Disp: , Rfl: 12    ipratropium-albuterol (DUO-NEB) 0.5-2.5  "mg/3 ml nebulizer, Take 3 mL by nebulization 4 (Four) Times a Day., Disp: 360 mL, Rfl:     labetalol (NORMODYNE,TRANDATE) 5 MG/ML injection, Infuse 2 mL into a venous catheter Every 6 (Six) Hours As Needed for High Blood Pressure., Disp: , Rfl:     losartan (COZAAR) 50 MG tablet, Administer 1 tablet per G tube Daily., Disp: , Rfl:     metoclopramide (REGLAN) 5 MG/ML injection, Infuse 1 mL into a venous catheter Every 6 (Six) Hours., Disp: , Rfl:     metoprolol tartrate (LOPRESSOR) 25 MG tablet, Administer 0.5 tablets per G tube Every 12 (Twelve) Hours., Disp: , Rfl:     mupirocin (BACTROBAN) 2 % ointment, Apply 1 application topically to the appropriate area as directed Every 12 (Twelve) Hours., Disp: , Rfl:     ondansetron (ZOFRAN) 2 mg/mL injection, Infuse 2 mL into a venous catheter Every 6 (Six) Hours As Needed for Nausea or Vomiting., Disp: , Rfl:     Objective     Vital Signs   /60 (BP Location: Left arm, Patient Position: Sitting, Cuff Size: Adult)   Pulse 102   Ht 177.8 cm (70\")   Wt 124 kg (273 lb)   SpO2 97%   BMI 39.17 kg/mý      Physical Exam:  General appearance - alert, well appearing, and in no distress  Mental status - alert, oriented to person, place, and time  Eyes - pupils equal and reactive, extraocular eye movements intact  Chest - clear to auscultation  Heart - normal rate and regular rhythm  Abdomen - soft, nontender, nondistended, no masses or organomegaly  PEG tube in place, no surrounding erythema and no drainage   Neurological - alert, oriented, normal speech, no focal findings or movement disorder noted  Musculoskeletal - no joint tenderness, deformity or swelling    Results Review:    The following data was reviewed by: Iris Cool MD on 08/16/2023:  None     Assessment & Plan       Diagnoses and all orders for this visit:    1. S/P percutaneous endoscopic gastrostomy (PEG) tube placement (Primary)    2. S/P surgical removal of pilonidal cyst       Mr. Alejandro is a 42 " year old male s/p PEG placement > 6 weeks ago. He presents today for removal. He is NPO and his blood thinners are held. I removed his PEG tube in office. I explained that he will drain stomach contents from this site for 1-2 weeks. Follow up with me in 4 weeks for a wound check.     I spent > 20 minutes with the patient on the day of the exam.     Class 2 Severe Obesity (BMI >=35 and <=39.9). Obesity-related health conditions include the following: hypertension. Obesity is unchanged. BMI is is above average; BMI management plan is completed. We discussed portion control and increasing exercise.      Iris Cool MD  08/17/23  09:12 CDT

## 2023-08-16 NOTE — DISCHARGE INSTRUCTIONS
[address] and Hyper[facility]ic Oxygen Therapy   Physician Orders and Discharge Instructions  [address] [facility],[address] [place] [address] Blvd, [address]  Telephone: [phone]    NAME:  Salma Moore  YOB: 1980  MEDICAL RECORD NUMBER:  [id number]  DATE:  8/16/2023    Discharge condition: Stable    Discharge to: Home    Left via:Private automobile    Accompanied by:  spouse    ECF/[facility]: [facility]       Dressing Orders:   Coccyx Wound:  Start Alternative dressing. Wound Vac dressing on hold till seen in [facility]. Wash with soap and water  Apply wound vac as follows: Apply Skin Prep to periwound. Apply drape - window pane to surrounding area (periwound). (May use duoderm instead of drape to prevent skin breakdown. Use ostomy paste to fill any creases to prevent leakage. If skin becomes red due to tape irritation please apply skin prep. Then apply light dusting of ostomy powder or antifungal powder to periwound. Then blot with skin prep to form crusting to protect skin. May repeat skin prep, powdering steps until proper crusting is made. )   Then apply drape to periwound. DO NOT PUT FOAM ON GOOD SKIN. Apply PROMOGRAN then tuck black foam to wound bed. BRIDGE to hip. Set wound vac to 125 mmHG, continuous. Change wound vac dressing 3 times per week (MWF or TTS)   Reapply vac dressing if vac stops working or dressing begins to leak or cannot be reinforced. Alternative dressing: Wash with soap and water. Apply 1/4 STR Dakins moistened gauze to wound bed. Cover with gauze. Secure with roll gauze and medipore tape. Change twice daily . Treatment Orders:   Protein rich diet   Multivitamin   Avoid pressure by always sitting with MEMORY FOAM CUSHION OR PILLOW     [facility] follow up visit ________keep scheduled follow-up_____________________  (Please note your next appointment above and if you are unable to keep, kindly give a 24 hour notice.  Thank

## 2023-08-17 NOTE — PATIENT INSTRUCTIONS

## 2023-08-18 ENCOUNTER — HOSPITAL ENCOUNTER (OUTPATIENT)
Dept: WOUND CARE | Age: 43
Discharge: HOME | End: 2023-08-18
Payer: MEDICAID

## 2023-08-18 DIAGNOSIS — E11.622 TYPE 2 DIABETES MELLITUS WITH OTHER SKIN ULCER, WITH LONG-TERM CURRENT USE OF INSULIN (HCC): ICD-10-CM

## 2023-08-18 DIAGNOSIS — L98.422 SACRAL ULCER, WITH FAT LAYER EXPOSED (HCC): Primary | ICD-10-CM

## 2023-08-18 DIAGNOSIS — L05.91 PILONIDAL CYST: ICD-10-CM

## 2023-08-18 DIAGNOSIS — Z79.4 TYPE 2 DIABETES MELLITUS WITH OTHER SKIN ULCER, WITH LONG-TERM CURRENT USE OF INSULIN (HCC): ICD-10-CM

## 2023-08-18 PROCEDURE — 97605 NEG PRS WND THER DME<=50SQCM: CPT

## 2023-08-18 PROCEDURE — 6370000000 HC RX 637 (ALT 250 FOR IP): Performed by: NURSE PRACTITIONER

## 2023-08-18 RX ORDER — IBUPROFEN 200 MG
TABLET ORAL ONCE
OUTPATIENT
Start: 2023-08-18 | End: 2023-08-18

## 2023-08-18 RX ORDER — CLOBETASOL PROPIONATE 0.5 MG/G
OINTMENT TOPICAL ONCE
OUTPATIENT
Start: 2023-08-18 | End: 2023-08-18

## 2023-08-18 RX ORDER — LIDOCAINE 50 MG/G
OINTMENT TOPICAL ONCE
OUTPATIENT
Start: 2023-08-18 | End: 2023-08-18

## 2023-08-18 RX ORDER — LIDOCAINE HYDROCHLORIDE 20 MG/ML
JELLY TOPICAL ONCE
OUTPATIENT
Start: 2023-08-18 | End: 2023-08-18

## 2023-08-18 RX ORDER — BETAMETHASONE DIPROPIONATE 0.05 %
OINTMENT (GRAM) TOPICAL ONCE
OUTPATIENT
Start: 2023-08-18 | End: 2023-08-18

## 2023-08-18 RX ORDER — GENTAMICIN SULFATE 1 MG/G
OINTMENT TOPICAL ONCE
OUTPATIENT
Start: 2023-08-18 | End: 2023-08-18

## 2023-08-18 RX ORDER — BACITRACIN ZINC AND POLYMYXIN B SULFATE 500; 1000 [USP'U]/G; [USP'U]/G
OINTMENT TOPICAL ONCE
OUTPATIENT
Start: 2023-08-18 | End: 2023-08-18

## 2023-08-18 RX ORDER — SODIUM CHLOR/HYPOCHLOROUS ACID 0.033 %
SOLUTION, IRRIGATION IRRIGATION ONCE
OUTPATIENT
Start: 2023-08-18 | End: 2023-08-18

## 2023-08-18 RX ORDER — LIDOCAINE HYDROCHLORIDE 40 MG/ML
SOLUTION TOPICAL ONCE
OUTPATIENT
Start: 2023-08-18 | End: 2023-08-18

## 2023-08-18 RX ORDER — LIDOCAINE HYDROCHLORIDE 40 MG/ML
SOLUTION TOPICAL ONCE
Status: COMPLETED | OUTPATIENT
Start: 2023-08-18 | End: 2023-08-18

## 2023-08-18 RX ORDER — GINSENG 100 MG
CAPSULE ORAL ONCE
OUTPATIENT
Start: 2023-08-18 | End: 2023-08-18

## 2023-08-18 RX ORDER — LIDOCAINE 40 MG/G
CREAM TOPICAL ONCE
OUTPATIENT
Start: 2023-08-18 | End: 2023-08-18

## 2023-08-18 RX ADMIN — LIDOCAINE HYDROCHLORIDE: 40 SOLUTION TOPICAL at 12:02

## 2023-08-18 NOTE — WOUND CARE
Negative Pressure Wound Therapy    NAME:  Bruna Atkinson  YOB: 1980  MEDICAL RECORD NUMBER:  [id number]  DATE:  8/18/2023    Applied Negative Pressure to coccyx and buttocks wound(s)/ulcer(s). [x] Applied skin barrier prep to julián-wound. [x] Cut strips of plastic drape to picture frame wound so that julián-wound is     covered with the drape. [x] If bridging dressing to less prominent site, cover any intact skin that will come in contact with the Negative Pressure Therapy sponge, gauze or channel drain with plastic drape. The sponge should never touch intact skin. [x] Cut sponge, gauze or channel drain to size which will fit into the wound/ulcer bed without being forced. [x] Be sure the sponge is large enough to hold the entire round plastic flange which is attached to the tubing. Never allow flange to be larger than the sponge or it will produce suction damaging intact skin. Total number of individual pieces of foam used within the wound bed: 1    [x] If bridging the dressing away from the primary site, be sure the bridge leads to a piece of sponge large enough to hold the entire flange without allowing any of the flange to overlap onto intact skin. [x] Covered sponge, gauze or channel drain with plastic drape. [x] Cut a hole in this plastic drape directly over the sponge the same size as the plastic drain tubing. [x] Removed plastic liner from flange and apply it directly over the hole you cut. [x] Removed the plastic cover from the flange. [x] Attached the tubing to the wound/ulcer Negative Pressure Therapy and turn it on to be sure a vacuum is created and that there are no leaks. [x] If air leaks occur, use plastic drape to patch them. [] Secured Negative Pressure Therapy dressing with ace wrap loosely if located on an extremity. Maintain tubing outside of ace wrap. Tubing must not exert pressure on intact skin.     Applied per  Guidelines      Electronically signed by

## 2023-08-18 NOTE — DISCHARGE INSTRUCTIONS
business hours:     * Increase in Pain  * Temperature over 101  * Increase in drainage from your wound  * Drainage with a foul odor  * Bleeding  * Increase in swelling  * Need for compression bandage changes due to slippage, breakthrough drainage. If you need medical attention outside of the business hours of the [facility] please contact your PCP or go to the nearest emergency room.

## 2023-08-21 ENCOUNTER — HOSPITAL ENCOUNTER (OUTPATIENT)
Dept: WOUND CARE | Age: 43
Discharge: HOME | End: 2023-08-21
Payer: MEDICAID

## 2023-08-21 VITALS
BODY MASS INDEX: 39.8 KG/M2 | HEIGHT: 70 IN | SYSTOLIC BLOOD PRESSURE: 138 MMHG | TEMPERATURE: 98.8 F | RESPIRATION RATE: 20 BRPM | HEART RATE: 95 BPM | DIASTOLIC BLOOD PRESSURE: 76 MMHG | WEIGHT: 278 LBS

## 2023-08-21 DIAGNOSIS — Z79.4 TYPE 2 DIABETES MELLITUS WITH OTHER SKIN ULCER, WITH LONG-TERM CURRENT USE OF INSULIN (HCC): ICD-10-CM

## 2023-08-21 DIAGNOSIS — E11.622 TYPE 2 DIABETES MELLITUS WITH OTHER SKIN ULCER, WITH LONG-TERM CURRENT USE OF INSULIN (HCC): ICD-10-CM

## 2023-08-21 DIAGNOSIS — L05.91 PILONIDAL CYST: ICD-10-CM

## 2023-08-21 DIAGNOSIS — L98.422 SACRAL ULCER, WITH FAT LAYER EXPOSED (HCC): Primary | ICD-10-CM

## 2023-08-21 PROCEDURE — 6370000000 HC RX 637 (ALT 250 FOR IP): Performed by: NURSE PRACTITIONER

## 2023-08-21 PROCEDURE — 11045 DBRDMT SUBQ TISS EACH ADDL: CPT | Performed by: NURSE PRACTITIONER

## 2023-08-21 PROCEDURE — 11042 DBRDMT SUBQ TIS 1ST 20SQCM/<: CPT | Performed by: NURSE PRACTITIONER

## 2023-08-21 PROCEDURE — 11045 DBRDMT SUBQ TISS EACH ADDL: CPT

## 2023-08-21 PROCEDURE — 97605 NEG PRS WND THER DME<=50SQCM: CPT

## 2023-08-21 PROCEDURE — 11042 DBRDMT SUBQ TIS 1ST 20SQCM/<: CPT

## 2023-08-21 RX ORDER — GENTAMICIN SULFATE 1 MG/G
OINTMENT TOPICAL ONCE
Status: CANCELLED | OUTPATIENT
Start: 2023-08-21 | End: 2023-08-21

## 2023-08-21 RX ORDER — IBUPROFEN 200 MG
TABLET ORAL ONCE
Status: CANCELLED | OUTPATIENT
Start: 2023-08-21 | End: 2023-08-21

## 2023-08-21 RX ORDER — LIDOCAINE HYDROCHLORIDE 40 MG/ML
SOLUTION TOPICAL ONCE
Status: CANCELLED | OUTPATIENT
Start: 2023-08-21 | End: 2023-08-21

## 2023-08-21 RX ORDER — LIDOCAINE HYDROCHLORIDE 20 MG/ML
JELLY TOPICAL ONCE
Status: COMPLETED | OUTPATIENT
Start: 2023-08-21 | End: 2023-08-21

## 2023-08-21 RX ORDER — CLOBETASOL PROPIONATE 0.5 MG/G
OINTMENT TOPICAL ONCE
Status: CANCELLED | OUTPATIENT
Start: 2023-08-21 | End: 2023-08-21

## 2023-08-21 RX ORDER — LIDOCAINE 40 MG/G
CREAM TOPICAL ONCE
Status: CANCELLED | OUTPATIENT
Start: 2023-08-21 | End: 2023-08-21

## 2023-08-21 RX ORDER — BETAMETHASONE DIPROPIONATE 0.05 %
OINTMENT (GRAM) TOPICAL ONCE
Status: CANCELLED | OUTPATIENT
Start: 2023-08-21 | End: 2023-08-21

## 2023-08-21 RX ORDER — BACITRACIN ZINC AND POLYMYXIN B SULFATE 500; 1000 [USP'U]/G; [USP'U]/G
OINTMENT TOPICAL ONCE
Status: CANCELLED | OUTPATIENT
Start: 2023-08-21 | End: 2023-08-21

## 2023-08-21 RX ORDER — LIDOCAINE 50 MG/G
OINTMENT TOPICAL ONCE
Status: CANCELLED | OUTPATIENT
Start: 2023-08-21 | End: 2023-08-21

## 2023-08-21 RX ORDER — LIDOCAINE HYDROCHLORIDE 20 MG/ML
JELLY TOPICAL ONCE
Status: CANCELLED | OUTPATIENT
Start: 2023-08-21 | End: 2023-08-21

## 2023-08-21 RX ORDER — SODIUM CHLOR/HYPOCHLOROUS ACID 0.033 %
SOLUTION, IRRIGATION IRRIGATION ONCE
Status: CANCELLED | OUTPATIENT
Start: 2023-08-21 | End: 2023-08-21

## 2023-08-21 RX ORDER — GINSENG 100 MG
CAPSULE ORAL ONCE
Status: CANCELLED | OUTPATIENT
Start: 2023-08-21 | End: 2023-08-21

## 2023-08-21 RX ADMIN — LIDOCAINE HYDROCHLORIDE: 20 JELLY TOPICAL at 12:11

## 2023-08-21 ASSESSMENT — PAIN DESCRIPTION - DESCRIPTORS: DESCRIPTORS: STABBING;SORE

## 2023-08-21 ASSESSMENT — PAIN SCALES - GENERAL: PAINLEVEL_OUTOF10: 5

## 2023-08-21 ASSESSMENT — PAIN DESCRIPTION - PAIN TYPE: TYPE: ACUTE PAIN

## 2023-08-21 ASSESSMENT — PAIN DESCRIPTION - LOCATION: LOCATION: SACRUM

## 2023-08-21 ASSESSMENT — PAIN DESCRIPTION - FREQUENCY: FREQUENCY: INTERMITTENT

## 2023-08-21 ASSESSMENT — PAIN - FUNCTIONAL ASSESSMENT: PAIN_FUNCTIONAL_ASSESSMENT: ACTIVITIES ARE NOT PREVENTED

## 2023-08-21 ASSESSMENT — PAIN DESCRIPTION - ONSET: ONSET: ON-GOING

## 2023-08-21 NOTE — PLAN OF CARE
Problem: Chronic Conditions and Co-morbidities  Goal: Patient's chronic conditions and co-morbidity symptoms are monitored and maintained or improved  Outcome: Progressing     Problem: Discharge Planning  Goal: Discharge to home or other facility with appropriate resources  Outcome: Progressing     Problem: Pain  Goal: Verbalizes/displays adequate comfort level or baseline comfort level  Outcome: Progressing     Problem: Wound:  Goal: Will show signs of wound healing; wound closure and no evidence of infection  Description: Will show signs of wound healing; wound closure and no evidence of infection  Outcome: Progressing     Problem: Weight control:  Goal: Ability to maintain an optimal weight for height and age will be supported  Description: Ability to maintain an optimal weight for height and age will be supported  Outcome: Progressing     Problem: Falls - Risk of:  Goal: Will remain free from falls  Description: Will remain free from falls  Outcome: Progressing     Problem: Blood Glucose:  Goal: Ability to maintain appropriate glucose levels will improve  Description: Ability to maintain appropriate glucose levels will improve  Outcome: Progressing
Dany Shankar on 8/21/2023 at 1:44 PM

## 2023-08-21 NOTE — PROGRESS NOTES
current use of insulin ([address])    Relevant Orders    Initiate Outpatient Wound Care Protocol    Neg. Pressure Wound Therapy       Other    BMI 40.0-44.9, adult (HCC)    * (Principal) Sacral ulcer, with fat layer exposed ([address]) - Primary    Relevant Orders    Initiate Outpatient Wound Care Protocol    Neg. Pressure Wound Therapy    Pilonidal cyst    Relevant Orders    Initiate Outpatient Wound Care Protocol    Neg. Pressure Wound Therapy       Treatment Note please see attached Discharge Instructions    In my professional opinion this patient would benefit from HBO Therapy: No    Written patient dismissal instructions given to patient and signed by patient or POA. Mr. Nilay Contreras healing well, follow up in 1 week. Continue wound vac.   Electronically signed by CHRISS Terry CNP on 8/21/2023 at 12:16 PM Advancement Flap (Single) Text: The defect edges were debeveled with a #15 scalpel blade.  Given the location of the defect and the proximity to free margins a single advancement flap was deemed most appropriate.  Using a sterile surgical marker, an appropriate advancement flap was drawn incorporating the defect and placing the expected incisions within the relaxed skin tension lines where possible.    The area thus outlined was incised deep to adipose tissue with a #15 scalpel blade.  The skin margins were undermined to an appropriate distance in all directions utilizing iris scissors.

## 2023-08-22 NOTE — OP NOTE
Operative Report    PATIENT NAME: Isabel De Los Santos  MEDICAL RECORD NO. [id number]  SURGEON: Gabe Abbott MD MD   Primary Care Physician: Dr. Manda Menendez M.D., MD  Date: 8/9/2023     PROCEDURE PERFORMED: incision and drainage of buttock abscess and debridement of muscle, 7 by 4 cm, 4 cm deep, tunnels 8 cm  PREOPERATIVE DIAGNOSIS: abscess of buttock wound  POSTOPERATIVE DIAGNOSIS: Same  SURGEON:  Gabe Abbott MD   ANESTHESIA:  MAC  ESTIMATED BLOOD LOSS: less than 100 ml  SPECIMEN:  culture  COMPLICATIONS:  None; patient tolerated the procedure well. FINDINGS: large abscess cavity over the patient's left buttock with necrotic muscle  DISPOSITION: PACU - hemodynamically stable. CONDITION: stable      Indications: The patient is a 38year old male who had an extended hospitalization at [facility] last month, including debridement of a buttock wound. He was being seen at the wound care clinic, with concern about wound infection. He was sent to the ER, where a CT scan was done showing a large abscess. He is taken to the OR for surgical treatment. Procedure Details     After the risks and benefits of the procedure were explained, informed consent was obtained, and the patient was taken to the operating room. The patient was placed in supine position and anesthesia was begun. He was then placed in prone position with appropriate padding and securing straps. The buttock was prepped and draped in normal sterile fashion. A time out was performed. There was a small opening from the already present midline wound that tracked towards the patient's left buttock. This was opened along its length with cautery. This revealed a large pocket with foul smelling purulent drainage. This was cultured. The opening from the previous midline surgical wound was extended superiorly to allow for better exposure and drainage of the abscess cavity. There was necrotic muscle in the base of the abscess cavity.  This was sharply debrided with

## 2023-08-23 ENCOUNTER — HOSPITAL ENCOUNTER (OUTPATIENT)
Dept: WOUND CARE | Age: 43
Discharge: HOME | End: 2023-08-23
Payer: MEDICAID

## 2023-08-23 VITALS
HEIGHT: 70 IN | WEIGHT: 278 LBS | TEMPERATURE: 98.5 F | HEART RATE: 95 BPM | DIASTOLIC BLOOD PRESSURE: 83 MMHG | RESPIRATION RATE: 20 BRPM | BODY MASS INDEX: 39.8 KG/M2 | SYSTOLIC BLOOD PRESSURE: 117 MMHG

## 2023-08-23 DIAGNOSIS — E11.622 TYPE 2 DIABETES MELLITUS WITH OTHER SKIN ULCER, WITH LONG-TERM CURRENT USE OF INSULIN (HCC): ICD-10-CM

## 2023-08-23 DIAGNOSIS — Z79.4 TYPE 2 DIABETES MELLITUS WITH OTHER SKIN ULCER, WITH LONG-TERM CURRENT USE OF INSULIN (HCC): ICD-10-CM

## 2023-08-23 DIAGNOSIS — L98.422 SACRAL ULCER, WITH FAT LAYER EXPOSED (HCC): Primary | ICD-10-CM

## 2023-08-23 DIAGNOSIS — L05.91 PILONIDAL CYST: ICD-10-CM

## 2023-08-23 PROCEDURE — 6370000000 HC RX 637 (ALT 250 FOR IP): Performed by: NURSE PRACTITIONER

## 2023-08-23 PROCEDURE — 97605 NEG PRS WND THER DME<=50SQCM: CPT

## 2023-08-23 RX ORDER — LIDOCAINE HYDROCHLORIDE 40 MG/ML
SOLUTION TOPICAL ONCE
OUTPATIENT
Start: 2023-08-23 | End: 2023-08-23

## 2023-08-23 RX ORDER — GENTAMICIN SULFATE 1 MG/G
OINTMENT TOPICAL ONCE
OUTPATIENT
Start: 2023-08-23 | End: 2023-08-23

## 2023-08-23 RX ORDER — LIDOCAINE HYDROCHLORIDE 20 MG/ML
JELLY TOPICAL ONCE
Status: COMPLETED | OUTPATIENT
Start: 2023-08-23 | End: 2023-08-23

## 2023-08-23 RX ORDER — BETAMETHASONE DIPROPIONATE 0.05 %
OINTMENT (GRAM) TOPICAL ONCE
OUTPATIENT
Start: 2023-08-23 | End: 2023-08-23

## 2023-08-23 RX ORDER — SODIUM CHLOR/HYPOCHLOROUS ACID 0.033 %
SOLUTION, IRRIGATION IRRIGATION ONCE
OUTPATIENT
Start: 2023-08-23 | End: 2023-08-23

## 2023-08-23 RX ORDER — BACITRACIN ZINC AND POLYMYXIN B SULFATE 500; 1000 [USP'U]/G; [USP'U]/G
OINTMENT TOPICAL ONCE
OUTPATIENT
Start: 2023-08-23 | End: 2023-08-23

## 2023-08-23 RX ORDER — CLOBETASOL PROPIONATE 0.5 MG/G
OINTMENT TOPICAL ONCE
OUTPATIENT
Start: 2023-08-23 | End: 2023-08-23

## 2023-08-23 RX ORDER — LIDOCAINE 50 MG/G
OINTMENT TOPICAL ONCE
OUTPATIENT
Start: 2023-08-23 | End: 2023-08-23

## 2023-08-23 RX ORDER — LIDOCAINE HYDROCHLORIDE 20 MG/ML
JELLY TOPICAL ONCE
OUTPATIENT
Start: 2023-08-23 | End: 2023-08-23

## 2023-08-23 RX ORDER — GINSENG 100 MG
CAPSULE ORAL ONCE
OUTPATIENT
Start: 2023-08-23 | End: 2023-08-23

## 2023-08-23 RX ORDER — IBUPROFEN 200 MG
TABLET ORAL ONCE
OUTPATIENT
Start: 2023-08-23 | End: 2023-08-23

## 2023-08-23 RX ORDER — LIDOCAINE 40 MG/G
CREAM TOPICAL ONCE
OUTPATIENT
Start: 2023-08-23 | End: 2023-08-23

## 2023-08-23 RX ADMIN — LIDOCAINE HYDROCHLORIDE: 20 JELLY TOPICAL at 11:54

## 2023-08-23 NOTE — DISCHARGE INSTRUCTIONS
[address] and Hyperbaric Oxygen Therapy   Physician Orders and Discharge Instructions  [address] [facility],[address] [place] [address] Blvd, [address]  Telephone: [phone]    NAME:  Salma Moore  YOB: 1980  MEDICAL RECORD NUMBER:  [id number]  DATE:  8/23/2023    Discharge condition: Stable    Discharge to: Home    Left via:Private automobile    Accompanied by:  spouse    [facility]F/[facility]: [facility]    Dressing Orders:   Coccyx Wound:   Left buttocks place white foam to wound bed, bridge with coccyx dressing. Wash with soap and water  Apply wound vac as follows: Apply Skin Prep to periwound. Apply drape - window pane to surrounding area (periwound). (May use duoderm instead of drape to prevent skin breakdown. Use ostomy paste to fill any creases to prevent leakage. If skin becomes red due to tape irritation please apply skin prep. Then apply light dusting of ostomy powder or antifungal powder to periwound. Then blot with skin prep to form crusting to protect skin. May repeat skin prep, powdering steps until proper crusting is made. )   Then apply drape to periwound. DO NOT PUT FOAM ON GOOD SKIN. Apply PROMOGRAN then tuck black foam to wound bed. BRIDGE to hip. Set wound vac to 150 mmHG, continuous. Change wound vac dressing 3 times per week (MWF or TTS)   Reapply vac dressing if vac stops working or dressing begins to leak or cannot be reinforced. Alternative dressing: Wash with soap and water. Apply 1/4 STR Dakins moistened gauze to wound bed. Cover with gauze. Secure with roll gauze and medipore tape. Change twice daily . Treatment Orders:   Protein rich diet   Multivitamin   Avoid pressure by always sitting with MEMORY FOAM CUSHION OR PILLOW     [facility] follow up visit ________keep scheduled follow-up_____________________  (Please note your next appointment above and if you are unable to keep, kindly give a 24 hour notice.  Thank you.)              If you

## 2023-08-23 NOTE — PLAN OF CARE
Patient presented today with wife for nurse visit for wound assessment and dressing change, Eli Shepard RN admitting patient for dressing change today. It was noted he has rash to trunk that is itchy, face appears swollen and around eyes appears swollen. Wife stated patient has had some trouble with urination. Patient was recently started on Lyrica. Patient was instructed to follow up with PCP today or if unable to see PCP to follow up with Urgent Care. Patient and wife verbalized understanding.

## 2023-08-23 NOTE — PLAN OF CARE
Negative Pressure Wound Therapy    NAME:  Naveen Thomson  YOB: 1980  MEDICAL RECORD NUMBER:  [id number]  DATE:  8/23/2023    Applied Negative Pressure to coccxy wound(s)/ulcer(s). [x] Applied skin barrier prep to julián-wound. [x] Cut strips of plastic drape to picture frame wound so that julián-wound is     covered with the drape. [x] If bridging dressing to less prominent site, cover any intact skin that will come in contact with the Negative Pressure Therapy sponge, gauze or channel drain with plastic drape. The sponge should never touch intact skin. [x] Cut sponge, gauze or channel drain to size which will fit into the wound/ulcer bed without being forced. [x] Be sure the sponge is large enough to hold the entire round plastic flange which is attached to the tubing. Never allow flange to be larger than the sponge or it will produce suction damaging intact skin. Total number of individual pieces of foam used within the wound bed: 3    [x] If bridging the dressing away from the primary site, be sure the bridge leads to a piece of sponge large enough to hold the entire flange without allowing any of the flange to overlap onto intact skin. [x] Covered sponge, gauze or channel drain with plastic drape. [x] Cut a hole in this plastic drape directly over the sponge the same size as the plastic drain tubing. [x] Removed plastic liner from flange and apply it directly over the hole you cut. [x] Removed the plastic cover from the flange. [x] Attached the tubing to the wound/ulcer Negative Pressure Therapy and turn it on to be sure a vacuum is created and that there are no leaks. [x] If air leaks occur, use plastic drape to patch them. [] Secured Negative Pressure Therapy dressing with ace wrap loosely if located on an extremity. Maintain tubing outside of ace wrap. Tubing must not exert pressure on intact skin.     Applied per  Guidelines      Electronically signed by Pierce Harris

## 2023-08-23 NOTE — PLAN OF CARE
Problem: Chronic Conditions and Co-morbidities  Goal: Patient's chronic conditions and co-morbidity symptoms are monitored and maintained or improved  Outcome: Progressing     Problem: Discharge Planning  Goal: Discharge to home or other facility with appropriate resources  Outcome: Progressing     Problem: Chronic Conditions and Co-morbidities  Goal: Patient's chronic conditions and co-morbidity symptoms are monitored and maintained or improved  Outcome: Progressing     Problem: Discharge Planning  Goal: Discharge to home or other facility with appropriate resources  Outcome: Progressing     Problem: Pain  Goal: Verbalizes/displays adequate comfort level or baseline comfort level  Outcome: Progressing     Problem: Wound:  Goal: Will show signs of wound healing; wound closure and no evidence of infection  Description: Will show signs of wound healing; wound closure and no evidence of infection  Outcome: Progressing     Problem: Weight control:  Goal: Ability to maintain an optimal weight for height and age will be supported  Description: Ability to maintain an optimal weight for height and age will be supported  Outcome: Progressing     Problem: Falls - Risk of:  Goal: Will remain free from falls  Description: Will remain free from falls  Outcome: Progressing     Problem: Blood Glucose:  Goal: Ability to maintain appropriate glucose levels will improve  Description: Ability to maintain appropriate glucose levels will improve  Outcome: Progressing

## 2023-08-25 ENCOUNTER — HOSPITAL ENCOUNTER (OUTPATIENT)
Dept: WOUND CARE | Age: 43
Discharge: HOME | End: 2023-08-25
Payer: MEDICAID

## 2023-08-25 DIAGNOSIS — L98.422 SACRAL ULCER, WITH FAT LAYER EXPOSED (HCC): Primary | ICD-10-CM

## 2023-08-25 DIAGNOSIS — E11.622 TYPE 2 DIABETES MELLITUS WITH OTHER SKIN ULCER, WITH LONG-TERM CURRENT USE OF INSULIN (HCC): ICD-10-CM

## 2023-08-25 DIAGNOSIS — L05.91 PILONIDAL CYST: ICD-10-CM

## 2023-08-25 DIAGNOSIS — Z79.4 TYPE 2 DIABETES MELLITUS WITH OTHER SKIN ULCER, WITH LONG-TERM CURRENT USE OF INSULIN (HCC): ICD-10-CM

## 2023-08-25 PROCEDURE — 97605 NEG PRS WND THER DME<=50SQCM: CPT

## 2023-08-25 RX ORDER — CLOBETASOL PROPIONATE 0.5 MG/G
OINTMENT TOPICAL ONCE
OUTPATIENT
Start: 2023-08-25 | End: 2023-08-25

## 2023-08-25 RX ORDER — LIDOCAINE 40 MG/G
CREAM TOPICAL ONCE
OUTPATIENT
Start: 2023-08-25 | End: 2023-08-25

## 2023-08-25 RX ORDER — GINSENG 100 MG
CAPSULE ORAL ONCE
OUTPATIENT
Start: 2023-08-25 | End: 2023-08-25

## 2023-08-25 RX ORDER — SODIUM CHLOR/HYPOCHLOROUS ACID 0.033 %
SOLUTION, IRRIGATION IRRIGATION ONCE
OUTPATIENT
Start: 2023-08-25 | End: 2023-08-25

## 2023-08-25 RX ORDER — BACITRACIN ZINC AND POLYMYXIN B SULFATE 500; 1000 [USP'U]/G; [USP'U]/G
OINTMENT TOPICAL ONCE
OUTPATIENT
Start: 2023-08-25 | End: 2023-08-25

## 2023-08-25 RX ORDER — LIDOCAINE HYDROCHLORIDE 20 MG/ML
JELLY TOPICAL ONCE
OUTPATIENT
Start: 2023-08-25 | End: 2023-08-25

## 2023-08-25 RX ORDER — IBUPROFEN 200 MG
TABLET ORAL ONCE
OUTPATIENT
Start: 2023-08-25 | End: 2023-08-25

## 2023-08-25 RX ORDER — LIDOCAINE 50 MG/G
OINTMENT TOPICAL ONCE
OUTPATIENT
Start: 2023-08-25 | End: 2023-08-25

## 2023-08-25 RX ORDER — LIDOCAINE HYDROCHLORIDE 40 MG/ML
SOLUTION TOPICAL ONCE
OUTPATIENT
Start: 2023-08-25 | End: 2023-08-25

## 2023-08-25 RX ORDER — BETAMETHASONE DIPROPIONATE 0.05 %
OINTMENT (GRAM) TOPICAL ONCE
OUTPATIENT
Start: 2023-08-25 | End: 2023-08-25

## 2023-08-25 RX ORDER — GENTAMICIN SULFATE 1 MG/G
OINTMENT TOPICAL ONCE
OUTPATIENT
Start: 2023-08-25 | End: 2023-08-25

## 2023-08-25 NOTE — DISCHARGE INSTRUCTIONS
[address] and Hyperbaric Oxygen Therapy   Physician Orders and Discharge Instructions  [address], [address] [facility]  Telephone: [phone]    NAME:  Lucie Norris  YOB: 1980  MEDICAL RECORD NUMBER:  [id number]  DATE:  8/25/2023    Discharge condition: Stable    Discharge to: Home    Left via:Private automobile    Accompanied by:  spouse    [facility]F/[facility]: [facility]       Dressing Orders:   Coccyx Wound:    Left buttocks place black foam to wound bed, bridge with coccyx dressing. Wash with soap and water   Apply wound vac as follows: Apply Skin Prep to periwound. Apply drape - window pane to surrounding area (periwound). (May use duoderm instead of drape to prevent skin breakdown. Use ostomy paste to fill any creases to prevent leakage. If skin becomes red due to tape irritation please apply skin prep. Then apply light dusting of ostomy powder or antifungal powder to periwound. Then blot with skin prep to form crusting to protect skin. May repeat skin prep, powdering steps until proper crusting is made. )   Then apply drape to periwound. DO NOT PUT FOAM ON GOOD SKIN. Apply PROMOGRAN then tuck black foam to wound bed. BRIDGE to hip. Set wound vac to 150 mmHG, continuous. Change wound vac dressing 3 times per week (MWF or TTS)   Reapply vac dressing if vac stops working or dressing begins to leak or cannot be reinforced. Alternative dressing: Wash with soap and water. Apply 1/4 STR Dakins moistened gauze to wound bed. Cover with gauze. Secure with roll gauze and medipore tape. Change twice daily . Treatment Orders:   Protein rich diet   Multivitamin   Avoid pressure by always sitting with MEMORY FOAM CUSHION OR PILLOW    [facility] follow up visit ___________8/30 nurse visit__________________  (Please note your next appointment above and if you are unable to keep, kindly give a 24 hour notice.  Thank you.)          If you

## 2023-08-25 NOTE — DISCHARGE INSTRUCTIONS
[address] and Hyperbaric Oxygen Therapy   Physician Orders and Discharge Instructions  [address] [facility],[address] [place] [address] Blvd, [address]  Telephone: [phone]    NAME:  Linda Marion  YOB: 1980  MEDICAL RECORD NUMBER:  [id number]  DATE:  8/25/2023    Discharge condition: Stable    Discharge to: Home    Left via:Private automobile    Accompanied by:  spouse    [facility]F/[facility]: [facility]     Dressing Orders:   Coccyx Wound:   Left buttocks place white foam to wound bed, bridge with coccyx dressing. Wash with soap and water  Apply wound vac as follows: Apply Skin Prep to periwound. Apply drape - window pane to surrounding area (periwound). (May use duoderm instead of drape to prevent skin breakdown. Use ostomy paste to fill any creases to prevent leakage. If skin becomes red due to tape irritation please apply skin prep. Then apply light dusting of ostomy powder or antifungal powder to periwound. Then blot with skin prep to form crusting to protect skin. May repeat skin prep, powdering steps until proper crusting is made. )   Then apply drape to periwound. DO NOT PUT FOAM ON GOOD SKIN. Apply PROMOGRAN then tuck black foam to wound bed. BRIDGE to hip. Set wound vac to 150 mmHG, continuous. Change wound vac dressing 3 times per week (MWF or TTS)   Reapply vac dressing if vac stops working or dressing begins to leak or cannot be reinforced. Alternative dressing: Wash with soap and water. Apply 1/4 STR Dakins moistened gauze to wound bed. Cover with gauze. Secure with roll gauze and medipore tape. Change twice daily. Treatment Orders:   Protein rich diet   Multivitamin   Avoid pressure by always sitting with MEMORY FOAM CUSHION OR PILLOW     [facility] follow up visit ________keep scheduled follow-up_____________________  (Please note your next appointment above and if you are unable to keep, kindly give a 24 hour notice.  Thank you.)              If you

## 2023-08-28 ENCOUNTER — HOSPITAL ENCOUNTER (OUTPATIENT)
Dept: WOUND CARE | Age: 43
Discharge: HOME | End: 2023-08-28
Payer: MEDICAID

## 2023-08-28 VITALS
HEIGHT: 70 IN | HEART RATE: 81 BPM | DIASTOLIC BLOOD PRESSURE: 85 MMHG | TEMPERATURE: 98.5 F | WEIGHT: 273 LBS | SYSTOLIC BLOOD PRESSURE: 142 MMHG | BODY MASS INDEX: 39.08 KG/M2 | RESPIRATION RATE: 20 BRPM

## 2023-08-28 DIAGNOSIS — L98.422 SACRAL ULCER, WITH FAT LAYER EXPOSED (HCC): Primary | ICD-10-CM

## 2023-08-28 DIAGNOSIS — L05.91 PILONIDAL CYST: ICD-10-CM

## 2023-08-28 DIAGNOSIS — E11.622 TYPE 2 DIABETES MELLITUS WITH OTHER SKIN ULCER, WITH LONG-TERM CURRENT USE OF INSULIN (HCC): ICD-10-CM

## 2023-08-28 DIAGNOSIS — Z79.4 TYPE 2 DIABETES MELLITUS WITH OTHER SKIN ULCER, WITH LONG-TERM CURRENT USE OF INSULIN (HCC): ICD-10-CM

## 2023-08-28 PROCEDURE — 11042 DBRDMT SUBQ TIS 1ST 20SQCM/<: CPT | Performed by: NURSE PRACTITIONER

## 2023-08-28 PROCEDURE — 6370000000 HC RX 637 (ALT 250 FOR IP): Performed by: NURSE PRACTITIONER

## 2023-08-28 PROCEDURE — 97605 NEG PRS WND THER DME<=50SQCM: CPT

## 2023-08-28 PROCEDURE — 11042 DBRDMT SUBQ TIS 1ST 20SQCM/<: CPT

## 2023-08-28 RX ORDER — LIDOCAINE HYDROCHLORIDE 20 MG/ML
JELLY TOPICAL ONCE
Status: COMPLETED | OUTPATIENT
Start: 2023-08-28 | End: 2023-08-28

## 2023-08-28 RX ORDER — BACITRACIN ZINC AND POLYMYXIN B SULFATE 500; 1000 [USP'U]/G; [USP'U]/G
OINTMENT TOPICAL ONCE
OUTPATIENT
Start: 2023-08-28 | End: 2023-08-28

## 2023-08-28 RX ORDER — IBUPROFEN 200 MG
TABLET ORAL ONCE
OUTPATIENT
Start: 2023-08-28 | End: 2023-08-28

## 2023-08-28 RX ORDER — LIDOCAINE HYDROCHLORIDE 40 MG/ML
SOLUTION TOPICAL ONCE
OUTPATIENT
Start: 2023-08-28 | End: 2023-08-28

## 2023-08-28 RX ORDER — LIDOCAINE 50 MG/G
OINTMENT TOPICAL ONCE
OUTPATIENT
Start: 2023-08-28 | End: 2023-08-28

## 2023-08-28 RX ORDER — LIDOCAINE HYDROCHLORIDE 20 MG/ML
JELLY TOPICAL ONCE
OUTPATIENT
Start: 2023-08-28 | End: 2023-08-28

## 2023-08-28 RX ORDER — LIDOCAINE HYDROCHLORIDE 20 MG/ML
JELLY TOPICAL ONCE
Status: CANCELLED | OUTPATIENT
Start: 2023-08-28 | End: 2023-08-28

## 2023-08-28 RX ORDER — SODIUM CHLOR/HYPOCHLOROUS ACID 0.033 %
SOLUTION, IRRIGATION IRRIGATION ONCE
OUTPATIENT
Start: 2023-08-28 | End: 2023-08-28

## 2023-08-28 RX ORDER — BETAMETHASONE DIPROPIONATE 0.05 %
OINTMENT (GRAM) TOPICAL ONCE
OUTPATIENT
Start: 2023-08-28 | End: 2023-08-28

## 2023-08-28 RX ORDER — CLOBETASOL PROPIONATE 0.5 MG/G
OINTMENT TOPICAL ONCE
OUTPATIENT
Start: 2023-08-28 | End: 2023-08-28

## 2023-08-28 RX ORDER — LIDOCAINE 40 MG/G
CREAM TOPICAL ONCE
OUTPATIENT
Start: 2023-08-28 | End: 2023-08-28

## 2023-08-28 RX ORDER — GENTAMICIN SULFATE 1 MG/G
OINTMENT TOPICAL ONCE
OUTPATIENT
Start: 2023-08-28 | End: 2023-08-28

## 2023-08-28 RX ORDER — GINSENG 100 MG
CAPSULE ORAL ONCE
OUTPATIENT
Start: 2023-08-28 | End: 2023-08-28

## 2023-08-28 RX ADMIN — LIDOCAINE HYDROCHLORIDE: 20 JELLY TOPICAL at 10:59

## 2023-08-28 ASSESSMENT — PAIN DESCRIPTION - FREQUENCY: FREQUENCY: INTERMITTENT

## 2023-08-28 ASSESSMENT — PAIN DESCRIPTION - ONSET: ONSET: ON-GOING

## 2023-08-28 ASSESSMENT — PAIN DESCRIPTION - DESCRIPTORS: DESCRIPTORS: STABBING;SORE

## 2023-08-28 ASSESSMENT — PAIN - FUNCTIONAL ASSESSMENT: PAIN_FUNCTIONAL_ASSESSMENT: ACTIVITIES ARE NOT PREVENTED

## 2023-08-28 ASSESSMENT — PAIN DESCRIPTION - LOCATION: LOCATION: COCCYX

## 2023-08-28 ASSESSMENT — PAIN SCALES - GENERAL: PAINLEVEL_OUTOF10: 5

## 2023-08-28 ASSESSMENT — PAIN DESCRIPTION - PAIN TYPE: TYPE: ACUTE PAIN

## 2023-08-28 NOTE — PROGRESS NOTES
Pressure Wound Therapy       Other    * (Principal) Sacral ulcer, with fat layer exposed ([address]) - Primary    Relevant Orders    Initiate Outpatient Wound Care Protocol    Neg. Pressure Wound Therapy    Pilonidal cyst    Relevant Orders    Initiate Outpatient Wound Care Protocol    Neg. Pressure Wound Therapy       Treatment Note please see attached Discharge Instructions    In my professional opinion this patient would benefit from HBO Therapy: No    Written patient dismissal instructions given to patient and signed by patient or POA. Mr. Hellen Miner is healing well, follow up in 1 week.   Electronically signed by CHRISS Capone CNP on 8/28/2023 at 12:32 PM

## 2023-08-28 NOTE — PLAN OF CARE
Negative Pressure Wound Therapy    NAME:  Gaetano Olsen  YOB: 1980  MEDICAL RECORD NUMBER:  [id number]  DATE:  8/28/2023    Applied Negative Pressure to coccyx and left buttocks wound(s)/ulcer(s). [x] Applied skin barrier prep to julián-wound. [x] Cut strips of plastic drape to picture frame wound so that julián-wound is     covered with the drape. [x] If bridging dressing to less prominent site, cover any intact skin that will come in contact with the Negative Pressure Therapy sponge, gauze or channel drain with plastic drape. The sponge should never touch intact skin. [x] Cut sponge, gauze or channel drain to size which will fit into the wound/ulcer bed without being forced. [x] Be sure the sponge is large enough to hold the entire round plastic flange which is attached to the tubing. Never allow flange to be larger than the sponge or it will produce suction damaging intact skin. Total number of individual pieces of foam used within the wound bed: 3    [x] If bridging the dressing away from the primary site, be sure the bridge leads to a piece of sponge large enough to hold the entire flange without allowing any of the flange to overlap onto intact skin. [x] Covered sponge, gauze or channel drain with plastic drape. [x] Cut a hole in this plastic drape directly over the sponge the same size as the plastic drain tubing. [x] Removed plastic liner from flange and apply it directly over the hole you cut. [x] Removed the plastic cover from the flange. [x] Attached the tubing to the wound/ulcer Negative Pressure Therapy and turn it on to be sure a vacuum is created and that there are no leaks. [x] If air leaks occur, use plastic drape to patch them. [] Secured Negative Pressure Therapy dressing with ace wrap loosely if located on an extremity. Maintain tubing outside of ace wrap. Tubing must not exert pressure on intact skin.     Applied per  Guidelines      Electronically

## 2023-08-29 ENCOUNTER — OFFICE VISIT (OUTPATIENT)
Dept: CARDIOLOGY | Facility: CLINIC | Age: 43
End: 2023-08-29
Payer: MEDICAID

## 2023-08-29 VITALS
DIASTOLIC BLOOD PRESSURE: 90 MMHG | HEART RATE: 93 BPM | HEIGHT: 70 IN | OXYGEN SATURATION: 98 % | WEIGHT: 273 LBS | BODY MASS INDEX: 39.08 KG/M2 | SYSTOLIC BLOOD PRESSURE: 122 MMHG

## 2023-08-29 DIAGNOSIS — J44.9 CHRONIC OBSTRUCTIVE PULMONARY DISEASE, UNSPECIFIED COPD TYPE: ICD-10-CM

## 2023-08-29 DIAGNOSIS — E66.2 OBESITY HYPOVENTILATION SYNDROME: ICD-10-CM

## 2023-08-29 DIAGNOSIS — I48.92 ATRIAL FLUTTER WITH RAPID VENTRICULAR RESPONSE: Primary | ICD-10-CM

## 2023-08-29 DIAGNOSIS — Z87.891 FORMER TOBACCO USE: ICD-10-CM

## 2023-08-29 DIAGNOSIS — I10 ESSENTIAL HYPERTENSION: Chronic | ICD-10-CM

## 2023-08-29 DIAGNOSIS — G47.33 OSA (OBSTRUCTIVE SLEEP APNEA): ICD-10-CM

## 2023-08-29 DIAGNOSIS — E11.9 TYPE 2 DIABETES MELLITUS WITHOUT COMPLICATION, WITHOUT LONG-TERM CURRENT USE OF INSULIN: ICD-10-CM
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
## 2023-08-30 ENCOUNTER — HOSPITAL ENCOUNTER (OUTPATIENT)
Dept: WOUND CARE | Age: 43
Discharge: HOME | End: 2023-08-30
Payer: MEDICAID

## 2023-08-30 VITALS
SYSTOLIC BLOOD PRESSURE: 135 MMHG | RESPIRATION RATE: 20 BRPM | TEMPERATURE: 98.5 F | HEART RATE: 89 BPM | DIASTOLIC BLOOD PRESSURE: 83 MMHG

## 2023-08-30 DIAGNOSIS — Z79.4 TYPE 2 DIABETES MELLITUS WITH OTHER SKIN ULCER, WITH LONG-TERM CURRENT USE OF INSULIN (HCC): ICD-10-CM

## 2023-08-30 DIAGNOSIS — L05.91 PILONIDAL CYST: ICD-10-CM

## 2023-08-30 DIAGNOSIS — L98.422 SACRAL ULCER, WITH FAT LAYER EXPOSED (HCC): Primary | ICD-10-CM

## 2023-08-30 DIAGNOSIS — E11.622 TYPE 2 DIABETES MELLITUS WITH OTHER SKIN ULCER, WITH LONG-TERM CURRENT USE OF INSULIN (HCC): ICD-10-CM

## 2023-08-30 PROCEDURE — 97605 NEG PRS WND THER DME<=50SQCM: CPT

## 2023-08-30 PROCEDURE — 6370000000 HC RX 637 (ALT 250 FOR IP): Performed by: NURSE PRACTITIONER

## 2023-08-30 RX ORDER — GENTAMICIN SULFATE 1 MG/G
OINTMENT TOPICAL ONCE
OUTPATIENT
Start: 2023-08-30 | End: 2023-08-30

## 2023-08-30 RX ORDER — GINSENG 100 MG
CAPSULE ORAL ONCE
OUTPATIENT
Start: 2023-08-30 | End: 2023-08-30

## 2023-08-30 RX ORDER — LIDOCAINE HYDROCHLORIDE 20 MG/ML
JELLY TOPICAL ONCE
Status: COMPLETED | OUTPATIENT
Start: 2023-08-30 | End: 2023-08-30

## 2023-08-30 RX ORDER — LIDOCAINE HYDROCHLORIDE 40 MG/ML
SOLUTION TOPICAL ONCE
OUTPATIENT
Start: 2023-08-30 | End: 2023-08-30

## 2023-08-30 RX ORDER — LIDOCAINE 50 MG/G
OINTMENT TOPICAL ONCE
OUTPATIENT
Start: 2023-08-30 | End: 2023-08-30

## 2023-08-30 RX ORDER — LIDOCAINE HYDROCHLORIDE 20 MG/ML
JELLY TOPICAL ONCE
OUTPATIENT
Start: 2023-08-30 | End: 2023-08-30

## 2023-08-30 RX ORDER — IBUPROFEN 200 MG
TABLET ORAL ONCE
OUTPATIENT
Start: 2023-08-30 | End: 2023-08-30

## 2023-08-30 RX ORDER — BETAMETHASONE DIPROPIONATE 0.05 %
OINTMENT (GRAM) TOPICAL ONCE
OUTPATIENT
Start: 2023-08-30 | End: 2023-08-30

## 2023-08-30 RX ORDER — SODIUM CHLOR/HYPOCHLOROUS ACID 0.033 %
SOLUTION, IRRIGATION IRRIGATION ONCE
OUTPATIENT
Start: 2023-08-30 | End: 2023-08-30

## 2023-08-30 RX ORDER — CLOBETASOL PROPIONATE 0.5 MG/G
OINTMENT TOPICAL ONCE
OUTPATIENT
Start: 2023-08-30 | End: 2023-08-30

## 2023-08-30 RX ORDER — LIDOCAINE 40 MG/G
CREAM TOPICAL ONCE
OUTPATIENT
Start: 2023-08-30 | End: 2023-08-30

## 2023-08-30 RX ORDER — BACITRACIN ZINC AND POLYMYXIN B SULFATE 500; 1000 [USP'U]/G; [USP'U]/G
OINTMENT TOPICAL ONCE
OUTPATIENT
Start: 2023-08-30 | End: 2023-08-30

## 2023-08-30 RX ADMIN — LIDOCAINE HYDROCHLORIDE: 20 JELLY TOPICAL at 11:34

## 2023-08-30 ASSESSMENT — PAIN DESCRIPTION - LOCATION: LOCATION: COCCYX

## 2023-08-30 ASSESSMENT — PAIN - FUNCTIONAL ASSESSMENT: PAIN_FUNCTIONAL_ASSESSMENT: ACTIVITIES ARE NOT PREVENTED

## 2023-08-30 ASSESSMENT — PAIN DESCRIPTION - ONSET: ONSET: ON-GOING

## 2023-08-30 ASSESSMENT — PAIN DESCRIPTION - FREQUENCY: FREQUENCY: INTERMITTENT

## 2023-08-30 ASSESSMENT — PAIN DESCRIPTION - PAIN TYPE: TYPE: ACUTE PAIN

## 2023-08-30 ASSESSMENT — PAIN DESCRIPTION - DESCRIPTORS: DESCRIPTORS: STABBING

## 2023-08-30 ASSESSMENT — PAIN SCALES - GENERAL: PAINLEVEL_OUTOF10: 8

## 2023-08-30 NOTE — DISCHARGE INSTRUCTIONS
[address] and Hyperbaric Oxygen Therapy   Physician Orders and Discharge Instructions  [address], [address] [facility]  Telephone: [phone]    NAME:  Carmen Rasmussen  YOB: 1980  MEDICAL RECORD NUMBER:  [id number]  DATE:  8/30/2023    Discharge condition: Stable    Discharge to: Home    Left via:Private automobile    Accompanied by:  spouse    ECF/HHA: [facility]    Dressing Orders:   Coccyx Wound:    Left buttocks place black foam to wound bed, bridge with coccyx dressing. Wash with soap and water   Apply wound vac as follows: Apply Skin Prep to periwound. Apply drape - window pane to surrounding area (periwound). (May use duoderm instead of drape to prevent skin breakdown. Use ostomy paste to fill any creases to prevent leakage. If skin becomes red due to tape irritation please apply skin prep. Then apply light dusting of ostomy powder or antifungal powder to periwound. Then blot with skin prep to form crusting to protect skin. May repeat skin prep, powdering steps until proper crusting is made. )   Then apply drape to periwound. DO NOT PUT FOAM ON GOOD SKIN. Apply PROMOGRAN then tuck black foam to wound bed. BRIDGE to hip. Set wound vac to 150 mmHG, continuous. Change wound vac dressing 3 times per week (MWF or TTS)   Reapply vac dressing if vac stops working or dressing begins to leak or cannot be reinforced. Alternative dressing: Wash with soap and water. Apply 1/4 STR Dakins moistened gauze to wound bed. Cover with gauze. Secure with roll gauze and medipore tape. Change twice daily . Treatment Orders:   Protein rich diet   Multivitamin   Avoid pressure by always sitting with MEMORY FOAM CUSHION OR PILLOW     [facility] follow up visit ___________keep scheduled follow-up__________________  (Please note your next appointment above and if you are unable to keep, kindly give a 24 hour notice.  Thank you.)              If

## 2023-08-30 NOTE — PLAN OF CARE
Negative Pressure Wound Therapy    NAME:  Salma Moore  YOB: 1980  MEDICAL RECORD NUMBER:  [id number]  DATE:  8/30/2023    Applied Negative Pressure to coccyx wound(s)/ulcer(s). [x] Applied skin barrier prep to julián-wound. [x] Cut strips of plastic drape to picture frame wound so that julián-wound is     covered with the drape. [x] If bridging dressing to less prominent site, cover any intact skin that will come in contact with the Negative Pressure Therapy sponge, gauze or channel drain with plastic drape. The sponge should never touch intact skin. [x] Cut sponge, gauze or channel drain to size which will fit into the wound/ulcer bed without being forced. [x] Be sure the sponge is large enough to hold the entire round plastic flange which is attached to the tubing. Never allow flange to be larger than the sponge or it will produce suction damaging intact skin. Total number of individual pieces of foam used within the wound bed: 3  [x] If bridging the dressing away from the primary site, be sure the bridge leads to a piece of sponge large enough to hold the entire flange without allowing any of the flange to overlap onto intact skin. [x] Covered sponge, gauze or channel drain with plastic drape. [x] Cut a hole in this plastic drape directly over the sponge the same size as the plastic drain tubing. [x] Removed plastic liner from flange and apply it directly over the hole you cut. [x] Removed the plastic cover from the flange. [x] Attached the tubing to the wound/ulcer Negative Pressure Therapy and turn it on to be sure a vacuum is created and that there are no leaks. [x] If air leaks occur, use plastic drape to patch them. [] Secured Negative Pressure Therapy dressing with ace wrap loosely if located on an extremity. Maintain tubing outside of ace wrap. Tubing must not exert pressure on intact skin.     Applied per  Guidelines      Electronically signed by Laban Apple

## 2023-09-01 ENCOUNTER — HOSPITAL ENCOUNTER (OUTPATIENT)
Dept: WOUND CARE | Age: 43
Discharge: HOME | End: 2023-09-01
Payer: MEDICAID

## 2023-09-01 DIAGNOSIS — L05.91 PILONIDAL CYST: ICD-10-CM

## 2023-09-01 DIAGNOSIS — L98.422 SACRAL ULCER, WITH FAT LAYER EXPOSED (HCC): Primary | ICD-10-CM

## 2023-09-01 DIAGNOSIS — E11.622 TYPE 2 DIABETES MELLITUS WITH OTHER SKIN ULCER, WITH LONG-TERM CURRENT USE OF INSULIN (HCC): ICD-10-CM

## 2023-09-01 DIAGNOSIS — Z79.4 TYPE 2 DIABETES MELLITUS WITH OTHER SKIN ULCER, WITH LONG-TERM CURRENT USE OF INSULIN (HCC): ICD-10-CM

## 2023-09-01 PROCEDURE — 97605 NEG PRS WND THER DME<=50SQCM: CPT

## 2023-09-01 RX ORDER — OXYCODONE AND ACETAMINOPHEN 10; 325 MG/1; MG/1
1 TABLET ORAL EVERY 6 HOURS PRN
COMMUNITY

## 2023-09-01 RX ORDER — DULOXETIN HYDROCHLORIDE 30 MG/1
30 CAPSULE, DELAYED RELEASE ORAL 2 TIMES DAILY
COMMUNITY

## 2023-09-01 NOTE — DISCHARGE INSTRUCTIONS
[address] and Hyperbaric Oxygen Therapy   Physician Orders and Discharge Instructions  [address] [facility],[address] [place] [address] Blvd, [address]  Telephone: [phone]    NAME:  Noah Fitzpatrick  YOB: 1980  MEDICAL RECORD NUMBER:  [id number]  DATE:  9/1/2023    Discharge condition: Stable    Discharge to: Home    Left via:Private automobile    Accompanied by:  spouse    ECF/HHA: [facility]     Dressing Orders:   Coccyx Wound:    Left buttocks place black foam to wound bed, bridge with coccyx dressing. Wash with soap and water   Apply wound vac as follows: Apply Skin Prep to periwound. Apply drape - window pane to surrounding area (periwound). (May use duoderm instead of drape to prevent skin breakdown. Use ostomy paste to fill any creases to prevent leakage. If skin becomes red due to tape irritation please apply skin prep. Then apply light dusting of ostomy powder or antifungal powder to periwound. Then blot with skin prep to form crusting to protect skin. May repeat skin prep, powdering steps until proper crusting is made. )   Then apply drape to periwound. DO NOT PUT FOAM ON GOOD SKIN. Apply PROMOGRAN then tuck black foam to wound bed. BRIDGE to hip. Set wound vac to 150 mmHG, continuous. Change wound vac dressing 3 times per week (MWF or TTS)   Reapply vac dressing if vac stops working or dressing begins to leak or cannot be reinforced. Alternative dressing: Wash with soap and water. Apply 1/4 STR Dakins moistened gauze to wound bed. Cover with gauze. Secure with roll gauze and medipore tape. Change twice daily . Treatment Orders:   Protein rich diet   Multivitamin   Avoid pressure by always sitting with MEMORY FOAM CUSHION OR PILLOW     [facility] follow up visit ___________keep scheduled follow-up__________________  (Please note your next appointment above and if you are unable to keep, kindly give a 24 hour notice.  Thank you.)              If

## 2023-09-01 NOTE — WOUND CARE
[address] [facility]. [address] [phone] [phone]     Ordering Center:     [address] [place] [id number] [phone]  [id number] [phone]    Patient Information:      Carmen Rasmussen  [address] [facility] [address]   [phone]   : 1980  AGE: 38 y.o. GENDER: male   EPISODE DATE: 2023    Insurance:      PRIMARY INSURANCE:  Plan: [place] MEDICAID  Coverage: [place]ICA[place]  Effective Date: 2015  Group Number: [unfilled]  Subscriber Number: [id number] - (Medicaid Managed)    Payer/Plan Subscr  Sex Relation Sub. Ins. ID Effective Group Num   1. [place] Zak Rasmussen 1980 Male Self [id number] 1/1/15 [id number]                                   [address] [address]       Patient Wound Information:      Problem List Items Addressed This Visit          Endocrine    Type 2 diabetes mellitus with skin complication, with long-term current use of insulin ([address])       Other    Sacral ulcer, with fat layer exposed ([address]) - Primary    Relevant Orders    [place] OFFICE/OUTPATIENT EST PT MAY NOT REQ PHYS/QHP    Pilonidal cyst       WOUNDS REQUIRING DRESSING SUPPLIES:     Negative Pressure Wound Therapy Buttocks (Active)   $ Standard NPWT <=50 sq cm PER TX $ Yes 23 1034   Wound Type Surgical 23 1132   Unit Type 3M 23 1132   Dressing Type Black Foam 23 1132   Number of pieces used 3 23 1132   Number of pieces removed 3 23 1132   Cycle Continuous 23 1132   Target Pressure (mmHg) 125 23 1132   Canister changed?  Yes 23 1132   Dressing Status New dressing applied 23 1132   Dressing Changed Changed/New 23 1132   Drainage Amount Large 23 1132   Drainage Description Serosanguinous 23 1134   Dressing Change Due 23 1114   Output (ml)

## 2023-09-01 NOTE — PLAN OF CARE
Negative Pressure Wound Therapy    NAME:  Angela Turcios  YOB: 1980  MEDICAL RECORD NUMBER:  [id number]  DATE:  9/1/2023    Applied Negative Pressure to coccyx and left buttocks wound(s)/ulcer(s). [x] Applied skin barrier prep to julián-wound. [x] Cut strips of plastic drape to picture frame wound so that julián-wound is     covered with the drape. [x] If bridging dressing to less prominent site, cover any intact skin that will come in contact with the Negative Pressure Therapy sponge, gauze or channel drain with plastic drape. The sponge should never touch intact skin. [x] Cut sponge, gauze or channel drain to size which will fit into the wound/ulcer bed without being forced. [x] Be sure the sponge is large enough to hold the entire round plastic flange which is attached to the tubing. Never allow flange to be larger than the sponge or it will produce suction damaging intact skin. Total number of individual pieces of foam used within the wound bed: 3    [x] If bridging the dressing away from the primary site, be sure the bridge leads to a piece of sponge large enough to hold the entire flange without allowing any of the flange to overlap onto intact skin. [x] Covered sponge, gauze or channel drain with plastic drape. [x] Cut a hole in this plastic drape directly over the sponge the same size as the plastic drain tubing. [x] Removed plastic liner from flange and apply it directly over the hole you cut. [x] Removed the plastic cover from the flange. [x] Attached the tubing to the wound/ulcer Negative Pressure Therapy and turn it on to be sure a vacuum is created and that there are no leaks. [x] If air leaks occur, use plastic drape to patch them. [] Secured Negative Pressure Therapy dressing with ace wrap loosely if located on an extremity. Maintain tubing outside of ace wrap. Tubing must not exert pressure on intact skin.     Applied per  Guidelines      Electronically

## 2023-09-05 ENCOUNTER — TELEPHONE (OUTPATIENT)
Dept: WOUND CARE | Age: 43
End: 2023-09-05

## 2023-09-05 ENCOUNTER — HOSPITAL ENCOUNTER (OUTPATIENT)
Dept: WOUND CARE | Age: 43
Discharge: HOME | End: 2023-09-05
Payer: MEDICAID

## 2023-09-05 VITALS
TEMPERATURE: 96.7 F | HEART RATE: 90 BPM | WEIGHT: 273 LBS | BODY MASS INDEX: 53.6 KG/M2 | RESPIRATION RATE: 18 BRPM | HEIGHT: 60 IN | DIASTOLIC BLOOD PRESSURE: 82 MMHG | SYSTOLIC BLOOD PRESSURE: 121 MMHG

## 2023-09-05 DIAGNOSIS — L98.422 SACRAL ULCER, WITH FAT LAYER EXPOSED (HCC): Primary | ICD-10-CM

## 2023-09-05 DIAGNOSIS — L05.91 PILONIDAL CYST: ICD-10-CM

## 2023-09-05 PROCEDURE — 97605 NEG PRS WND THER DME<=50SQCM: CPT

## 2023-09-05 ASSESSMENT — PAIN DESCRIPTION - ONSET: ONSET: ON-GOING

## 2023-09-05 ASSESSMENT — PAIN DESCRIPTION - ORIENTATION: ORIENTATION: MID

## 2023-09-05 ASSESSMENT — PAIN DESCRIPTION - PAIN TYPE: TYPE: ACUTE PAIN

## 2023-09-05 ASSESSMENT — PAIN DESCRIPTION - LOCATION: LOCATION: COCCYX

## 2023-09-05 ASSESSMENT — PAIN DESCRIPTION - FREQUENCY: FREQUENCY: INTERMITTENT

## 2023-09-05 ASSESSMENT — PAIN - FUNCTIONAL ASSESSMENT: PAIN_FUNCTIONAL_ASSESSMENT: PREVENTS OR INTERFERES SOME ACTIVE ACTIVITIES AND ADLS

## 2023-09-05 ASSESSMENT — PAIN DESCRIPTION - DESCRIPTORS: DESCRIPTORS: ACHING;THROBBING;STABBING

## 2023-09-05 ASSESSMENT — PAIN SCALES - GENERAL: PAINLEVEL_OUTOF10: 7

## 2023-09-05 NOTE — TELEPHONE ENCOUNTER
Wife had stated during nurse visit today that patient was out of black foam dressings and would need more ordered, asked if we would place the order. Call  placed to  and spoke with Scar Deal [phone], order placed for x10 black foam dressing kits to be delivered to patient home. [employer] # [id number].

## 2023-09-05 NOTE — PROGRESS NOTES
Wife had stated during nurse visit today that patient was out of black foam dressings and would need more ordered, asked if we would place the order. Call  placed to  and spoke with Sisi Leal [phone], order placed for x10 black foam dressing kits to be delivered to patient home. [facility] # [id number]. Statement Selected

## 2023-09-05 NOTE — PLAN OF CARE
Negative Pressure Wound Therapy    NAME:  Anastasia Severs  YOB: 1980  MEDICAL RECORD NUMBER:  [id number]  DATE:  9/5/2023    Applied Negative Pressure to coccyx and left buttocks wound(s)/ulcer(s). [x] Applied skin barrier prep to julián-wound. [x] Cut strips of plastic drape to picture frame wound so that julián-wound is     covered with the drape. [x] If bridging dressing to less prominent site, cover any intact skin that will come in contact with the Negative Pressure Therapy sponge, gauze or channel drain with plastic drape. The sponge should never touch intact skin. [x] Cut sponge, gauze or channel drain to size which will fit into the wound/ulcer bed without being forced. [x] Be sure the sponge is large enough to hold the entire round plastic flange which is attached to the tubing. Never allow flange to be larger than the sponge or it will produce suction damaging intact skin. Total number of individual pieces of foam used within the wound bed: 3    [x] If bridging the dressing away from the primary site, be sure the bridge leads to a piece of sponge large enough to hold the entire flange without allowing any of the flange to overlap onto intact skin. [x] Covered sponge, gauze or channel drain with plastic drape. [x] Cut a hole in this plastic drape directly over the sponge the same size as the plastic drain tubing. [x] Removed plastic liner from flange and apply it directly over the hole you cut. [x] Removed the plastic cover from the flange. [x] Attached the tubing to the wound/ulcer Negative Pressure Therapy and turn it on to be sure a vacuum is created and that there are no leaks. [x] If air leaks occur, use plastic drape to patch them. [] Secured Negative Pressure Therapy dressing with ace wrap loosely if located on an extremity. Maintain tubing outside of ace wrap. Tubing must not exert pressure on intact skin.     Applied per  Guidelines      Electronically

## 2023-09-05 NOTE — DISCHARGE INSTRUCTIONS
[address] and Hyperbaric Oxygen Therapy   Physician Orders and Discharge Instructions  18[address] [facility],[address] [place] [address] Blvd, [address]  Telephone: [phone]    NAME:  Dotty Stevens  YOB: 1980  MEDICAL RECORD NUMBER:  [id number]  DATE:  9/5/2023    Discharge condition: Stable    Discharge to: Home    Left via:Private automobile    Accompanied by:  spouse    ECF/HHA: [facility]       Dressing Orders:   Coccyx Wound:    Left buttocks place black foam to wound bed, bridge with coccyx dressing. Wash with soap and water   Apply wound vac as follows: Apply Skin Prep to periwound. Apply drape - window pane to surrounding area (periwound). (May use duoderm instead of drape to prevent skin breakdown. Use ostomy paste to fill any creases to prevent leakage. If skin becomes red due to tape irritation please apply skin prep. Then apply light dusting of ostomy powder or antifungal powder to periwound. Then blot with skin prep to form crusting to protect skin. May repeat skin prep, powdering steps until proper crusting is made. )   Then apply drape to periwound. DO NOT PUT FOAM ON GOOD SKIN. Apply PROMOGRAN then tuck black foam to wound bed. BRIDGE to hip. Set wound vac to 150 mmHG, continuous. Change wound vac dressing 3 times per week (MWF or TTS)   Reapply vac dressing if vac stops working or dressing begins to leak or cannot be reinforced. Alternative dressing: Wash with soap and water. Apply 1/4 STR Dakins moistened gauze to wound bed. Cover with gauze. Secure with roll gauze and medipore tape. Change twice daily . Treatment Orders:   Protein rich diet   Multivitamin   Avoid pressure by always sitting with MEMORY FOAM CUSHION OR PILLOW     401 [facility] follow up visit ___________ nurse visit__________________  (Please note your next appointment above and if you are unable to keep, kindly give a 24 hour notice.  Thank you.)          If you

## 2023-09-07 NOTE — DISCHARGE INSTRUCTIONS
[address] and Hyperbaric Oxygen Therapy   Physician Orders and Discharge Instructions  1830 [facility],[address] [place] [address] Blvd, [address]  Telephone: [phone]    NAME:  Ila Cheung  YOB: 1980  MEDICAL RECORD NUMBER:  [id number]  DATE:      Discharge condition: Stable    Discharge to: Home    Left via:Private automobile    Accompanied by:  spouse    ECF/HHA: [facility]       Dressing Orders:   Coccyx Wound:    Left buttocks place black foam to wound bed, bridge with coccyx dressing. Wash with soap and water   Apply wound vac as follows: Apply Skin Prep to periwound. Apply drape - window pane to surrounding area (periwound). (May use duoderm instead of drape to prevent skin breakdown. Use ostomy paste to fill any creases to prevent leakage. If skin becomes red due to tape irritation please apply skin prep. Then apply light dusting of ostomy powder or antifungal powder to periwound. Then blot with skin prep to form crusting to protect skin. May repeat skin prep, powdering steps until proper crusting is made. )   Then apply drape to periwound. DO NOT PUT FOAM ON GOOD SKIN. Apply PROMOGRAN then tuck black foam to wound bed. BRIDGE to hip. Set wound vac to 150 mmHG, continuous. Change wound vac dressing 3 times per week (MWF or TTS)   Reapply vac dressing if vac stops working or dressing begins to leak or cannot be reinforced. Alternative dressing: Wash with soap and water. Apply 1/4 STR Dakins moistened gauze to wound bed. Cover with gauze. Secure with roll gauze and medipore tape. Change twice daily . Treatment Orders:   Protein rich diet   Multivitamin   Avoid pressure by always sitting with MEMORY FOAM CUSHION OR PILLOW    401 [facility] follow up visit ____________see appointment times_________________  (Please note your next appointment above and if you are unable to keep, kindly give a 24 hour notice.  Thank you.)          If you

## 2023-09-08 ENCOUNTER — HOSPITAL ENCOUNTER (OUTPATIENT)
Dept: WOUND CARE | Age: 43
Discharge: HOME | End: 2023-09-08
Payer: MEDICAID

## 2023-09-08 VITALS
TEMPERATURE: 97 F | SYSTOLIC BLOOD PRESSURE: 120 MMHG | WEIGHT: 260 LBS | HEIGHT: 71 IN | RESPIRATION RATE: 18 BRPM | HEART RATE: 90 BPM | DIASTOLIC BLOOD PRESSURE: 82 MMHG | BODY MASS INDEX: 36.4 KG/M2

## 2023-09-08 DIAGNOSIS — L98.422 SACRAL ULCER, WITH FAT LAYER EXPOSED (HCC): Primary | ICD-10-CM

## 2023-09-08 DIAGNOSIS — L05.91 PILONIDAL CYST: ICD-10-CM

## 2023-09-08 DIAGNOSIS — E11.622 TYPE 2 DIABETES MELLITUS WITH OTHER SKIN ULCER, WITH LONG-TERM CURRENT USE OF INSULIN (HCC): ICD-10-CM

## 2023-09-08 DIAGNOSIS — Z79.4 TYPE 2 DIABETES MELLITUS WITH OTHER SKIN ULCER, WITH LONG-TERM CURRENT USE OF INSULIN (HCC): ICD-10-CM

## 2023-09-08 PROCEDURE — 6370000000 HC RX 637 (ALT 250 FOR IP): Performed by: NURSE PRACTITIONER

## 2023-09-08 RX ORDER — LIDOCAINE HYDROCHLORIDE 40 MG/ML
SOLUTION TOPICAL ONCE
OUTPATIENT
Start: 2023-09-08 | End: 2023-09-08

## 2023-09-08 RX ORDER — IBUPROFEN 200 MG
TABLET ORAL ONCE
OUTPATIENT
Start: 2023-09-08 | End: 2023-09-08

## 2023-09-08 RX ORDER — SODIUM CHLOR/HYPOCHLOROUS ACID 0.033 %
SOLUTION, IRRIGATION IRRIGATION ONCE
OUTPATIENT
Start: 2023-09-08 | End: 2023-09-08

## 2023-09-08 RX ORDER — GINSENG 100 MG
CAPSULE ORAL ONCE
OUTPATIENT
Start: 2023-09-08 | End: 2023-09-08

## 2023-09-08 RX ORDER — CLOBETASOL PROPIONATE 0.5 MG/G
OINTMENT TOPICAL ONCE
OUTPATIENT
Start: 2023-09-08 | End: 2023-09-08

## 2023-09-08 RX ORDER — LIDOCAINE 40 MG/G
CREAM TOPICAL ONCE
OUTPATIENT
Start: 2023-09-08 | End: 2023-09-08

## 2023-09-08 RX ORDER — GENTAMICIN SULFATE 1 MG/G
OINTMENT TOPICAL ONCE
OUTPATIENT
Start: 2023-09-08 | End: 2023-09-08

## 2023-09-08 RX ORDER — BETAMETHASONE DIPROPIONATE 0.05 %
OINTMENT (GRAM) TOPICAL ONCE
OUTPATIENT
Start: 2023-09-08 | End: 2023-09-08

## 2023-09-08 RX ORDER — LIDOCAINE HYDROCHLORIDE 20 MG/ML
JELLY TOPICAL ONCE
Status: COMPLETED | OUTPATIENT
Start: 2023-09-08 | End: 2023-09-08

## 2023-09-08 RX ORDER — LIDOCAINE 50 MG/G
OINTMENT TOPICAL ONCE
OUTPATIENT
Start: 2023-09-08 | End: 2023-09-08

## 2023-09-08 RX ORDER — BACITRACIN ZINC AND POLYMYXIN B SULFATE 500; 1000 [USP'U]/G; [USP'U]/G
OINTMENT TOPICAL ONCE
OUTPATIENT
Start: 2023-09-08 | End: 2023-09-08

## 2023-09-08 RX ORDER — LIDOCAINE HYDROCHLORIDE 20 MG/ML
JELLY TOPICAL ONCE
OUTPATIENT
Start: 2023-09-08 | End: 2023-09-08

## 2023-09-08 RX ADMIN — LIDOCAINE HYDROCHLORIDE: 20 JELLY TOPICAL at 11:18

## 2023-09-08 ASSESSMENT — PAIN SCALES - GENERAL: PAINLEVEL_OUTOF10: 6

## 2023-09-08 ASSESSMENT — PAIN DESCRIPTION - DESCRIPTORS: DESCRIPTORS: ACHING

## 2023-09-08 ASSESSMENT — PAIN DESCRIPTION - LOCATION: LOCATION: COCCYX

## 2023-09-08 NOTE — PLAN OF CARE
Negative Pressure Wound Therapy    NAME:  Chasity Russo  YOB: 1980  MEDICAL RECORD NUMBER:  [id number]  DATE:  9/8/2023    Applied Negative Pressure to coccyx, left buttocks wound(s)/ulcer(s). [x] Applied skin barrier prep to julián-wound. [x] Cut strips of plastic drape to picture frame wound so that julián-wound is     covered with the drape. [x] If bridging dressing to less prominent site, cover any intact skin that will come in contact with the Negative Pressure Therapy sponge, gauze or channel drain with plastic drape. The sponge should never touch intact skin. [x] Cut sponge, gauze or channel drain to size which will fit into the wound/ulcer bed without being forced. [x] Be sure the sponge is large enough to hold the entire round plastic flange which is attached to the tubing. Never allow flange to be larger than the sponge or it will produce suction damaging intact skin. Total number of individual pieces of foam used within the wound bed: 2    [x] If bridging the dressing away from the primary site, be sure the bridge leads to a piece of sponge large enough to hold the entire flange without allowing any of the flange to overlap onto intact skin. [x] Covered sponge, gauze or channel drain with plastic drape. [x] Cut a hole in this plastic drape directly over the sponge the same size as the plastic drain tubing. [x] Removed plastic liner from flange and apply it directly over the hole you cut. [x] Removed the plastic cover from the flange. [x] Attached the tubing to the wound/ulcer Negative Pressure Therapy and turn it on to be sure a vacuum is created and that there are no leaks. [x] If air leaks occur, use plastic drape to patch them. [] Secured Negative Pressure Therapy dressing with ace wrap loosely if located on an extremity. Maintain tubing outside of ace wrap. Tubing must not exert pressure on intact skin.     Applied per  Guidelines      Electronically signed

## 2023-09-08 NOTE — PLAN OF CARE
Problem: Chronic Conditions and Co-morbidities  Goal: Patient's chronic conditions and co-morbidity symptoms are monitored and maintained or improved  Outcome: Progressing     Problem: Discharge Planning  Goal: Discharge to home or other facility with appropriate resources  Outcome: Progressing     Problem: Pain  Goal: Verbalizes/displays adequate comfort level or baseline comfort level  Outcome: Progressing     Problem: Wound:  Goal: Will show signs of wound healing; wound closure and no evidence of infection  Description: Will show signs of wound healing; wound closure and no evidence of infection  Outcome: Progressing     Problem: Blood Glucose:  Goal: Ability to maintain appropriate glucose levels will improve  Description: Ability to maintain appropriate glucose levels will improve  Outcome: Progressing

## 2023-09-08 NOTE — PROGRESS NOTES
mellitus with skin complication, with long-term current use of insulin (HCC)    Relevant Orders    Initiate Outpatient Wound Care Protocol       Other    * (Principal) Sacral ulcer, with fat layer exposed ([address]) - Primary    Relevant Orders    Initiate Outpatient Wound Care Protocol    Pilonidal cyst    Relevant Orders    Initiate Outpatient Wound Care Protocol       Treatment Note please see attached Discharge Instructions    In my professional opinion this patient would benefit from HBO Therapy: No    Written patient dismissal instructions given to patient and signed by patient or POA. Mr. Nydia Bermeo is healing well, follow up in 1 week.   Electronically signed by CHRISS Mcwilliams CNP on 9/8/2023 at 12:07 PM

## 2023-09-11 ENCOUNTER — HOSPITAL ENCOUNTER (OUTPATIENT)
Dept: WOUND CARE | Age: 43
Discharge: HOME | End: 2023-09-11
Payer: MEDICAID

## 2023-09-11 VITALS
HEIGHT: 71 IN | HEART RATE: 84 BPM | BODY MASS INDEX: 36.82 KG/M2 | TEMPERATURE: 98 F | SYSTOLIC BLOOD PRESSURE: 123 MMHG | WEIGHT: 263 LBS | DIASTOLIC BLOOD PRESSURE: 81 MMHG | RESPIRATION RATE: 18 BRPM

## 2023-09-11 DIAGNOSIS — L05.91 PILONIDAL CYST: ICD-10-CM

## 2023-09-11 DIAGNOSIS — Z79.4 TYPE 2 DIABETES MELLITUS WITH OTHER SKIN ULCER, WITH LONG-TERM CURRENT USE OF INSULIN (HCC): ICD-10-CM

## 2023-09-11 DIAGNOSIS — L98.422 SACRAL ULCER, WITH FAT LAYER EXPOSED (HCC): Primary | ICD-10-CM

## 2023-09-11 DIAGNOSIS — E11.622 TYPE 2 DIABETES MELLITUS WITH OTHER SKIN ULCER, WITH LONG-TERM CURRENT USE OF INSULIN (HCC): ICD-10-CM

## 2023-09-11 PROCEDURE — 97605 NEG PRS WND THER DME<=50SQCM: CPT

## 2023-09-11 RX ORDER — LIDOCAINE HYDROCHLORIDE 40 MG/ML
SOLUTION TOPICAL ONCE
Status: CANCELLED | OUTPATIENT
Start: 2023-09-11 | End: 2023-09-11

## 2023-09-11 RX ORDER — BETAMETHASONE DIPROPIONATE 0.05 %
OINTMENT (GRAM) TOPICAL ONCE
Status: CANCELLED | OUTPATIENT
Start: 2023-09-11 | End: 2023-09-11

## 2023-09-11 RX ORDER — GINSENG 100 MG
CAPSULE ORAL ONCE
Status: CANCELLED | OUTPATIENT
Start: 2023-09-11 | End: 2023-09-11

## 2023-09-11 RX ORDER — SODIUM CHLOR/HYPOCHLOROUS ACID 0.033 %
SOLUTION, IRRIGATION IRRIGATION ONCE
Status: CANCELLED | OUTPATIENT
Start: 2023-09-11 | End: 2023-09-11

## 2023-09-11 RX ORDER — LIDOCAINE HYDROCHLORIDE 20 MG/ML
JELLY TOPICAL ONCE
Status: CANCELLED | OUTPATIENT
Start: 2023-09-11 | End: 2023-09-11

## 2023-09-11 RX ORDER — BACITRACIN ZINC AND POLYMYXIN B SULFATE 500; 1000 [USP'U]/G; [USP'U]/G
OINTMENT TOPICAL ONCE
Status: CANCELLED | OUTPATIENT
Start: 2023-09-11 | End: 2023-09-11

## 2023-09-11 RX ORDER — LIDOCAINE 40 MG/G
CREAM TOPICAL ONCE
Status: CANCELLED | OUTPATIENT
Start: 2023-09-11 | End: 2023-09-11

## 2023-09-11 RX ORDER — LIDOCAINE 50 MG/G
OINTMENT TOPICAL ONCE
Status: CANCELLED | OUTPATIENT
Start: 2023-09-11 | End: 2023-09-11

## 2023-09-11 RX ORDER — CLOBETASOL PROPIONATE 0.5 MG/G
OINTMENT TOPICAL ONCE
Status: CANCELLED | OUTPATIENT
Start: 2023-09-11 | End: 2023-09-11

## 2023-09-11 RX ORDER — IBUPROFEN 200 MG
TABLET ORAL ONCE
Status: CANCELLED | OUTPATIENT
Start: 2023-09-11 | End: 2023-09-11

## 2023-09-11 RX ORDER — GENTAMICIN SULFATE 1 MG/G
OINTMENT TOPICAL ONCE
Status: CANCELLED | OUTPATIENT
Start: 2023-09-11 | End: 2023-09-11

## 2023-09-11 NOTE — DISCHARGE INSTRUCTIONS
[address] and Hyperbaric Oxygen Therapy   Physician Orders and Discharge Instructions  1830 [facility],[address] [place] [address] Blvd, [address]  Telephone: [phone]    NAME:  Kelly Mendoza  YOB: 1980  MEDICAL RECORD NUMBER:  [id number]  DATE:  9/11/2023    Discharge condition: Stable    Discharge to: Home    Left via:Private automobile    Accompanied by:  spouse    ECF/HHA: [facility]    Dressing Orders:   Coccyx Wound:    Left buttocks place black foam to wound bed, bridge with coccyx dressing. Wash with soap and water   Apply wound vac as follows: Apply Skin Prep to periwound. Apply drape - window pane to surrounding area (periwound). (May use duoderm instead of drape to prevent skin breakdown. Use ostomy paste to fill any creases to prevent leakage. If skin becomes red due to tape irritation please apply skin prep. Then apply light dusting of ostomy powder or antifungal powder to periwound. Then blot with skin prep to form crusting to protect skin. May repeat skin prep, powdering steps until proper crusting is made. )   Then apply drape to periwound. DO NOT PUT FOAM ON GOOD SKIN. Apply PROMOGRAN then tuck black foam to wound bed. BRIDGE to hip. Set wound vac to 150 mmHG, continuous. Change wound vac dressing 3 times per week (MWF or TTS)   Reapply vac dressing if vac stops working or dressing begins to leak or cannot be reinforced. Alternative dressing: Wash with soap and water. Apply 1/4 STR Dakins moistened gauze to wound bed. Cover with gauze. Secure with roll gauze and medipore tape. Change twice daily . Treatment Orders:   Protein rich diet   Multivitamin   Avoid pressure by always sitting with MEMORY FOAM CUSHION OR PILLOW     401 [facility] follow up visit ____________keep scheduled appointment_________________  (Please note your next appointment above and if you are unable to keep, kindly give a 24 hour notice.  Thank you.)

## 2023-09-11 NOTE — PLAN OF CARE
Negative Pressure Wound Therapy    NAME:  Ila Cheung  YOB: 1980  MEDICAL RECORD NUMBER:  [id number]  DATE:  9/11/2023    Applied Negative Pressure to coccxy and left buttocks wound(s)/ulcer(s). [x] Applied skin barrier prep to julián-wound. [x] Cut strips of plastic drape to picture frame wound so that julián-wound is     covered with the drape. [x] If bridging dressing to less prominent site, cover any intact skin that will come in contact with the Negative Pressure Therapy sponge, gauze or channel drain with plastic drape. The sponge should never touch intact skin. [x] Cut sponge, gauze or channel drain to size which will fit into the wound/ulcer bed without being forced. [x] Be sure the sponge is large enough to hold the entire round plastic flange which is attached to the tubing. Never allow flange to be larger than the sponge or it will produce suction damaging intact skin. Total number of individual pieces of foam used within the wound bed: 2    [x] If bridging the dressing away from the primary site, be sure the bridge leads to a piece of sponge large enough to hold the entire flange without allowing any of the flange to overlap onto intact skin. [x] Covered sponge, gauze or channel drain with plastic drape. [x] Cut a hole in this plastic drape directly over the sponge the same size as the plastic drain tubing. [x] Removed plastic liner from flange and apply it directly over the hole you cut. [x] Removed the plastic cover from the flange. [x] Attached the tubing to the wound/ulcer Negative Pressure Therapy and turn it on to be sure a vacuum is created and that there are no leaks. [x] If air leaks occur, use plastic drape to patch them. [x] Secured Negative Pressure Therapy dressing with ace wrap loosely if located on an extremity. Maintain tubing outside of ace wrap. Tubing must not exert pressure on intact skin.     Applied per  Guidelines      Electronically

## 2023-09-11 NOTE — PLAN OF CARE
Problem: Chronic Conditions and Co-morbidities  Goal: Patient's chronic conditions and co-morbidity symptoms are monitored and maintained or improved  Outcome: Progressing     Problem: Wound:  Goal: Will show signs of wound healing; wound closure and no evidence of infection  Description: Will show signs of wound healing; wound closure and no evidence of infection  Outcome: Progressing     Problem: Weight control:  Goal: Ability to maintain an optimal weight for height and age will be supported  Description: Ability to maintain an optimal weight for height and age will be supported  Outcome: Progressing     Problem: Falls - Risk of:  Goal: Will remain free from falls  Description: Will remain free from falls  Outcome: Progressing     Problem: Blood Glucose:  Goal: Ability to maintain appropriate glucose levels will improve  Description: Ability to maintain appropriate glucose levels will improve  Outcome: Progressing

## 2023-09-13 ENCOUNTER — HOSPITAL ENCOUNTER (OUTPATIENT)
Dept: WOUND CARE | Age: 43
Discharge: HOME | End: 2023-09-13
Payer: MEDICAID

## 2023-09-13 VITALS
DIASTOLIC BLOOD PRESSURE: 96 MMHG | WEIGHT: 263 LBS | RESPIRATION RATE: 18 BRPM | HEIGHT: 71 IN | SYSTOLIC BLOOD PRESSURE: 143 MMHG | HEART RATE: 87 BPM | TEMPERATURE: 97.3 F | BODY MASS INDEX: 36.82 KG/M2

## 2023-09-13 DIAGNOSIS — L05.91 PILONIDAL CYST: ICD-10-CM

## 2023-09-13 DIAGNOSIS — L98.422 SACRAL ULCER, WITH FAT LAYER EXPOSED (HCC): Primary | ICD-10-CM

## 2023-09-13 DIAGNOSIS — Z79.4 TYPE 2 DIABETES MELLITUS WITH OTHER SKIN ULCER, WITH LONG-TERM CURRENT USE OF INSULIN (HCC): ICD-10-CM

## 2023-09-13 DIAGNOSIS — E11.622 TYPE 2 DIABETES MELLITUS WITH OTHER SKIN ULCER, WITH LONG-TERM CURRENT USE OF INSULIN (HCC): ICD-10-CM

## 2023-09-13 PROCEDURE — 97605 NEG PRS WND THER DME<=50SQCM: CPT

## 2023-09-13 RX ORDER — GENTAMICIN SULFATE 1 MG/G
OINTMENT TOPICAL ONCE
OUTPATIENT
Start: 2023-09-13 | End: 2023-09-13

## 2023-09-13 RX ORDER — LIDOCAINE 40 MG/G
CREAM TOPICAL ONCE
OUTPATIENT
Start: 2023-09-13 | End: 2023-09-13

## 2023-09-13 RX ORDER — LIDOCAINE 50 MG/G
OINTMENT TOPICAL ONCE
OUTPATIENT
Start: 2023-09-13 | End: 2023-09-13

## 2023-09-13 RX ORDER — IBUPROFEN 200 MG
TABLET ORAL ONCE
OUTPATIENT
Start: 2023-09-13 | End: 2023-09-13

## 2023-09-13 RX ORDER — BACITRACIN ZINC AND POLYMYXIN B SULFATE 500; 1000 [USP'U]/G; [USP'U]/G
OINTMENT TOPICAL ONCE
OUTPATIENT
Start: 2023-09-13 | End: 2023-09-13

## 2023-09-13 RX ORDER — LIDOCAINE HYDROCHLORIDE 20 MG/ML
JELLY TOPICAL ONCE
Status: CANCELLED | OUTPATIENT
Start: 2023-09-13 | End: 2023-09-13

## 2023-09-13 RX ORDER — LIDOCAINE HYDROCHLORIDE 40 MG/ML
SOLUTION TOPICAL ONCE
OUTPATIENT
Start: 2023-09-13 | End: 2023-09-13

## 2023-09-13 RX ORDER — SODIUM CHLOR/HYPOCHLOROUS ACID 0.033 %
SOLUTION, IRRIGATION IRRIGATION ONCE
OUTPATIENT
Start: 2023-09-13 | End: 2023-09-13

## 2023-09-13 RX ORDER — LIDOCAINE HYDROCHLORIDE 20 MG/ML
JELLY TOPICAL ONCE
OUTPATIENT
Start: 2023-09-13 | End: 2023-09-13

## 2023-09-13 RX ORDER — CLOBETASOL PROPIONATE 0.5 MG/G
OINTMENT TOPICAL ONCE
OUTPATIENT
Start: 2023-09-13 | End: 2023-09-13

## 2023-09-13 RX ORDER — GINSENG 100 MG
CAPSULE ORAL ONCE
OUTPATIENT
Start: 2023-09-13 | End: 2023-09-13

## 2023-09-13 RX ORDER — BETAMETHASONE DIPROPIONATE 0.05 %
OINTMENT (GRAM) TOPICAL ONCE
OUTPATIENT
Start: 2023-09-13 | End: 2023-09-13

## 2023-09-13 NOTE — PLAN OF CARE
Negative Pressure Wound Therapy    NAME:  Cristofer Barajas  YOB: 1980  MEDICAL RECORD NUMBER:  [id number]  DATE:  9/13/2023    Applied Negative Pressure to coccyx and left buttock wound(s)/ulcer(s). [x] Applied skin barrier prep to julián-wound. [x] Cut strips of plastic drape to picture frame wound so that julián-wound is     covered with the drape. [x] If bridging dressing to less prominent site, cover any intact skin that will come in contact with the Negative Pressure Therapy sponge, gauze or channel drain with plastic drape. The sponge should never touch intact skin. [x] Cut sponge, gauze or channel drain to size which will fit into the wound/ulcer bed without being forced. [x] Be sure the sponge is large enough to hold the entire round plastic flange which is attached to the tubing. Never allow flange to be larger than the sponge or it will produce suction damaging intact skin. Total number of individual pieces of foam used within the wound bed: 1    [x] If bridging the dressing away from the primary site, be sure the bridge leads to a piece of sponge large enough to hold the entire flange without allowing any of the flange to overlap onto intact skin. [x] Covered sponge, gauze or channel drain with plastic drape. [x] Cut a hole in this plastic drape directly over the sponge the same size as the plastic drain tubing. [x] Removed plastic liner from flange and apply it directly over the hole you cut. [x] Removed the plastic cover from the flange. [x] Attached the tubing to the wound/ulcer Negative Pressure Therapy and turn it on to be sure a vacuum is created and that there are no leaks. [x] If air leaks occur, use plastic drape to patch them. [] Secured Negative Pressure Therapy dressing with ace wrap loosely if located on an extremity. Maintain tubing outside of ace wrap. Tubing must not exert pressure on intact skin.     Applied per  Guidelines      Electronically

## 2023-09-13 NOTE — DISCHARGE INSTRUCTIONS
[address] and Hyperbaric Oxygen Therapy   Physician Orders and Discharge Instructions  1830 [facility],[address] [place] [address] Blvd, [address]  Telephone: [phone]    NAME:  Angela Turcios  YOB: 1980  MEDICAL RECORD NUMBER:  [id number]  DATE:  9/13/2023    Discharge condition: Stable    Discharge to: Home    Left via:Private automobile    Accompanied by:  spouse    ECF/HHA: [facility]    Dressing Orders:   Coccyx Wound:    Left buttocks place black foam to wound bed, bridge with coccyx dressing. Wash with soap and water   Apply wound vac as follows: Apply Skin Prep to periwound. Apply drape - window pane to surrounding area (periwound). (May use duoderm instead of drape to prevent skin breakdown. Use ostomy paste to fill any creases to prevent leakage. If skin becomes red due to tape irritation please apply skin prep. Then apply light dusting of ostomy powder or antifungal powder to periwound. Then blot with skin prep to form crusting to protect skin. May repeat skin prep, powdering steps until proper crusting is made. )   Then apply drape to periwound. DO NOT PUT FOAM ON GOOD SKIN. Apply PROMOGRAN then tuck black foam to wound bed. BRIDGE to hip. Set wound vac to 150 mmHG, continuous. Change wound vac dressing 3 times per week (MWF or TTS)   Reapply vac dressing if vac stops working or dressing begins to leak or cannot be reinforced. Alternative dressing: Wash with soap and water. Apply 1/4 STR Dakins moistened gauze to wound bed. Cover with gauze. Secure with roll gauze and medipore tape. Change twice daily . Treatment Orders:   Protein rich diet   Multivitamin   Avoid pressure by always sitting with MEMORY FOAM CUSHION OR PILLOW     401 [facility] follow up visit ____________keep scheduled appointment_________________  (Please note your next appointment above and if you are unable to keep, kindly give a 24 hour notice.  Thank you.)

## 2023-09-15 ENCOUNTER — HOSPITAL ENCOUNTER (OUTPATIENT)
Dept: WOUND CARE | Age: 43
Discharge: HOME | End: 2023-09-15
Payer: MEDICAID

## 2023-09-15 VITALS
SYSTOLIC BLOOD PRESSURE: 130 MMHG | HEIGHT: 71 IN | BODY MASS INDEX: 36.82 KG/M2 | RESPIRATION RATE: 18 BRPM | WEIGHT: 263 LBS | HEART RATE: 97 BPM | TEMPERATURE: 98 F | DIASTOLIC BLOOD PRESSURE: 88 MMHG

## 2023-09-15 DIAGNOSIS — L05.91 PILONIDAL CYST: ICD-10-CM

## 2023-09-15 DIAGNOSIS — T81.49XA SURGICAL WOUND INFECTION: Chronic | ICD-10-CM

## 2023-09-15 DIAGNOSIS — L98.422 SACRAL ULCER, WITH FAT LAYER EXPOSED (HCC): ICD-10-CM

## 2023-09-15 DIAGNOSIS — Z79.4 TYPE 2 DIABETES MELLITUS WITH OTHER SKIN ULCER, WITH LONG-TERM CURRENT USE OF INSULIN (HCC): Primary | ICD-10-CM

## 2023-09-15 DIAGNOSIS — E11.622 TYPE 2 DIABETES MELLITUS WITH OTHER SKIN ULCER, WITH LONG-TERM CURRENT USE OF INSULIN (HCC): Primary | ICD-10-CM

## 2023-09-15 PROBLEM — E11.628 TYPE 2 DIABETES MELLITUS WITH SKIN COMPLICATION, WITH LONG-TERM CURRENT USE OF INSULIN (HCC): Chronic | Status: ACTIVE | Noted: 2023-08-07

## 2023-09-15 PROCEDURE — 99212 OFFICE O/P EST SF 10 MIN: CPT | Performed by: SURGERY

## 2023-09-15 PROCEDURE — 97605 NEG PRS WND THER DME<=50SQCM: CPT

## 2023-09-15 RX ORDER — GENTAMICIN SULFATE 1 MG/G
OINTMENT TOPICAL ONCE
OUTPATIENT
Start: 2023-09-15 | End: 2023-09-15

## 2023-09-15 RX ORDER — LIDOCAINE 50 MG/G
OINTMENT TOPICAL ONCE
OUTPATIENT
Start: 2023-09-15 | End: 2023-09-15

## 2023-09-15 RX ORDER — BACITRACIN ZINC AND POLYMYXIN B SULFATE 500; 1000 [USP'U]/G; [USP'U]/G
OINTMENT TOPICAL ONCE
OUTPATIENT
Start: 2023-09-15 | End: 2023-09-15

## 2023-09-15 RX ORDER — LIDOCAINE 40 MG/G
CREAM TOPICAL ONCE
OUTPATIENT
Start: 2023-09-15 | End: 2023-09-15

## 2023-09-15 RX ORDER — GINSENG 100 MG
CAPSULE ORAL ONCE
OUTPATIENT
Start: 2023-09-15 | End: 2023-09-15

## 2023-09-15 RX ORDER — LIDOCAINE HYDROCHLORIDE 20 MG/ML
JELLY TOPICAL ONCE
OUTPATIENT
Start: 2023-09-15 | End: 2023-09-15

## 2023-09-15 RX ORDER — SODIUM CHLOR/HYPOCHLOROUS ACID 0.033 %
SOLUTION, IRRIGATION IRRIGATION ONCE
OUTPATIENT
Start: 2023-09-15 | End: 2023-09-15

## 2023-09-15 RX ORDER — LIDOCAINE HYDROCHLORIDE 40 MG/ML
SOLUTION TOPICAL ONCE
OUTPATIENT
Start: 2023-09-15 | End: 2023-09-15

## 2023-09-15 RX ORDER — IBUPROFEN 200 MG
TABLET ORAL ONCE
OUTPATIENT
Start: 2023-09-15 | End: 2023-09-15

## 2023-09-15 RX ORDER — LIDOCAINE HYDROCHLORIDE 20 MG/ML
JELLY TOPICAL ONCE
Status: DISCONTINUED | OUTPATIENT
Start: 2023-09-15 | End: 2023-09-17 | Stop reason: HOSPADM

## 2023-09-15 RX ORDER — CLOBETASOL PROPIONATE 0.5 MG/G
OINTMENT TOPICAL ONCE
OUTPATIENT
Start: 2023-09-15 | End: 2023-09-15

## 2023-09-15 RX ORDER — BETAMETHASONE DIPROPIONATE 0.05 %
OINTMENT (GRAM) TOPICAL ONCE
OUTPATIENT
Start: 2023-09-15 | End: 2023-09-15

## 2023-09-15 NOTE — PLAN OF CARE
Problem: Chronic Conditions and Co-morbidities  Goal: Patient's chronic conditions and co-morbidity symptoms are monitored and maintained or improved  Outcome: Progressing     Problem: Chronic Conditions and Co-morbidities  Goal: Patient's chronic conditions and co-morbidity symptoms are monitored and maintained or improved  Outcome: Progressing     Problem: Wound:  Goal: Will show signs of wound healing; wound closure and no evidence of infection  Description: Will show signs of wound healing; wound closure and no evidence of infection  Outcome: Progressing     Problem: Weight control:  Goal: Ability to maintain an optimal weight for height and age will be supported  Description: Ability to maintain an optimal weight for height and age will be supported  Outcome: Progressing     Problem: Falls - Risk of:  Goal: Will remain free from falls  Description: Will remain free from falls  Outcome: Progressing     Problem: Blood Glucose:  Goal: Ability to maintain appropriate glucose levels will improve  Description: Ability to maintain appropriate glucose levels will improve  Outcome: Progressing

## 2023-09-15 NOTE — PROGRESS NOTES
[address]   Progress Note and Procedure Note      Rashida Murray RECORD NUMBER:  [id number]  AGE: 38 y.o. GENDER: male  : 1980  EPISODE DATE:  9/15/2023    Subjective:     Chief Complaint   Patient presents with    Wound Check         HISTORY of PRESENT ILLNESS HPI     Gaetano Olsen is a 38 y.o. male who presents today for wound/ulcer evaluation. History of Wound Context: Pt with sacral and L buttock surgical wound here for eval/treat  Wound/Ulcer Pain Timing/Severity: none  Quality of pain: N/A  Severity:  0 / 10   Modifying Factors: None  Associated Signs/Symptoms: none    Ulcer Identification:  Ulcer Type: pressure and non-healing surgical  Contributing Factors: none    Wound: Surgical incision        PAST MEDICAL HISTORY        Diagnosis Date    Chronic pain     Diabetes ([address])     Hyperlipidemia     Hypertension     Osteoarthritis     Sleep apnea        PAST SURGICAL HISTORY    Past Surgical History:   Procedure Laterality Date    BACK SURGERY N/A 2023    INCISION AND DRAINAGE OF BUTTOCK ABSCESS AND DEBRIDMENT OF MUSCLE performed by Anne Marie Malone MD at [address] [place] [address]      bilateral       FAMILY HISTORY    History reviewed. No pertinent family history.     SOCIAL HISTORY    Social History     Tobacco Use    Smoking status: Former     Packs/day: .5     Types: Cigarettes     Quit date: 2023     Years since quittin.2    Smokeless tobacco: Never   Substance Use Topics    Alcohol use: Not Currently    Drug use: Never       ALLERGIES    Allergies   Allergen Reactions    Aspirin Anaphylaxis and Hives    Lisinopril Cough    Aleve [Naproxen Sodium]        MEDICATIONS    Current Outpatient Medications on File Prior to Encounter   Medication Sig Dispense Refill    DULoxetine (CYMBALTA) 30 MG extended release capsule Take 1 capsule by mouth 2 times daily      oxyCODONE-acetaminophen (PERCOCET)  MG per tablet Take

## 2023-09-15 NOTE — PLAN OF CARE
Negative Pressure Wound Therapy    NAME:  Marcela Royal  YOB: 1980  MEDICAL RECORD NUMBER:  [id number]  DATE:  9/15/2023    Applied Negative Pressure to coccyx wound(s)/ulcer(s). [x] Applied skin barrier prep to julián-wound. [x] Cut strips of plastic drape to picture frame wound so that julián-wound is     covered with the drape. [x] If bridging dressing to less prominent site, cover any intact skin that will come in contact with the Negative Pressure Therapy sponge, gauze or channel drain with plastic drape. The sponge should never touch intact skin. [x] Cut sponge, gauze or channel drain to size which will fit into the wound/ulcer bed without being forced. [x] Be sure the sponge is large enough to hold the entire round plastic flange which is attached to the tubing. Never allow flange to be larger than the sponge or it will produce suction damaging intact skin. Total number of individual pieces of foam used within the wound bed: 1    [x] If bridging the dressing away from the primary site, be sure the bridge leads to a piece of sponge large enough to hold the entire flange without allowing any of the flange to overlap onto intact skin. [x] Covered sponge, gauze or channel drain with plastic drape. [x] Cut a hole in this plastic drape directly over the sponge the same size as the plastic drain tubing. [x] Removed plastic liner from flange and apply it directly over the hole you cut. [x] Removed the plastic cover from the flange. [x] Attached the tubing to the wound/ulcer Negative Pressure Therapy and turn it on to be sure a vacuum is created and that there are no leaks. [x] If air leaks occur, use plastic drape to patch them. [] Secured Negative Pressure Therapy dressing with ace wrap loosely if located on an extremity. Maintain tubing outside of ace wrap. Tubing must not exert pressure on intact skin.     Applied per  Guidelines      Electronically signed by Akira Mishra

## 2023-09-15 NOTE — DISCHARGE INSTRUCTIONS
experience any of the following, please call the [address] [place], [place] [address] during business hours:    * Increase in Pain  * Temperature over 101  * Increase in drainage from your wound  * Drainage with a foul odor  * Bleeding  * Increase in swelling  * Need for compression bandage changes due to slippage, breakthrough drainage. If you need medical attention outside of the business hours of the [address] [place] [address] please contact your PCP or go to the nearest emergency room.

## 2023-09-18 ENCOUNTER — OFFICE VISIT (OUTPATIENT)
Dept: SURGERY | Facility: CLINIC | Age: 43
End: 2023-09-18
Payer: MEDICAID

## 2023-09-18 ENCOUNTER — HOSPITAL ENCOUNTER (OUTPATIENT)
Dept: WOUND CARE | Age: 43
Discharge: HOME | End: 2023-09-18
Payer: MEDICAID

## 2023-09-18 VITALS
BODY MASS INDEX: 36.82 KG/M2 | TEMPERATURE: 97 F | SYSTOLIC BLOOD PRESSURE: 128 MMHG | HEART RATE: 73 BPM | DIASTOLIC BLOOD PRESSURE: 82 MMHG | HEIGHT: 71 IN | RESPIRATION RATE: 18 BRPM | WEIGHT: 263 LBS

## 2023-09-18 VITALS
WEIGHT: 273 LBS | SYSTOLIC BLOOD PRESSURE: 135 MMHG | BODY MASS INDEX: 39.08 KG/M2 | HEART RATE: 88 BPM | DIASTOLIC BLOOD PRESSURE: 92 MMHG | HEIGHT: 70 IN | OXYGEN SATURATION: 94 %

## 2023-09-18 DIAGNOSIS — E11.622 TYPE 2 DIABETES MELLITUS WITH OTHER SKIN ULCER, WITH LONG-TERM CURRENT USE OF INSULIN (HCC): ICD-10-CM

## 2023-09-18 DIAGNOSIS — Z93.1 S/P PERCUTANEOUS ENDOSCOPIC GASTROSTOMY (PEG) TUBE PLACEMENT: Primary | ICD-10-CM

## 2023-09-18 DIAGNOSIS — Z79.4 TYPE 2 DIABETES MELLITUS WITH OTHER SKIN ULCER, WITH LONG-TERM CURRENT USE OF INSULIN (HCC): ICD-10-CM

## 2023-09-18 DIAGNOSIS — L98.422 SACRAL ULCER, WITH FAT LAYER EXPOSED (HCC): Primary | Chronic | ICD-10-CM

## 2023-09-18 DIAGNOSIS — L05.91 PILONIDAL CYST: ICD-10-CM

## 2023-09-18 PROCEDURE — 99024 POSTOP FOLLOW-UP VISIT: CPT | Performed by: STUDENT IN AN ORGANIZED HEALTH CARE EDUCATION/TRAINING PROGRAM

## 2023-09-18 PROCEDURE — 1159F MED LIST DOCD IN RCRD: CPT | Performed by: STUDENT IN AN ORGANIZED HEALTH CARE EDUCATION/TRAINING PROGRAM

## 2023-09-18 PROCEDURE — 97605 NEG PRS WND THER DME<=50SQCM: CPT

## 2023-09-18 PROCEDURE — 3075F SYST BP GE 130 - 139MM HG: CPT | Performed by: STUDENT IN AN ORGANIZED HEALTH CARE EDUCATION/TRAINING PROGRAM

## 2023-09-18 PROCEDURE — 3080F DIAST BP >= 90 MM HG: CPT | Performed by: STUDENT IN AN ORGANIZED HEALTH CARE EDUCATION/TRAINING PROGRAM

## 2023-09-18 PROCEDURE — 1160F RVW MEDS BY RX/DR IN RCRD: CPT | Performed by: STUDENT IN AN ORGANIZED HEALTH CARE EDUCATION/TRAINING PROGRAM

## 2023-09-18 RX ORDER — LIDOCAINE HYDROCHLORIDE 20 MG/ML
JELLY TOPICAL ONCE
OUTPATIENT
Start: 2023-09-18 | End: 2023-09-18

## 2023-09-18 RX ORDER — LIDOCAINE 40 MG/G
CREAM TOPICAL ONCE
OUTPATIENT
Start: 2023-09-18 | End: 2023-09-18

## 2023-09-18 RX ORDER — BACITRACIN ZINC AND POLYMYXIN B SULFATE 500; 1000 [USP'U]/G; [USP'U]/G
OINTMENT TOPICAL ONCE
OUTPATIENT
Start: 2023-09-18 | End: 2023-09-18

## 2023-09-18 RX ORDER — GENTAMICIN SULFATE 1 MG/G
OINTMENT TOPICAL ONCE
OUTPATIENT
Start: 2023-09-18 | End: 2023-09-18

## 2023-09-18 RX ORDER — LIDOCAINE HYDROCHLORIDE 20 MG/ML
JELLY TOPICAL ONCE
Status: CANCELLED | OUTPATIENT
Start: 2023-09-18 | End: 2023-09-18

## 2023-09-18 RX ORDER — CLOBETASOL PROPIONATE 0.5 MG/G
OINTMENT TOPICAL ONCE
OUTPATIENT
Start: 2023-09-18 | End: 2023-09-18

## 2023-09-18 RX ORDER — LIDOCAINE HYDROCHLORIDE 40 MG/ML
SOLUTION TOPICAL ONCE
OUTPATIENT
Start: 2023-09-18 | End: 2023-09-18

## 2023-09-18 RX ORDER — LIDOCAINE 50 MG/G
OINTMENT TOPICAL ONCE
OUTPATIENT
Start: 2023-09-18 | End: 2023-09-18

## 2023-09-18 RX ORDER — IBUPROFEN 200 MG
TABLET ORAL ONCE
OUTPATIENT
Start: 2023-09-18 | End: 2023-09-18

## 2023-09-18 RX ORDER — BETAMETHASONE DIPROPIONATE 0.05 %
OINTMENT (GRAM) TOPICAL ONCE
OUTPATIENT
Start: 2023-09-18 | End: 2023-09-18

## 2023-09-18 RX ORDER — GINSENG 100 MG
CAPSULE ORAL ONCE
OUTPATIENT
Start: 2023-09-18 | End: 2023-09-18

## 2023-09-18 RX ORDER — TRIAMCINOLONE ACETONIDE 1 MG/G
OINTMENT TOPICAL ONCE
OUTPATIENT
Start: 2023-09-18 | End: 2023-09-18

## 2023-09-18 RX ORDER — SODIUM CHLOR/HYPOCHLOROUS ACID 0.033 %
SOLUTION, IRRIGATION IRRIGATION ONCE
OUTPATIENT
Start: 2023-09-18 | End: 2023-09-18

## 2023-09-18 NOTE — PLAN OF CARE
Negative Pressure Wound Therapy    NAME:  Ila Cheung  YOB: 1980  MEDICAL RECORD NUMBER:  [id number]  DATE:  9/18/2023    Applied Negative Pressure to coccxy wound(s)/ulcer(s). [x] Applied skin barrier prep to julián-wound. [x] Cut strips of plastic drape to picture frame wound so that julián-wound is     covered with the drape. [x] If bridging dressing to less prominent site, cover any intact skin that will come in contact with the Negative Pressure Therapy sponge, gauze or channel drain with plastic drape. The sponge should never touch intact skin. [x] Cut sponge, gauze or channel drain to size which will fit into the wound/ulcer bed without being forced. [x] Be sure the sponge is large enough to hold the entire round plastic flange which is attached to the tubing. Never allow flange to be larger than the sponge or it will produce suction damaging intact skin. Total number of individual pieces of foam used within the wound bed: 1    [x] If bridging the dressing away from the primary site, be sure the bridge leads to a piece of sponge large enough to hold the entire flange without allowing any of the flange to overlap onto intact skin. [x] Covered sponge, gauze or channel drain with plastic drape. [x] Cut a hole in this plastic drape directly over the sponge the same size as the plastic drain tubing. [x] Removed plastic liner from flange and apply it directly over the hole you cut. [x] Removed the plastic cover from the flange. [x] Attached the tubing to the wound/ulcer Negative Pressure Therapy and turn it on to be sure a vacuum is created and that there are no leaks. [x] If air leaks occur, use plastic drape to patch them. [x] Secured Negative Pressure Therapy dressing with ace wrap loosely if located on an extremity. Maintain tubing outside of ace wrap. Tubing must not exert pressure on intact skin.     Applied per  Guidelines      Electronically signed by Orpha Plater

## 2023-09-18 NOTE — DISCHARGE INSTRUCTIONS
[address] and Hyperbaric Oxygen Therapy   Physician Orders and Discharge Instructions  18[address] [facility],[address] [place] [address] Blvd, [address]  Telephone: [phone]    NAME:  Noah Fitzpatrick  YOB: 1980  MEDICAL RECORD NUMBER:  [id number]  DATE:  9/18/2023    Discharge condition: Stable    Discharge to: Home    Left via:Private automobile    Accompanied by:  spouse    ECF/HHA: [facility]      Dressing Orders:   Coccyx Wound:    Left buttocks place black foam to wound bed, bridge with coccyx dressing. Wash with soap and water   Apply wound vac as follows: Apply Skin Prep to periwound. Apply drape - window pane to surrounding area (periwound). (May use duoderm instead of drape to prevent skin breakdown. Use ostomy paste to fill any creases to prevent leakage. If skin becomes red due to tape irritation please apply skin prep. Then apply light dusting of ostomy powder or antifungal powder to periwound. Then blot with skin prep to form crusting to protect skin. May repeat skin prep, powdering steps until proper crusting is made. )   Then apply drape to periwound. DO NOT PUT FOAM ON GOOD SKIN. Apply PROMOGRAN then tuck black foam to wound bed. BRIDGE to hip. Set wound vac to 150 mmHG, continuous. Change wound vac dressing 3 times per week (MWF or TTS)   Reapply vac dressing if vac stops working or dressing begins to leak or cannot be reinforced. Alternative dressing: Wash with soap and water. Apply 1/4 STR Dakins moistened gauze to wound bed. Cover with gauze. Secure with roll gauze and medipore tape. Change twice daily . Treatment Orders:   Protein rich diet   Multivitamin   Avoid pressure by always sitting with MEMORY FOAM CUSHION OR PILLOW     [address] [facility] follow up visit ____________keep scheduled follow-up_________________  (Please note your next appointment above and if you are unable to keep, kindly give a 24 hour notice.  Thank you.)

## 2023-09-19 NOTE — PATIENT INSTRUCTIONS

## 2023-09-19 NOTE — PROGRESS NOTES
Office Established Patient Note:     Referring Provider: Thomas Henao MD    Chief Complaint   Patient presents with    Follow-up       Subjective .     History of present illness:  Rikki Alejandro III is a 42 y.o. male  s/p debridement of infected/necrotic pilonidal with wound vac placement (7/3/23) and PEG tube placement (6/27/23) with recent hospitalization. His PEG was removed in office on 8/16/23. He denies any issues and reports there is no further drainage. No fevers. Wound vac in place to pilonidal and pictures from wife look like this almost completely healed.     History  Past Medical History:   Diagnosis Date    Anxiety     Arthritis     Bulging of cervical intervertebral disc     Bulging of lumbar intervertebral disc     Depression     GERD (gastroesophageal reflux disease)     Hypertension    ,   Past Surgical History:   Procedure Laterality Date    PEG TUBE INSERTION N/A 6/27/2023    Procedure: PERCUTANEOUS ENDOSCOPIC GASTROSTOMY TUBE INSERTION;  Surgeon: Iris Cool MD;  Location:  [facility] OR;  Service: General;  Laterality: N/A;    PILONIDAL CYSTECTOMY N/A 7/3/2023    Procedure: EXCISION & DEBRIDEMENT OF PILONIDAL CYST POSSIBLE WOUND VAC PLACEMENT;  Surgeon: Iris Cool MD;  Location:  [facility] OR;  Service: General;  Laterality: N/A;    REPLACEMENT TOTAL HIP LATERAL POSITION Bilateral     TOTAL HIP ARTHROPLASTY Bilateral 2017    Performed by Dr. Mejia in [place], [place]    TRACHEOSTOMY N/A 6/27/2023    Procedure: TRACHEOSTOMY;  Surgeon: Juan Pablo Marte MD;  Location:  [facility] OR;  Service: ENT;  Laterality: N/A;   ,   Family History   Problem Relation Age of Onset    COPD Mother     Hyperlipidemia Mother     Heart attack Father    ,   Social History     Tobacco Use    Smoking status: Former     Packs/day: 0.50     Years: 30.00     Pack years: 15.00     Types: Cigarettes    Smokeless tobacco: Never   Vaping Use    Vaping Use: Former    Devices: Pre-filled pod   Substance Use  Topics    Alcohol use: Yes     Alcohol/week: 4.0 standard drinks     Types: 4 Cans of beer per week     Comment: 12 beers/week    Drug use: No   , (Not in a hospital admission)   and Allergies:  Aspirin, Mobic [meloxicam], Lisinopril, and Naproxen sodium    Current Outpatient Medications:     amLODIPine (NORVASC) 5 MG tablet, Administer 1 tablet per G tube Daily., Disp: , Rfl:     budesonide (PULMICORT) 0.5 MG/2ML nebulizer solution, Take 2 mL by nebulization 2 (Two) Times a Day., Disp: , Rfl:     chlorhexidine (PERIDEX) 0.12 % solution, Apply 15 mL to the mouth or throat Every 12 (Twelve) Hours., Disp: , Rfl:     Chlorhexidine Gluconate Cloth 2 % pads, Apply 1 application topically to the appropriate area as directed Daily., Disp: , Rfl:     collagenase 250 UNIT/GM ointment, Apply 1 application topically to the appropriate area as directed Every 12 (Twelve) Hours., Disp: , Rfl:     dexmedetomidine (PRECEDEX) 400 MCG/100ML solution infusion, Infuse 30.4-228 mcg/hr into a venous catheter Dose Adjusted By Provider As Needed., Disp: , Rfl:     Enoxaparin Sodium (LOVENOX) 40 MG/0.4ML solution prefilled syringe syringe, Inject 0.4 mL under the skin into the appropriate area as directed Daily. Indications: Prevention of Unwanted Clot in Veins, Disp: , Rfl:     famotidine (PEPCID) 10 MG/ML solution injection, Infuse 2 mL into a venous catheter 2 (Two) Times a Day., Disp: , Rfl:     furosemide (LASIX) 10 MG/ML injection, Infuse 4 mL into a venous catheter Daily., Disp: , Rfl:     insulin detemir (LEVEMIR) 100 UNIT/ML injection, Inject 30 Units under the skin into the appropriate area as directed Daily., Disp: , Rfl: 12    Insulin Lispro (humaLOG) 100 UNIT/ML injection, Inject 4-24 Units under the skin into the appropriate area as directed Every 6 (Six) Hours., Disp: , Rfl: 12    ipratropium-albuterol (DUO-NEB) 0.5-2.5 mg/3 ml nebulizer, Take 3 mL by nebulization 4 (Four) Times a Day., Disp: 360 mL, Rfl:     labetalol  "(NORMODYNE,TRANDATE) 5 MG/ML injection, Infuse 2 mL into a venous catheter Every 6 (Six) Hours As Needed for High Blood Pressure., Disp: , Rfl:     losartan (COZAAR) 50 MG tablet, Administer 1 tablet per G tube Daily., Disp: , Rfl:     metoclopramide (REGLAN) 5 MG/ML injection, Infuse 1 mL into a venous catheter Every 6 (Six) Hours., Disp: , Rfl:     metoprolol tartrate (LOPRESSOR) 25 MG tablet, Administer 0.5 tablets per G tube Every 12 (Twelve) Hours., Disp: , Rfl:     mupirocin (BACTROBAN) 2 % ointment, Apply 1 application topically to the appropriate area as directed Every 12 (Twelve) Hours., Disp: , Rfl:     ondansetron (ZOFRAN) 2 mg/mL injection, Infuse 2 mL into a venous catheter Every 6 (Six) Hours As Needed for Nausea or Vomiting., Disp: , Rfl:     Objective     Vital Signs   /92   Pulse 88   Ht 177.8 cm (70\")   Wt 124 kg (273 lb)   SpO2 94%   BMI 39.17 kg/m²      Physical Exam:  General appearance - alert, well appearing, and in no distress  Mental status - alert, oriented to person, place, and time  Eyes - pupils equal and reactive, extraocular eye movements intact  Neck - supple, no significant adenopathy  Chest - no tachypnea, retractions or cyanosis  Abdomen - soft, nontender, nondistended, no masses or organomegaly  PEG tube site well healed.   Neurological - alert, oriented, normal speech, no focal findings or movement disorder noted  Musculoskeletal - no joint tenderness, deformity or swelling    Results Review:     The following data was reviewed by: Iris Cool MD on 09/18/2023:  none    Assessment & Plan       Diagnoses and all orders for this visit:    1. S/P percutaneous endoscopic gastrostomy (PEG) tube placement (Primary)       PEG removed a month ago and PEG site well healed. Follow up as needed. Pilonidal area healing well - continue management per wound care.     Class 2 Severe Obesity (BMI >=35 and <=39.9). Obesity-related health conditions include the following: " hypertension. Obesity is unchanged. BMI is is above average; BMI management plan is completed. We discussed portion control and increasing exercise.      Iris Cool MD  09/18/23  20:00 CDT

## 2023-09-19 NOTE — DISCHARGE INSTRUCTIONS
[address] and Hyperbaric Oxygen Therapy   Physician Orders and Discharge Instructions  18[address] [facility],[address] [place] [address] Blvd, [address]  Telephone: [phone]    NAME:  Ila Cheung  YOB: 1980  MEDICAL RECORD NUMBER:  [id number]  DATE:  9/22/23    Discharge condition: Stable    Discharge to: Home    Left via:Private automobile    Accompanied by:  spouse    ECF/HHA: [facility]      Dressing Orders:       Coccyx Wound:    Wash with soap and water   Apply wound vac as follows: Apply Skin Prep to periwound. Apply drape - window pane to surrounding area (periwound). (May use duoderm instead of drape to prevent skin breakdown. Use ostomy paste to fill any creases to prevent leakage. If skin becomes red due to tape irritation please apply skin prep. Then apply light dusting of ostomy powder or antifungal powder to periwound. Then blot with skin prep to form crusting to protect skin. May repeat skin prep, powdering steps until proper crusting is made. )   Then apply drape to periwound. DO NOT PUT FOAM ON GOOD SKIN. Apply PROMOGRAN then tuck black foam to wound bed. BRIDGE lateral upper hip. Set wound vac to 150 mmHG, continuous. Change wound vac dressing 3 times per week (MWF or TTS)   Reapply vac dressing if vac stops working or dressing begins to leak or cannot be reinforced. Alternative dressing: Wash with soap and water. Apply 1/4 STR Dakins moistened gauze to wound bed. Cover with gauze. Secure with roll gauze and medipore tape. Change twice daily . Treatment Orders:   Protein rich diet   Multivitamin   Avoid pressure by always sitting with MEMORY FOAM CUSHION OR PILLOW    PRESSURE RELIEF INSTRUCTIONS  Avoid Pressure to wound site.   Turn frequently (turn at least every two hours when in bed)  While in chair reposition every 30 minutes             [facility] follow up visit ____________see appointment times_________________  (Please note your

## 2023-09-20 ENCOUNTER — HOSPITAL ENCOUNTER (OUTPATIENT)
Dept: WOUND CARE | Age: 43
Discharge: HOME | End: 2023-09-20
Payer: MEDICAID

## 2023-09-20 VITALS
SYSTOLIC BLOOD PRESSURE: 147 MMHG | HEIGHT: 71 IN | BODY MASS INDEX: 36.82 KG/M2 | WEIGHT: 263 LBS | TEMPERATURE: 95.7 F | DIASTOLIC BLOOD PRESSURE: 104 MMHG | HEART RATE: 82 BPM | RESPIRATION RATE: 18 BRPM

## 2023-09-20 DIAGNOSIS — L98.422 SACRAL ULCER, WITH FAT LAYER EXPOSED (HCC): Primary | Chronic | ICD-10-CM

## 2023-09-20 PROCEDURE — 97605 NEG PRS WND THER DME<=50SQCM: CPT

## 2023-09-20 ASSESSMENT — PAIN SCALES - GENERAL: PAINLEVEL_OUTOF10: 0

## 2023-09-22 ENCOUNTER — HOSPITAL ENCOUNTER (OUTPATIENT)
Dept: WOUND CARE | Age: 43
Discharge: HOME | End: 2023-09-22
Payer: MEDICAID

## 2023-09-22 VITALS
HEIGHT: 71 IN | TEMPERATURE: 96 F | DIASTOLIC BLOOD PRESSURE: 98 MMHG | HEART RATE: 83 BPM | SYSTOLIC BLOOD PRESSURE: 142 MMHG | RESPIRATION RATE: 18 BRPM | BODY MASS INDEX: 36.4 KG/M2 | WEIGHT: 260 LBS

## 2023-09-22 DIAGNOSIS — E11.622 TYPE 2 DIABETES MELLITUS WITH OTHER SKIN ULCER, WITH LONG-TERM CURRENT USE OF INSULIN (HCC): Primary | ICD-10-CM

## 2023-09-22 DIAGNOSIS — L05.91 PILONIDAL CYST: ICD-10-CM

## 2023-09-22 DIAGNOSIS — Z79.4 TYPE 2 DIABETES MELLITUS WITH OTHER SKIN ULCER, WITH LONG-TERM CURRENT USE OF INSULIN (HCC): Primary | ICD-10-CM

## 2023-09-22 DIAGNOSIS — L98.422 SACRAL ULCER, WITH FAT LAYER EXPOSED (HCC): ICD-10-CM

## 2023-09-22 PROCEDURE — 6370000000 HC RX 637 (ALT 250 FOR IP): Performed by: NURSE PRACTITIONER

## 2023-09-22 PROCEDURE — 11042 DBRDMT SUBQ TIS 1ST 20SQCM/<: CPT

## 2023-09-22 RX ORDER — LIDOCAINE 40 MG/G
CREAM TOPICAL ONCE
OUTPATIENT
Start: 2023-09-22 | End: 2023-09-22

## 2023-09-22 RX ORDER — LIDOCAINE HYDROCHLORIDE 40 MG/ML
SOLUTION TOPICAL ONCE
OUTPATIENT
Start: 2023-09-22 | End: 2023-09-22

## 2023-09-22 RX ORDER — LIDOCAINE HYDROCHLORIDE 20 MG/ML
JELLY TOPICAL ONCE
OUTPATIENT
Start: 2023-09-22 | End: 2023-09-22

## 2023-09-22 RX ORDER — BACITRACIN ZINC AND POLYMYXIN B SULFATE 500; 1000 [USP'U]/G; [USP'U]/G
OINTMENT TOPICAL ONCE
OUTPATIENT
Start: 2023-09-22 | End: 2023-09-22

## 2023-09-22 RX ORDER — SODIUM CHLOR/HYPOCHLOROUS ACID 0.033 %
SOLUTION, IRRIGATION IRRIGATION ONCE
OUTPATIENT
Start: 2023-09-22 | End: 2023-09-22

## 2023-09-22 RX ORDER — LIDOCAINE HYDROCHLORIDE 20 MG/ML
JELLY TOPICAL ONCE
Status: COMPLETED | OUTPATIENT
Start: 2023-09-22 | End: 2023-09-22

## 2023-09-22 RX ORDER — BETAMETHASONE DIPROPIONATE 0.05 %
OINTMENT (GRAM) TOPICAL ONCE
OUTPATIENT
Start: 2023-09-22 | End: 2023-09-22

## 2023-09-22 RX ORDER — GENTAMICIN SULFATE 1 MG/G
OINTMENT TOPICAL ONCE
OUTPATIENT
Start: 2023-09-22 | End: 2023-09-22

## 2023-09-22 RX ORDER — IBUPROFEN 200 MG
TABLET ORAL ONCE
OUTPATIENT
Start: 2023-09-22 | End: 2023-09-22

## 2023-09-22 RX ORDER — TRIAMCINOLONE ACETONIDE 1 MG/G
OINTMENT TOPICAL ONCE
OUTPATIENT
Start: 2023-09-22 | End: 2023-09-22

## 2023-09-22 RX ORDER — GINSENG 100 MG
CAPSULE ORAL ONCE
OUTPATIENT
Start: 2023-09-22 | End: 2023-09-22

## 2023-09-22 RX ORDER — LIDOCAINE 50 MG/G
OINTMENT TOPICAL ONCE
OUTPATIENT
Start: 2023-09-22 | End: 2023-09-22

## 2023-09-22 RX ORDER — CLOBETASOL PROPIONATE 0.5 MG/G
OINTMENT TOPICAL ONCE
OUTPATIENT
Start: 2023-09-22 | End: 2023-09-22

## 2023-09-22 RX ADMIN — LIDOCAINE HYDROCHLORIDE: 20 JELLY TOPICAL at 11:40

## 2023-09-22 ASSESSMENT — PAIN DESCRIPTION - PAIN TYPE: TYPE: ACUTE PAIN

## 2023-09-22 ASSESSMENT — PAIN DESCRIPTION - FREQUENCY: FREQUENCY: INTERMITTENT

## 2023-09-22 ASSESSMENT — PAIN SCALES - GENERAL: PAINLEVEL_OUTOF10: 7

## 2023-09-22 ASSESSMENT — PAIN DESCRIPTION - LOCATION: LOCATION: COCCYX

## 2023-09-22 ASSESSMENT — PAIN DESCRIPTION - ONSET: ONSET: ON-GOING

## 2023-09-22 ASSESSMENT — PAIN DESCRIPTION - DESCRIPTORS: DESCRIPTORS: ACHING;BURNING

## 2023-09-22 ASSESSMENT — PAIN - FUNCTIONAL ASSESSMENT: PAIN_FUNCTIONAL_ASSESSMENT: PREVENTS OR INTERFERES SOME ACTIVE ACTIVITIES AND ADLS

## 2023-09-22 NOTE — PROGRESS NOTES
[address]   Progress Note and Procedure Note      Rashida Murray RECORD NUMBER:  [id number]  AGE: 38 y.o. GENDER: male  : 1980  EPISODE DATE:  2023    Subjective:     Chief Complaint   Patient presents with    Wound Check      HISTORY of PRESENT ILLNESS HPI     John Maria is a 38 y.o. male who presents today for wound/ulcer evaluation. History of Wound Context: sacral wound follow up/eval and treat    Ulcer Identification:  Ulcer Type: non-healing surgical  Contributing Factors: diabetes, chronic pressure, and shear force    Wound: Surgical incision        PAST MEDICAL HISTORY        Diagnosis Date    Chronic pain     Diabetes ([address])     Hyperlipidemia     Hypertension     Osteoarthritis     Sleep apnea        PAST SURGICAL HISTORY    Past Surgical History:   Procedure Laterality Date    BACK SURGERY N/A 2023    INCISION AND DRAINAGE OF BUTTOCK ABSCESS AND DEBRIDMENT OF MUSCLE performed by Charly Hopper MD at [address] [place] [address]      bilateral       FAMILY HISTORY    History reviewed. No pertinent family history.     SOCIAL HISTORY    Social History     Tobacco Use    Smoking status: Former     Packs/day: .5     Types: Cigarettes     Quit date: 2023     Years since quittin.2    Smokeless tobacco: Never   Substance Use Topics    Alcohol use: Not Currently    Drug use: Never       ALLERGIES    Allergies   Allergen Reactions    Aspirin Anaphylaxis and Hives    Lisinopril Cough    Aleve [Naproxen Sodium]        MEDICATIONS    Current Outpatient Medications on File Prior to Encounter   Medication Sig Dispense Refill    DULoxetine (CYMBALTA) 30 MG extended release capsule Take 1 capsule by mouth 2 times daily      oxyCODONE-acetaminophen (PERCOCET)  MG per tablet Take 1 tablet by mouth every 6 hours as needed for Pain.      diazePAM (VALIUM) 5 MG tablet Take 1 tablet by mouth every 12 hours as

## 2023-09-22 NOTE — PLAN OF CARE
Problem: Chronic Conditions and Co-morbidities  Goal: Patient's chronic conditions and co-morbidity symptoms are monitored and maintained or improved  Outcome: Progressing     Problem: Pain  Goal: Verbalizes/displays adequate comfort level or baseline comfort level  Outcome: Progressing     Problem: Wound:  Goal: Will show signs of wound healing; wound closure and no evidence of infection  Description: Will show signs of wound healing; wound closure and no evidence of infection  Outcome: Progressing     Problem: Weight control:  Goal: Ability to maintain an optimal weight for height and age will be supported  Description: Ability to maintain an optimal weight for height and age will be supported  Outcome: Progressing     Problem: Falls - Risk of:  Goal: Will remain free from falls  Description: Will remain free from falls  Outcome: Progressing     Problem: Blood Glucose:  Goal: Ability to maintain appropriate glucose levels will improve  Description: Ability to maintain appropriate glucose levels will improve  Outcome: Progressing

## 2023-09-22 NOTE — PLAN OF CARE
Negative Pressure Wound Therapy    NAME:  Carmen Rasmussen  YOB: 1980  MEDICAL RECORD NUMBER:  [id number]  DATE:  9/22/2023    Applied Negative Pressure to coccyx wound(s)/ulcer(s). [x] Applied skin barrier prep to julián-wound. [x] Cut strips of plastic drape to picture frame wound so that julián-wound is     covered with the drape. [x] If bridging dressing to less prominent site, cover any intact skin that will come in contact with the Negative Pressure Therapy sponge, gauze or channel drain with plastic drape. The sponge should never touch intact skin. [x] Cut sponge, gauze or channel drain to size which will fit into the wound/ulcer bed without being forced. [x] Be sure the sponge is large enough to hold the entire round plastic flange which is attached to the tubing. Never allow flange to be larger than the sponge or it will produce suction damaging intact skin. Total number of individual pieces of foam used within the wound bed: 1    [x] If bridging the dressing away from the primary site, be sure the bridge leads to a piece of sponge large enough to hold the entire flange without allowing any of the flange to overlap onto intact skin. [x] Covered sponge, gauze or channel drain with plastic drape. [x] Cut a hole in this plastic drape directly over the sponge the same size as the plastic drain tubing. [x] Removed plastic liner from flange and apply it directly over the hole you cut. [x] Removed the plastic cover from the flange. [x] Attached the tubing to the wound/ulcer Negative Pressure Therapy and turn it on to be sure a vacuum is created and that there are no leaks. [x] If air leaks occur, use plastic drape to patch them. [] Secured Negative Pressure Therapy dressing with ace wrap loosely if located on an extremity. Maintain tubing outside of ace wrap. Tubing must not exert pressure on intact skin.     Applied per  Guidelines      Electronically signed by Juan Garrido

## 2023-09-22 NOTE — PATIENT INSTRUCTIONS
NAME:  Stella Lui  YOB: 1980  MEDICAL RECORD NUMBER:  [id number]  DATE:  9/22/23    Discharge condition: Stable    Discharge to: Home    Left via:Private automobile    Accompanied by:  spouse    ECF/HHA: [facility]      Dressing Orders:       Coccyx Wound:    Wash with soap and water   Apply wound vac as follows: Apply Skin Prep to periwound. Apply drape - window pane to surrounding area (periwound). (May use duoderm instead of drape to prevent skin breakdown. Use ostomy paste to fill any creases to prevent leakage. If skin becomes red due to tape irritation please apply skin prep. Then apply light dusting of ostomy powder or antifungal powder to periwound. Then blot with skin prep to form crusting to protect skin. May repeat skin prep, powdering steps until proper crusting is made. )   Then apply drape to periwound. DO NOT PUT FOAM ON GOOD SKIN. Apply PROMOGRAN then tuck black foam to wound bed. BRIDGE lateral upper hip. Set wound vac to 150 mmHG, continuous. Change wound vac dressing 3 times per week (MWF or TTS)   Reapply vac dressing if vac stops working or dressing begins to leak or cannot be reinforced. Alternative dressing: Wash with soap and water. Apply 1/4 STR Dakins moistened gauze to wound bed. Cover with gauze. Secure with roll gauze and medipore tape. Change twice daily . Treatment Orders:   Protein rich diet   Multivitamin   Avoid pressure by always sitting with MEMORY FOAM CUSHION OR PILLOW    PRESSURE RELIEF INSTRUCTIONS  Avoid Pressure to wound site.   Turn frequently (turn at least every two hours when in bed)  While in chair reposition every 30 minutes

## 2023-09-25 ENCOUNTER — HOSPITAL ENCOUNTER (OUTPATIENT)
Dept: WOUND CARE | Age: 43
Discharge: HOME | End: 2023-09-25
Payer: MEDICAID

## 2023-09-25 VITALS
HEIGHT: 71 IN | WEIGHT: 260 LBS | DIASTOLIC BLOOD PRESSURE: 94 MMHG | SYSTOLIC BLOOD PRESSURE: 142 MMHG | BODY MASS INDEX: 36.4 KG/M2 | HEART RATE: 88 BPM | TEMPERATURE: 98.3 F | RESPIRATION RATE: 18 BRPM

## 2023-09-25 DIAGNOSIS — L05.91 PILONIDAL CYST: ICD-10-CM

## 2023-09-25 DIAGNOSIS — E11.622 TYPE 2 DIABETES MELLITUS WITH OTHER SKIN ULCER, WITH LONG-TERM CURRENT USE OF INSULIN (HCC): ICD-10-CM

## 2023-09-25 DIAGNOSIS — L98.422 SACRAL ULCER, WITH FAT LAYER EXPOSED (HCC): Primary | Chronic | ICD-10-CM

## 2023-09-25 DIAGNOSIS — Z79.4 TYPE 2 DIABETES MELLITUS WITH OTHER SKIN ULCER, WITH LONG-TERM CURRENT USE OF INSULIN (HCC): ICD-10-CM

## 2023-09-25 PROCEDURE — 97605 NEG PRS WND THER DME<=50SQCM: CPT

## 2023-09-25 RX ORDER — LIDOCAINE HYDROCHLORIDE 40 MG/ML
SOLUTION TOPICAL ONCE
OUTPATIENT
Start: 2023-09-25 | End: 2023-09-25

## 2023-09-25 RX ORDER — LIDOCAINE 50 MG/G
OINTMENT TOPICAL ONCE
OUTPATIENT
Start: 2023-09-25 | End: 2023-09-25

## 2023-09-25 RX ORDER — LIDOCAINE HYDROCHLORIDE 20 MG/ML
JELLY TOPICAL ONCE
Status: CANCELLED | OUTPATIENT
Start: 2023-09-25 | End: 2023-09-25

## 2023-09-25 RX ORDER — LIDOCAINE 40 MG/G
CREAM TOPICAL ONCE
OUTPATIENT
Start: 2023-09-25 | End: 2023-09-25

## 2023-09-25 RX ORDER — GINSENG 100 MG
CAPSULE ORAL ONCE
OUTPATIENT
Start: 2023-09-25 | End: 2023-09-25

## 2023-09-25 RX ORDER — BETAMETHASONE DIPROPIONATE 0.05 %
OINTMENT (GRAM) TOPICAL ONCE
OUTPATIENT
Start: 2023-09-25 | End: 2023-09-25

## 2023-09-25 RX ORDER — IBUPROFEN 200 MG
TABLET ORAL ONCE
OUTPATIENT
Start: 2023-09-25 | End: 2023-09-25

## 2023-09-25 RX ORDER — SODIUM CHLOR/HYPOCHLOROUS ACID 0.033 %
SOLUTION, IRRIGATION IRRIGATION ONCE
OUTPATIENT
Start: 2023-09-25 | End: 2023-09-25

## 2023-09-25 RX ORDER — TRIAMCINOLONE ACETONIDE 1 MG/G
OINTMENT TOPICAL ONCE
OUTPATIENT
Start: 2023-09-25 | End: 2023-09-25

## 2023-09-25 RX ORDER — CLOBETASOL PROPIONATE 0.5 MG/G
OINTMENT TOPICAL ONCE
OUTPATIENT
Start: 2023-09-25 | End: 2023-09-25

## 2023-09-25 RX ORDER — GENTAMICIN SULFATE 1 MG/G
OINTMENT TOPICAL ONCE
OUTPATIENT
Start: 2023-09-25 | End: 2023-09-25

## 2023-09-25 RX ORDER — BACITRACIN ZINC AND POLYMYXIN B SULFATE 500; 1000 [USP'U]/G; [USP'U]/G
OINTMENT TOPICAL ONCE
OUTPATIENT
Start: 2023-09-25 | End: 2023-09-25

## 2023-09-25 RX ORDER — LIDOCAINE HYDROCHLORIDE 20 MG/ML
JELLY TOPICAL ONCE
OUTPATIENT
Start: 2023-09-25 | End: 2023-09-25

## 2023-09-25 NOTE — DISCHARGE INSTRUCTIONS
[address] and Hyperbaric Oxygen Therapy   Physician Orders and Discharge Instructions  [address] [facility],[address] [place] [address] Blvd, [address]  Telephone: [phone]    NAME:  Annie Wilder  YOB: 1980  MEDICAL RECORD NUMBER:  [id number]  DATE:  9/25/2023    Discharge condition: Stable    Discharge to: Home    Left via:Private automobile    Accompanied by:  spouse    ECF/HHA: [facility]      Dressing Orders:      Coccyx Wound:    Wash with soap and water   Apply wound vac as follows: Apply Skin Prep to periwound. Apply drape - window pane to surrounding area (periwound). (May use duoderm instead of drape to prevent skin breakdown. Use ostomy paste to fill any creases to prevent leakage. If skin becomes red due to tape irritation please apply skin prep. Then apply light dusting of ostomy powder or antifungal powder to periwound. Then blot with skin prep to form crusting to protect skin. May repeat skin prep, powdering steps until proper crusting is made. )   Then apply drape to periwound. DO NOT PUT FOAM ON GOOD SKIN. Apply PROMOGRAN then tuck black foam to wound bed. BRIDGE lateral upper hip. Set wound vac to 150 mmHG, continuous. Change wound vac dressing 3 times per week (MWF or TTS)   Reapply vac dressing if vac stops working or dressing begins to leak or cannot be reinforced. Alternative dressing: Wash with soap and water. Apply 1/4 STR Dakins moistened gauze to wound bed. Cover with gauze. Secure with roll gauze and medipore tape. Change twice daily . Treatment Orders:   Protein rich diet   Multivitamin   Avoid pressure by always sitting with MEMORY FOAM CUSHION OR PILLOW     PRESSURE RELIEF INSTRUCTIONS  Avoid Pressure to wound site.   Turn frequently (turn at least every two hours when in bed)  While in chair reposition every 30 minutes               [facility] follow up visit ____________keep scheduled follow-up

## 2023-09-25 NOTE — PLAN OF CARE
Negative Pressure Wound Therapy    NAME:  Kelly Mendoza  YOB: 1980  MEDICAL RECORD NUMBER:  [id number]  DATE:  9/25/2023    Applied Negative Pressure to coccyx wound(s)/ulcer(s). [x] Applied skin barrier prep to julián-wound. [x] Cut strips of plastic drape to picture frame wound so that julián-wound is     covered with the drape. [x] If bridging dressing to less prominent site, cover any intact skin that will come in contact with the Negative Pressure Therapy sponge, gauze or channel drain with plastic drape. The sponge should never touch intact skin. [x] Cut sponge, gauze or channel drain to size which will fit into the wound/ulcer bed without being forced. [x] Be sure the sponge is large enough to hold the entire round plastic flange which is attached to the tubing. Never allow flange to be larger than the sponge or it will produce suction damaging intact skin. Total number of individual pieces of foam used within the wound bed: 2    [x] If bridging the dressing away from the primary site, be sure the bridge leads to a piece of sponge large enough to hold the entire flange without allowing any of the flange to overlap onto intact skin. [x] Covered sponge, gauze or channel drain with plastic drape. [x] Cut a hole in this plastic drape directly over the sponge the same size as the plastic drain tubing. [x] Removed plastic liner from flange and apply it directly over the hole you cut. [x] Removed the plastic cover from the flange. [x] Attached the tubing to the wound/ulcer Negative Pressure Therapy and turn it on to be sure a vacuum is created and that there are no leaks. [x] If air leaks occur, use plastic drape to patch them. [] Secured Negative Pressure Therapy dressing with ace wrap loosely if located on an extremity. Maintain tubing outside of ace wrap. Tubing must not exert pressure on intact skin.     Applied per  Guidelines      Electronically signed by Kody Early
Problem: Chronic Conditions and Co-morbidities  Goal: Patient's chronic conditions and co-morbidity symptoms are monitored and maintained or improved  Outcome: Progressing     Problem: Chronic Conditions and Co-morbidities  Goal: Patient's chronic conditions and co-morbidity symptoms are monitored and maintained or improved  Outcome: Progressing     Problem: Pain  Goal: Verbalizes/displays adequate comfort level or baseline comfort level  Outcome: Progressing     Problem: Wound:  Goal: Will show signs of wound healing; wound closure and no evidence of infection  Description: Will show signs of wound healing; wound closure and no evidence of infection  Outcome: Progressing     Problem: Weight control:  Goal: Ability to maintain an optimal weight for height and age will be supported  Description: Ability to maintain an optimal weight for height and age will be supported  Outcome: Progressing     Problem: Falls - Risk of:  Goal: Will remain free from falls  Description: Will remain free from falls  Outcome: Progressing     Problem: Blood Glucose:  Goal: Ability to maintain appropriate glucose levels will improve  Description: Ability to maintain appropriate glucose levels will improve  Outcome: Progressing     Problem: Blood Glucose:  Goal: Ability to maintain appropriate glucose levels will improve  Description: Ability to maintain appropriate glucose levels will improve  Outcome: Progressing
No

## 2023-09-27 ENCOUNTER — HOSPITAL ENCOUNTER (OUTPATIENT)
Dept: WOUND CARE | Age: 43
Discharge: HOME | End: 2023-09-27
Payer: MEDICAID

## 2023-09-27 DIAGNOSIS — E11.622 TYPE 2 DIABETES MELLITUS WITH OTHER SKIN ULCER, WITH LONG-TERM CURRENT USE OF INSULIN (HCC): ICD-10-CM

## 2023-09-27 DIAGNOSIS — L98.422 SACRAL ULCER, WITH FAT LAYER EXPOSED (HCC): Primary | Chronic | ICD-10-CM

## 2023-09-27 DIAGNOSIS — Z79.4 TYPE 2 DIABETES MELLITUS WITH OTHER SKIN ULCER, WITH LONG-TERM CURRENT USE OF INSULIN (HCC): ICD-10-CM

## 2023-09-27 DIAGNOSIS — L05.91 PILONIDAL CYST: ICD-10-CM

## 2023-09-27 PROCEDURE — 97605 NEG PRS WND THER DME<=50SQCM: CPT

## 2023-09-27 RX ORDER — LIDOCAINE 40 MG/G
CREAM TOPICAL ONCE
OUTPATIENT
Start: 2023-09-27 | End: 2023-09-27

## 2023-09-27 RX ORDER — IBUPROFEN 200 MG
TABLET ORAL ONCE
OUTPATIENT
Start: 2023-09-27 | End: 2023-09-27

## 2023-09-27 RX ORDER — SODIUM CHLOR/HYPOCHLOROUS ACID 0.033 %
SOLUTION, IRRIGATION IRRIGATION ONCE
OUTPATIENT
Start: 2023-09-27 | End: 2023-09-27

## 2023-09-27 RX ORDER — CLOBETASOL PROPIONATE 0.5 MG/G
OINTMENT TOPICAL ONCE
OUTPATIENT
Start: 2023-09-27 | End: 2023-09-27

## 2023-09-27 RX ORDER — BACITRACIN ZINC AND POLYMYXIN B SULFATE 500; 1000 [USP'U]/G; [USP'U]/G
OINTMENT TOPICAL ONCE
OUTPATIENT
Start: 2023-09-27 | End: 2023-09-27

## 2023-09-27 RX ORDER — BETAMETHASONE DIPROPIONATE 0.05 %
OINTMENT (GRAM) TOPICAL ONCE
OUTPATIENT
Start: 2023-09-27 | End: 2023-09-27

## 2023-09-27 RX ORDER — LIDOCAINE HYDROCHLORIDE 20 MG/ML
JELLY TOPICAL ONCE
OUTPATIENT
Start: 2023-09-27 | End: 2023-09-27

## 2023-09-27 RX ORDER — GINSENG 100 MG
CAPSULE ORAL ONCE
OUTPATIENT
Start: 2023-09-27 | End: 2023-09-27

## 2023-09-27 RX ORDER — TRIAMCINOLONE ACETONIDE 1 MG/G
OINTMENT TOPICAL ONCE
OUTPATIENT
Start: 2023-09-27 | End: 2023-09-27

## 2023-09-27 RX ORDER — LIDOCAINE 50 MG/G
OINTMENT TOPICAL ONCE
OUTPATIENT
Start: 2023-09-27 | End: 2023-09-27

## 2023-09-27 RX ORDER — GENTAMICIN SULFATE 1 MG/G
OINTMENT TOPICAL ONCE
OUTPATIENT
Start: 2023-09-27 | End: 2023-09-27

## 2023-09-27 RX ORDER — LIDOCAINE HYDROCHLORIDE 40 MG/ML
SOLUTION TOPICAL ONCE
OUTPATIENT
Start: 2023-09-27 | End: 2023-09-27

## 2023-09-27 NOTE — DISCHARGE INSTRUCTIONS
appt_________________  (Please note your next appointment above and if you are unable to keep, kindly give a 24 hour notice. Thank you.)              If you experience any of the following, please call the [address] [place] [address] during business hours:     * Increase in Pain  * Temperature over 101  * Increase in drainage from your wound  * Drainage with a foul odor  * Bleeding  * Increase in swelling  * Need for compression bandage changes due to slippage, breakthrough drainage. If you need medical attention outside of the business hours of the [address] [place] [address] please contact your PCP or go to the nearest emergency room.

## 2023-09-27 NOTE — PLAN OF CARE
Negative Pressure Wound Therapy    NAME:  Kelly Mendoza  YOB: 1980  MEDICAL RECORD NUMBER:  [id number]  DATE:  9/27/2023    Applied Negative Pressure to coccyx wound(s)/ulcer(s). [x] Applied skin barrier prep to julián-wound. [x] Cut strips of plastic drape to picture frame wound so that julián-wound is     covered with the drape. [x] If bridging dressing to less prominent site, cover any intact skin that will come in contact with the Negative Pressure Therapy sponge, gauze or channel drain with plastic drape. The sponge should never touch intact skin. [x] Cut sponge, gauze or channel drain to size which will fit into the wound/ulcer bed without being forced. [x] Be sure the sponge is large enough to hold the entire round plastic flange which is attached to the tubing. Never allow flange to be larger than the sponge or it will produce suction damaging intact skin. Total number of individual pieces of foam used within the wound bed: 1    [x] If bridging the dressing away from the primary site, be sure the bridge leads to a piece of sponge large enough to hold the entire flange without allowing any of the flange to overlap onto intact skin. [x] Covered sponge, gauze or channel drain with plastic drape. [x] Cut a hole in this plastic drape directly over the sponge the same size as the plastic drain tubing. [x] Removed plastic liner from flange and apply it directly over the hole you cut. [x] Removed the plastic cover from the flange. [x] Attached the tubing to the wound/ulcer Negative Pressure Therapy and turn it on to be sure a vacuum is created and that there are no leaks. [x] If air leaks occur, use plastic drape to patch them. [] Secured Negative Pressure Therapy dressing with ace wrap loosely if located on an extremity. Maintain tubing outside of ace wrap. Tubing must not exert pressure on intact skin.     Applied per  Guidelines      Electronically signed by Kody Early

## 2023-09-29 ENCOUNTER — HOSPITAL ENCOUNTER (OUTPATIENT)
Dept: WOUND CARE | Age: 43
Discharge: HOME | End: 2023-09-29
Payer: MEDICAID

## 2023-09-29 VITALS
WEIGHT: 260 LBS | SYSTOLIC BLOOD PRESSURE: 144 MMHG | DIASTOLIC BLOOD PRESSURE: 103 MMHG | RESPIRATION RATE: 18 BRPM | TEMPERATURE: 98.7 F | HEIGHT: 71 IN | HEART RATE: 103 BPM | BODY MASS INDEX: 36.4 KG/M2

## 2023-09-29 DIAGNOSIS — L98.422 SACRAL ULCER, WITH FAT LAYER EXPOSED (HCC): Chronic | ICD-10-CM

## 2023-09-29 DIAGNOSIS — E11.622 TYPE 2 DIABETES MELLITUS WITH OTHER SKIN ULCER, WITH LONG-TERM CURRENT USE OF INSULIN (HCC): Chronic | ICD-10-CM

## 2023-09-29 DIAGNOSIS — L05.91 PILONIDAL CYST: Primary | ICD-10-CM

## 2023-09-29 DIAGNOSIS — Z79.4 TYPE 2 DIABETES MELLITUS WITH OTHER SKIN ULCER, WITH LONG-TERM CURRENT USE OF INSULIN (HCC): Chronic | ICD-10-CM

## 2023-09-29 PROCEDURE — 11042 DBRDMT SUBQ TIS 1ST 20SQCM/<: CPT

## 2023-09-29 PROCEDURE — 97605 NEG PRS WND THER DME<=50SQCM: CPT

## 2023-09-29 PROCEDURE — 6370000000 HC RX 637 (ALT 250 FOR IP): Performed by: NURSE PRACTITIONER

## 2023-09-29 PROCEDURE — 11042 DBRDMT SUBQ TIS 1ST 20SQCM/<: CPT | Performed by: NURSE PRACTITIONER

## 2023-09-29 RX ORDER — LIDOCAINE HYDROCHLORIDE 20 MG/ML
JELLY TOPICAL ONCE
Status: COMPLETED | OUTPATIENT
Start: 2023-09-29 | End: 2023-09-29

## 2023-09-29 RX ADMIN — LIDOCAINE HYDROCHLORIDE: 20 JELLY TOPICAL at 09:32

## 2023-09-29 NOTE — PROGRESS NOTES
(cm) 1.2 cm 09/29/23 1000   Post-Procedure Surface Area (cm^2) 6 cm^2 09/29/23 1000   Post-Procedure Volume (cm^3) 7.2 cm^3 09/29/23 1000   Distance Tunneling (cm) 6 cm 09/29/23 1000   Tunneling Position ___ O'Clock 2 09/29/23 1000   Undermining Starts ___ O'Clock 9 09/29/23 1000   Undermining Ends___ O'Clock 10 09/29/23 1000   Undermining Maxium Distance (cm) 3 09/29/23 1000   Wound Assessment Granulation tissue;Slough 09/29/23 0929   Drainage Amount Large (50-75% saturated) 09/29/23 0929   Drainage Description Serosanguinous 09/29/23 0929   Odor Moderate 09/29/23 0929   Rita-wound Assessment Blanchable erythema 09/29/23 0929   Margins Epibole (rolled edges) 09/29/23 0929   Wound Thickness Description not for Pressure Injury Full thickness 09/29/23 0929   Number of days: 66           Procedure Note  Indications:  Based on my examination of this patient's wound(s)/ulcer(s) today, debridement is required to promote healing and evaluate the wound base. Performed by: CHRISS Duarte CNP    Consent obtained:  Yes    Time out taken:  Yes    Pain Control:         Debridement:Excisional Debridement    Using curette the wound(s)/ulcer(s) was/were sharply debrided down through and including the removal of epidermis, dermis, and subcutaneous tissue. Devitalized Tissue Debrided:  fibrin, biofilm, slough, and exudate    Pre Debridement Measurements:  Are located in the Wound/Ulcer Documentation Flow Sheet    Wound/Ulcer #: 1    Post Debridement Measurements:  Wound/Ulcer Descriptions are Pre Debridement except measurements:      Percent of Wound/Ulcer Debrided: 100%    Total Surface Area Debrided:  6 sq cm     Estimated Blood Loss:  Minimal    Hemostasis Achieved:  by pressure    Procedural Pain:  0  / 10     Post Procedural Pain:  0 / 10     Response to treatment:  Well tolerated by patient.          Diabetic/Pressure/Non Pressure Ulcers only:  Ulcer: Pressure ulcer, Stage 4            Plan:     Problem List

## 2023-09-29 NOTE — PLAN OF CARE
Negative Pressure Wound Therapy    NAME:  Kelly Mendoza  YOB: 1980  MEDICAL RECORD NUMBER:  [id number]  DATE:  9/29/2023    Applied Negative Pressure to coccyx wound(s)/ulcer(s). [x] Applied skin barrier prep to julián-wound. [x] Cut strips of plastic drape to picture frame wound so that julián-wound is     covered with the drape. [x] If bridging dressing to less prominent site, cover any intact skin that will come in contact with the Negative Pressure Therapy sponge, gauze or channel drain with plastic drape. The sponge should never touch intact skin. [x] Cut sponge, gauze or channel drain to size which will fit into the wound/ulcer bed without being forced. [x] Be sure the sponge is large enough to hold the entire round plastic flange which is attached to the tubing. Never allow flange to be larger than the sponge or it will produce suction damaging intact skin. Total number of individual pieces of foam used within the wound bed: 2    [x] If bridging the dressing away from the primary site, be sure the bridge leads to a piece of sponge large enough to hold the entire flange without allowing any of the flange to overlap onto intact skin. [x] Covered sponge, gauze or channel drain with plastic drape. [x] Cut a hole in this plastic drape directly over the sponge the same size as the plastic drain tubing. [x] Removed plastic liner from flange and apply it directly over the hole you cut. [x] Removed the plastic cover from the flange. [x] Attached the tubing to the wound/ulcer Negative Pressure Therapy and turn it on to be sure a vacuum is created and that there are no leaks. [x] If air leaks occur, use plastic drape to patch them. [x] Secured Negative Pressure Therapy dressing with ace wrap loosely if located on an extremity. Maintain tubing outside of ace wrap. Tubing must not exert pressure on intact skin.     Applied per  Guidelines      Electronically signed by Kody Early

## 2023-09-29 NOTE — DISCHARGE INSTRUCTIONS
[address] and Hyperbaric Oxygen Therapy   Physician Orders and Discharge Instructions  [address] [facility],[address] [place] [address] Blvd, [address]  Telephone: [phone]    NAME:  Chasity Russo  YOB: 1980  MEDICAL RECORD NUMBER:  [id number]  DATE:  9/29/2023    Discharge condition: Stable    Discharge to: Home    Left via:Private automobile    Accompanied by:  spouse    ECF/HHA: [facility]      Dressing Orders:     Include Promogram and black foam to tunnel area at 6 o'clock in the wound bed       Coccyx Wound:    Wash with soap and water   Apply wound vac as follows: Apply Skin Prep to periwound. Apply drape - window pane to surrounding area (periwound). (May use duoderm instead of drape to prevent skin breakdown. Use ostomy paste to fill any creases to prevent leakage. If skin becomes red due to tape irritation please apply skin prep. Then apply light dusting of ostomy powder or antifungal powder to periwound. Then blot with skin prep to form crusting to protect skin. May repeat skin prep, powdering steps until proper crusting is made. )   Then apply drape to periwound. DO NOT PUT FOAM ON GOOD SKIN. Apply PROMOGRAN then tuck black foam to wound bed. BRIDGE lateral upper hip. Set wound vac to 125 mmHG, continuous. Change wound vac dressing 3 times per week (MWF or TTS)   Reapply vac dressing if vac stops working or dressing begins to leak or cannot be reinforced. Alternative dressing: Wash with soap and water. Apply 1/4 STR Dakins moistened gauze to wound bed. Cover with gauze. Secure with roll gauze and medipore tape. Change twice daily . Treatment Orders:   Protein rich diet   Multivitamin   Avoid pressure by always sitting with MEMORY FOAM CUSHION OR PILLOW     PRESSURE RELIEF INSTRUCTIONS  Avoid Pressure to wound site.   Turn frequently (turn at least every two hours when in bed)  While in chair reposition every 30 minutes

## 2023-10-02 ENCOUNTER — HOSPITAL ENCOUNTER (OUTPATIENT)
Dept: WOUND CARE | Age: 43
Discharge: HOME | End: 2023-10-02
Payer: MEDICAID

## 2023-10-02 VITALS
TEMPERATURE: 97.6 F | BODY MASS INDEX: 36.4 KG/M2 | HEART RATE: 99 BPM | SYSTOLIC BLOOD PRESSURE: 159 MMHG | HEIGHT: 71 IN | RESPIRATION RATE: 18 BRPM | WEIGHT: 260 LBS | DIASTOLIC BLOOD PRESSURE: 112 MMHG

## 2023-10-02 DIAGNOSIS — L98.422 SACRAL ULCER, WITH FAT LAYER EXPOSED (HCC): Primary | Chronic | ICD-10-CM

## 2023-10-02 DIAGNOSIS — E11.622 TYPE 2 DIABETES MELLITUS WITH OTHER SKIN ULCER, WITH LONG-TERM CURRENT USE OF INSULIN (HCC): Chronic | ICD-10-CM

## 2023-10-02 DIAGNOSIS — Z79.4 TYPE 2 DIABETES MELLITUS WITH OTHER SKIN ULCER, WITH LONG-TERM CURRENT USE OF INSULIN (HCC): Chronic | ICD-10-CM

## 2023-10-02 DIAGNOSIS — L05.91 PILONIDAL CYST: ICD-10-CM

## 2023-10-02 PROCEDURE — 97597 DBRDMT OPN WND 1ST 20 CM/<: CPT | Performed by: NURSE PRACTITIONER

## 2023-10-02 PROCEDURE — 97605 NEG PRS WND THER DME<=50SQCM: CPT

## 2023-10-02 PROCEDURE — 97597 DBRDMT OPN WND 1ST 20 CM/<: CPT

## 2023-10-02 RX ORDER — SODIUM HYPOCHLORITE 1.25 MG/ML
SOLUTION TOPICAL
Qty: 1000 ML | Refills: 5 | Status: SHIPPED | OUTPATIENT
Start: 2023-10-02

## 2023-10-02 RX ORDER — LEVOFLOXACIN 500 MG/1
500 TABLET, FILM COATED ORAL DAILY
Qty: 7 TABLET | Refills: 0 | Status: SHIPPED | OUTPATIENT
Start: 2023-10-02 | End: 2023-10-09

## 2023-10-02 NOTE — DISCHARGE INSTRUCTIONS
[address] and Hyperbaric Oxygen Therapy   Physician Orders and Discharge Instructions  18[address] [facility],[address] [place] [address] Bl, [address]  Telephone: [phone]    NAME:  Marcela Royal  YOB: 1980  MEDICAL RECORD NUMBER:  [id number]  DATE:  10/2/2023    Discharge condition: Stable    Discharge to: Home    Left via:Private automobile    Accompanied by:  spouse    ECF/HHA: [facility]    VAC on hold- patient going out of town      Dressing Orders:        Coccyx: Wash with soap and water. Apply 1/4 STR Dakins moistened gauze to wound bed. Cover with gauze. Secure with roll gauze and medipore tape. Change twice daily . Treatment Orders:   Protein rich diet   Multivitamin   Avoid pressure by always sitting with MEMORY FOAM CUSHION OR PILLOW     PRESSURE RELIEF INSTRUCTIONS  Avoid Pressure to wound site. Turn frequently (turn at least every two hours when in bed)  While in chair reposition every 30 minutes               [address] [place] [address] follow up visit ____________10/16/23 with Viji_________________  (Please note your next appointment above and if you are unable to keep, kindly give a 24 hour notice. Thank you.)              If you experience any of the following, please call the [facility] [place] during business hours:     * Increase in Pain  * Temperature over 101  * Increase in drainage from your wound  * Drainage with a foul odor  * Bleeding  * Increase in swelling  * Need for compression bandage changes due to slippage, breakthrough drainage. If you need medical attention outside of the business hours of the [address] [place], [place] [address] please contact your PCP or go to the nearest emergency room.

## 2023-10-02 NOTE — PLAN OF CARE
Problem: Chronic Conditions and Co-morbidities  Goal: Patient's chronic conditions and co-morbidity symptoms are monitored and maintained or improved  Outcome: Progressing     Problem: Wound:  Goal: Will show signs of wound healing; wound closure and no evidence of infection  Description: Will show signs of wound healing; wound closure and no evidence of infection  Outcome: Progressing     Problem: Weight control:  Goal: Ability to maintain an optimal weight for height and age will be supported  Description: Ability to maintain an optimal weight for height and age will be supported  Outcome: Progressing     Problem: Falls - Risk of:  Goal: Will remain free from falls  Description: Will remain free from falls  Outcome: Progressing

## 2023-10-02 NOTE — HOME CARE
[address] [facility]. [address] [phone] [phone]     Ordering Center:     [address] [place] [id number] [phone]  WOUND CARE Dept: [id number] [place] [address] VVAZ [phone]    Patient Information:      Annie Proffer  [address] [facility] [address]   [phone]   : 1980  AGE: 38 y.o. GENDER: male   EPISODE DATE: 10/2/2023    Insurance:      PRIMARY INSURANCE:  Plan: [place] MEDICAID  Coverage: [place] MEDICAID  Effective Date: 2015  Group Number: [unfilled]  Subscriber Number: [id number] - (Medicaid Managed)    Payer/Plan Subscr  Sex Relation Sub. Ins. ID Effective Group Num   1. BCBS [place] MEDICRose Proffer 1980 Male Self [id number] 1/1/15 [id number]                                    [address] [id number]       Patient Wound Information:      Problem List Items Addressed This Visit          Other    Sacral ulcer, with fat layer exposed ([address]) - Primary (Chronic)    Relevant Orders    [place] OFFICE/OUTPATIENT EST PT MAY NOT REQ PHYS/QHP       WOUNDS REQUIRING DRESSING SUPPLIES:     Negative Pressure Wound Therapy Buttocks (Active)   Wound Type Surgical 23 1038   Unit Type 3m 23 1038   Dressing Type Black Foam 23 1038   Number of pieces used 2 23 1038   Number of pieces removed 1 23 0929   Cycle Continuous 23 1038   Target Pressure (mmHg) 125 23 1038   Canister changed?  No 23 1220   Dressing Status Old drainage noted 23 1038   Dressing Changed Changed/New 23 1038   Drainage Amount Large 23 1038   Drainage Description Serosanguinous 23 1038   Dressing Change Due 23 1038   Output (ml) 200 ml 23 1114   Wound Assessment Granulation tissue;Slough 23 0929   Rita-wound Assessment Blanchable erythema 23 0929   Shape oval 23 1133   Odor None

## 2023-10-02 NOTE — PROGRESS NOTES
[address]   Progress Note and Procedure Note      Rashida Murray RECORD NUMBER:  [id number]  AGE: 38 y.o. GENDER: male  : 1980  EPISODE DATE:  10/2/2023    Subjective:     Chief Complaint   Patient presents with    Wound Check     Sacral wound      HISTORY of PRESENT ILLNESS HPI     Naveen Thomson is a 38 y.o. male who presents today for wound/ulcer evaluation. History of Wound Context: sacral wound follow up/eval and treat    Ulcer Identification:  Ulcer Type:  surgical  Contributing Factors: diabetes, chronic pressure, and shear force    Wound: Surgical incision        PAST MEDICAL HISTORY        Diagnosis Date    Chronic pain     Diabetes ([address])     Hyperlipidemia     Hypertension     Osteoarthritis     Sleep apnea        PAST SURGICAL HISTORY    Past Surgical History:   Procedure Laterality Date    BACK SURGERY N/A 2023    INCISION AND DRAINAGE OF BUTTOCK ABSCESS AND DEBRIDMENT OF MUSCLE performed by Martínez Miller MD at [address] [place] [address]      bilateral       FAMILY HISTORY    History reviewed. No pertinent family history.     SOCIAL HISTORY    Social History     Tobacco Use    Smoking status: Former     Packs/day: .5     Types: Cigarettes     Quit date: 2023     Years since quittin.3    Smokeless tobacco: Never   Substance Use Topics    Alcohol use: Not Currently    Drug use: Never       ALLERGIES    Allergies   Allergen Reactions    Aspirin Anaphylaxis and Hives    Lisinopril Cough    Aleve [Naproxen Sodium]        MEDICATIONS    Current Outpatient Medications on File Prior to Encounter   Medication Sig Dispense Refill    DULoxetine (CYMBALTA) 30 MG extended release capsule Take 1 capsule by mouth 2 times daily      oxyCODONE-acetaminophen (PERCOCET)  MG per tablet Take 1 tablet by mouth every 6 hours as needed for Pain.      diazePAM (VALIUM) 5 MG tablet Take 1 tablet by mouth every 12 hours

## 2023-10-02 NOTE — PATIENT INSTRUCTIONS
[address] and Hyperbaric Oxygen Therapy   Physician Orders and Discharge Instructions  1830 [facility],[address] [place] [address] Blvd, [address]  Telephone: [phone]    NAME:  Carmen Rasmussen  YOB: 1980  MEDICAL RECORD NUMBER:  [id number]  DATE:  10/2/2023    Discharge condition: Stable    Discharge to: Home    Left via:Private automobile    Accompanied by:  spouse    ECF/HHA: [facility]    VAC on hold- patient going out of town      Dressing Orders:        Coccyx: Wash with soap and water. Apply 1/4 STR Dakins moistened gauze to wound bed. Cover with gauze. Secure with roll gauze and medipore tape. Change twice daily . Treatment Orders:   Protein rich diet   Multivitamin   Avoid pressure by always sitting with MEMORY FOAM CUSHION OR PILLOW     PRESSURE RELIEF INSTRUCTIONS  Avoid Pressure to wound site. Turn frequently (turn at least every two hours when in bed)  While in chair reposition every 30 minutes               [address] [place] [address] follow up visit ____________10/16/23 with Viji_________________  (Please note your next appointment above and if you are unable to keep, kindly give a 24 hour notice. Thank you.)              If you experience any of the following, [facility] the [facility] during business hours:     * Increase in Pain  * Temperature over 101  * Increase in drainage from your wound  * Drainage with a foul odor  * Bleeding  * Increase in swelling  * Need for compression bandage changes due to slippage, breakthrough drainage. If you need medical attention outside of the business hours of the [address] [place] [address] please contact your PCP or go to the nearest emergency room.

## 2023-10-09 ENCOUNTER — TELEPHONE (OUTPATIENT)
Dept: WOUND CARE | Age: 43
End: 2023-10-09

## 2023-10-16 ENCOUNTER — HOSPITAL ENCOUNTER (OUTPATIENT)
Dept: WOUND CARE | Age: 43
Discharge: HOME | End: 2023-10-16
Payer: MEDICAID

## 2023-10-16 VITALS
TEMPERATURE: 97 F | WEIGHT: 260 LBS | HEIGHT: 71 IN | DIASTOLIC BLOOD PRESSURE: 90 MMHG | BODY MASS INDEX: 36.4 KG/M2 | RESPIRATION RATE: 18 BRPM | HEART RATE: 84 BPM | SYSTOLIC BLOOD PRESSURE: 128 MMHG

## 2023-10-16 DIAGNOSIS — Z79.4 TYPE 2 DIABETES MELLITUS WITH OTHER SKIN ULCER, WITH LONG-TERM CURRENT USE OF INSULIN (HCC): Primary | ICD-10-CM

## 2023-10-16 DIAGNOSIS — L98.422 SACRAL ULCER, WITH FAT LAYER EXPOSED (HCC): ICD-10-CM

## 2023-10-16 DIAGNOSIS — E11.622 TYPE 2 DIABETES MELLITUS WITH OTHER SKIN ULCER, WITH LONG-TERM CURRENT USE OF INSULIN (HCC): Primary | ICD-10-CM

## 2023-10-16 DIAGNOSIS — L05.91 PILONIDAL CYST: ICD-10-CM

## 2023-10-16 PROCEDURE — 97597 DBRDMT OPN WND 1ST 20 CM/<: CPT

## 2023-10-16 PROCEDURE — 6370000000 HC RX 637 (ALT 250 FOR IP): Performed by: NURSE PRACTITIONER

## 2023-10-16 PROCEDURE — 97597 DBRDMT OPN WND 1ST 20 CM/<: CPT | Performed by: NURSE PRACTITIONER

## 2023-10-16 RX ORDER — LIDOCAINE HYDROCHLORIDE 40 MG/ML
SOLUTION TOPICAL ONCE
OUTPATIENT
Start: 2023-10-16 | End: 2023-10-16

## 2023-10-16 RX ORDER — IBUPROFEN 200 MG
TABLET ORAL ONCE
OUTPATIENT
Start: 2023-10-16 | End: 2023-10-16

## 2023-10-16 RX ORDER — LIDOCAINE HYDROCHLORIDE 20 MG/ML
JELLY TOPICAL ONCE
Status: COMPLETED | OUTPATIENT
Start: 2023-10-16 | End: 2023-10-16

## 2023-10-16 RX ORDER — LIDOCAINE 50 MG/G
OINTMENT TOPICAL ONCE
OUTPATIENT
Start: 2023-10-16 | End: 2023-10-16

## 2023-10-16 RX ORDER — SODIUM CHLOR/HYPOCHLOROUS ACID 0.033 %
SOLUTION, IRRIGATION IRRIGATION ONCE
OUTPATIENT
Start: 2023-10-16 | End: 2023-10-16

## 2023-10-16 RX ORDER — BACITRACIN ZINC AND POLYMYXIN B SULFATE 500; 1000 [USP'U]/G; [USP'U]/G
OINTMENT TOPICAL ONCE
OUTPATIENT
Start: 2023-10-16 | End: 2023-10-16

## 2023-10-16 RX ORDER — BETAMETHASONE DIPROPIONATE 0.05 %
OINTMENT (GRAM) TOPICAL ONCE
OUTPATIENT
Start: 2023-10-16 | End: 2023-10-16

## 2023-10-16 RX ORDER — LIDOCAINE 40 MG/G
CREAM TOPICAL ONCE
OUTPATIENT
Start: 2023-10-16 | End: 2023-10-16

## 2023-10-16 RX ORDER — CLOBETASOL PROPIONATE 0.5 MG/G
OINTMENT TOPICAL ONCE
OUTPATIENT
Start: 2023-10-16 | End: 2023-10-16

## 2023-10-16 RX ORDER — LIDOCAINE HYDROCHLORIDE 20 MG/ML
JELLY TOPICAL ONCE
OUTPATIENT
Start: 2023-10-16 | End: 2023-10-16

## 2023-10-16 RX ORDER — GENTAMICIN SULFATE 1 MG/G
OINTMENT TOPICAL ONCE
OUTPATIENT
Start: 2023-10-16 | End: 2023-10-16

## 2023-10-16 RX ORDER — TRIAMCINOLONE ACETONIDE 1 MG/G
OINTMENT TOPICAL ONCE
OUTPATIENT
Start: 2023-10-16 | End: 2023-10-16

## 2023-10-16 RX ORDER — GINSENG 100 MG
CAPSULE ORAL ONCE
OUTPATIENT
Start: 2023-10-16 | End: 2023-10-16

## 2023-10-16 RX ADMIN — LIDOCAINE HYDROCHLORIDE: 20 JELLY TOPICAL at 11:25

## 2023-10-16 NOTE — DISCHARGE INSTRUCTIONS
[address] and Hyperbaric Oxygen Therapy   Physician Orders and Discharge Instructions  1830 [facility],[address] [facility] [address], [address]  Telephone: [phone]    NAME:  Dimitri Humphrey  YOB: 1980  MEDICAL RECORD NUMBER:  [id number]  DATE:  10/16/2023    Discharge condition: Stable    Discharge to: Home    Left via:Private automobile    Accompanied by:  self    ECF/HHA:  [facility]     Discontinue wound vac, send back to          Dressing Orders:        Coccyx: Wash with soap and water. Apply 1/4 STR Dakins moistened gauze to wound bed. Cover with gauze. Secure with roll gauze and medipore tape. Change twice daily . Treatment Orders:   Protein rich diet   Multivitamin   Avoid pressure by always sitting with MEMORY FOAM CUSHION OR PILLOW     PRESSURE RELIEF INSTRUCTIONS  Avoid Pressure to wound site. Turn frequently (turn at least every two hours when in bed)  While in chair reposition every 30 minutes               [address] [place] [address] follow up visit ____________2 weeks_________________  (Please note your next appointment above and if you are unable to keep, kindly give a 24 hour notice. Thank you.)          If you experience any of the following, please call the [address] [place] [address] during business hours:    * Increase in Pain  * Temperature over 101  * Increase in drainage from your wound  * Drainage with a foul odor  * Bleeding  * Increase in swelling  * Need for compression bandage changes due to slippage, breakthrough drainage. If you need medical attention outside of the business hours of the [address] [place] [address] please contact your PCP or go to the nearest emergency room.

## 2023-10-16 NOTE — WOUND CARE
Description Serosanguinous 09/29/23 1038   Dressing Change Due 09/11/23 09/29/23 1038   Output (ml) 200 ml 08/16/23 1114   Wound Assessment Granulation tissue;Slough 09/29/23 0929   Rita-wound Assessment Blanchable erythema 09/29/23 0929   Shape oval 09/22/23 1133   Odor None 09/22/23 1133   Number of days: 104       Wound 07/24/23 Coccyx Medial wound 1-coccyx surgical (Active)   Wound Image   10/16/23 1125   Wound Etiology Surgical 10/16/23 1125   Dressing Status New dressing applied 10/16/23 1150   Wound Cleansed Soap and water 10/16/23 1125   Dressing/Treatment Moist to dry 10/16/23 1150   Wound Length (cm) 4 cm 10/16/23 1125   Wound Width (cm) 1.2 cm 10/16/23 1125   Wound Depth (cm) 1.2 cm 10/16/23 1125   Wound Surface Area (cm^2) 4.8 cm^2 10/16/23 1125   Change in Wound Size % (l*w) 60 10/16/23 1125   Wound Volume (cm^3) 5.76 cm^3 10/16/23 1125   Wound Healing % 86 10/16/23 1125   Post-Procedure Length (cm) 4 cm 10/16/23 1129   Post-Procedure Width (cm) 1.2 cm 10/16/23 1129   Post-Procedure Depth (cm) 1.2 cm 10/16/23 1129   Post-Procedure Surface Area (cm^2) 4.8 cm^2 10/16/23 1129   Post-Procedure Volume (cm^3) 5.76 cm^3 10/16/23 1129   Distance Tunneling (cm) 2 cm 10/16/23 1129   Tunneling Position ___ O'Clock 6 10/16/23 1129   Undermining Starts ___ O'Clock 9 10/16/23 1129   Undermining Ends___ O'Clock 10 10/16/23 1129   Undermining Maxium Distance (cm) 2.4 10/16/23 1129   Wound Assessment Pink/red;Slough 10/16/23 1125   Drainage Amount Moderate (25-50%) 10/16/23 1125   Drainage Description Serosanguinous 10/16/23 1125   Odor None 10/16/23 1125   Rita-wound Assessment Blanchable erythema 10/16/23 1125   Margins Epibole (rolled edges) 10/16/23 1125   Wound Thickness Description not for Pressure Injury Full thickness 10/16/23 1125   Number of days: 83          Supplies Requested :      WOUND #: 1   PRIMARY DRESSING:  Other: 4x4 gauze   Cover and Secure with: 4X4 gauze pad  Other medipore tape     FREQUENCY OF

## 2023-10-16 NOTE — PATIENT INSTRUCTIONS
[address] and Hyperbaric Oxygen Therapy   Physician Orders and Discharge Instructions  [address] [facility],[address] [place] [address] Bl, [address]  Telephone: [phone]    NAME:  Storm Cheung  YOB: 1980  MEDICAL RECORD NUMBER:  [id number]  DATE:  10/16/2023    Discharge condition: Stable    Discharge to: Home    Left via:Private automobile    Accompanied by:  self    ECF/HHA:  [facility]     Discontinue wound vac, send back to          Dressing Orders:        Coccyx: Wash with soap and water. Apply 1/4 STR Dakins moistened gauze to wound bed. Cover with gauze. Secure with roll gauze and medipore tape. Change twice daily . Treatment Orders:   Protein rich diet   Multivitamin   Avoid pressure by always sitting with MEMORY FOAM CUSHION OR PILLOW     PRESSURE RELIEF INSTRUCTIONS  Avoid Pressure to wound site. Turn frequently (turn at least every two hours when in bed)  While in chair reposition every 30 minutes               [facility] follow up visit ____________2 weeks_________________  (Please note your next appointment above and if you are unable to keep, kindly give a 24 hour notice. Thank you.)          If you experience any of the following, please call the [facility] [place] during business hours:    * Increase in Pain  * Temperature over 101  * Increase in drainage from your wound  * Drainage with a foul odor  * Bleeding  * Increase in swelling  * Need for compression bandage changes due to slippage, breakthrough drainage. If you need medical attention outside of the business hours of the [address] [place], [place] [address] please contact your PCP or go to the nearest emergency room.

## 2023-10-24 ENCOUNTER — TELEPHONE (OUTPATIENT)
Dept: WOUND CARE | Age: 43
End: 2023-10-24

## 2023-10-24 NOTE — TELEPHONE ENCOUNTER
Returned patient and Christiano Blair spouse call re: have not received wound care supplies. Call was placed to [facility] [employer] [phone], informed patient insurance did not cover the supplies order has been sent to [facility]. Message to [facility] to follow up on the order from 10/16/23. Informed patient and his wife, they also had questions re: their insurance coverage as they have received x3 letters. Informed would discuss with  and would call them back tomorrow. They verbalized understanding.

## 2023-10-25 ENCOUNTER — TELEPHONE (OUTPATIENT)
Dept: WOUND CARE | Age: 43
End: 2023-10-25

## 2023-10-25 NOTE — TELEPHONE ENCOUNTER
Call back to patient/wife re:questions re:billing and the letters they have received. Given number to physician billing to f/u on payments there [phone], discussed calling the insurance company to find out exactly what they billing, discussed wound care supplies and that order was faxed to [facility] today for them to follow up on the order that [employer] has sent to them for processing. Verbalized understanding and will call back if any other questions or concerns arise.

## 2023-10-30 ENCOUNTER — HOSPITAL ENCOUNTER (OUTPATIENT)
Dept: WOUND CARE | Age: 43
Discharge: HOME | End: 2023-10-30
Payer: MEDICAID

## 2023-10-30 VITALS
WEIGHT: 256 LBS | HEART RATE: 94 BPM | HEIGHT: 71 IN | BODY MASS INDEX: 35.84 KG/M2 | DIASTOLIC BLOOD PRESSURE: 101 MMHG | RESPIRATION RATE: 18 BRPM | SYSTOLIC BLOOD PRESSURE: 138 MMHG | TEMPERATURE: 98 F

## 2023-10-30 DIAGNOSIS — E11.622 TYPE 2 DIABETES MELLITUS WITH OTHER SKIN ULCER, WITH LONG-TERM CURRENT USE OF INSULIN (HCC): Primary | ICD-10-CM

## 2023-10-30 DIAGNOSIS — L05.91 PILONIDAL CYST: ICD-10-CM

## 2023-10-30 DIAGNOSIS — L98.422 SACRAL ULCER, WITH FAT LAYER EXPOSED (HCC): ICD-10-CM

## 2023-10-30 DIAGNOSIS — Z79.4 TYPE 2 DIABETES MELLITUS WITH OTHER SKIN ULCER, WITH LONG-TERM CURRENT USE OF INSULIN (HCC): Primary | ICD-10-CM

## 2023-10-30 PROCEDURE — 97597 DBRDMT OPN WND 1ST 20 CM/<: CPT | Performed by: NURSE PRACTITIONER

## 2023-10-30 PROCEDURE — 97597 DBRDMT OPN WND 1ST 20 CM/<: CPT

## 2023-10-30 RX ORDER — LIDOCAINE 40 MG/G
CREAM TOPICAL ONCE
OUTPATIENT
Start: 2023-10-30 | End: 2023-10-30

## 2023-10-30 RX ORDER — BACITRACIN ZINC AND POLYMYXIN B SULFATE 500; 1000 [USP'U]/G; [USP'U]/G
OINTMENT TOPICAL ONCE
OUTPATIENT
Start: 2023-10-30 | End: 2023-10-30

## 2023-10-30 RX ORDER — LIDOCAINE HYDROCHLORIDE 20 MG/ML
JELLY TOPICAL ONCE
OUTPATIENT
Start: 2023-10-30 | End: 2023-10-30

## 2023-10-30 RX ORDER — LIDOCAINE HYDROCHLORIDE 40 MG/ML
SOLUTION TOPICAL ONCE
OUTPATIENT
Start: 2023-10-30 | End: 2023-10-30

## 2023-10-30 RX ORDER — SODIUM CHLOR/HYPOCHLOROUS ACID 0.033 %
SOLUTION, IRRIGATION IRRIGATION ONCE
OUTPATIENT
Start: 2023-10-30 | End: 2023-10-30

## 2023-10-30 RX ORDER — IBUPROFEN 200 MG
TABLET ORAL ONCE
OUTPATIENT
Start: 2023-10-30 | End: 2023-10-30

## 2023-10-30 RX ORDER — CLOBETASOL PROPIONATE 0.5 MG/G
OINTMENT TOPICAL ONCE
OUTPATIENT
Start: 2023-10-30 | End: 2023-10-30

## 2023-10-30 RX ORDER — LIDOCAINE 50 MG/G
OINTMENT TOPICAL ONCE
OUTPATIENT
Start: 2023-10-30 | End: 2023-10-30

## 2023-10-30 RX ORDER — BETAMETHASONE DIPROPIONATE 0.05 %
OINTMENT (GRAM) TOPICAL ONCE
OUTPATIENT
Start: 2023-10-30 | End: 2023-10-30

## 2023-10-30 RX ORDER — GINSENG 100 MG
CAPSULE ORAL ONCE
OUTPATIENT
Start: 2023-10-30 | End: 2023-10-30

## 2023-10-30 RX ORDER — TRIAMCINOLONE ACETONIDE 1 MG/G
OINTMENT TOPICAL ONCE
OUTPATIENT
Start: 2023-10-30 | End: 2023-10-30

## 2023-10-30 RX ORDER — GENTAMICIN SULFATE 1 MG/G
OINTMENT TOPICAL ONCE
OUTPATIENT
Start: 2023-10-30 | End: 2023-10-30

## 2023-10-30 RX ORDER — LIDOCAINE HYDROCHLORIDE 20 MG/ML
JELLY TOPICAL ONCE
Status: COMPLETED | OUTPATIENT
Start: 2023-10-30 | End: 2023-10-30

## 2023-10-30 RX ADMIN — LIDOCAINE HYDROCHLORIDE: 20 JELLY TOPICAL at 11:33

## 2023-10-30 NOTE — PATIENT INSTRUCTIONS
[address] and Hyperbaric Oxygen Therapy   Physician Orders and Discharge Instructions  1830 [facility],[address] [place] [address] Blvd, [address]  Telephone: [phone]    NAME:  Isabel De Los Santos  YOB: 1980  MEDICAL RECORD NUMBER:  [id number]  DATE:  10/30/23    Discharge condition: Stable    Discharge to: Home    Left via:Private automobile    Accompanied by:  spouse    ECF/HHA:  [facility]           Dressing Orders:        Coccyx: Wash with soap and water. Apply collagen, then place 1/4 STR Dakins moistened gauze to wound bed. Cover with gauze. Secure with roll gauze and medipore tape. Change twice daily . Treatment Orders:   Protein rich diet   Multivitamin   Avoid pressure by always sitting with MEMORY FOAM CUSHION OR PILLOW     PRESSURE RELIEF INSTRUCTIONS  Avoid Pressure to wound site. Turn frequently (turn at least every two hours when in bed)  While in chair reposition every 30 minutes               [facility] follow up visit ____________2 weeks_________________  (Please note your next appointment above and if you are unable to keep, kindly give a 24 hour notice. Thank you.)          If you experience any of the following, please call the [facility] during business hours:    * Increase in Pain  * Temperature over 101  * Increase in drainage from your wound  * Drainage with a foul odor  * Bleeding  * Increase in swelling  * Need for compression bandage changes due to slippage, breakthrough drainage. If you need medical attention outside of the business hours of the [facility] please contact your PCP or go to the nearest emergency room.

## 2023-10-30 NOTE — HOME CARE
8[address] [facility]. [address] [phone] [phone]     Ordering Center:     [address] [place] [id number] [phone]  WOUND CARE Dept: [address] [facility] [phone]    Patient Information:      Theresa Paling  [address] [facility] [address]   [phone]   : 1980  AGE: 37 y.o. GENDER: male   EPISODE DATE: 10/30/2023    Insurance:      PRIMARY INSURANCE:  Plan: [place] MEDICAID  Coverage: [place] MEDICAID  Effective Date: 2015  Group Number: [unfilled]  Subscriber Number: [id number] - (Medicaid Managed)    Payer/Plan Subscr  Sex Relation Sub. Ins. ID Effective Group Num   1. [place]BS [place] MEDICDorthy Paling 1980 Male Self [id number] 1/1/15 [id number]                                   [address] [address]       Patient Wound Information:      Problem List Items Addressed This Visit          Endocrine    Type 2 diabetes mellitus with skin complication, with long-term current use of insulin ([address]) - Primary (Chronic)    Relevant Orders    Initiate Outpatient Wound Care Protocol       Other    Sacral ulcer, with fat layer exposed ([address]) (Chronic)    Relevant Orders    Initiate Outpatient Wound Care Protocol    Pilonidal cyst    Relevant Orders    Initiate Outpatient Wound Care Protocol       WOUNDS REQUIRING DRESSING SUPPLIES:     Negative Pressure Wound Therapy Buttocks (Active)   Wound Type Surgical 23 1038   Unit Type 3m 23 1038   Dressing Type Black Foam 23 1038   Number of pieces used 2 23 1038   Number of pieces removed 1 23 0929   Cycle Continuous 23 1038   Target Pressure (mmHg) 125 23 1038   Canister changed?  No 23 1220   Dressing Status Old drainage noted 23 1038   Dressing Changed Changed/New 23 1038   Drainage Amount Large 23 1038   Drainage Description

## 2023-10-30 NOTE — PROGRESS NOTES
Minimal    Hemostasis Achieved:  by pressure    Procedural Pain:  3  / 10     Post Procedural Pain:  0 / 10     Response to treatment:  Well tolerated by patient., With complaints of pain. Diabetic/Pressure/Non Pressure Ulcers only:  Ulcer: Pressure ulcer, Stage 4            Plan:     Problem List Items Addressed This Visit          Endocrine    Type 2 diabetes mellitus with skin complication, with long-term current use of insulin ([address]) - Primary (Chronic)    Relevant Orders    Initiate Outpatient Wound Care Protocol       Other    * (Principal) Sacral ulcer, with fat layer exposed ([address]) (Chronic)    Relevant Orders    Initiate Outpatient Wound Care Protocol    Pilonidal cyst    Relevant Orders    Initiate Outpatient Wound Care Protocol       Treatment Note please see attached Discharge Instructions    In my professional opinion this patient would benefit from HBO Therapy: No    Written patient dismissal instructions given to patient and signed by patient or POA. Mr. Ernestine Gillis is healing well, I want to add collagen, follow up in 2 weeks.   Electronically signed by CHRISS Olivo CNP on 10/30/2023 at 11:45 AM

## 2023-11-13 ENCOUNTER — TELEPHONE (OUTPATIENT)
Dept: WOUND CARE | Age: 43
End: 2023-11-13

## 2023-11-13 ENCOUNTER — HOSPITAL ENCOUNTER (OUTPATIENT)
Dept: WOUND CARE | Age: 43
Discharge: HOME | End: 2023-11-13
Payer: MEDICAID

## 2023-11-13 VITALS
HEIGHT: 71 IN | BODY MASS INDEX: 35.56 KG/M2 | DIASTOLIC BLOOD PRESSURE: 78 MMHG | HEART RATE: 99 BPM | RESPIRATION RATE: 18 BRPM | WEIGHT: 254 LBS | TEMPERATURE: 97.2 F | SYSTOLIC BLOOD PRESSURE: 118 MMHG

## 2023-11-13 DIAGNOSIS — L98.422 SACRAL ULCER, WITH FAT LAYER EXPOSED (HCC): ICD-10-CM

## 2023-11-13 DIAGNOSIS — Z79.4 TYPE 2 DIABETES MELLITUS WITH OTHER SKIN ULCER, WITH LONG-TERM CURRENT USE OF INSULIN (HCC): Primary | ICD-10-CM

## 2023-11-13 DIAGNOSIS — E11.622 TYPE 2 DIABETES MELLITUS WITH OTHER SKIN ULCER, WITH LONG-TERM CURRENT USE OF INSULIN (HCC): Primary | ICD-10-CM

## 2023-11-13 DIAGNOSIS — L05.91 PILONIDAL CYST: ICD-10-CM

## 2023-11-13 PROCEDURE — 97597 DBRDMT OPN WND 1ST 20 CM/<: CPT

## 2023-11-13 PROCEDURE — 97597 DBRDMT OPN WND 1ST 20 CM/<: CPT | Performed by: NURSE PRACTITIONER

## 2023-11-13 RX ORDER — LIDOCAINE 40 MG/G
CREAM TOPICAL ONCE
OUTPATIENT
Start: 2023-11-13 | End: 2023-11-13

## 2023-11-13 RX ORDER — LIDOCAINE 50 MG/G
OINTMENT TOPICAL ONCE
OUTPATIENT
Start: 2023-11-13 | End: 2023-11-13

## 2023-11-13 RX ORDER — IBUPROFEN 200 MG
TABLET ORAL ONCE
OUTPATIENT
Start: 2023-11-13 | End: 2023-11-13

## 2023-11-13 RX ORDER — BACITRACIN ZINC AND POLYMYXIN B SULFATE 500; 1000 [USP'U]/G; [USP'U]/G
OINTMENT TOPICAL ONCE
OUTPATIENT
Start: 2023-11-13 | End: 2023-11-13

## 2023-11-13 RX ORDER — LIDOCAINE HYDROCHLORIDE 20 MG/ML
JELLY TOPICAL ONCE
OUTPATIENT
Start: 2023-11-13 | End: 2023-11-13

## 2023-11-13 RX ORDER — BETAMETHASONE DIPROPIONATE 0.05 %
OINTMENT (GRAM) TOPICAL ONCE
OUTPATIENT
Start: 2023-11-13 | End: 2023-11-13

## 2023-11-13 RX ORDER — SODIUM HYPOCHLORITE 1.25 MG/ML
SOLUTION TOPICAL 2 TIMES DAILY
Qty: 1000 ML | Refills: 1 | Status: SHIPPED | OUTPATIENT
Start: 2023-11-13

## 2023-11-13 RX ORDER — GENTAMICIN SULFATE 1 MG/G
OINTMENT TOPICAL ONCE
OUTPATIENT
Start: 2023-11-13 | End: 2023-11-13

## 2023-11-13 RX ORDER — GINSENG 100 MG
CAPSULE ORAL ONCE
OUTPATIENT
Start: 2023-11-13 | End: 2023-11-13

## 2023-11-13 RX ORDER — LIDOCAINE HYDROCHLORIDE 40 MG/ML
SOLUTION TOPICAL ONCE
OUTPATIENT
Start: 2023-11-13 | End: 2023-11-13

## 2023-11-13 RX ORDER — CLOBETASOL PROPIONATE 0.5 MG/G
OINTMENT TOPICAL ONCE
OUTPATIENT
Start: 2023-11-13 | End: 2023-11-13

## 2023-11-13 RX ORDER — SODIUM CHLOR/HYPOCHLOROUS ACID 0.033 %
SOLUTION, IRRIGATION IRRIGATION ONCE
OUTPATIENT
Start: 2023-11-13 | End: 2023-11-13

## 2023-11-13 RX ORDER — TRIAMCINOLONE ACETONIDE 1 MG/G
OINTMENT TOPICAL ONCE
OUTPATIENT
Start: 2023-11-13 | End: 2023-11-13

## 2023-11-13 RX ORDER — LIDOCAINE HYDROCHLORIDE 20 MG/ML
JELLY TOPICAL ONCE
Status: DISCONTINUED | OUTPATIENT
Start: 2023-11-13 | End: 2023-11-15 | Stop reason: HOSPADM

## 2023-11-13 ASSESSMENT — PAIN DESCRIPTION - ONSET: ONSET: ON-GOING

## 2023-11-13 ASSESSMENT — PAIN DESCRIPTION - FREQUENCY: FREQUENCY: INTERMITTENT

## 2023-11-13 ASSESSMENT — PAIN DESCRIPTION - PAIN TYPE: TYPE: CHRONIC PAIN

## 2023-11-13 ASSESSMENT — PAIN SCALES - GENERAL: PAINLEVEL_OUTOF10: 7

## 2023-11-13 ASSESSMENT — PAIN DESCRIPTION - LOCATION: LOCATION: FOOT

## 2023-11-13 ASSESSMENT — PAIN - FUNCTIONAL ASSESSMENT: PAIN_FUNCTIONAL_ASSESSMENT: PREVENTS OR INTERFERES SOME ACTIVE ACTIVITIES AND ADLS

## 2023-11-13 ASSESSMENT — PAIN DESCRIPTION - DESCRIPTORS: DESCRIPTORS: BURNING

## 2023-11-13 NOTE — TELEPHONE ENCOUNTER
Patient's wife Maria Del Carmen Molina called to say that patient is out of Dakins 1/4 strength solution. Would like prescription called into [facility]. Call back to patient to inform Dakins 1/4 strength has been placed to [facility], discussed if he has to wait for the prescription to be ordered in to use saline moistened gauze until Dakins 1/4 strength arrives. Patient verbalized  understanding.

## 2023-11-13 NOTE — PROGRESS NOTES
(Start on 11/13/2023 12:00 PM)    Other Relevant Orders    Initiate Outpatient Wound Care Protocol    Pilonidal cyst    Relevant Medications    lidocaine (XYLOCAINE) 2 % uro-jet (Start on 11/13/2023 12:00 PM)    Other Relevant Orders    Initiate Outpatient Wound Care Protocol       Treatment Note please see attached Discharge Instructions    In my professional opinion this patient would benefit from HBO Therapy: No    Written patient dismissal instructions given to patient and signed by patient or POA. Mr. Dwight Burks is healing well, follow up in 2 weeks.   Electronically signed by CHRISS Montalvo CNP on 11/13/2023 at 11:45 AM

## 2023-11-13 NOTE — PATIENT INSTRUCTIONS
[address] and Hyperbaric Oxygen Therapy   Physician Orders and Discharge Instructions  1830 [facility],[address] [place] [address] Bl, [address]  Telephone: [phone]    NAME:  Hilda Hill  YOB: 1980  MEDICAL RECORD NUMBER:  [id number]  DATE:  11/13/23    Discharge condition: Stable    Discharge to: Home    Left via:Private automobile    Accompanied by:  spouse    ECF/HHA:  [facility]           Dressing Orders:        Coccyx: Wash with soap and water. Apply collagen, then place 1/4 STR Dakins moistened gauze to wound bed. Cover with gauze. Secure with roll gauze and medipore tape. Change twice daily . Treatment Orders:   Protein rich diet   Multivitamin   Avoid pressure by always sitting with MEMORY FOAM CUSHION OR PILLOW     PRESSURE RELIEF INSTRUCTIONS  Avoid Pressure to wound site. Turn frequently (turn at least every two hours when in bed)  While in chair reposition every 30 minutes               [address] [place] [address] follow up visit ____________2 weeks_________________  (Please note your next appointment above and if you are unable to keep, kindly give a 24 hour notice. Thank you.)          If you experience any of the following, please call the [address] [place] [address] during business hours:    * Increase in Pain  * Temperature over 101  * Increase in drainage from your wound  * Drainage with a foul odor  * Bleeding  * Increase in swelling  * Need for compression bandage changes due to slippage, breakthrough drainage. If you need medical attention outside of the business hours of the [address] [place] [address] please contact your PCP or go to the nearest emergency room.

## 2023-11-13 NOTE — HOME CARE
[address] [phone]     Ordering Center:     [address] [place] [id number] [phone]  WOUND CARE Dept: [id number] [place] [address] [phone]    Patient Information:      Stefano Ahr  [address] [address]   [phone]   : 1980  AGE: 37 y.o. GENDER: male   EPISODE DATE: 2023    Insurance:      PRIMARY INSURANCE:  Plan: [place] MEDICAID  Coverage: [place] MEDICAID  Effective Date: 2015  Group Number: [unfilled]  Subscriber Number: [id number] - (Medicaid Managed)    Payer/Plan Subscr  Sex Relation Sub. Ins. ID Effective Group Num   1.  [place]BS [place] MEDICElson Ahr 1980 Male Self [id number] 1/1/15 [id number]                                   [address] [address]       Patient Wound Information:      Problem List Items Addressed This Visit          Endocrine    Type 2 diabetes mellitus with skin complication, with long-term current use of insulin ([address]) - Primary (Chronic)    Relevant Medications    lidocaine (XYLOCAINE) 2 % uro-jet (Start on 2023 12:00 PM)    Other Relevant Orders    Initiate Outpatient Wound Care Protocol       Other    Sacral ulcer, with fat layer exposed ([address]) (Chronic)    Relevant Medications    lidocaine (XYLOCAINE) 2 % uro-jet (Start on 2023 12:00 PM)    Other Relevant Orders    Initiate Outpatient Wound Care Protocol    Pilonidal cyst    Relevant Medications    lidocaine (XYLOCAINE) 2 % uro-jet (Start on 2023 12:00 PM)    Other Relevant Orders    Initiate Outpatient Wound Care Protocol       WOUNDS REQUIRING DRESSING SUPPLIES:     Wound 23 Coccyx Medial wound 1-coccyx surgical (Active)   Wound Image   23 1121   Wound Etiology Surgical 23 1121   Dressing Status Old drainage noted 23 1121   Wound Cleansed Soap and water 23

## 2023-11-27 ENCOUNTER — HOSPITAL ENCOUNTER (OUTPATIENT)
Dept: WOUND CARE | Age: 43
Discharge: HOME | End: 2023-11-27
Payer: MEDICAID

## 2023-11-27 VITALS
SYSTOLIC BLOOD PRESSURE: 124 MMHG | DIASTOLIC BLOOD PRESSURE: 91 MMHG | TEMPERATURE: 96.9 F | BODY MASS INDEX: 35.56 KG/M2 | HEART RATE: 108 BPM | HEIGHT: 71 IN | RESPIRATION RATE: 18 BRPM | WEIGHT: 254 LBS

## 2023-11-27 DIAGNOSIS — E11.622 TYPE 2 DIABETES MELLITUS WITH OTHER SKIN ULCER, WITH LONG-TERM CURRENT USE OF INSULIN (HCC): Primary | ICD-10-CM

## 2023-11-27 DIAGNOSIS — L05.91 PILONIDAL CYST: ICD-10-CM

## 2023-11-27 DIAGNOSIS — L98.422 SACRAL ULCER, WITH FAT LAYER EXPOSED (HCC): ICD-10-CM

## 2023-11-27 DIAGNOSIS — Z79.4 TYPE 2 DIABETES MELLITUS WITH OTHER SKIN ULCER, WITH LONG-TERM CURRENT USE OF INSULIN (HCC): Primary | ICD-10-CM

## 2023-11-27 PROCEDURE — 97597 DBRDMT OPN WND 1ST 20 CM/<: CPT

## 2023-11-27 PROCEDURE — 97597 DBRDMT OPN WND 1ST 20 CM/<: CPT | Performed by: NURSE PRACTITIONER

## 2023-11-27 RX ORDER — GENTAMICIN SULFATE 1 MG/G
OINTMENT TOPICAL ONCE
OUTPATIENT
Start: 2023-11-27 | End: 2023-11-27

## 2023-11-27 RX ORDER — LIDOCAINE HYDROCHLORIDE 40 MG/ML
SOLUTION TOPICAL ONCE
OUTPATIENT
Start: 2023-11-27 | End: 2023-11-27

## 2023-11-27 RX ORDER — BACITRACIN ZINC AND POLYMYXIN B SULFATE 500; 1000 [USP'U]/G; [USP'U]/G
OINTMENT TOPICAL ONCE
OUTPATIENT
Start: 2023-11-27 | End: 2023-11-27

## 2023-11-27 RX ORDER — TRIAMCINOLONE ACETONIDE 1 MG/G
OINTMENT TOPICAL ONCE
OUTPATIENT
Start: 2023-11-27 | End: 2023-11-27

## 2023-11-27 RX ORDER — GINSENG 100 MG
CAPSULE ORAL ONCE
OUTPATIENT
Start: 2023-11-27 | End: 2023-11-27

## 2023-11-27 RX ORDER — CLOBETASOL PROPIONATE 0.5 MG/G
OINTMENT TOPICAL ONCE
OUTPATIENT
Start: 2023-11-27 | End: 2023-11-27

## 2023-11-27 RX ORDER — LIDOCAINE HYDROCHLORIDE 20 MG/ML
JELLY TOPICAL ONCE
OUTPATIENT
Start: 2023-11-27 | End: 2023-11-27

## 2023-11-27 RX ORDER — LIDOCAINE 40 MG/G
CREAM TOPICAL ONCE
OUTPATIENT
Start: 2023-11-27 | End: 2023-11-27

## 2023-11-27 RX ORDER — LIDOCAINE HYDROCHLORIDE 20 MG/ML
JELLY TOPICAL ONCE
Status: COMPLETED | OUTPATIENT
Start: 2023-11-27 | End: 2023-11-27

## 2023-11-27 RX ORDER — BETAMETHASONE DIPROPIONATE 0.05 %
OINTMENT (GRAM) TOPICAL ONCE
OUTPATIENT
Start: 2023-11-27 | End: 2023-11-27

## 2023-11-27 RX ORDER — LIDOCAINE 50 MG/G
OINTMENT TOPICAL ONCE
OUTPATIENT
Start: 2023-11-27 | End: 2023-11-27

## 2023-11-27 RX ORDER — IBUPROFEN 200 MG
TABLET ORAL ONCE
OUTPATIENT
Start: 2023-11-27 | End: 2023-11-27

## 2023-11-27 RX ORDER — SODIUM CHLOR/HYPOCHLOROUS ACID 0.033 %
SOLUTION, IRRIGATION IRRIGATION ONCE
OUTPATIENT
Start: 2023-11-27 | End: 2023-11-27

## 2023-11-27 RX ADMIN — LIDOCAINE HYDROCHLORIDE: 20 JELLY TOPICAL at 11:55

## 2023-11-27 ASSESSMENT — PAIN SCALES - GENERAL: PAINLEVEL_OUTOF10: 5

## 2023-11-27 ASSESSMENT — PAIN DESCRIPTION - DESCRIPTORS: DESCRIPTORS: BURNING;DISCOMFORT

## 2023-11-27 ASSESSMENT — PAIN DESCRIPTION - FREQUENCY: FREQUENCY: INTERMITTENT

## 2023-11-27 ASSESSMENT — PAIN DESCRIPTION - LOCATION: LOCATION: COCCYX

## 2023-11-27 ASSESSMENT — PAIN DESCRIPTION - PAIN TYPE: TYPE: ACUTE PAIN

## 2023-11-27 ASSESSMENT — PAIN DESCRIPTION - ONSET: ONSET: ON-GOING

## 2023-11-27 ASSESSMENT — PAIN - FUNCTIONAL ASSESSMENT: PAIN_FUNCTIONAL_ASSESSMENT: PREVENTS OR INTERFERES SOME ACTIVE ACTIVITIES AND ADLS

## 2023-11-27 NOTE — PROGRESS NOTES
(Chronic)    Relevant Orders    Initiate Outpatient Wound Care Protocol    Pilonidal cyst    Relevant Orders    Initiate Outpatient Wound Care Protocol       Treatment Note please see attached Discharge Instructions    In my professional opinion this patient would benefit from HBO Therapy: No    Written patient dismissal instructions given to patient and signed by patient or POA. Mr. Nilay Contreras wound is healing well, follow up in 2 weeks.   Electronically signed by CHRISS Terry CNP on 11/27/2023 at 12:02 PM

## 2024-03-11 NOTE — DISCHARGE INSTRUCTIONS
[address] and Hyperbaric Oxygen Therapy   Physician Orders and Discharge Instructions  1830 [facility],[address] [place] [address] Blvd, [address]  Telephone: [phone]    NAME:  Varinder Molina  YOB: 1980  MEDICAL RECORD NUMBER:  [id number]  DATE:  8/21/2023    Discharge condition: Stable    Discharge to: Home    Left via:Private automobile    Accompanied by:  spouse    ECF/HHA: [facility]     Dressing Orders:   Coccyx Wound:   Left buttocks place white foam to wound bed, bridge with coccyx dressing. Wash with soap and water  Apply wound vac as follows: Apply Skin Prep to periwound. Apply drape - window pane to surrounding area (periwound). (May use duoderm instead of drape to prevent skin breakdown. Use ostomy paste to fill any creases to prevent leakage. If skin becomes red due to tape irritation please apply skin prep. Then apply light dusting of ostomy powder or antifungal powder to periwound. Then blot with skin prep to form crusting to protect skin. May repeat skin prep, powdering steps until proper crusting is made. )   Then apply drape to periwound. DO NOT PUT FOAM ON GOOD SKIN. Apply PROMOGRAN then tuck black foam to wound bed. BRIDGE to hip. Set wound vac to 150 mmHG, continuous. Change wound vac dressing 3 times per week (MWF or TTS)   Reapply vac dressing if vac stops working or dressing begins to leak or cannot be reinforced. Alternative dressing: Wash with soap and water. Apply 1/4 STR Dakins moistened gauze to wound bed. Cover with gauze. Secure with roll gauze and medipore tape. Change twice daily . Treatment Orders:   Protein rich diet   Multivitamin   Avoid pressure by always sitting with MEMORY FOAM CUSHION OR PILLOW     [address] [facility] follow up visit ________keep scheduled follow-up_____________________  (Please note your next appointment above and if you are unable to keep, kindly give a 24 hour notice.  Thank you.)          If you
Detail Level: Zone
Plan: Doing well 3 months post Accutane. Discussed option of retinol now. Patient will call if she decides she wants to start it.

## 2025-04-20 NOTE — TELEPHONE ENCOUNTER
[facility] New Patient Questionnaire    Diagnosis/Reason for Referral?    Intervertebral disc disorders with radiculopathy, lumbar region    2. Who is completing questionnaire? Patient  x Caregiver Family      3. Has the patient had any previous spinal/brain surgeries? no        A. If yes, what is the name of the facility in which the surgery was performed? B. Procedure/Surgery performed? C. Who was the surgeon? D. When was the surgery? MM/YY       E. Did the patient improve after the surgery? 4. Is this a second opinion? If yes, Dr. Flores Stands would like to review patient first before making the appointment. 5. Have MRI Images been obtain within the last year? Yes x No      XR  CT     If yes, [address] was the [facility] performed[address]      If yes, what part of the body? Lumbar  x Cervical  Thoracic  Brain     If yes, when was it obtained? 2020    Note: if the scan was performed at a facility other than [facility], the disc will need to be brought to the appointment or we need to reach out to obtain the disc. A. Was the patient instructed to provide the disc? Yes  x No      8. Has the patient had a NCV/EMG within the last year? Yes  No x     If yes, where was it performed and date? MM/YY  Location:      9. Has the patient been to Physical Therapy? Yes  No x     If yes, what location, how long attended, and last visit? Location:        Therapy Lasted:    Date of Last Visit:      10. Has the patient been to Pain Management? Yes x No     If yes, what location and last visit     Location: [facility]   Last Visit:   Is it helping?
Pt called and stated that his MRI has been more than a year, Will have Ange order X-ray before patient appointment. Patient has been informed to arrive 1 hr early and to go to Outpatient registration to register. Patient voiced understanding.
Right arm;

## (undated) DEVICE — SUTURE PERMAHAND SZ 2-0 L18IN NONABSORBABLE BLK L26MM FS 685G

## (undated) DEVICE — BANDAGE,GAUZE,4.5"X4.1YD,STERILE,LF: Brand: MEDLINE

## (undated) DEVICE — DRAIN SURG PENROSE 0.5X12 IN PREM SIL STRL

## (undated) DEVICE — MINOR CDS: Brand: MEDLINE INDUSTRIES, INC.

## (undated) DEVICE — GAUZE,SPONGE,FLUFF,6"X6.75",STRL,10/TRAY: Brand: MEDLINE

## (undated) DEVICE — GLOVE SURG SZ 7 CRM LTX FREE POLYISOPRENE POLYMER BEAD ANTI

## (undated) DEVICE — DRAPE,UTILITY,XL,4/PK,STERILE: Brand: MEDLINE

## (undated) DEVICE — ROYAL SILK SURGICAL GOWN, XXL: Brand: CONVERTORS

## (undated) DEVICE — YANKAUER,TAPERED BULBOUS TIP,W/O VENT: Brand: MEDLINE